# Patient Record
Sex: FEMALE | Race: WHITE | NOT HISPANIC OR LATINO | Employment: OTHER | URBAN - METROPOLITAN AREA
[De-identification: names, ages, dates, MRNs, and addresses within clinical notes are randomized per-mention and may not be internally consistent; named-entity substitution may affect disease eponyms.]

---

## 2017-04-03 ENCOUNTER — TRANSFERRED RECORDS (OUTPATIENT)
Dept: HEALTH INFORMATION MANAGEMENT | Facility: CLINIC | Age: 62
End: 2017-04-03

## 2018-06-05 ENCOUNTER — TRANSFERRED RECORDS (OUTPATIENT)
Dept: HEALTH INFORMATION MANAGEMENT | Facility: CLINIC | Age: 63
End: 2018-06-05

## 2018-12-18 ENCOUNTER — TRANSFERRED RECORDS (OUTPATIENT)
Dept: MULTI SPECIALTY CLINIC | Facility: CLINIC | Age: 63
End: 2018-12-18

## 2019-10-07 ENCOUNTER — ANCILLARY PROCEDURE (OUTPATIENT)
Dept: MAMMOGRAPHY | Facility: CLINIC | Age: 64
End: 2019-10-07
Attending: FAMILY MEDICINE
Payer: COMMERCIAL

## 2019-10-07 DIAGNOSIS — Z12.31 VISIT FOR SCREENING MAMMOGRAM: ICD-10-CM

## 2019-10-07 PROCEDURE — 77067 SCR MAMMO BI INCL CAD: CPT | Mod: TC

## 2019-10-07 PROCEDURE — 77063 BREAST TOMOSYNTHESIS BI: CPT | Mod: TC

## 2019-12-19 ENCOUNTER — TRANSFERRED RECORDS (OUTPATIENT)
Dept: HEALTH INFORMATION MANAGEMENT | Facility: CLINIC | Age: 64
End: 2019-12-19

## 2020-06-17 ENCOUNTER — HOSPITAL ENCOUNTER (OUTPATIENT)
Facility: CLINIC | Age: 65
Setting detail: OBSERVATION
Discharge: HOME | End: 2020-06-20
Attending: EMERGENCY MEDICINE | Admitting: INTERNAL MEDICINE
Payer: COMMERCIAL

## 2020-06-17 DIAGNOSIS — J18.9 PNEUMONIA OF LEFT LOWER LOBE DUE TO INFECTIOUS ORGANISM: ICD-10-CM

## 2020-06-17 DIAGNOSIS — E11.00 TYPE 2 DIABETES MELLITUS WITH HYPEROSMOLARITY WITHOUT COMA, UNSPECIFIED WHETHER LONG TERM INSULIN USE (H): Primary | ICD-10-CM

## 2020-06-17 PROCEDURE — C9803 HOPD COVID-19 SPEC COLLECT: HCPCS

## 2020-06-17 PROCEDURE — 96365 THER/PROPH/DIAG IV INF INIT: CPT | Mod: 59

## 2020-06-17 PROCEDURE — 99285 EMERGENCY DEPT VISIT HI MDM: CPT | Mod: 25

## 2020-06-17 PROCEDURE — 96375 TX/PRO/DX INJ NEW DRUG ADDON: CPT | Mod: 59

## 2020-06-17 PROCEDURE — 96361 HYDRATE IV INFUSION ADD-ON: CPT

## 2020-06-17 PROCEDURE — 93005 ELECTROCARDIOGRAM TRACING: CPT

## 2020-06-17 ASSESSMENT — MIFFLIN-ST. JEOR: SCORE: 1900.79

## 2020-06-18 ENCOUNTER — APPOINTMENT (OUTPATIENT)
Dept: CT IMAGING | Facility: CLINIC | Age: 65
End: 2020-06-18
Attending: EMERGENCY MEDICINE
Payer: COMMERCIAL

## 2020-06-18 ENCOUNTER — APPOINTMENT (OUTPATIENT)
Dept: GENERAL RADIOLOGY | Facility: CLINIC | Age: 65
End: 2020-06-18
Attending: EMERGENCY MEDICINE
Payer: COMMERCIAL

## 2020-06-18 PROBLEM — J18.9 PNEUMONIA: Status: ACTIVE | Noted: 2020-06-18

## 2020-06-18 LAB
ALBUMIN SERPL-MCNC: 2.7 G/DL (ref 3.4–5)
ALBUMIN SERPL-MCNC: 3.3 G/DL (ref 3.4–5)
ALBUMIN UR-MCNC: 10 MG/DL
ALP SERPL-CCNC: 78 U/L (ref 40–150)
ALT SERPL W P-5'-P-CCNC: 31 U/L (ref 0–50)
ANION GAP SERPL CALCULATED.3IONS-SCNC: 4 MMOL/L (ref 3–14)
ANION GAP SERPL CALCULATED.3IONS-SCNC: 6 MMOL/L (ref 3–14)
APPEARANCE UR: CLEAR
AST SERPL W P-5'-P-CCNC: 13 U/L (ref 0–45)
BASOPHILS # BLD AUTO: 0 10E9/L (ref 0–0.2)
BASOPHILS NFR BLD AUTO: 0.2 %
BILIRUB SERPL-MCNC: 0.9 MG/DL (ref 0.2–1.3)
BILIRUB UR QL STRIP: NEGATIVE
BUN SERPL-MCNC: 16 MG/DL (ref 7–30)
BUN SERPL-MCNC: 16 MG/DL (ref 7–30)
CALCIUM SERPL-MCNC: 11.8 MG/DL (ref 8.5–10.1)
CALCIUM SERPL-MCNC: 9.9 MG/DL (ref 8.5–10.1)
CHLORIDE SERPL-SCNC: 101 MMOL/L (ref 94–109)
CHLORIDE SERPL-SCNC: 95 MMOL/L (ref 94–109)
CO2 SERPL-SCNC: 29 MMOL/L (ref 20–32)
CO2 SERPL-SCNC: 29 MMOL/L (ref 20–32)
COLOR UR AUTO: YELLOW
CREAT SERPL-MCNC: 0.8 MG/DL (ref 0.52–1.04)
CREAT SERPL-MCNC: 0.82 MG/DL (ref 0.52–1.04)
D DIMER PPP FEU-MCNC: 0.6 UG/ML FEU (ref 0–0.5)
DIFFERENTIAL METHOD BLD: ABNORMAL
EOSINOPHIL # BLD AUTO: 0.1 10E9/L (ref 0–0.7)
EOSINOPHIL NFR BLD AUTO: 0.7 %
ERYTHROCYTE [DISTWIDTH] IN BLOOD BY AUTOMATED COUNT: 12.9 % (ref 10–15)
GFR SERPL CREATININE-BSD FRML MDRD: 76 ML/MIN/{1.73_M2}
GFR SERPL CREATININE-BSD FRML MDRD: 78 ML/MIN/{1.73_M2}
GLUCOSE BLDC GLUCOMTR-MCNC: 282 MG/DL (ref 70–99)
GLUCOSE BLDC GLUCOMTR-MCNC: 306 MG/DL (ref 70–99)
GLUCOSE BLDC GLUCOMTR-MCNC: 318 MG/DL (ref 70–99)
GLUCOSE BLDC GLUCOMTR-MCNC: 357 MG/DL (ref 70–99)
GLUCOSE BLDC GLUCOMTR-MCNC: 382 MG/DL (ref 70–99)
GLUCOSE SERPL-MCNC: 321 MG/DL (ref 70–99)
GLUCOSE SERPL-MCNC: 420 MG/DL (ref 70–99)
GLUCOSE UR STRIP-MCNC: >1000 MG/DL
HBA1C MFR BLD: 12.4 % (ref 0–5.6)
HCT VFR BLD AUTO: 44.9 % (ref 35–47)
HGB BLD-MCNC: 14.7 G/DL (ref 11.7–15.7)
HGB UR QL STRIP: NEGATIVE
IMM GRANULOCYTES # BLD: 0.1 10E9/L (ref 0–0.4)
IMM GRANULOCYTES NFR BLD: 0.4 %
INTERPRETATION ECG - MUSE: NORMAL
KETONES UR STRIP-MCNC: 10 MG/DL
LACTATE BLD-SCNC: 1.5 MMOL/L (ref 0.7–2)
LEUKOCYTE ESTERASE UR QL STRIP: NEGATIVE
LIPASE SERPL-CCNC: 191 U/L (ref 73–393)
LYMPHOCYTES # BLD AUTO: 1.4 10E9/L (ref 0.8–5.3)
LYMPHOCYTES NFR BLD AUTO: 8.3 %
MAGNESIUM SERPL-MCNC: 1.7 MG/DL (ref 1.6–2.3)
MCH RBC QN AUTO: 31.5 PG (ref 26.5–33)
MCHC RBC AUTO-ENTMCNC: 32.7 G/DL (ref 31.5–36.5)
MCV RBC AUTO: 96 FL (ref 78–100)
MONOCYTES # BLD AUTO: 1.6 10E9/L (ref 0–1.3)
MONOCYTES NFR BLD AUTO: 9.8 %
MUCOUS THREADS #/AREA URNS LPF: PRESENT /LPF
NEUTROPHILS # BLD AUTO: 13.3 10E9/L (ref 1.6–8.3)
NEUTROPHILS NFR BLD AUTO: 80.6 %
NITRATE UR QL: NEGATIVE
NRBC # BLD AUTO: 0 10*3/UL
NRBC BLD AUTO-RTO: 0 /100
OSMOLALITY UR: 814 MMOL/KG (ref 100–1200)
PH UR STRIP: 6 PH (ref 5–7)
PHOSPHATE SERPL-MCNC: 1.9 MG/DL (ref 2.5–4.5)
PLATELET # BLD AUTO: 358 10E9/L (ref 150–450)
POTASSIUM SERPL-SCNC: 3.8 MMOL/L (ref 3.4–5.3)
POTASSIUM SERPL-SCNC: 3.9 MMOL/L (ref 3.4–5.3)
PROT SERPL-MCNC: 9.3 G/DL (ref 6.8–8.8)
RBC # BLD AUTO: 4.67 10E12/L (ref 3.8–5.2)
RBC #/AREA URNS AUTO: <1 /HPF (ref 0–2)
SARS-COV-2 RNA SPEC QL NAA+PROBE: NOT DETECTED
SODIUM SERPL-SCNC: 130 MMOL/L (ref 133–144)
SODIUM SERPL-SCNC: 134 MMOL/L (ref 133–144)
SODIUM UR-SCNC: 36 MMOL/L
SOURCE: ABNORMAL
SP GR UR STRIP: 1.01 (ref 1–1.03)
SPECIMEN SOURCE: NORMAL
SQUAMOUS #/AREA URNS AUTO: 4 /HPF (ref 0–1)
TROPONIN I SERPL-MCNC: <0.015 UG/L (ref 0–0.04)
UROBILINOGEN UR STRIP-MCNC: NORMAL MG/DL (ref 0–2)
WBC # BLD AUTO: 16.5 10E9/L (ref 4–11)
WBC #/AREA URNS AUTO: 2 /HPF (ref 0–5)

## 2020-06-18 PROCEDURE — 85025 COMPLETE CBC W/AUTO DIFF WBC: CPT | Performed by: EMERGENCY MEDICINE

## 2020-06-18 PROCEDURE — 25000125 ZZHC RX 250: Performed by: HOSPITALIST

## 2020-06-18 PROCEDURE — 99220 ZZC INITIAL OBSERVATION CARE,LEVL III: CPT | Performed by: INTERNAL MEDICINE

## 2020-06-18 PROCEDURE — 96365 THER/PROPH/DIAG IV INF INIT: CPT | Mod: 59

## 2020-06-18 PROCEDURE — 84484 ASSAY OF TROPONIN QUANT: CPT | Performed by: EMERGENCY MEDICINE

## 2020-06-18 PROCEDURE — 71045 X-RAY EXAM CHEST 1 VIEW: CPT

## 2020-06-18 PROCEDURE — 25000131 ZZH RX MED GY IP 250 OP 636 PS 637: Performed by: INTERNAL MEDICINE

## 2020-06-18 PROCEDURE — 83036 HEMOGLOBIN GLYCOSYLATED A1C: CPT | Performed by: EMERGENCY MEDICINE

## 2020-06-18 PROCEDURE — U0003 INFECTIOUS AGENT DETECTION BY NUCLEIC ACID (DNA OR RNA); SEVERE ACUTE RESPIRATORY SYNDROME CORONAVIRUS 2 (SARS-COV-2) (CORONAVIRUS DISEASE [COVID-19]), AMPLIFIED PROBE TECHNIQUE, MAKING USE OF HIGH THROUGHPUT TECHNOLOGIES AS DESCRIBED BY CMS-2020-01-R: HCPCS | Performed by: EMERGENCY MEDICINE

## 2020-06-18 PROCEDURE — 83605 ASSAY OF LACTIC ACID: CPT | Performed by: INTERNAL MEDICINE

## 2020-06-18 PROCEDURE — 83735 ASSAY OF MAGNESIUM: CPT | Performed by: INTERNAL MEDICINE

## 2020-06-18 PROCEDURE — 96372 THER/PROPH/DIAG INJ SC/IM: CPT

## 2020-06-18 PROCEDURE — 25000132 ZZH RX MED GY IP 250 OP 250 PS 637: Performed by: EMERGENCY MEDICINE

## 2020-06-18 PROCEDURE — 83690 ASSAY OF LIPASE: CPT | Performed by: EMERGENCY MEDICINE

## 2020-06-18 PROCEDURE — 00000146 ZZHCL STATISTIC GLUCOSE BY METER IP

## 2020-06-18 PROCEDURE — 25000132 ZZH RX MED GY IP 250 OP 250 PS 637: Performed by: INTERNAL MEDICINE

## 2020-06-18 PROCEDURE — 96375 TX/PRO/DX INJ NEW DRUG ADDON: CPT | Mod: 59

## 2020-06-18 PROCEDURE — 36415 COLL VENOUS BLD VENIPUNCTURE: CPT | Performed by: INTERNAL MEDICINE

## 2020-06-18 PROCEDURE — G0378 HOSPITAL OBSERVATION PER HR: HCPCS

## 2020-06-18 PROCEDURE — 80053 COMPREHEN METABOLIC PANEL: CPT | Performed by: EMERGENCY MEDICINE

## 2020-06-18 PROCEDURE — 96376 TX/PRO/DX INJ SAME DRUG ADON: CPT | Mod: 59

## 2020-06-18 PROCEDURE — 87040 BLOOD CULTURE FOR BACTERIA: CPT | Performed by: EMERGENCY MEDICINE

## 2020-06-18 PROCEDURE — 85379 FIBRIN DEGRADATION QUANT: CPT | Performed by: EMERGENCY MEDICINE

## 2020-06-18 PROCEDURE — 71275 CT ANGIOGRAPHY CHEST: CPT

## 2020-06-18 PROCEDURE — 99207 ZZC NON-BILLABLE SERV PER CHARTING: CPT | Performed by: PHYSICIAN ASSISTANT

## 2020-06-18 PROCEDURE — 25800030 ZZH RX IP 258 OP 636: Performed by: HOSPITALIST

## 2020-06-18 PROCEDURE — C9113 INJ PANTOPRAZOLE SODIUM, VIA: HCPCS | Performed by: INTERNAL MEDICINE

## 2020-06-18 PROCEDURE — 25000128 H RX IP 250 OP 636: Performed by: EMERGENCY MEDICINE

## 2020-06-18 PROCEDURE — 25800030 ZZH RX IP 258 OP 636: Performed by: EMERGENCY MEDICINE

## 2020-06-18 PROCEDURE — 81001 URINALYSIS AUTO W/SCOPE: CPT | Performed by: EMERGENCY MEDICINE

## 2020-06-18 PROCEDURE — 99207 ZZC NON-BILLABLE SERV PER CHARTING: CPT | Performed by: INTERNAL MEDICINE

## 2020-06-18 PROCEDURE — 25000128 H RX IP 250 OP 636: Performed by: INTERNAL MEDICINE

## 2020-06-18 PROCEDURE — 25000125 ZZHC RX 250: Performed by: EMERGENCY MEDICINE

## 2020-06-18 PROCEDURE — 83935 ASSAY OF URINE OSMOLALITY: CPT | Performed by: EMERGENCY MEDICINE

## 2020-06-18 PROCEDURE — 96361 HYDRATE IV INFUSION ADD-ON: CPT

## 2020-06-18 PROCEDURE — 84300 ASSAY OF URINE SODIUM: CPT | Performed by: EMERGENCY MEDICINE

## 2020-06-18 PROCEDURE — 80069 RENAL FUNCTION PANEL: CPT | Performed by: INTERNAL MEDICINE

## 2020-06-18 RX ORDER — ONDANSETRON 4 MG/1
4 TABLET, ORALLY DISINTEGRATING ORAL EVERY 6 HOURS PRN
Status: DISCONTINUED | OUTPATIENT
Start: 2020-06-18 | End: 2020-06-20 | Stop reason: HOSPADM

## 2020-06-18 RX ORDER — PROCHLORPERAZINE 25 MG
25 SUPPOSITORY, RECTAL RECTAL EVERY 12 HOURS PRN
Status: DISCONTINUED | OUTPATIENT
Start: 2020-06-18 | End: 2020-06-20 | Stop reason: HOSPADM

## 2020-06-18 RX ORDER — METHOCARBAMOL 500 MG/1
500 TABLET, FILM COATED ORAL ONCE
Status: COMPLETED | OUTPATIENT
Start: 2020-06-18 | End: 2020-06-18

## 2020-06-18 RX ORDER — CEFTRIAXONE 2 G/1
2 INJECTION, POWDER, FOR SOLUTION INTRAMUSCULAR; INTRAVENOUS EVERY 24 HOURS
Status: DISCONTINUED | OUTPATIENT
Start: 2020-06-19 | End: 2020-06-20 | Stop reason: HOSPADM

## 2020-06-18 RX ORDER — CEFTRIAXONE 1 G/1
1 INJECTION, POWDER, FOR SOLUTION INTRAMUSCULAR; INTRAVENOUS ONCE
Status: COMPLETED | OUTPATIENT
Start: 2020-06-18 | End: 2020-06-18

## 2020-06-18 RX ORDER — TRIAMCINOLONE ACETONIDE 1 MG/G
CREAM TOPICAL DAILY PRN
COMMUNITY
End: 2021-10-14

## 2020-06-18 RX ORDER — IBUPROFEN 600 MG/1
600 TABLET, FILM COATED ORAL 3 TIMES DAILY
Status: DISCONTINUED | OUTPATIENT
Start: 2020-06-18 | End: 2020-06-19

## 2020-06-18 RX ORDER — DEXTROSE MONOHYDRATE 25 G/50ML
25-50 INJECTION, SOLUTION INTRAVENOUS
Status: DISCONTINUED | OUTPATIENT
Start: 2020-06-18 | End: 2020-06-20 | Stop reason: HOSPADM

## 2020-06-18 RX ORDER — LEVOTHYROXINE SODIUM 100 UG/1
100 TABLET ORAL DAILY
COMMUNITY
End: 2020-07-20

## 2020-06-18 RX ORDER — NICOTINE POLACRILEX 4 MG
15-30 LOZENGE BUCCAL
Status: DISCONTINUED | OUTPATIENT
Start: 2020-06-18 | End: 2020-06-20 | Stop reason: HOSPADM

## 2020-06-18 RX ORDER — FUROSEMIDE 40 MG
40 TABLET ORAL DAILY PRN
COMMUNITY
End: 2021-08-24

## 2020-06-18 RX ORDER — AZITHROMYCIN 500 MG/1
500 INJECTION, POWDER, LYOPHILIZED, FOR SOLUTION INTRAVENOUS ONCE
Status: COMPLETED | OUTPATIENT
Start: 2020-06-18 | End: 2020-06-18

## 2020-06-18 RX ORDER — HYDROMORPHONE HYDROCHLORIDE 1 MG/ML
0.5 INJECTION, SOLUTION INTRAMUSCULAR; INTRAVENOUS; SUBCUTANEOUS
Status: DISCONTINUED | OUTPATIENT
Start: 2020-06-18 | End: 2020-06-18

## 2020-06-18 RX ORDER — LOSARTAN POTASSIUM 25 MG/1
25 TABLET ORAL DAILY
COMMUNITY
End: 2020-07-20

## 2020-06-18 RX ORDER — TRIAMTERENE AND HYDROCHLOROTHIAZIDE 37.5; 25 MG/1; MG/1
2 CAPSULE ORAL EVERY MORNING
COMMUNITY
End: 2020-10-01

## 2020-06-18 RX ORDER — ACETAMINOPHEN 325 MG/1
650 TABLET ORAL EVERY 4 HOURS PRN
Status: DISCONTINUED | OUTPATIENT
Start: 2020-06-18 | End: 2020-06-20 | Stop reason: HOSPADM

## 2020-06-18 RX ORDER — SODIUM CHLORIDE 9 MG/ML
1000 INJECTION, SOLUTION INTRAVENOUS CONTINUOUS
Status: ACTIVE | OUTPATIENT
Start: 2020-06-18 | End: 2020-06-18

## 2020-06-18 RX ORDER — METFORMIN HCL 500 MG
1000 TABLET, EXTENDED RELEASE 24 HR ORAL
COMMUNITY
End: 2020-09-18

## 2020-06-18 RX ORDER — PROCHLORPERAZINE MALEATE 5 MG
10 TABLET ORAL EVERY 6 HOURS PRN
Status: DISCONTINUED | OUTPATIENT
Start: 2020-06-18 | End: 2020-06-20 | Stop reason: HOSPADM

## 2020-06-18 RX ORDER — ONDANSETRON 2 MG/ML
4 INJECTION INTRAMUSCULAR; INTRAVENOUS EVERY 6 HOURS PRN
Status: DISCONTINUED | OUTPATIENT
Start: 2020-06-18 | End: 2020-06-20 | Stop reason: HOSPADM

## 2020-06-18 RX ORDER — CALCIUM CARBONATE 500 MG/1
TABLET, CHEWABLE ORAL
Status: DISCONTINUED
Start: 2020-06-18 | End: 2020-06-19

## 2020-06-18 RX ORDER — ACETAMINOPHEN 325 MG/1
TABLET ORAL
Status: DISCONTINUED
Start: 2020-06-18 | End: 2020-06-19

## 2020-06-18 RX ORDER — MULTIVITAMIN,THERAPEUTIC
1 TABLET ORAL DAILY
COMMUNITY

## 2020-06-18 RX ORDER — IOPAMIDOL 755 MG/ML
83 INJECTION, SOLUTION INTRAVASCULAR ONCE
Status: COMPLETED | OUTPATIENT
Start: 2020-06-18 | End: 2020-06-18

## 2020-06-18 RX ORDER — PRAVASTATIN SODIUM 20 MG
20 TABLET ORAL DAILY
COMMUNITY
End: 2020-06-25

## 2020-06-18 RX ORDER — ACETAMINOPHEN 650 MG/1
650 SUPPOSITORY RECTAL EVERY 4 HOURS PRN
Status: DISCONTINUED | OUTPATIENT
Start: 2020-06-18 | End: 2020-06-20 | Stop reason: HOSPADM

## 2020-06-18 RX ORDER — HYDROMORPHONE HYDROCHLORIDE 1 MG/ML
.3-.5 INJECTION, SOLUTION INTRAMUSCULAR; INTRAVENOUS; SUBCUTANEOUS
Status: DISCONTINUED | OUTPATIENT
Start: 2020-06-18 | End: 2020-06-20 | Stop reason: HOSPADM

## 2020-06-18 RX ORDER — ALLOPURINOL 300 MG/1
300 TABLET ORAL DAILY
COMMUNITY
End: 2021-01-18

## 2020-06-18 RX ORDER — LIDOCAINE 40 MG/G
CREAM TOPICAL
Status: DISCONTINUED | OUTPATIENT
Start: 2020-06-18 | End: 2020-06-20 | Stop reason: HOSPADM

## 2020-06-18 RX ORDER — CALCIUM CARBONATE 500 MG/1
1000 TABLET, CHEWABLE ORAL EVERY 4 HOURS PRN
Status: DISCONTINUED | OUTPATIENT
Start: 2020-06-18 | End: 2020-06-20 | Stop reason: HOSPADM

## 2020-06-18 RX ORDER — SODIUM CHLORIDE 9 MG/ML
1000 INJECTION, SOLUTION INTRAVENOUS CONTINUOUS
Status: DISCONTINUED | OUTPATIENT
Start: 2020-06-18 | End: 2020-06-18

## 2020-06-18 RX ADMIN — IBUPROFEN 600 MG: 600 TABLET ORAL at 08:51

## 2020-06-18 RX ADMIN — SODIUM CHLORIDE 1000 ML: 9 INJECTION, SOLUTION INTRAVENOUS at 03:56

## 2020-06-18 RX ADMIN — SODIUM CHLORIDE 1000 ML: 9 INJECTION, SOLUTION INTRAVENOUS at 01:36

## 2020-06-18 RX ADMIN — LIDOCAINE HYDROCHLORIDE 30 ML: 20 SOLUTION ORAL; TOPICAL at 00:07

## 2020-06-18 RX ADMIN — IBUPROFEN 600 MG: 600 TABLET ORAL at 13:57

## 2020-06-18 RX ADMIN — PANTOPRAZOLE SODIUM 40 MG: 40 INJECTION, POWDER, FOR SOLUTION INTRAVENOUS at 08:38

## 2020-06-18 RX ADMIN — METHOCARBAMOL 500 MG: 500 TABLET, FILM COATED ORAL at 03:11

## 2020-06-18 RX ADMIN — INSULIN GLARGINE 24 UNITS: 100 INJECTION, SOLUTION SUBCUTANEOUS at 12:11

## 2020-06-18 RX ADMIN — HYDROMORPHONE HYDROCHLORIDE 0.5 MG: 1 INJECTION, SOLUTION INTRAMUSCULAR; INTRAVENOUS; SUBCUTANEOUS at 05:12

## 2020-06-18 RX ADMIN — CEFTRIAXONE SODIUM 1 G: 1 INJECTION, POWDER, FOR SOLUTION INTRAMUSCULAR; INTRAVENOUS at 03:12

## 2020-06-18 RX ADMIN — IBUPROFEN 600 MG: 600 TABLET ORAL at 20:04

## 2020-06-18 RX ADMIN — POTASSIUM PHOSPHATE, MONOBASIC AND POTASSIUM PHOSPHATE, DIBASIC 20 MMOL: 224; 236 INJECTION, SOLUTION INTRAVENOUS at 23:09

## 2020-06-18 RX ADMIN — CEFTRIAXONE SODIUM 1 G: 1 INJECTION, POWDER, FOR SOLUTION INTRAMUSCULAR; INTRAVENOUS at 05:39

## 2020-06-18 RX ADMIN — AZITHROMYCIN MONOHYDRATE 500 MG: 500 INJECTION, POWDER, LYOPHILIZED, FOR SOLUTION INTRAVENOUS at 03:39

## 2020-06-18 RX ADMIN — HYDROMORPHONE HYDROCHLORIDE 0.5 MG: 1 INJECTION, SOLUTION INTRAMUSCULAR; INTRAVENOUS; SUBCUTANEOUS at 10:02

## 2020-06-18 RX ADMIN — ACETAMINOPHEN 650 MG: 325 TABLET, FILM COATED ORAL at 23:48

## 2020-06-18 RX ADMIN — IOPAMIDOL 83 ML: 755 INJECTION, SOLUTION INTRAVENOUS at 01:52

## 2020-06-18 RX ADMIN — SODIUM CHLORIDE 1000 ML: 9 INJECTION, SOLUTION INTRAVENOUS at 10:02

## 2020-06-18 RX ADMIN — HYDROMORPHONE HYDROCHLORIDE 0.5 MG: 1 INJECTION, SOLUTION INTRAMUSCULAR; INTRAVENOUS; SUBCUTANEOUS at 01:52

## 2020-06-18 RX ADMIN — Medication 20 MG: at 00:07

## 2020-06-18 RX ADMIN — CALCIUM CARBONATE (ANTACID) CHEW TAB 500 MG 1000 MG: 500 CHEW TAB at 23:54

## 2020-06-18 RX ADMIN — INSULIN ASPART 2 UNITS: 100 INJECTION, SOLUTION INTRAVENOUS; SUBCUTANEOUS at 21:40

## 2020-06-18 ASSESSMENT — ENCOUNTER SYMPTOMS
ARTHRALGIAS: 1
FREQUENCY: 1
COUGH: 1
FEVER: 1

## 2020-06-18 NOTE — PLAN OF CARE
VSS, A/Ox4. SBA. Has pain in left shoulder and discomfort in chest. SOB upon exertion. Denies tingling or numbness in extremities. Chest x-ray showed possible pneumonia. Pain controlled with Dilaudid and Tylenol. Bg has been running high, last one was 357. Need sputum sample and gram stain sample. Covid results pending.

## 2020-06-18 NOTE — ED NOTES
"[facility]  ED Nurse Handoff Report    ED Chief complaint: Shoulder Pain      ED Diagnosis:   Final diagnoses:   None       Code Status: Full Code    Allergies: Allergies not on file    Patient Story: left chest and shoulder pain  Focused Assessment:  Patient has had left shoulder and chest pain x 2 days. She reports discomfort started 2 days ago after eating mexican food. She denies cough or shortness of breath but is running a low grade fever.     Treatments and/or interventions provided: labs, chest xray, chest CT, urine  Patient's response to treatments and/or interventions: hanyt continues to c/o discomfort     To be done/followed up on inpatient unit:  TBD    Does this patient have any cognitive concerns?: alert and oriented x 4    Activity level - Baseline/Home:  Independent  Activity Level - Current:   Stand with Assist    Patient's Preferred language: English   Needed?: No    Isolation: None and Other: covid rule out  Infection: covid pending  Bariatric?: No    Vital Signs:   Vitals:    06/17/20 2309 06/17/20 2314 06/18/20 0100   BP:  (!) 150/86 (!) 160/92   Pulse:   110   Temp: 99.6  F (37.6  C)     TempSrc: Oral     SpO2:  95% 93%   Weight:  136.1 kg (300 lb)    Height:  1.626 m (5' 4\")        Cardiac Rhythm:     Was the PSS-3 completed:   Yes  What interventions are required if any?               Family Comments: SO updated  OBS brochure/video discussed/provided to patient/family: Yes              Name of person given brochure if not patient: na              Relationship to patient: na    For the majority of the shift this patient's behavior was Green.   Behavioral interventions performed were na.    ED NURSE PHONE NUMBER: *[phone]         "

## 2020-06-18 NOTE — PLAN OF CARE
COVID-19 RESULTS PENDING. A&Ox4. VSS on RA, NICHOLS. IS at bedside, proper technique demonstrated, pt has been doing 10 breaths q hr while awake. Na 130, 1500mL fluid restriction. BGs 306 and 382, see eMAR for insulin schedule. Up ind. Urinary frequency. Chest discomfort/L shoulder pain controlled w/ scheduled ibuprofen and dilaudid x1. Orlin Mod carb diet. Sputum sample needed. Pt ok to transfer if COVID negative (see MD note). Continue to monitor.      Magnesium and renal panel needed, this RN attempted to draw but unsuccessful. Attempted to call lab [id number] but no answer. Alicia REHMAN who is assuming cares for evening is aware and will follow up w/ lab.

## 2020-06-18 NOTE — PHARMACY-ADMISSION MEDICATION HISTORY
Pharmacy Medication History  Admission medication history interview status for the 6/17/2020  admission is complete. See EPIC admission navigator for prior to admission medications     Medication history sources: Patient and Pharmacy ([facility]), care everywhere  Medication history source reliability: Good  Adherence assessment: not assessed    Significant changes made to the medication list:  All meds added to list      Additional medication history information:   Pt thought she was taking Triam/HCTZ 50-25, but pharmacy filling 37.5-25    Medication reconciliation completed by provider prior to medication history? No    Time spent in this activity: 30 minutes      Prior to Admission medications    Medication Sig Last Dose Taking? Auth Provider   allopurinol (ZYLOPRIM) 300 MG tablet Take 300 mg by mouth daily 6/17/2020 at am Yes Unknown, Entered By History   aspirin (ASA) 325 MG EC tablet Take 325 mg by mouth daily 6/17/2020 at am Yes Unknown, Entered By History   furosemide (LASIX) 40 MG tablet Take 40 mg by mouth daily as needed (swelling) prn Yes Unknown, Entered By History   levothyroxine (SYNTHROID/LEVOTHROID) 100 MCG tablet Take 100 mcg by mouth daily 6/17/2020 at am Yes Unknown, Entered By History   losartan (COZAAR) 25 MG tablet Take 25 mg by mouth daily 6/17/2020 at am Yes Unknown, Entered By History   metFORMIN (GLUCOPHAGE-XR) 500 MG 24 hr tablet Take 1,000 mg by mouth daily (with breakfast) 6/17/2020 at am Yes Unknown, Entered By History   multivitamin, therapeutic (THERA-VIT) TABS tablet Take 1 tablet by mouth daily 6/17/2020 at am Yes Unknown, Entered By History   pravastatin (PRAVACHOL) 20 MG tablet Take 20 mg by mouth daily 6/17/2020 at am Yes Unknown, Entered By History   triamcinolone (KENALOG) 0.1 % external cream Apply topically daily as needed for irritation (psoriasis) prn Yes Unknown, Entered By History   triamterene-HCTZ (DYAZIDE) 37.5-25 MG capsule Take 2 capsules by mouth every morning 6/17/2020  at am Yes Unknown, Entered By History

## 2020-06-18 NOTE — PROGRESS NOTES
RECEIVING UNIT ED HANDOFF REVIEW    ED Nurse Handoff Report was reviewed by: Janet Guevara RN on June 18, 2020 at 3:27 AM

## 2020-06-18 NOTE — PROGRESS NOTES
Observation goals      List all goals to be met before discharge home:     - Dyspnea improved and oxygen saturations greater than 88% on room air or prior home oxygen levels: Met   - Tolerates oral antibiotics or has plans for home infusion set up: Not met  - Vital signs normal or at patient baseline: Met   - Infection is improving: Not met   - Return to baseline functional status: Not met   - Safe disposition plan has been identified: Not met     - Nurse to notify provider when observation goals have been met and patient is ready for discharge.

## 2020-06-18 NOTE — ED PROVIDER NOTES
"History     Chief Complaint:    Shoulder Pain       The history is provided by the patient.     Crystal Nicole is a 64 year old female who presents for evaluation of shoulder pain. The patient reports left chest and shoulder pain that becomes worse when she lays down or takes a deep breath. She has tried Tylenol, Ibuprofen, Tums and other antacids but with no relief. Patient states they just make her burp. She also endorses cough, congestion, urinary frequency for a couple weeks and fever. She denies traumas or falls.       Allergies:  No Known Drug Allergies     Medications:   Maxzide   Zyloprim   Cozaar  Metformin   Lipitor   Synthroid   Soma   Lasix   Pravastatin   Dyazide     Medical History:   Hypertension   Edema   Obesity   Gout   Eczema   Hypothyroidism   Generalized anxiety disorder  Medial meniscus tear    Surgical History   Cholecystectomy   Hernia   Right knee arthroscopy with medial debridement  Colonoscopy   Hysteroscopy D & C 2x      Family History:   Family history reviewed. No pertinent family history.     Social History:  Patient was not accompanied to the ED.  Smoking Status: Negative   Smokeless Tobacco: Negative   Alcohol Use: Negative   Drug Use: Negative   Primary Physician: Jia Francois     Review of Systems   Constitutional: Positive for fever.   HENT: Positive for congestion.    Respiratory: Positive for cough.    Cardiovascular: Positive for chest pain.   Genitourinary: Positive for frequency.   Musculoskeletal: Positive for arthralgias (shoulder).   All other systems reviewed and are negative.      Physical Exam     Patient Vitals for the past 24 hrs:   BP Temp Temp src Pulse Heart Rate SpO2 Height Weight   06/18/20 0100 (!) 160/92 -- -- 110 -- 93 % -- --   06/17/20 2314 (!) 150/86 -- -- -- 116 95 % 1.626 m (5' 4\") 136.1 kg (300 lb)   06/17/20 2309 -- 99.6  F (37.6  C) Oral -- -- -- -- --       Physical Exam  General: Appears well-developed and well-nourished.   Head: No signs of " trauma.   Neck: Normal range of motion. No nuchal rigidity. No cervical adenopathy  CV: Normal rate and regular rhythm.    Resp: Effort normal and breath sounds normal. No respiratory distress.   GI: Soft. There is no tenderness.  No rebound or guarding.  Normal bowel sounds.  No CVA tenderness.  MSK: Normal range of motion. no edema. No Calf tenderness.  Neuro: The patient is alert and oriented.  Speech normal.  Skin: Skin is warm and dry. No rash noted.   Psych: normal mood and affect. behavior is normal.       Emergency Department Course     ECG:  Indication: Shoulder Pain  Time: 2344  Vent. Rate 111 bpm. OH interval 156. QRS duration 86. QT/QTc 318/432. P-R-T axis 24 -10 97. Sinus tachycardia with frequent premature ventriclar complexes. ST & T wave abnormality, consider lateral ischemia. Abnormal ECG. Read time: 2347     Imaging:  Radiology findings were communicated with the patient and Admitting MD who voiced understanding of the findings.    XR Chest, Portable, G/E 1 view:   Shallow inspiration. Heart is normal in size. Small amount of left basilar atelectasis or infiltrate. Lung findings accentuated due to level of inspiration. Minimal right lower lung atelectasis. Upper lungs clear. Degenerative change both AC   joints. Limited study. As per radiology.    CT Chest w/ IV contrast - PE protocol:   1.  There is no pulmonary embolus, aortic aneurysm or dissection.  2.  Left basilar pneumonia and small left pleural effusion. As per radiology.     Laboratory:  Laboratory findings were communicated with the patient and Admitting MD who voiced understanding of the findings.    CMP: Glucose 420 (H), Sodium: 130 (L), Calcium: 11.8 (H), Albumin: 3.3 (L), Protein: 9.3 (H)  o/w WNL (Creatinine: 0.82)    CBC: WBC: 16.5 (H), HGB: 14.7, PLT: 358    Lipase: 191    0001 Troponin: <0.015    D dimer quantitative: 0.6 (H)    Hemoglobin A1c: 12.4 (H)    UA with Microscopic: Glucose: >1,000 (A), Ketones: 10 (A), Protein Albumin:  10 (A), Squamous Epithelial: 4 (H), Mucous: Present (A) o/w Negative    COVID-19 Virus (Coronavirus), PCR NP Swab: Pending       Interventions:  0007 GI Cocktail 30 mL PO  0007 Pepcid 20 mg IV  0136 NS 1L IV  0152 Dilaudid 0.5 mg IV  0311 Robaxin 500 mg PO    Emergency Department Course:  Past medical records, nursing notes, and vitals reviewed.    2332 I performed an exam of the patient as documented above.     EKG obtained in the ED, see results above.     IV was inserted and blood was drawn for laboratory testing, results above.    The patient was sent for a Chest X-ray while in the emergency department, results above.     0240 I rechecked the patient and discussed the results of her workup thus far.     0254 I consulted with Dr. Medeiros, Hospitalist, regarding the patient's history and presentation here in the emergency department.    Findings and plan explained to the Patient who consents to admission. Discussed the patient with Dr. Medeiros, who will admit the patient to a Observation bed for further monitoring, evaluation, and treatment.    I personally reviewed the laboratory and imaging results with the Patient and answered all related questions prior to admission.     Impression & Plan     Covid-19  Crystal Nicole was evaluated during a global COVID-19 pandemic, which necessitated consideration that the patient might be at risk for infection with the SARS-CoV-2 virus that causes COVID-19.   Applicable protocols for evaluation were followed during the patient's care.   COVID-19 was considered as part of the patient's evaluation. The plan for testing is:  a test was obtained during this visit.    Medical Decision Making:  Crystal Nicole is a 64-year-old woman presents due to left shoulder and chest pain.  Reports that she is had discomfort the last couple of days and became worse.  States that she been unable to find a comfortable position but ultimately feels best sitting up.  She has had a cough and felt somewhat  congested.  On my evaluation she did not appear in significant distress.  Blood work was obtained that did show an elevated white blood cell count.  Given the pleuritic component to her symptoms I did obtain a d-dimer which came back elevated so CT scan was obtained.  I also obtained a CT scan of the abdomen and pelvis given her symptoms with an area that could be either etiology.  CT scan showed signs of left basilar pneumonia.  Given the patient's continued discomfort despite a few rounds of IV Dilaudid along with her elevated blood sugar levels which seems to be chronic, she will be admitted to the hospital service for IV antibiotics and symptom management.  Pain in the shoulder is likely pleuritic from irritation to the diaphragm.    Diagnosis:    ICD-10-CM    1. Pneumonia of left lower lobe due to infectious organism  J18.9        Disposition:  Admitted to Dr. Medeiros.    Scribe Disclosure:  I, David Clayton, am serving as a scribe at 11:33 PM on 6/17/2020 to document services personally performed by Adan Johnson MD based on my observations and the provider's statements to me.        Adan Johnson MD  06/18/20 6766

## 2020-06-18 NOTE — PROGRESS NOTES
Patient seen and evaluated today history updated.  Examination done.  Agree with IV ceftriaxone and azithromycin for left lower lobe pneumonia/empty can pneumonia  Hyponatremia can be secondary to increased fluid intake and hydrochlorothiazide.  Stop IV fluids in few hours, fluid restriction 1500 mL in 24 hours.  Incentive spirometry and flutter  Once COVID-19 come back negative can be transferred to general medicine floor.  Switch Lantus to 20 units in the morning, start on NovoLog 8 units with the meal  Keep her on sliding scale insulin for correction hypoglycemia protocol

## 2020-06-18 NOTE — H&P
[facility]    History and Physical - Hospitalist Service       Date of Admission:  6/17/2020    Assessment & Plan   Crystal Nicole is a 64 year old female admitted on 6/17/2020. She presents the emergency department with complaints of positional left shoulder pain, worsened with deep inspiration and laying back.  Found on evaluation to have left basilar pneumonia.    Left basilar pneumonia with associated pleuritis: Leukocytosis, dry cough.  Elevated d-dimer with pleuritic chest discomfort, though CT imaging demonstrates no pulmonary emboli, though left lower lobe infiltrate with pleural effusion.  -Ibuprofen 600 mg 3 times daily  -Acetaminophen, oxycodone, low-dose Dilaudid available x3 doses if needed  -Received azithromycin as well as 1 g ceftriaxone in the emergency department.  Continuing azithromycin, additional 1 g ceftriaxone now and 2 g every 24 hours for pneumonia treatment.  -Sputum culture and Gram stain given finding of pleural effusion  -COVID swab pending for rule out.  Low suspicion.    Indigestion: Patient with belching which preceded chest discomfort, though has persisted  -Protonix daily IV given initiation of ibuprofen for pleuritic chest discomfort  -PRN GI cocktail available    Uncontrolled type 2 diabetes: Hemoglobin A1c 12.4 in the emergency department.  Blood glucose in the 400 range.  -Discussed need for insulin initiation with patient on admission  -Initiating Lantus 20 units daily.  Anticipate need for prescription of this at discharge  -Can resume prior to admission metformin when reconciled by pharmacy  -Prandial insulin at 1 unit per 20 g carbohydrate as well as medium dose sliding scale insulin during hospitalization.  -Resume prior to admission statin when reconciled by pharmacy or at discharge given observation admission    Morbid obesity: Increased risk of all cause mortality.  BMI greater than 51.  Comorbidities of diabetes, hypertension, possible hyperlipidemia.  She  reports she does not have sleep apnea.  -Recommend follow-up in bariatric clinic for physiotherapy, dietitian, and surgical follow-up.  This was discussed with patient on admission    Hypertension:  -Can resume prior to admission hydrochlorothiazide when reconciled by pharmacy    Hypothyroidism:  -Resume prior to admission levothyroxine at discharge.    Psoriasis: Rashes on bilateral legs with typical scaling plaque formation.  -Resume prior to admission topicals when reconciled by pharmacy       Diet: Combination Diet 6859-2223 Calories: Moderate Consistent CHO (4-6 CHO units/meal); 2 gm NA Diet    DVT Prophylaxis: Ambulate every shift  Chowdhury Catheter: not present  Code Status: Full Code    Rule Out COVID-19 Handoff:  Crystal is a LOW SUSPICION PUI.  Follow these instructions:    If COVID test positive -> continue isolation precautions    If COVID test negative -> discontinue COVID-specific isolation precautions       Disposition Plan   Expected discharge: Tomorrow, recommended to prior living arrangement once pain better controlled, transitioned to oral antibiotics.  Entered: Teddy Medeiros MD 06/18/2020, 5:03 AM     The patient's care was discussed with the Patient and Dr Conroy in the ER.    Teddy Medeiros MD  [facility]    ______________________________________________________________________    Chief Complaint   Pleuritic chest discomfort, cough    History obtained from patient, chart review, discussion with Dr. Conroy in the emergency department.    History of Present Illness   Crystal Nicole is a 64 year old female who presents the emergency department for complaints of spasm-like back pain and left shoulder pain worsened with deep inspiration and laying flat.  With complaints of pleuritic chest discomfort a d-dimer was obtained and mildly elevated at 0.6.  CT PE study demonstrated no pulmonary embolism, though a left basilar pneumonia with associated pleural effusion.  Given  location adjacent to diaphragm, it appears that patient's shoulder pain and scapular pain is actually referred pain associated with diaphragmatic irritation with left basilar pneumonia.  Patient with a leukocytosis, though no fevers, no chills.  She has had a dry cough without sputum production, though has been attempting not to cough secondary to increased discomfort with coughing.    Patient describes onset of her discomfort following Monday night when she had some Mexican food.  This resulted in some indigestion with belching.  She had no improvement with Pepcid.  The next day she had increased back pain and a dry cough, belching has persisted.  Reports no nausea or vomiting    In the emergency department, patient was initiated on ceftriaxone and azithromycin.  She has not been hypoxic, though has some borderline oxygen saturations in the low 90% range.  She continues to have pain worse with laying flat.    Patient with uncontrolled diabetes.  Her hemoglobin A1c is in the 400 range.  She tells me that she is on metformin, though no other medications for diabetes other than primary prevention medications including statin therapy.  Discussed that she will likely require insulin.  She tells me that her last hemoglobin A1c was also in the 12 range and she was due for follow-up, though no additional medications were initiated through primary clinic at time of last elevated A1c.  Again, discussed insulin teaching and initiation of long-acting insulin regimen prior to follow-up with PCP as I do not anticipate she will have adequate control with oral regimen alone.  Discussed weight loss, bariatric clinic referral with patient.  She is not interested in bariatric clinic referral as she is hesitant to undergo surgery.  Discussed multimodality approach even excluding surgery such as physiotherapy and following with the dietitian, both of which may be beneficial for patient.  Will defer referral to primary care  provider.    Patient lives alone.  Has a family member in [place], son is an emergency department physician in [facility] as well.  Recently at a [employer] gathering this past week, though she tells me that no one appeared sick, and everyone more facemasks.    Review of Systems    The 10 point Review of Systems is negative other than noted in the HPI or here.  No fever  No shaking chills  No abdominal pain    Past Medical History    I have reviewed this patient's medical history and updated it with pertinent information if needed.     Morbid obesity  Type 2 diabetes  Hypothyroidism  Hypertension    Past Surgical History   I have reviewed this patient's surgical history and updated it with pertinent information if needed.  Cholecystectomy    Social History   I have reviewed this patient's social history and updated it with pertinent information if needed.  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            Family History   I have reviewed this patient's family history and updated it with pertinent information if needed.   Brother w/ juvenile diabetes    Prior to Admission Medications   Patient reports metformin, levothyroxine, hydrochlorothiazide, allopurinol, statin therapy     Allergies   Allergies not on file    Physical Exam   Vital Signs: Temp: 97.1  F (36.2  C) Temp src: Oral BP: 137/59 Pulse: 110 Heart Rate: 101 Resp: 20 SpO2: 95 % O2 Device: None (Room air)    Weight: 300 lbs 0 oz    General Appearance: Morbidly obese 64-year-old female who appears her stated age.  Comfortable sitting at edge of bed, though uncomfortable when laying flat for exam  Eyes: No scleral icterus or injection  HEENT: Obese neck, normocephalic  Respiratory: Breath sounds are clear bilaterally to auscultation without wheezes or crackles.  Exam is somewhat limited by habitus.  Some splinting is present with deep inspiration.  Cardiovascular: Regular rate and  rhythm with heart rate currently in the 100 range.  No appreciable murmur.  No rub  GI: Abdomen obese, soft, nontender to palpation.  No palpable mass.  Lymph/Hematologic: Chronic lymphedema bilateral lower extremities  Genitourinary no CVA tenderness to percussion  Skin: Scaling plaque formation along the lateral thighs bilaterally.  Patient reports history of psoriasis.  No significant intertrigo  Musculoskeletal: Shoulder discomfort is not reproducible with palpation  Neurologic: Alert, conversant, appropriate conversation.  Mental status grossly tact.  Psychiatric: Normal affect, very pleasant.  Somewhat pressured speech.      Data   Data reviewed today: I reviewed all medications, new labs and imaging results over the last 24 hours. I personally reviewed the chest CT image(s) showing Left basilar pneumonia with associated effusion.    Recent Labs   Lab 06/18/20  0001   WBC 16.5*   HGB 14.7   MCV 96      *   POTASSIUM 3.9   CHLORIDE 95   CO2 29   BUN 16   CR 0.82   ANIONGAP 6   TRISTIAN 11.8*   *   ALBUMIN 3.3*   PROTTOTAL 9.3*   BILITOTAL 0.9   ALKPHOS 78   ALT 31   AST 13   LIPASE 191   TROPI <0.015

## 2020-06-19 LAB
ALBUMIN SERPL-MCNC: 2.7 G/DL (ref 3.4–5)
ALP SERPL-CCNC: 75 U/L (ref 40–150)
ALT SERPL W P-5'-P-CCNC: 25 U/L (ref 0–50)
ANION GAP SERPL CALCULATED.3IONS-SCNC: 6 MMOL/L (ref 3–14)
AST SERPL W P-5'-P-CCNC: 18 U/L (ref 0–45)
BASOPHILS # BLD AUTO: 0 10E9/L (ref 0–0.2)
BASOPHILS NFR BLD AUTO: 0.3 %
BILIRUB SERPL-MCNC: 0.6 MG/DL (ref 0.2–1.3)
BUN SERPL-MCNC: 16 MG/DL (ref 7–30)
CALCIUM SERPL-MCNC: 9.7 MG/DL (ref 8.5–10.1)
CHLORIDE SERPL-SCNC: 103 MMOL/L (ref 94–109)
CO2 SERPL-SCNC: 26 MMOL/L (ref 20–32)
CREAT SERPL-MCNC: 0.73 MG/DL (ref 0.52–1.04)
DIFFERENTIAL METHOD BLD: ABNORMAL
EOSINOPHIL # BLD AUTO: 0.4 10E9/L (ref 0–0.7)
EOSINOPHIL NFR BLD AUTO: 2.8 %
ERYTHROCYTE [DISTWIDTH] IN BLOOD BY AUTOMATED COUNT: 13 % (ref 10–15)
GFR SERPL CREATININE-BSD FRML MDRD: 87 ML/MIN/{1.73_M2}
GLUCOSE BLDC GLUCOMTR-MCNC: 268 MG/DL (ref 70–99)
GLUCOSE BLDC GLUCOMTR-MCNC: 293 MG/DL (ref 70–99)
GLUCOSE BLDC GLUCOMTR-MCNC: 311 MG/DL (ref 70–99)
GLUCOSE BLDC GLUCOMTR-MCNC: 318 MG/DL (ref 70–99)
GLUCOSE SERPL-MCNC: 354 MG/DL (ref 70–99)
HCT VFR BLD AUTO: 40 % (ref 35–47)
HGB BLD-MCNC: 12.7 G/DL (ref 11.7–15.7)
IMM GRANULOCYTES # BLD: 0 10E9/L (ref 0–0.4)
IMM GRANULOCYTES NFR BLD: 0.3 %
LYMPHOCYTES # BLD AUTO: 1.1 10E9/L (ref 0.8–5.3)
LYMPHOCYTES NFR BLD AUTO: 7.6 %
MCH RBC QN AUTO: 30.6 PG (ref 26.5–33)
MCHC RBC AUTO-ENTMCNC: 31.8 G/DL (ref 31.5–36.5)
MCV RBC AUTO: 96 FL (ref 78–100)
MONOCYTES # BLD AUTO: 1.6 10E9/L (ref 0–1.3)
MONOCYTES NFR BLD AUTO: 11.2 %
NEUTROPHILS # BLD AUTO: 10.8 10E9/L (ref 1.6–8.3)
NEUTROPHILS NFR BLD AUTO: 77.8 %
NRBC # BLD AUTO: 0 10*3/UL
NRBC BLD AUTO-RTO: 0 /100
PHOSPHATE SERPL-MCNC: 2.3 MG/DL (ref 2.5–4.5)
PHOSPHATE SERPL-MCNC: 2.3 MG/DL (ref 2.5–4.5)
PLATELET # BLD AUTO: 299 10E9/L (ref 150–450)
POTASSIUM SERPL-SCNC: 3.9 MMOL/L (ref 3.4–5.3)
PROT SERPL-MCNC: 7.8 G/DL (ref 6.8–8.8)
RBC # BLD AUTO: 4.15 10E12/L (ref 3.8–5.2)
SODIUM SERPL-SCNC: 135 MMOL/L (ref 133–144)
WBC # BLD AUTO: 13.9 10E9/L (ref 4–11)

## 2020-06-19 PROCEDURE — 36415 COLL VENOUS BLD VENIPUNCTURE: CPT | Performed by: INTERNAL MEDICINE

## 2020-06-19 PROCEDURE — G0378 HOSPITAL OBSERVATION PER HR: HCPCS

## 2020-06-19 PROCEDURE — 99207 ZZC CDG-CODE CATEGORY CHANGED: CPT | Performed by: INTERNAL MEDICINE

## 2020-06-19 PROCEDURE — 84100 ASSAY OF PHOSPHORUS: CPT | Performed by: INTERNAL MEDICINE

## 2020-06-19 PROCEDURE — 25000132 ZZH RX MED GY IP 250 OP 250 PS 637: Performed by: HOSPITALIST

## 2020-06-19 PROCEDURE — 80053 COMPREHEN METABOLIC PANEL: CPT | Performed by: INTERNAL MEDICINE

## 2020-06-19 PROCEDURE — 25000131 ZZH RX MED GY IP 250 OP 636 PS 637: Performed by: INTERNAL MEDICINE

## 2020-06-19 PROCEDURE — 25000132 ZZH RX MED GY IP 250 OP 250 PS 637: Performed by: INTERNAL MEDICINE

## 2020-06-19 PROCEDURE — 25000125 ZZHC RX 250: Performed by: HOSPITALIST

## 2020-06-19 PROCEDURE — 99226 ZZC SUBSEQUENT OBSERVATION CARE,LEVEL III: CPT | Performed by: INTERNAL MEDICINE

## 2020-06-19 PROCEDURE — 00000146 ZZHCL STATISTIC GLUCOSE BY METER IP

## 2020-06-19 PROCEDURE — 80069 RENAL FUNCTION PANEL: CPT | Performed by: INTERNAL MEDICINE

## 2020-06-19 PROCEDURE — 96376 TX/PRO/DX INJ SAME DRUG ADON: CPT

## 2020-06-19 PROCEDURE — 85025 COMPLETE CBC W/AUTO DIFF WBC: CPT | Performed by: INTERNAL MEDICINE

## 2020-06-19 PROCEDURE — 25000128 H RX IP 250 OP 636: Performed by: INTERNAL MEDICINE

## 2020-06-19 PROCEDURE — C9113 INJ PANTOPRAZOLE SODIUM, VIA: HCPCS | Performed by: INTERNAL MEDICINE

## 2020-06-19 PROCEDURE — 96372 THER/PROPH/DIAG INJ SC/IM: CPT

## 2020-06-19 PROCEDURE — 25800030 ZZH RX IP 258 OP 636: Performed by: HOSPITALIST

## 2020-06-19 RX ORDER — LOSARTAN POTASSIUM 25 MG/1
25 TABLET ORAL DAILY
Status: DISCONTINUED | OUTPATIENT
Start: 2020-06-19 | End: 2020-06-20 | Stop reason: HOSPADM

## 2020-06-19 RX ORDER — ALLOPURINOL 300 MG/1
300 TABLET ORAL DAILY
Status: DISCONTINUED | OUTPATIENT
Start: 2020-06-19 | End: 2020-06-20 | Stop reason: HOSPADM

## 2020-06-19 RX ORDER — PANTOPRAZOLE SODIUM 40 MG/1
40 TABLET, DELAYED RELEASE ORAL
Status: DISCONTINUED | OUTPATIENT
Start: 2020-06-20 | End: 2020-06-20 | Stop reason: HOSPADM

## 2020-06-19 RX ORDER — PRAVASTATIN SODIUM 20 MG
20 TABLET ORAL DAILY
Status: DISCONTINUED | OUTPATIENT
Start: 2020-06-19 | End: 2020-06-20 | Stop reason: HOSPADM

## 2020-06-19 RX ORDER — OXYCODONE HYDROCHLORIDE 5 MG/1
5-10 TABLET ORAL EVERY 6 HOURS PRN
Status: DISCONTINUED | OUTPATIENT
Start: 2020-06-19 | End: 2020-06-20 | Stop reason: HOSPADM

## 2020-06-19 RX ORDER — NALOXONE HYDROCHLORIDE 0.4 MG/ML
.1-.4 INJECTION, SOLUTION INTRAMUSCULAR; INTRAVENOUS; SUBCUTANEOUS
Status: DISCONTINUED | OUTPATIENT
Start: 2020-06-19 | End: 2020-06-20 | Stop reason: HOSPADM

## 2020-06-19 RX ORDER — CEFTRIAXONE 2 G/1
INJECTION, POWDER, FOR SOLUTION INTRAMUSCULAR; INTRAVENOUS
Status: DISCONTINUED
Start: 2020-06-19 | End: 2020-06-19

## 2020-06-19 RX ORDER — AZITHROMYCIN 250 MG/1
250 TABLET, FILM COATED ORAL DAILY
Status: DISCONTINUED | OUTPATIENT
Start: 2020-06-19 | End: 2020-06-20 | Stop reason: HOSPADM

## 2020-06-19 RX ORDER — IBUPROFEN 600 MG/1
600 TABLET, FILM COATED ORAL EVERY 6 HOURS PRN
Status: DISCONTINUED | OUTPATIENT
Start: 2020-06-19 | End: 2020-06-20 | Stop reason: HOSPADM

## 2020-06-19 RX ORDER — LEVOTHYROXINE SODIUM 100 UG/1
100 TABLET ORAL DAILY
Status: DISCONTINUED | OUTPATIENT
Start: 2020-06-19 | End: 2020-06-20 | Stop reason: HOSPADM

## 2020-06-19 RX ORDER — MULTIVITAMIN,THERAPEUTIC
1 TABLET ORAL DAILY
Status: DISCONTINUED | OUTPATIENT
Start: 2020-06-19 | End: 2020-06-20 | Stop reason: HOSPADM

## 2020-06-19 RX ADMIN — ASPIRIN 325 MG: 325 TABLET, COATED ORAL at 09:57

## 2020-06-19 RX ADMIN — CEFTRIAXONE 2 G: 2 INJECTION, POWDER, FOR SOLUTION INTRAMUSCULAR; INTRAVENOUS at 01:56

## 2020-06-19 RX ADMIN — DIBASIC SODIUM PHOSPHATE, MONOBASIC POTASSIUM PHOSPHATE AND MONOBASIC SODIUM PHOSPHATE 250 MG: 852; 155; 130 TABLET ORAL at 17:55

## 2020-06-19 RX ADMIN — ALLOPURINOL 300 MG: 300 TABLET ORAL at 09:57

## 2020-06-19 RX ADMIN — LOSARTAN POTASSIUM 25 MG: 25 TABLET, FILM COATED ORAL at 09:57

## 2020-06-19 RX ADMIN — CALCIUM CARBONATE (ANTACID) CHEW TAB 500 MG 1000 MG: 500 CHEW TAB at 21:37

## 2020-06-19 RX ADMIN — AZITHROMYCIN MONOHYDRATE 250 MG: 250 TABLET ORAL at 08:10

## 2020-06-19 RX ADMIN — INSULIN ASPART 2 UNITS: 100 INJECTION, SOLUTION INTRAVENOUS; SUBCUTANEOUS at 21:38

## 2020-06-19 RX ADMIN — IBUPROFEN 600 MG: 600 TABLET ORAL at 14:15

## 2020-06-19 RX ADMIN — PANTOPRAZOLE SODIUM 40 MG: 40 INJECTION, POWDER, FOR SOLUTION INTRAVENOUS at 08:10

## 2020-06-19 RX ADMIN — OXYCODONE HYDROCHLORIDE 5 MG: 5 TABLET ORAL at 17:55

## 2020-06-19 RX ADMIN — IBUPROFEN 600 MG: 600 TABLET ORAL at 21:37

## 2020-06-19 RX ADMIN — IBUPROFEN 600 MG: 600 TABLET ORAL at 08:10

## 2020-06-19 RX ADMIN — POTASSIUM PHOSPHATE, MONOBASIC AND POTASSIUM PHOSPHATE, DIBASIC 15 MMOL: 224; 236 INJECTION, SOLUTION INTRAVENOUS at 13:13

## 2020-06-19 RX ADMIN — THERA TABS 1 TABLET: TAB at 09:57

## 2020-06-19 RX ADMIN — CALCIUM CARBONATE (ANTACID) CHEW TAB 500 MG 1000 MG: 500 CHEW TAB at 16:49

## 2020-06-19 RX ADMIN — INSULIN GLARGINE 30 UNITS: 100 INJECTION, SOLUTION SUBCUTANEOUS at 09:57

## 2020-06-19 RX ADMIN — LEVOTHYROXINE SODIUM 100 MCG: 100 TABLET ORAL at 09:57

## 2020-06-19 RX ADMIN — ACETAMINOPHEN 650 MG: 325 TABLET, FILM COATED ORAL at 16:49

## 2020-06-19 RX ADMIN — PRAVASTATIN SODIUM 20 MG: 20 TABLET ORAL at 09:57

## 2020-06-19 NOTE — PROGRESS NOTES
Paged regarding negative Covid PCR results. Per Dr. Curtis's note from earlier today, OK to transfer to general medicine floor once results back. Discontinue Covid precautions.

## 2020-06-19 NOTE — PLAN OF CARE
- Dyspnea improved and oxygen saturations greater than 88% on room air or prior home oxygen levels: MET  - Tolerates oral antibiotics or has plans for home infusion set up: NOT MET  - Vital signs normal or at patient baseline: MET  - Infection is improving: NOT MET      DATE & TIME: 6/18/2020 6462-3204   Cognitive Concerns/ Orientation : A&Ox4    BEHAVIOR & AGGRESSION TOOL COLOR: Green  CIWA SCORE: NA   ABNL VS/O2: VSS on RA.   MOBILITY: Pt can be Ind in room, Calls appropriate when hooked to IV .   PAIN MANAGMENT: L Shoulder pain, gave PRN tylenol  DIET: Mod CHO/ 1500 ml fluid restriction.   BOWEL/BLADDER: Continent B/B  ABNL LAB/BG: Phos 1.9 Replaced  DRAIN/DEVICES: Double lumen PIV RLE Saline locked. PIV RUE SL  TELEMETRY RHYTHM: N/A  SKIN: Bruises, redness to bilateral shins (per pt, normal for her).   TESTS/PROCEDURES: NA  D/C DAY/GOALS/PLACE: Pending. Pt observation status.   OTHER IMPORTANT INFO: Sputum culture still needed Pt does not have productive cough. Phos replaced, new IV placed for IV antibiotics. NICHOLS. Nursing will continue to monitor.

## 2020-06-19 NOTE — PLAN OF CARE
COVID NEGATIVE. Precautions discontinued. Plan to transfer to station 66 and handoff report given. A&Ox4 with VSS on RA. Up independently and tolerating mod carb diet.  and 282. Corrections given. Pt on 1500mL fluid restrictions. IV SL and IS at bedside. Reports shoulder pain at 4/10. Declines intervention. NICHOLS. Denies numbness, tingling, nausea, and dizziness. Continue to monitor.

## 2020-06-19 NOTE — PROGRESS NOTES
Transfer    S- Transfer to 605-2 from Obs6.    B- Pt seen for increasing SOB and NICHOLS over multiple days. Pt in the observation unit, swabbed for COVID which have come back negative.     A- Brief systems assessment: Pt is alert and orientedx4, is spontaouesnly speaking, clearly and logically. Pt's respirations are equal and nonlabored at rest. Pt is NICHOLS. Pt is satting mid 90s on RA. pts skin is warm to the touch. Nursing will continue to monitor.     R- Transfer to non-covid unit per physician orders. Continue to monitor pt and update physician as needed.      Code status: Full Code  Skin: Terry BLE, scattered bruising.   Fall Risk: Yes- Department fall risk interventions implemented.  Isolation: None  Patient belongings: Accompanied pt and are placed in her window cill. Pt denies any valuables she needs locked up and denies bringing any medications of her own with.   Medication drips upon transfer: BISHNU  Bedside Report Letter Given and explained to pt: BISHNU

## 2020-06-19 NOTE — PLAN OF CARE
DATE & TIME: 6/19/20 (6402-2938)   Cognitive Concerns/ Orientation : A&Ox4    BEHAVIOR & AGGRESSION TOOL COLOR: Green  CIWA SCORE: NA   ABNL VS/O2: VSS on RA.   MOBILITY: Pt can be Ind in room, Calls appropriate when hooked to IV .   PAIN MANAGMENT: L lower back pain, ibuprofen given this am and ice packs applied.  DIET: Mod CHO/ 1500 ml fluid restriction.   BOWEL/BLADDER: Continent B/B  ABNL LAB/BG: Phos 2.3 Replacing per IV currently, started on oral Phosphorus BID,  & 311  DRAIN/DEVICES: PIV  TELEMETRY RHYTHM: N/A  SKIN: Bruises, redness to bilateral shins (per pt, normal for her).   TESTS/PROCEDURES: NA  D/C DAY/GOALS/PLACE: Pending Tomorrow. Pt observation status.   OTHER IMPORTANT INFO: Sputum culture still needed Pt does not have productive cough. NICHOLS. Lungs diminished left base. Nursing will continue to monitor.     MD/RN ROUNDING SIGNED OFF D/E SHIFT: Yes  COMMIT TO SIT DONE AND SIGNED OFF Yes

## 2020-06-19 NOTE — PROVIDER NOTIFICATION
MD Notification    Notified Person: MD    Notified Person Name: Lee    Notification Date/Time: 06/19/20 12:44 AM    Notification Interaction: Text Page    Purpose of Notification: Can we get oral azithromycin instead of IV    Orders Received: Waiting for response    Comments:

## 2020-06-19 NOTE — UTILIZATION REVIEW
"  Concurrent stay review; Secondary Review Determination     [facility]          Under the authority of the Utilization Management Committee, the utilization review process indicated a secondary review on the above patient.  The review outcome is based on review of the medical records, discussions with staff, and applying clinical experience noted on the date of the review.          (x) Observation Status Appropriate - Concurrent stay review    RATIONALE FOR DETERMINATION   Patient in observation for lower lobe community-acquired pneumonia, has diabetes mellitus hypertension.  No evidence of sepsis, afebrile, no hypoxia, blood sugar poorly controlled and has leukocytosis.  He reports today \"Has some pleuritic chest pain, some cough this morning.  No fever chills nausea vomiting at this time.  No shortness of breath.\"  No clear indication to change patient's status to inpatient. The severity of illness, intensity of service provided, expected LOS and risk for adverse outcome make the care appropriate for observation.      This document was produced using voice recognition software       The information on this document is developed by the utilization review team in order for the business office to ensure compliance.  This only denotes the appropriateness of proper admission status and does not reflect the quality of care rendered.         The definitions of Inpatient Status and Observation Status used in making the determination above are those provided in the CMS Coverage Manual, Chapter 1 and Chapter 6, section 70.4.      Sincerely,     DELANEY DE JESUS MD    System Medical Director  Utilization Management  [facility].          "

## 2020-06-19 NOTE — PLAN OF CARE
DATE & TIME: 6/18/2020 2200-2330    Cognitive Concerns/ Orientation : A&Ox4    BEHAVIOR & AGGRESSION TOOL COLOR: Green  CIWA SCORE: NA   ABNL VS/O2: Slightly elevated BP; otherwise VSS on RA.   MOBILITY: SBA (pt not seen ambulating yet, was independent in observation); nursing to monitor and readdress as needed.   PAIN MANAGMENT: Reports L shoulder discomfort; declines want for any intervention.   DIET: Mod CHO/ 1500 ml fluid restriction.   BOWEL/BLADDER: Continent; was ambulating to BR in obs unit.   ABNL LAB/BG: ;coverage done in obs unit. Phos 1.9; no protocol noted. On call MD paged for replacement- new orders placed. D-dimer 0.6 upon admission.   DRAIN/DEVICES: PIV SL.   TELEMETRY RHYTHM: NA, radial pulse regular.   SKIN: Bruises, redness to bilateral shins (per pt, normal for her).   TESTS/PROCEDURES: CT PE negative for PE.   D/C DAY/GOALS/PLACE: Pending. Pt observation status.   OTHER IMPORTANT INFO: Sputum culture still needed. Transferred from observation unit at 2200. NICHOLS. Nursing will continue to monitor.     MD/RN ROUNDING SIGNED OFF D/E SHIFT: NA, transferred after 2200; MD did not round at this time.   COMMIT TO SIT DONE AND SIGNED OFF Yes

## 2020-06-19 NOTE — PROVIDER NOTIFICATION
MD Notification    Notified Person: PA    Notified Person Name: Dennise Leigh PA    Notification Date/Time: 06/18/2020 at 2048    Notification Interaction: Web page    Purpose of Notification: Informed about negative covid results and asked about discontinuing isolation.    Orders Received:    Comments:

## 2020-06-19 NOTE — PROGRESS NOTES
[facility]    Hospitalist Progress Note    Brief Summary:   This is a 64-year-old female with history of obesity, diabetes mellitus type 2, hypertension hypothyroidism psoriasis comes to the ER with complaint of pleuritic chest pain found to have left lower lobe pneumonia and subsequently get admitted.    Assessment & Plan        Left lower lobe community-acquired pneumonia:  Small left pleural effusion  Patient presented with pleuritic chest pain in the left side, cough, CT scan chest PE protocol done shows no pulmonary embolism, but mild left-sided pleural effusion with left basilar infiltrate consistent with left lower lobe pneumonia and have leukocytosis.  She was started on IV ceftriaxone and azithromycin agree with that.  She was also started on scheduled ibuprofen 3 times a day for pleuritic chest pain, sputum culture cultures were ordered but sample still have to be obtained.  Blood cultures remain negative so far.  COVID-19 come back negative.  At this time I will start her on incentive spirometry, will discontinue scheduled ibuprofen now.  And continue the ceftriaxone 2 g IV and oral azithromycin.  If she remains stable she can switch her IV antibiotic to oral and discharge home.         Dyspepsia:   Proved at this time, she is on Protonix will continue that.     Uncontrolled type 2 diabetes:   Patient diabetes is uncontrolled, with A1c of 12.4.\  She was only on metformin XR 1000 mg once a day.  Started on Lantus increase the dose to 24 yesterday, blood sugar remained on the higher side increase the dose to 30 units in the morning.  Started on NovoLog increase the dose to 10 units with the meal today.  Can restart metformin on discharge.  Keep on sliding scale insulin for correction hypoglycemia protocol.     Morbid obesity: Increased risk of all cause mortality.  BMI greater than 51.  Comorbidities of diabetes, hypertension, possible hyperlipidemia.  She reports she does not have sleep  apnea.  Needs dieting and exercising and lose some weight.  We will go that he was started on Victoza as an outpatient basis to both control diabetes as well as her weight.     Hypertension:  Patient is on losartan and Maxide at home.  I will restart the losartan today and continue hold Maxide because of her hyponatremia.     Hypothyroidism:  -Resume levothyroxine at this time.     Psoriasis: Rashes on bilateral legs with typical scaling plaque formation.    Increase Lantus to 30 units and NovoLog to 10 units with meals  Resume metformin on discharge  Okay to switch azithromycin and Protonix to oral  Possible discharge tomorrow  Updated her son  on 6/18/2020      DVT Prophylaxis: Pneumatic Compression Devices  Code Status: Full Code    Disposition: Expected discharge tomorrow if remains stable.    Vidal Curtis MD  Text Page  (7am - 6pm)    Interval History   Has some pleuritic chest pain, some cough this morning.  No fever chills nausea vomiting at this time.  No shortness of breath    No other significant event overnight    -Data reviewed today: I reviewed all new labs and imaging results over the last 24 hours. I personally reviewed no images or EKG's today.    Physical Exam   Temp: 98.6  F (37  C) Temp src: Oral BP: 125/66 Pulse: 83 Heart Rate: 77 Resp: 18 SpO2: 95 % O2 Device: None (Room air)    Vitals:    06/17/20 2314   Weight: 136.1 kg (300 lb)     Vital Signs with Ranges  Temp:  [95.9  F (35.5  C)-99.1  F (37.3  C)] 98.6  F (37  C)  Pulse:  [83] 83  Heart Rate:  [77-90] 77  Resp:  [18-20] 18  BP: (121-154)/(49-79) 125/66  SpO2:  [92 %-97 %] 95 %  I/O last 3 completed shifts:  In: 1240 [P.O.:1240]  Out: -     Constitutional: awake, alert, cooperative, no apparent distress, and appears stated age  Eyes: Lids and lashes normal, pupils equal, round and reactive to light, extra ocular muscles intact, sclera clear, conjunctiva normal  Respiratory: No increased work of breathing, decreased air entry in the left  base, occasional crackles, no wheezing  Cardiovascular: normal apical pulses   GI: No scars, normal bowel sounds, soft, non-distended, non-tender, no masses palpated, no hepatosplenomegally  Skin: no bruising or bleeding  Musculoskeletal: no lower extremity pitting edema present  Neurologic: No focal deficit    Medications       allopurinol  300 mg Oral Daily     aspirin  325 mg Oral Daily     azithromycin  250 mg Oral Daily     cefTRIAXone  2 g Intravenous Q24H     insulin aspart  10 Units Subcutaneous TID w/meals     insulin aspart  1-7 Units Subcutaneous TID AC     insulin aspart  1-5 Units Subcutaneous At Bedtime     insulin aspart   Subcutaneous QAM AC     insulin glargine  30 Units Subcutaneous QAM AC     levothyroxine  100 mcg Oral Daily     losartan  25 mg Oral Daily     multivitamin, therapeutic  1 tablet Oral Daily     [START ON 6/20/2020] pantoprazole  40 mg Oral QAM AC     pravastatin  20 mg Oral Daily     sodium chloride 0.9 %  100 mL Intravenous Once     sodium chloride (PF)  3 mL Intracatheter Q8H       Data   Recent Labs   Lab 06/19/20  0948 06/18/20  2042 06/18/20  0001   WBC 13.9*  --  16.5*   HGB 12.7  --  14.7   MCV 96  --  96     --  358    134 130*   POTASSIUM 3.9 3.8 3.9   CHLORIDE 103 101 95   CO2 26 29 29   BUN 16 16 16   CR 0.73 0.80 0.82   ANIONGAP 6 4 6   TRISTIAN 9.7 9.9 11.8*   * 321* 420*   ALBUMIN 2.7* 2.7* 3.3*   PROTTOTAL 7.8  --  9.3*   BILITOTAL 0.6  --  0.9   ALKPHOS 75  --  78   ALT 25  --  31   AST 18  --  13   LIPASE  --   --  191   TROPI  --   --  <0.015       No results found for this or any previous visit (from the past 24 hour(s)).

## 2020-06-20 VITALS
TEMPERATURE: 98.7 F | SYSTOLIC BLOOD PRESSURE: 129 MMHG | WEIGHT: 293 LBS | HEIGHT: 64 IN | DIASTOLIC BLOOD PRESSURE: 57 MMHG | HEART RATE: 83 BPM | BODY MASS INDEX: 50.02 KG/M2 | RESPIRATION RATE: 16 BRPM | OXYGEN SATURATION: 93 %

## 2020-06-20 LAB
GLUCOSE BLDC GLUCOMTR-MCNC: 258 MG/DL (ref 70–99)
GLUCOSE BLDC GLUCOMTR-MCNC: 259 MG/DL (ref 70–99)
GLUCOSE BLDC GLUCOMTR-MCNC: 287 MG/DL (ref 70–99)
PHOSPHATE SERPL-MCNC: 3.2 MG/DL (ref 2.5–4.5)

## 2020-06-20 PROCEDURE — 36415 COLL VENOUS BLD VENIPUNCTURE: CPT | Performed by: STUDENT IN AN ORGANIZED HEALTH CARE EDUCATION/TRAINING PROGRAM

## 2020-06-20 PROCEDURE — 25000131 ZZH RX MED GY IP 250 OP 636 PS 637: Performed by: INTERNAL MEDICINE

## 2020-06-20 PROCEDURE — 25000128 H RX IP 250 OP 636: Performed by: INTERNAL MEDICINE

## 2020-06-20 PROCEDURE — 84100 ASSAY OF PHOSPHORUS: CPT | Performed by: STUDENT IN AN ORGANIZED HEALTH CARE EDUCATION/TRAINING PROGRAM

## 2020-06-20 PROCEDURE — 96376 TX/PRO/DX INJ SAME DRUG ADON: CPT

## 2020-06-20 PROCEDURE — 99207 ZZC CDG-CODE CATEGORY CHANGED: CPT | Performed by: STUDENT IN AN ORGANIZED HEALTH CARE EDUCATION/TRAINING PROGRAM

## 2020-06-20 PROCEDURE — 00000146 ZZHCL STATISTIC GLUCOSE BY METER IP

## 2020-06-20 PROCEDURE — 25000132 ZZH RX MED GY IP 250 OP 250 PS 637: Performed by: INTERNAL MEDICINE

## 2020-06-20 PROCEDURE — 25000132 ZZH RX MED GY IP 250 OP 250 PS 637: Performed by: HOSPITALIST

## 2020-06-20 PROCEDURE — 96372 THER/PROPH/DIAG INJ SC/IM: CPT

## 2020-06-20 PROCEDURE — G0378 HOSPITAL OBSERVATION PER HR: HCPCS

## 2020-06-20 PROCEDURE — 99217 ZZC OBSERVATION CARE DISCHARGE: CPT | Performed by: STUDENT IN AN ORGANIZED HEALTH CARE EDUCATION/TRAINING PROGRAM

## 2020-06-20 RX ORDER — GLUCOSAMINE HCL/CHONDROITIN SU 500-400 MG
CAPSULE ORAL
Qty: 100 EACH | Refills: 3 | Status: SHIPPED | OUTPATIENT
Start: 2020-06-20 | End: 2020-07-09

## 2020-06-20 RX ORDER — LANCETS
EACH MISCELLANEOUS
Qty: 200 EACH | Refills: 1 | Status: SHIPPED | OUTPATIENT
Start: 2020-06-20 | End: 2024-03-21

## 2020-06-20 RX ORDER — AZITHROMYCIN 250 MG/1
250 TABLET, FILM COATED ORAL DAILY
Qty: 4 TABLET | Refills: 0 | Status: SHIPPED | OUTPATIENT
Start: 2020-06-20 | End: 2020-06-24

## 2020-06-20 RX ORDER — OXYCODONE HYDROCHLORIDE 5 MG/1
5 TABLET ORAL EVERY 6 HOURS PRN
Qty: 15 TABLET | Refills: 0 | Status: SHIPPED | OUTPATIENT
Start: 2020-06-20 | End: 2020-09-23

## 2020-06-20 RX ORDER — CEFUROXIME AXETIL 500 MG/1
500 TABLET ORAL 2 TIMES DAILY
Qty: 10 TABLET | Refills: 0 | Status: SHIPPED | OUTPATIENT
Start: 2020-06-20 | End: 2020-06-25

## 2020-06-20 RX ORDER — INSULIN ASPART 100 [IU]/ML
INJECTION, SOLUTION INTRAVENOUS; SUBCUTANEOUS
Qty: 15 ML | Refills: 0 | Status: SHIPPED | OUTPATIENT
Start: 2020-06-20 | End: 2020-06-30

## 2020-06-20 RX ORDER — PANTOPRAZOLE SODIUM 40 MG/1
40 TABLET, DELAYED RELEASE ORAL
Qty: 30 TABLET | Refills: 0 | Status: SHIPPED | OUTPATIENT
Start: 2020-06-21 | End: 2020-06-25

## 2020-06-20 RX ADMIN — OXYCODONE HYDROCHLORIDE 10 MG: 5 TABLET ORAL at 13:28

## 2020-06-20 RX ADMIN — LEVOTHYROXINE SODIUM 100 MCG: 100 TABLET ORAL at 06:46

## 2020-06-20 RX ADMIN — AZITHROMYCIN MONOHYDRATE 250 MG: 250 TABLET ORAL at 08:09

## 2020-06-20 RX ADMIN — LOSARTAN POTASSIUM 25 MG: 25 TABLET, FILM COATED ORAL at 08:08

## 2020-06-20 RX ADMIN — THERA TABS 1 TABLET: TAB at 08:09

## 2020-06-20 RX ADMIN — OXYCODONE HYDROCHLORIDE 10 MG: 5 TABLET ORAL at 06:49

## 2020-06-20 RX ADMIN — INSULIN GLARGINE 30 UNITS: 100 INJECTION, SOLUTION SUBCUTANEOUS at 08:09

## 2020-06-20 RX ADMIN — ALLOPURINOL 300 MG: 300 TABLET ORAL at 08:08

## 2020-06-20 RX ADMIN — OXYCODONE HYDROCHLORIDE 5 MG: 5 TABLET ORAL at 00:44

## 2020-06-20 RX ADMIN — CEFTRIAXONE 2 G: 2 INJECTION, POWDER, FOR SOLUTION INTRAMUSCULAR; INTRAVENOUS at 00:01

## 2020-06-20 RX ADMIN — DIBASIC SODIUM PHOSPHATE, MONOBASIC POTASSIUM PHOSPHATE AND MONOBASIC SODIUM PHOSPHATE 250 MG: 852; 155; 130 TABLET ORAL at 08:08

## 2020-06-20 RX ADMIN — OXYCODONE HYDROCHLORIDE 5 MG: 5 TABLET ORAL at 00:00

## 2020-06-20 RX ADMIN — PRAVASTATIN SODIUM 20 MG: 20 TABLET ORAL at 08:08

## 2020-06-20 RX ADMIN — ASPIRIN 325 MG: 325 TABLET, COATED ORAL at 08:08

## 2020-06-20 RX ADMIN — PANTOPRAZOLE SODIUM 40 MG: 40 TABLET, DELAYED RELEASE ORAL at 06:46

## 2020-06-20 NOTE — PLAN OF CARE
DATE & TIME: 6/19 5427-2980  Cognitive Concerns/ Orientation : A&Ox4    BEHAVIOR & AGGRESSION TOOL COLOR: Green  CIWA SCORE: NA   ABNL VS/O2: VSS on RA, HTN at times.   MOBILITY: Pt can be Ind in room, Calls appropriate when hooked to IV.   PAIN MANAGMENT: L lower back pain/ L shoulder. Tylenol and ibuprofen utilized w/o sufficient pain relief, MD notified. Oxycodone added and relieved pain. ice packs applied.  DIET: Mod CHO/ 1500 ml fluid restriction.   BOWEL/BLADDER: Continent B/B. C/o diarrhea, will continue to monitor.     ABNL LAB/BG: Phos 2.3 Replaced through IV this evening & started on oral Phosphorus BID,  & 293.  DRAIN/DEVICES: PIV  TELEMETRY RHYTHM: N/A  SKIN: Bruises, redness to bilateral shins (per pt, normal for her).   TESTS/PROCEDURES: NA  D/C DAY/GOALS/PLACE: Possibly tomorrow. Pt observation status.   OTHER IMPORTANT INFO: Sputum culture still needed Pt does not have productive cough. NICHOLS. Lungs diminished left base. Pt concerned about elevated BG levels and going home tomorrow. Pt will need diabetes teaching before she can go home as she has never given herself insulin before.

## 2020-06-20 NOTE — DISCHARGE SUMMARY
[facility]  Hospitalist Discharge Summary      Date of Admission:  6/17/2020  Date of Discharge:  6/20/2020  Discharging Provider: Osman Zelaya MD      Discharge Diagnoses     Left lower lobe community-acquired pneumonia    Follow-ups Needed After Discharge   Follow-up Appointments     Follow-up and recommended labs and tests       Follow up with primary care provider, Jia Francois, within 7 days   for hospital follow- up.  The following labs/tests are recommended: None.   Needs tighter BG control, likely should be on insulin.           Unresulted Labs Ordered in the Past 30 Days of this Admission     Date and Time Order Name Status Description    6/18/2020 0244 Blood culture Preliminary     6/18/2020 0244 Blood culture Preliminary         Discharge Disposition   Discharged to home  Condition at discharge: Stable    Hospital Course      Left lower lobe community-acquired pneumonia:  Small left pleural effusion  Patient presented with pleuritic chest pain in the left side, cough, CT scan chest PE protocol done shows no pulmonary embolism, but mild left-sided pleural effusion with left basilar infiltrate consistent with left lower lobe pneumonia and have leukocytosis.  She was started on IV ceftriaxone and azithromycin agree with that.  She was also started on scheduled ibuprofen 3 times a day for pleuritic chest pain, sputum culture cultures were ordered but sample still have to be obtained.  Blood cultures remain negative so far.  COVID-19 come back negative.  Plan:  - Ceftin/Azithromycin at discharge      Dyspepsia:   She is on Protonix will continue that.     Uncontrolled type 2 diabetes:   Patient diabetes is uncontrolled, with A1c of 12.4. Was only on metformin XR 1000 mg once a day. Metformin resumed at discharge with plans to follow up with PCP for starting insulin.   Plan:  - Started on Lantus 30 units in the morning.  - Started on NovoLog sliding scale    Morbid obesity: Increased risk  of all cause mortality.  BMI greater than 51.  Comorbidities of diabetes, hypertension, possible hyperlipidemia.  She reports she does not have sleep apnea.  Needs dieting and exercising and lose some weight.  We will go that he was started on Victoza as an outpatient basis to both control diabetes as well as her weight.     Hypertension:  Patient is on losartan and Maxide at home, resumed at discharge.      Hypothyroidism:  -Resume levothyroxine      Psoriasis: Rashes on bilateral legs with typical scaling plaque formation.    Consultations This Hospital Stay   None    Code Status   Full Code    Time Spent on this Encounter   I, Osman Zelaya MD, personally saw the patient today and spent greater than 30 minutes discharging this patient.       Osman Zelaya MD  [facility]  ______________________________________________________________________    Physical Exam   Vital Signs: Temp: 98.7  F (37.1  C) Temp src: Oral BP: 129/57   Heart Rate: 78 Resp: 16 SpO2: 93 % O2 Device: None (Room air)    Weight: 300 lbs 0 oz    Primary Care Physician   Jia Francois    Discharge Orders      Reason for your hospital stay    You had pneumonia and needed IV antibiotics. Also needed insulin for BG control.     Follow-up and recommended labs and tests     Follow up with primary care provider, Jia Francois, within 7 days for hospital follow- up.  The following labs/tests are recommended: None. Needs tighter BG control, likely should be on insulin.     Activity    Your activity upon discharge: activity as tolerated     Diet    Follow this diet upon discharge: Orders Placed This Encounter      Fluid restriction 1500 ML FLUID      Combination Diet 1559-1574 Calories: Moderate Consistent CHO (4-6 CHO units/meal); 2 gm NA Diet       Significant Results and Procedures   Most Recent 3 CBC's:  Recent Labs   Lab Test 06/19/20  0948 06/18/20  0001   WBC 13.9* 16.5*   HGB 12.7 14.7   MCV 96 96    358     Most Recent 3  BMP's:  Recent Labs   Lab Test 06/19/20  0948 06/18/20 2042 06/18/20  0001    134 130*   POTASSIUM 3.9 3.8 3.9   CHLORIDE 103 101 95   CO2 26 29 29   BUN 16 16 16   CR 0.73 0.80 0.82   ANIONGAP 6 4 6   TRISTIAN 9.7 9.9 11.8*   * 321* 420*     Most Recent 2 LFT's:  Recent Labs   Lab Test 06/19/20  0948 06/18/20  0001   AST 18 13   ALT 25 31   ALKPHOS 75 78   BILITOTAL 0.6 0.9     Most Recent 3 INR's:No lab results found.,   Results for orders placed or performed during the hospital encounter of 06/17/20   XR Chest Port 1 View    Narrative    EXAM: XR CHEST PORT 1 VW  LOCATION: [facility]  DATE/TIME: 6/18/2020 12:05 AM    INDICATION: Cough.  COMPARISON: None.      Impression    IMPRESSION: Shallow inspiration. Heart is normal in size. Small amount of left basilar atelectasis or infiltrate. Lung findings accentuated due to level of inspiration. Minimal right lower lung atelectasis. Upper lungs clear. Degenerative change both AC   joints. Limited study.   CT Chest Pulmonary Embolism w Contrast    Narrative    EXAM: CT CHEST PULMONARY EMBOLISM W CONTRAST  LOCATION: [facility]  DATE/TIME: 6/18/2020 2:12 AM    INDICATION: Left-sided chest pain. Upper abdominal pain. PUI for COVID-19.  COMPARISON: None.  TECHNIQUE: CT chest pulmonary angiogram during arterial phase injection of IV contrast. Multiplanar reformats and MIP reconstructions were performed. Dose reduction techniques were used.   CONTRAST: 83 mL Isovue-370.    FINDINGS:  ANGIOGRAM CHEST: Pulmonary arteries are normal caliber and negative for pulmonary emboli. Thoracic aorta is negative for dissection. The heart size is normal.    LUNGS AND PLEURA: There is a left basilar consolidation consistent with pneumonia. Small left pleural effusion. Few bands of scar or atelectasis in the lungs bilaterally.    MEDIASTINUM/AXILLAE: No lymph node enlargement. The central airways are unremarkable.    UPPER ABDOMEN: No acute upper abdominal  abnormality.    MUSCULOSKELETAL: Degenerative disease in the spine.      Impression    IMPRESSION:  1.  There is no pulmonary embolus, aortic aneurysm or dissection.  2.  Left basilar pneumonia and small left pleural effusion.       Discharge Medications   Current Discharge Medication List      START taking these medications    Details   alcohol swab prep pads Use to swab area of injection/shubham as directed.  Qty: 100 each, Refills: 3    Associated Diagnoses: Type 2 diabetes mellitus with hyperosmolarity without coma, unspecified whether long term insulin use (H)      azithromycin (ZITHROMAX) 250 MG tablet Take 1 tablet (250 mg) by mouth daily for 4 days  Qty: 4 tablet, Refills: 0    Associated Diagnoses: Pneumonia of left lower lobe due to infectious organism      blood glucose (NO BRAND SPECIFIED) test strip Use to test blood sugar 4 times daily or as directed.  Qty: 100 strip, Refills: 6    Comments: To accompany: glucometer per insurance.  Associated Diagnoses: Type 2 diabetes mellitus with hyperosmolarity without coma, unspecified whether long term insulin use (H)      blood glucose calibration (NO BRAND SPECIFIED) solution Use to calibrate blood glucose monitor as needed as directed.  Qty: 1 Bottle, Refills: 3    Comments: To accompany: Glucometer per insurance.  Associated Diagnoses: Type 2 diabetes mellitus with hyperosmolarity without coma, unspecified whether long term insulin use (H)      blood glucose monitoring (NO BRAND SPECIFIED) meter device kit Use to test blood sugar 4 times daily or as directed.  Qty: 1 kit, Refills: 0    Comments: Preferred blood glucose meter OR supplies to accompany: glucometer per insurance  Associated Diagnoses: Type 2 diabetes mellitus with hyperosmolarity without coma, unspecified whether long term insulin use (H)      cefuroxime (CEFTIN) 500 MG tablet Take 1 tablet (500 mg) by mouth 2 times daily for 5 days  Qty: 10 tablet, Refills: 0    Associated Diagnoses: Pneumonia of  left lower lobe due to infectious organism      insulin aspart (NOVOLOG FLEXPEN) 100 UNIT/ML pen Novolog Flexpen  Give before meals and before bed:  For Pre-meal glucose:  140 - 239 give 1 unit   240 - 339 give 2 units   > 340 give 3 units    For Bedtime Glucose:  200 - 239 give 0.5 unit   240 - 339 give 1 unit  > 340 give 1.5 units  Qty: 15 mL, Refills: 0    Associated Diagnoses: Type 2 diabetes mellitus with hyperosmolarity without coma, unspecified whether long term insulin use (H)      insulin glargine (LANTUS PEN) 100 UNIT/ML pen Inject 30 Units Subcutaneous every morning (before breakfast)  Qty: 3 mL, Refills: 1    Comments: If Lantus is not covered by insurance, may substitute Basaglar at same dose and frequency.    Associated Diagnoses: Type 2 diabetes mellitus with hyperosmolarity without coma, unspecified whether long term insulin use (H)      oxyCODONE (ROXICODONE) 5 MG tablet Take 1 tablet (5 mg) by mouth every 6 hours as needed for moderate to severe pain  Qty: 15 tablet, Refills: 0    Associated Diagnoses: Pneumonia of left lower lobe due to infectious organism      pantoprazole (PROTONIX) 40 MG EC tablet Take 1 tablet (40 mg) by mouth every morning (before breakfast)  Qty: 30 tablet, Refills: 0    Associated Diagnoses: Pneumonia of left lower lobe due to infectious organism      thin (NO BRAND SPECIFIED) lancets Use with lanceting device.  Qty: 200 each, Refills: 1    Comments: To accompany: per insurance.  Associated Diagnoses: Type 2 diabetes mellitus with hyperosmolarity without coma, unspecified whether long term insulin use (H)         CONTINUE these medications which have NOT CHANGED    Details   allopurinol (ZYLOPRIM) 300 MG tablet Take 300 mg by mouth daily      aspirin (ASA) 325 MG EC tablet Take 325 mg by mouth daily      furosemide (LASIX) 40 MG tablet Take 40 mg by mouth daily as needed (swelling)      levothyroxine (SYNTHROID/LEVOTHROID) 100 MCG tablet Take 100 mcg by mouth daily       losartan (COZAAR) 25 MG tablet Take 25 mg by mouth daily      metFORMIN (GLUCOPHAGE-XR) 500 MG 24 hr tablet Take 1,000 mg by mouth daily (with breakfast)      multivitamin, therapeutic (THERA-VIT) TABS tablet Take 1 tablet by mouth daily      pravastatin (PRAVACHOL) 20 MG tablet Take 20 mg by mouth daily      triamcinolone (KENALOG) 0.1 % external cream Apply topically daily as needed for irritation (psoriasis)      triamterene-HCTZ (DYAZIDE) 37.5-25 MG capsule Take 2 capsules by mouth every morning           Allergies   No Known Allergies

## 2020-06-20 NOTE — PROGRESS NOTES
MD Notification    Notified Person: MD    Notified Person Name: Nathalie    Notification Date/Time: 6/20/20 1010    Notification Interaction: Paged    Purpose of Notification: Pt was stable overnight and is wondering if she is able to discharge today. Pt also asking what type of BG control will be ordered at discharge.     Thanks     Orders Received: Okay to discharge. Will go home with PTA Metformin and should follow-up with primary outpatient to start insulin.     Comments:

## 2020-06-20 NOTE — PROGRESS NOTES
Met with patient, Primary care MD correct on face sheet and patient will call to schedule her own appointment.

## 2020-06-20 NOTE — PROGRESS NOTES
DATE & TIME:  night shift     Cognitive Concerns/ Orientation : A&Ox4   BEHAVIOR & AGGRESSION TOOL COLOR: green   ABNL VS/O2: VSS on RA, dyspnea with exertion   MOBILITY: independent in room, calling appropriately   PAIN MANAGMENT: 10mg PRN oxycodone given for 8/10 L rib cage pain, effective   DIET: Mod CHO w/ 1500 ml fluid restriction   BOWEL/BLADDER: continent of B/B. 1 small bout of diarrhea on shift per pt, continue to monitor  ABNL LAB/BG: phos 2.3- replaced, recheck in AM. Now on phosphorus BID. B  DRAIN/DEVICES: PIV SL- intermittent rocephin   TELEMETRY RHYTHM: na  SKIN: Redness to bilat shins, baseline per pt. Scattered bruising, otherwise intact   TESTS/PROCEDURES: na  D/C DAY/GOALS/PLACE: possible today , obs status   OTHER IMPORTANT INFO: sputum culture needed, no productive cough at this time. Pt needs diabetic teaching prior to discharge.   MD/RN ROUNDING SIGNED OFF D/E SHIFT: na  COMMIT TO SIT DONE AND SIGNED OFF yes

## 2020-06-20 NOTE — PLAN OF CARE
Discharge instructions reviewed with patient. Education performed regarding insulin administration and dosing. proper technique demonstrated by patient. Counseling provided regarding antibiotic and narcotic rxs. Pt verbalized understanding of all instructions. Discharged home with  transporting.

## 2020-06-24 LAB
BACTERIA SPEC CULT: NO GROWTH
BACTERIA SPEC CULT: NO GROWTH
SPECIMEN SOURCE: NORMAL
SPECIMEN SOURCE: NORMAL

## 2020-06-24 NOTE — PROGRESS NOTES
Subjective     Crystal Nicole is a 64 year old female who presents to clinic today for the following health issues:    HPI     Hospital Follow-up Visit:    Hospital/Nursing Home/IP Rehab Facility: [facility]  Date of Admission: 6/17/20  Date of Discharge: 6/20/20  Reason(s) for Admission: pneumonia      Was your hospitalization related to COVID-19? No   Problems taking medications regularly:  None  Medication changes since discharge: None  Problems adhering to non-medication therapy:  None    Summary of hospitalization:  [facility] discharge summary reviewed  Diagnostic Tests/Treatments reviewed.  Follow up needed: diabetic ed  Other Healthcare Providers Involved in Patient s Care:         None  Update since discharge: improved. Post Discharge Medication Reconciliation: discharge medications reconciled and changed, per note/orders (see AVS).  Plan of care communicated with patient          Diabetes Follow-up    How often are you checking your blood sugar? Three times daily  Blood sugar testing frequency justification:  Uncontrolled diabetes    accuchecks still over 200    What symptoms do you notice when your blood sugar is low?  Has not had any symptoms    What concerns do you have today about your diabetes?  Blood sugar is often over 200     Do you have any of these symptoms? (Select all that apply)  No numbness or tingling in feet.  No redness, sores or blisters on feet.  No complaints of excessive thirst.  No reports of blurry vision.  No significant changes to weight.    Have you had a diabetic eye exam in the last 12 months? No        BP Readings from Last 2 Encounters:   06/25/20 124/70   06/20/20 129/57     Hemoglobin A1C (%)   Date Value   06/18/2020 12.4 (H)           Patient Active Problem List   Diagnosis     Pneumonia     Diabetes mellitus, type 2 (H)     Morbid obesity (H)     Essential hypertension     Hypothyroidism     Past Surgical History:   Procedure Laterality Date      CHOLECYSTECTOMY         Social History     Tobacco Use     Smoking status: Never Smoker     Smokeless tobacco: Never Used   Substance Use Topics     Alcohol use: Yes     Frequency: Never     Comment: Occ     Family History   Problem Relation Age of Onset     Diabetes Mother         d age 80     Hypertension Mother      Coronary Artery Disease Father         d age 85     Brain Tumor Father      Diabetes Brother          Current Outpatient Medications   Medication Sig Dispense Refill     alcohol swab prep pads Use to swab area of injection/shubham as directed. 100 each 3     allopurinol (ZYLOPRIM) 300 MG tablet Take 300 mg by mouth daily       aspirin (ASA) 325 MG EC tablet Take 325 mg by mouth daily       blood glucose (NO BRAND SPECIFIED) test strip Use to test blood sugar 4 times daily or as directed. 100 strip 6     blood glucose calibration (NO BRAND SPECIFIED) solution Use to calibrate blood glucose monitor as needed as directed. 1 Bottle 3     blood glucose monitoring (NO BRAND SPECIFIED) meter device kit Use to test blood sugar 4 times daily or as directed. 1 kit 0     cefdinir (OMNICEF) 300 MG capsule Take 1 capsule (300 mg) by mouth 2 times daily for 10 days 20 capsule 0     furosemide (LASIX) 40 MG tablet Take 40 mg by mouth daily as needed (swelling)       insulin aspart (NOVOLOG FLEXPEN) 100 UNIT/ML pen Novolog Flexpen  Give before meals and before bed:  For Pre-meal glucose:  140 - 239 give 1 unit   240 - 339 give 2 units   > 340 give 3 units    For Bedtime Glucose:  200 - 239 give 0.5 unit   240 - 339 give 1 unit  > 340 give 1.5 units 15 mL 0     insulin glargine (LANTUS PEN) 100 UNIT/ML pen Inject 30 Units Subcutaneous every morning (before breakfast) 3 mL 1     levothyroxine (SYNTHROID/LEVOTHROID) 100 MCG tablet Take 100 mcg by mouth daily       losartan (COZAAR) 25 MG tablet Take 25 mg by mouth daily       metFORMIN (GLUCOPHAGE-XR) 500 MG 24 hr tablet Take 1,000 mg by mouth daily (with breakfast)        "multivitamin, therapeutic (THERA-VIT) TABS tablet Take 1 tablet by mouth daily       oxyCODONE (ROXICODONE) 5 MG tablet Take 1 tablet (5 mg) by mouth every 6 hours as needed for moderate to severe pain 15 tablet 0     pantoprazole (PROTONIX) 40 MG EC tablet Take 1 tablet (40 mg) by mouth every morning (before breakfast) 30 tablet 0     pravastatin (PRAVACHOL) 20 MG tablet Take 20 mg by mouth daily       thin (NO BRAND SPECIFIED) lancets Use with lanceting device. 200 each 1     triamcinolone (KENALOG) 0.1 % external cream Apply topically daily as needed for irritation (psoriasis)       triamterene-HCTZ (DYAZIDE) 37.5-25 MG capsule Take 2 capsules by mouth every morning       cefuroxime (CEFTIN) 500 MG tablet Take 1 tablet (500 mg) by mouth 2 times daily for 5 days (Patient not taking: Reported on 6/25/2020) 10 tablet 0     No Known Allergies  Recent Labs   Lab Test 06/19/20  0948 06/18/20 2042 06/18/20  0001   A1C  --   --  12.4*   ALT 25  --  31   CR 0.73 0.80 0.82   GFRESTIMATED 87 78 76   GFRESTBLACK >90 >90 88   POTASSIUM 3.9 3.8 3.9      BP Readings from Last 3 Encounters:   06/25/20 124/70   06/20/20 129/57    Wt Readings from Last 3 Encounters:   06/25/20 146.9 kg (323 lb 12.8 oz)   06/17/20 136.1 kg (300 lb)                    Reviewed and updated as needed this visit by Provider         Review of Systems   CONSTITUTIONAL: NEGATIVE for fever, chills, change in weight  INTEGUMENTARY/SKIN: NEGATIVE for worrisome rashes, moles or lesions  ENT/MOUTH: NEGATIVE for ear, mouth and throat problems  RESP:still has cough  Though symptosmare better  CV: NEGATIVE for chest pain, palpitations or peripheral edema  GI: NEGATIVE for nausea, abdominal pain, heartburn, or change in bowel habits  PSYCHIATRIC: NEGATIVE for changes in mood or affect  ROS otherwise negative      Objective    /70   Pulse 107   Temp 97.3  F (36.3  C) (Oral)   Resp 22   Ht 1.626 m (5' 4\")   Wt 146.9 kg (323 lb 12.8 oz)   SpO2 91%   BMI " "55.58 kg/m    Body mass index is 55.58 kg/m .  Physical Exam   GENERAL: healthy, alert and no distress  GENERAL: healthy, alert, no distress and obese  NECK: no adenopathy, no asymmetry, masses, or scars and thyroid normal to palpation  RESP: lungs clear to auscultation - no rales, rhonchi or wheezes  CV: regular rate and rhythm, normal S1 S2, no S3 or S4, no murmur, click or rub, no peripheral edema and peripheral pulses strong  ABDOMEN: soft, nontender, no hepatosplenomegaly, no masses and bowel sounds normal  MS: no gross musculoskeletal defects noted, no edema  SKIN: no suspicious lesions or rashes  PSYCH: mentation appears normal    Diagnostic Test Results:  Labs reviewed in Epic  Pending         Assessment & Plan     1. Pneumonia of left lower lobe due to infectious organism  Will continue antibiotics  If any worsening go to ER  - cefdinir (OMNICEF) 300 MG capsule; Take 1 capsule (300 mg) by mouth 2 times daily for 10 days  Dispense: 20 capsule; Refill: 0  -   Follow up CXR in 6 weeks  2. Type 2 diabetes mellitus without complication, with long-term current use of insulin (H)  Refer Diabetic e  ? Diabetes Type 1 -will need to get her Old Records  - Lipid panel reflex to direct LDL Fasting  - Albumin Random Urine Quantitative with Creat Ratio  - ENDOCRINOLOGY ADULT REFERRAL  - AMBULATORY ADULT DIABETES EDUCATOR REFERRAL; Future  - Basic metabolic panel    3. Morbid obesity (H)  Low brianne diet    4. Acquired hypothyroidism  Pending  lab  5 GERD-stable   Refilled PPI  Follow up 6 weeks repeat Labs and CXR  Make appointment with endocrine       BMI:   Estimated body mass index is 55.58 kg/m  as calculated from the following:    Height as of this encounter: 1.626 m (5' 4\").    Weight as of this encounter: 146.9 kg (323 lb 12.8 oz).   Weight management plan: Discussed healthy diet and exercise guidelines        Work on weight loss  Regular exercise    Return in about 6 weeks (around 8/6/2020) for recheck.    Juani" MD Tu  [facility] [place]

## 2020-06-25 ENCOUNTER — OFFICE VISIT (OUTPATIENT)
Dept: FAMILY MEDICINE | Facility: CLINIC | Age: 65
End: 2020-06-25
Payer: COMMERCIAL

## 2020-06-25 VITALS
OXYGEN SATURATION: 91 % | DIASTOLIC BLOOD PRESSURE: 70 MMHG | SYSTOLIC BLOOD PRESSURE: 124 MMHG | WEIGHT: 293 LBS | BODY MASS INDEX: 50.02 KG/M2 | TEMPERATURE: 97.3 F | HEART RATE: 107 BPM | HEIGHT: 64 IN | RESPIRATION RATE: 22 BRPM

## 2020-06-25 DIAGNOSIS — K21.9 GASTROESOPHAGEAL REFLUX DISEASE WITHOUT ESOPHAGITIS: ICD-10-CM

## 2020-06-25 DIAGNOSIS — E66.01 MORBID OBESITY (H): ICD-10-CM

## 2020-06-25 DIAGNOSIS — J18.9 PNEUMONIA OF LEFT LOWER LOBE DUE TO INFECTIOUS ORGANISM: Primary | ICD-10-CM

## 2020-06-25 DIAGNOSIS — E78.5 HYPERLIPIDEMIA LDL GOAL <70: ICD-10-CM

## 2020-06-25 DIAGNOSIS — I10 HYPERTENSION GOAL BP (BLOOD PRESSURE) < 140/90: ICD-10-CM

## 2020-06-25 DIAGNOSIS — E03.9 ACQUIRED HYPOTHYROIDISM: ICD-10-CM

## 2020-06-25 DIAGNOSIS — E11.9 TYPE 2 DIABETES MELLITUS WITHOUT COMPLICATION, WITH LONG-TERM CURRENT USE OF INSULIN (H): ICD-10-CM

## 2020-06-25 DIAGNOSIS — Z79.4 TYPE 2 DIABETES MELLITUS WITHOUT COMPLICATION, WITH LONG-TERM CURRENT USE OF INSULIN (H): ICD-10-CM

## 2020-06-25 LAB
ANION GAP SERPL CALCULATED.3IONS-SCNC: 9 MMOL/L (ref 3–14)
BUN SERPL-MCNC: 30 MG/DL (ref 7–30)
CALCIUM SERPL-MCNC: 10.1 MG/DL (ref 8.5–10.1)
CHLORIDE SERPL-SCNC: 100 MMOL/L (ref 94–109)
CHOLEST SERPL-MCNC: 129 MG/DL
CO2 SERPL-SCNC: 25 MMOL/L (ref 20–32)
CREAT SERPL-MCNC: 1.2 MG/DL (ref 0.52–1.04)
GFR SERPL CREATININE-BSD FRML MDRD: 48 ML/MIN/{1.73_M2}
GLUCOSE SERPL-MCNC: 290 MG/DL (ref 70–99)
HDLC SERPL-MCNC: 29 MG/DL
LDLC SERPL CALC-MCNC: 72 MG/DL
NONHDLC SERPL-MCNC: 100 MG/DL
POTASSIUM SERPL-SCNC: 3.8 MMOL/L (ref 3.4–5.3)
SODIUM SERPL-SCNC: 134 MMOL/L (ref 133–144)
TRIGL SERPL-MCNC: 141 MG/DL
TSH SERPL DL<=0.005 MIU/L-ACNC: 2.44 MU/L (ref 0.4–4)

## 2020-06-25 PROCEDURE — 36415 COLL VENOUS BLD VENIPUNCTURE: CPT | Performed by: FAMILY MEDICINE

## 2020-06-25 PROCEDURE — 99204 OFFICE O/P NEW MOD 45 MIN: CPT | Performed by: FAMILY MEDICINE

## 2020-06-25 PROCEDURE — 82043 UR ALBUMIN QUANTITATIVE: CPT | Performed by: FAMILY MEDICINE

## 2020-06-25 PROCEDURE — 80048 BASIC METABOLIC PNL TOTAL CA: CPT | Performed by: FAMILY MEDICINE

## 2020-06-25 PROCEDURE — 84443 ASSAY THYROID STIM HORMONE: CPT | Performed by: FAMILY MEDICINE

## 2020-06-25 PROCEDURE — 80061 LIPID PANEL: CPT | Performed by: FAMILY MEDICINE

## 2020-06-25 RX ORDER — CEFDINIR 300 MG/1
300 CAPSULE ORAL 2 TIMES DAILY
Qty: 20 CAPSULE | Refills: 0 | Status: SHIPPED | OUTPATIENT
Start: 2020-06-25 | End: 2020-07-05

## 2020-06-25 RX ORDER — PANTOPRAZOLE SODIUM 40 MG/1
40 TABLET, DELAYED RELEASE ORAL
Qty: 30 TABLET | Refills: 0 | Status: SHIPPED | OUTPATIENT
Start: 2020-06-25 | End: 2020-08-05

## 2020-06-25 RX ORDER — ATORVASTATIN CALCIUM 10 MG/1
10 TABLET, FILM COATED ORAL DAILY
Qty: 90 TABLET | Refills: 3 | Status: SHIPPED | OUTPATIENT
Start: 2020-06-25 | End: 2021-05-31

## 2020-06-25 SDOH — HEALTH STABILITY: MENTAL HEALTH: HOW OFTEN DO YOU HAVE A DRINK CONTAINING ALCOHOL?: NEVER

## 2020-06-25 ASSESSMENT — MIFFLIN-ST. JEOR: SCORE: 2008.75

## 2020-06-25 NOTE — PATIENT INSTRUCTIONS
Please make appointment with Diabetic educator  Increase Lantus to 32 units and then 34 units if Fasting AM blood sugar is more than 140  Follow up 6 weeks forRepeat Chest Xray  If any worsening go to ER  Please make appointment with endocrinologist  Juani Mae MD

## 2020-06-26 ENCOUNTER — TELEPHONE (OUTPATIENT)
Dept: FAMILY MEDICINE | Facility: CLINIC | Age: 65
End: 2020-06-26

## 2020-06-26 DIAGNOSIS — E11.00 TYPE 2 DIABETES MELLITUS WITH HYPEROSMOLARITY WITHOUT COMA, UNSPECIFIED WHETHER LONG TERM INSULIN USE (H): ICD-10-CM

## 2020-06-26 LAB
CREAT UR-MCNC: 96 MG/DL
MICROALBUMIN UR-MCNC: 26 MG/L
MICROALBUMIN/CREAT UR: 27.44 MG/G CR (ref 0–25)

## 2020-06-26 NOTE — TELEPHONE ENCOUNTER
Left message for patient to call RN hotline [phone].     Katharine Zimmerman RN    ----- Message from Juani Mae MD sent at 6/26/2020  9:52 AM CDT -----  Kidney test are borderline High  Cholesterol is Good  Please tell pt to change to Lipitor as this is a better medicine  Blood sugar is High  Follow up 3 months  Juani Zimmerman RN

## 2020-06-26 NOTE — TELEPHONE ENCOUNTER
Patient notified of providers message as written.  Patient is scheduled to see endocrinology on 7/15/20- she thought provider wanted her seen sooner due to her elevated blood sugars.  Is it okay for her to wait until this appointment?  Tiffanie Singh RN

## 2020-06-29 ENCOUNTER — ALLIED HEALTH/NURSE VISIT (OUTPATIENT)
Dept: EDUCATION SERVICES | Facility: CLINIC | Age: 65
End: 2020-06-29
Payer: COMMERCIAL

## 2020-06-29 ENCOUNTER — TELEPHONE (OUTPATIENT)
Dept: EDUCATION SERVICES | Facility: CLINIC | Age: 65
End: 2020-06-29

## 2020-06-29 DIAGNOSIS — E11.9 DIABETES MELLITUS (H): ICD-10-CM

## 2020-06-29 DIAGNOSIS — E11.9 TYPE 2 DIABETES MELLITUS WITHOUT COMPLICATION, WITH LONG-TERM CURRENT USE OF INSULIN (H): Primary | ICD-10-CM

## 2020-06-29 DIAGNOSIS — Z79.4 TYPE 2 DIABETES MELLITUS WITHOUT COMPLICATION, WITH LONG-TERM CURRENT USE OF INSULIN (H): Primary | ICD-10-CM

## 2020-06-29 PROCEDURE — G0108 DIAB MANAGE TRN  PER INDIV: HCPCS | Mod: 95

## 2020-06-29 NOTE — TELEPHONE ENCOUNTER
Hi Dr. Mae,     May I put an prescription in for a FreeStyle Hi continuous glucose monitor?       Thanks!  Emili Hernandez RD, LD, CDE  Diabetes Education

## 2020-06-29 NOTE — TELEPHONE ENCOUNTER
Pt is requesting a call back, please clarify if okay for pt to wait till 07/15 for appt with endo or if she needs to be seen sooner.    Per SHERIE Moreno 06/26:  Patient is scheduled to see endocrinology on 7/15/20- she thought provider wanted her seen sooner due to her elevated blood sugars.  Is it okay for her to wait until this appointment?  Tiffanie Singh RN

## 2020-06-29 NOTE — PROGRESS NOTES
"Diabetes Self-Management Education & Support  Presents for: Individual review.  Patient verbally consented to the telephone visit service today: yes    SUBJECTIVE/OBJECTIVE:  Presents for: Individual review  Accompanied by: Self  Diabetes education in the past 24mo: No  Focus of Visit: Patient Unsure  Diabetes type: Type 2  Date of diagnosis: diagnosed 5 years ago  Disease course: Worsening  Other concerns:: None  Cultural Influences/Ethnic Background:  American    Diabetes Symptoms & Complications:    Patient Problem List and Family Medical History reviewed for relevant medical history, current medical status, and diabetes risk factors.    Vitals:  There were no vitals taken for this visit.  Estimated body mass index is 55.58 kg/m  as calculated from the following:    Height as of 6/25/20: 1.626 m (5' 4\").    Weight as of 6/25/20: 146.9 kg (323 lb 12.8 oz).   Last 3 BP:   BP Readings from Last 3 Encounters:   06/25/20 124/70   06/20/20 129/57       History   Smoking Status     Never Smoker   Smokeless Tobacco     Never Used       Labs:  Lab Results   Component Value Date    A1C 12.4 06/18/2020     Lab Results   Component Value Date     06/25/2020     Lab Results   Component Value Date    LDL 72 06/25/2020     HDL Cholesterol   Date Value Ref Range Status   06/25/2020 29 (L) >49 mg/dL Final   ]  GFR Estimate   Date Value Ref Range Status   06/25/2020 48 (L) >60 mL/min/[1.73_m2] Final     Comment:     Non  GFR Calc  Starting 12/18/2018, serum creatinine based estimated GFR (eGFR) will be   calculated using the Chronic Kidney Disease Epidemiology Collaboration   (CKD-EPI) equation.       GFR Estimate If Black   Date Value Ref Range Status   06/25/2020 55 (L) >60 mL/min/[1.73_m2] Final     Comment:      GFR Calc  Starting 12/18/2018, serum creatinine based estimated GFR (eGFR) will be   calculated using the Chronic Kidney Disease Epidemiology Collaboration   (CKD-EPI) equation.   "     Lab Results   Component Value Date    CR 1.20 06/25/2020     No results found for: MICROALBUMIN    Healthy Eating:  Healthy Eating Assessed Today: Yes  Meal planning/habits: None  Meals include: Breakfast, Lunch, Dinner    Being Active:  Being Active Assessed Today: No    Monitoring:  Monitoring Assessed Today: Yes  Times checking blood sugar at home (number): 4  Times checking blood sugar at home (per): Day  Blood glucose trend: Decreasing    Before meals and bedtime  25th: 289, 266, 288, 251  26th: 289, 298, 241, 348  32 u  27th: 255, 261, 284, 328  34 u  28th: 242, 228, 226, 256  34 u  29th: 270, 196                  34 u    Taking Medications:  Diabetes Medication(s)     Biguanides       metFORMIN (GLUCOPHAGE-XR) 500 MG 24 hr tablet    Take 1,000 mg by mouth daily (with breakfast)    Insulin       insulin aspart (NOVOLOG FLEXPEN) 100 UNIT/ML pen    Novolog Flexpen  Give before meals and before bed:  For Pre-meal glucose:  140 - 239 give 1 unit   240 - 339 give 2 units   > 340 give 3 units    For Bedtime Glucose:  200 - 239 give 0.5 unit   240 - 339 give 1 unit  > 340 give 1.5 units     insulin glargine (LANTUS PEN) 100 UNIT/ML pen    Inject 30 Units Subcutaneous every morning (before breakfast)        Lantus   Novolog QID with sliding scale, bedtime   Novolog Flexpen Give before meals and before bed: For Pre-meal glucose: 140 - 239 give 1 unit 240 - 339 give 2 units > 340 give 3 units For Bedtime Glucose: 200 - 239 give 0.5 unit 240 - 339 give 1 unit> 340 give 1.5 units    Taking Medication Assessed Today: Yes  Current Treatments: Insulin Injections  Dose schedule: Pre-breakfast, Pre-lunch, Pre-dinner  Given by: Patient    Problem Solving:  Not assessed at this visit     Reducing Risks:  Reducing Risks Assessed Today: No  Feet checked by healthcare provider in the last year?: Yes    Healthy Coping:  Healthy Coping Assessed Today: Yes  Emotional response to diabetes: Ready to learn  Stage of change: ACTION  (Actively working towards change)  Support resources: Websites  Patient Activation Measure Survey Score:  No flowsheet data found.    Diabetes knowledge and skills assessment:   Patient is knowledgeable in diabetes management concepts related to: Monitoring, Taking Medication and Problem Solving  Patient needs further education on the following diabetes management concepts: Healthy Eating, Being Active, Monitoring, Taking Medication, Problem Solving, Reducing Risks and Healthy Coping  Based on learning assessment above, most appropriate setting for further diabetes education would be: Individual setting.      INTERVENTIONS:  Education provided today on:  AADE Self-Care Behaviors:  Diabetes Pathophysiology  Healthy Eating: carbohydrate counting and consistency in amount, composition, and timing of food intake  Monitoring: log and interpret results, individual blood glucose targets, frequency of monitoring and continuous glucose monitoring   Taking Medication: action of prescribed medication    Opportunities for ongoing education and support in diabetes-self management were discussed.  Pt verbalized understanding of concepts discussed and recommendations provided today.       Education Materials Provided:  Living Healthy with Diabetes and Carbohydrate Counting      ASSESSMENT:  Patient was diagnosed 5 years ago, but is taking more action now.  Had some education in the hospital when the insulin was started.  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  Reviewed insulin injection, titration, action, hypoglycemia etc.      Is trying to keep carbohydrates at 45-60/meal     Reviewed self titration of insulin, 2 units every 3 days if > 50% of fasting blood sugars are > 150.      Plan for follow up in 8 days to continue education and insulin adjustments.     Has antibody testing ordered for MATTEO and reviewed what these labs are for, Diabetes Pathophysiology.     Will see if a FreeStyle Hi continuous  glucose monitor is on formulary, telephone encounter sent to endocrinology.     Patient's most recent   Lab Results   Component Value Date    A1C 12.4 06/18/2020    is not meeting goal of <7.0    PLAN  See Patient Instructions for co-developed, patient-stated behavior change goals.  AVS printed and provided to patient today. See Follow-Up section for recommended follow-up.    Karen Hernandez RD, LD, CDE  Diabetes Education    Time Spent: 46 minutes  Encounter Type: Individual

## 2020-06-30 RX ORDER — FLASH GLUCOSE SCANNING READER
EACH MISCELLANEOUS
Qty: 1 EACH | Refills: 0 | Status: SHIPPED | OUTPATIENT
Start: 2020-06-30 | End: 2020-06-30

## 2020-06-30 RX ORDER — FLASH GLUCOSE SENSOR
KIT MISCELLANEOUS
Qty: 2 EACH | Refills: 11 | Status: SHIPPED | OUTPATIENT
Start: 2020-06-30 | End: 2021-10-14

## 2020-06-30 RX ORDER — FLASH GLUCOSE SCANNING READER
EACH MISCELLANEOUS
Qty: 1 EACH | Refills: 0 | Status: SHIPPED | OUTPATIENT
Start: 2020-06-30 | End: 2024-03-19

## 2020-06-30 RX ORDER — INSULIN ASPART 100 [IU]/ML
INJECTION, SOLUTION INTRAVENOUS; SUBCUTANEOUS
Qty: 15 ML | Refills: 0 | Status: SHIPPED | OUTPATIENT
Start: 2020-06-30 | End: 2020-07-26

## 2020-06-30 RX ORDER — FLASH GLUCOSE SENSOR
KIT MISCELLANEOUS
Qty: 2 EACH | Refills: 11 | Status: SHIPPED | OUTPATIENT
Start: 2020-06-30 | End: 2020-06-30

## 2020-06-30 NOTE — TELEPHONE ENCOUNTER
Pt is requesting a call back, please clarify if okay for pt to wait till 07/15 for appt with endo or if she needs to be seen sooner.     Per SHERIE Moreno 06/26:  Patient is scheduled to see endocrinology on 7/15/20- she thought provider wanted her seen sooner due to her elevated blood sugars.  Is it okay for her to wait until this appointment?  Tiffanie Zimmerman RN

## 2020-06-30 NOTE — TELEPHONE ENCOUNTER
Left detailed message for patient with information.  Patient has an appointment with diabetic ed on 7/7/2020 and 7/15/2020 with endocrinology. OK to keep as scheduled.   Advised to call RN Hotline if questions.     Katharine Zimmerman RN

## 2020-06-30 NOTE — TELEPHONE ENCOUNTER
Called and spoke with patient & notified of provider's notes as written.   Verbalizes understanding & no further questions.     TC - please make sure Dr. Mae signed insulin Rx and fax to [facility]       Katharine Zimmerman RN

## 2020-06-30 NOTE — TELEPHONE ENCOUNTER
Spoke with pt. Gave her message below.    [facility] diabetic ed informed her of lab orders in her chart. States she was seen on 6/25 and orders were placed for labs on 6/26 for islet cell antibody, insulin antibody, glutamic acid  Decarboxylase antibody, and c peptide.  Should she do these labs?    Is down to 30 units of Novolog. Pt is requesting a refill. Medication has not been prescribed by provider. Please advise.    Ayde Wong RN  [facility]

## 2020-07-01 ENCOUNTER — TELEPHONE (OUTPATIENT)
Dept: FAMILY MEDICINE | Facility: CLINIC | Age: 65
End: 2020-07-01

## 2020-07-01 NOTE — TELEPHONE ENCOUNTER
Reason for Call:  Form, our goal is to have forms completed with 72 hours, however, some forms may require a visit or additional information.    Type of letter, form or note:  FMLA    Who is the form from?: Patient    Where did the form come from: Patient or family brought in       What clinic location was the form placed at?: [facility]    Where the form was placed: [facility] Box/Folder    What number is listed as a contact on the form?: [phone]       Additional comments: PT says all information and instructions for Dr. Mae are in the sealed envelope. PT is unsure of dates of leave for her FMLA.    Call taken on 7/1/2020 at 1:03 PM by Cliff Hameed

## 2020-07-07 ENCOUNTER — ALLIED HEALTH/NURSE VISIT (OUTPATIENT)
Dept: EDUCATION SERVICES | Facility: CLINIC | Age: 65
End: 2020-07-07
Payer: COMMERCIAL

## 2020-07-07 DIAGNOSIS — E11.00 TYPE 2 DIABETES MELLITUS WITH HYPEROSMOLARITY WITHOUT COMA, UNSPECIFIED WHETHER LONG TERM INSULIN USE (H): ICD-10-CM

## 2020-07-07 DIAGNOSIS — E11.9 TYPE 2 DIABETES MELLITUS WITHOUT COMPLICATION, WITH LONG-TERM CURRENT USE OF INSULIN (H): Primary | ICD-10-CM

## 2020-07-07 DIAGNOSIS — Z79.4 TYPE 2 DIABETES MELLITUS WITHOUT COMPLICATION, WITH LONG-TERM CURRENT USE OF INSULIN (H): Primary | ICD-10-CM

## 2020-07-07 PROCEDURE — 98968 PH1 ASSMT&MGMT NQHP 21-30: CPT

## 2020-07-07 NOTE — PROGRESS NOTES
"Diabetes Self-Management Education & Support    Presents for: Individual review.  Patient verbally consented to the telephone visit service today: yes    SUBJECTIVE/OBJECTIVE:  Presents for: Individual review  Accompanied by: Self  Diabetes education in the past 24mo: No  Focus of Visit: Patient Unsure  Diabetes type: Type 2  Date of diagnosis: diagnosed 5 years ago  Disease course: Improving  Other concerns:: None  Cultural Influences/Ethnic Background:  American      Diabetes Symptoms & Complications:  Not assessed at this visit      Patient Problem List and Family Medical History reviewed for relevant medical history, current medical status, and diabetes risk factors.    Vitals:  There were no vitals taken for this visit.  Estimated body mass index is 55.58 kg/m  as calculated from the following:    Height as of 6/25/20: 1.626 m (5' 4\").    Weight as of 6/25/20: 146.9 kg (323 lb 12.8 oz).   Last 3 BP:   BP Readings from Last 3 Encounters:   06/25/20 124/70   06/20/20 129/57       History   Smoking Status     Never Smoker   Smokeless Tobacco     Never Used       Labs:  Lab Results   Component Value Date    A1C 12.4 06/18/2020     Lab Results   Component Value Date     06/25/2020     Lab Results   Component Value Date    LDL 72 06/25/2020     HDL Cholesterol   Date Value Ref Range Status   06/25/2020 29 (L) >49 mg/dL Final   ]  GFR Estimate   Date Value Ref Range Status   06/25/2020 48 (L) >60 mL/min/[1.73_m2] Final     Comment:     Non  GFR Calc  Starting 12/18/2018, serum creatinine based estimated GFR (eGFR) will be   calculated using the Chronic Kidney Disease Epidemiology Collaboration   (CKD-EPI) equation.       GFR Estimate If Black   Date Value Ref Range Status   06/25/2020 55 (L) >60 mL/min/[1.73_m2] Final     Comment:      GFR Calc  Starting 12/18/2018, serum creatinine based estimated GFR (eGFR) will be   calculated using the Chronic Kidney Disease Epidemiology " Collaboration   (CKD-EPI) equation.       Lab Results   Component Value Date    CR 1.20 06/25/2020     No results found for: MICROALBUMIN    Healthy Eating:  Healthy Eating Assessed Today: Yes  Meal planning/habits: None  Meals include: Breakfast, Lunch, Dinner    Being Active:  Being Active Assessed Today: No    Monitoring:  Monitoring Assessed Today: Yes  Times checking blood sugar at home (number): 4  Times checking blood sugar at home (per): Day  Blood glucose trend: Decreasing    29th: 270, 196,  266, 318  30th: 241, 231, 230, 255  1st: 259, 204, 231, 258  2nd: 240, 237, 234, 284  3rd: 236, 195, 196, 307 (38 units)   4th, 214, 200, 210, 278  5th: 219, 176, 242, 246  6th 256, 198, 235, 193  7th: 229       Average decreasing, 232mg/dL   (260mg/dL average at last assessment)     Taking Medications:  Diabetes Medication(s)     Biguanides       metFORMIN (GLUCOPHAGE-XR) 500 MG 24 hr tablet    Take 1,000 mg by mouth daily (with breakfast)    Insulin       insulin aspart (NOVOLOG FLEXPEN) 100 UNIT/ML pen    Novolog Flexpen  Give before meals and before bed:  For Pre-meal glucose:  140 - 239 give 1 unit   240 - 339 give 2 units   > 340 give 3 units    For Bedtime Glucose:  200 - 239 give 0.5 unit   240 - 339 give 1 unit  > 340 give 1.5 units     insulin glargine (LANTUS PEN) 100 UNIT/ML pen    Inject 30 Units Subcutaneous every morning (before breakfast)          Taking Medication Assessed Today: Yes  Current Treatments: Insulin Injections  Dose schedule: Pre-breakfast, Pre-lunch, Pre-dinner  Given by: Patient    Problem Solving:   Not assessed at this visit     Reducing Risks:  Reducing Risks Assessed Today: No  Feet checked by healthcare provider in the last year?: Yes    Healthy Coping:  Healthy Coping Assessed Today: Yes  Emotional response to diabetes: Ready to learn  Stage of change: ACTION (Actively working towards change)  Support resources: Websites  Patient Activation Measure Survey Score:  No flowsheet data  found.    Diabetes knowledge and skills assessment:   Patient is knowledgeable in diabetes management concepts related to: Healthy Eating, Being Active, Monitoring, Taking Medication, Problem Solving, Reducing Risks and Healthy Coping  Patient needs further education on the following diabetes management concepts: Taking Medication and Reducing Risks  Based on learning assessment above, most appropriate setting for further diabetes education would be: Individual setting.      INTERVENTIONS:  Education provided today on:  AADE Self-Care Behaviors:  Diabetes Pathophysiology  Healthy Eating: carbohydrate counting, consistency in amount, composition, and timing of food intake and portion control  Monitoring: individual blood glucose targets  Taking Medication: action of prescribed medication    Opportunities for ongoing education and support in diabetes-self management were discussed.  Pt verbalized understanding of concepts discussed and recommendations provided today.         ASSESSMENT:  Increase Lantus by 6 units today (16% increase, 0.05units/kg) to expedite titration from 38 units to 44 units.  Set short phone follow ups for titration Friday and Monday before endocrinology next Wednesday.      Titrating up long acting insulin as it is only at 0.3units/kg and will see if basal insulin is enough to control blood sugars to avoid MDI.  Also could consider GLP1s, Patient denies any of the contraindications to a GLP1 medication including a history of Pancreatitis, history of Medullary Thyroid Cancer, or Multiple Endocrine Neoplasia Syndrome Type 2.    Labs to check for MATTEO ordered for the future.     FreeStyle Hi continuous glucose monitor was too expensive, continue finger sticks.       Patient's most recent   Lab Results   Component Value Date    A1C 12.4 06/18/2020    is not meeting goal of <7.0    PLAN  See Patient Instructions for co-developed, patient-stated behavior change goals.  AVS printed and provided to  patient today. See Follow-Up section for recommended follow-up.    Karen Hernandez RD, JOSÉ, CDE  Diabetes Education    Time Spent: 29 minutes  Encounter Type: Individual    Any diabetes medication dose changes were made via the CDE Protocol and Collaborative Practice Agreement with the patient's referring provider. A copy of this encounter was shared with the provider.

## 2020-07-07 NOTE — Clinical Note
Hi Dr. Armijo,   This was a lady who was diagnosed before [place], so don't have those records.  She is seeing you next week.  MATTEO labs are ordered  by her PCP and she was wondering if she should get those before she sees you.  Higher BMI, but it sounds like some type 1 in her family and she is only 5 years diagnosed with very elevated blood sugars.   I told her it wouldn't hurt to get the labs done and rule it out, but she wanted me to run it by you.  We discussed GLP1s as a possibility depending on lab work.  Have just been working up basal, hoping Novolog is temporary.  Thanks! Emili

## 2020-07-09 ENCOUNTER — TELEPHONE (OUTPATIENT)
Dept: FAMILY MEDICINE | Facility: CLINIC | Age: 65
End: 2020-07-09

## 2020-07-09 DIAGNOSIS — E11.00 TYPE 2 DIABETES MELLITUS WITH HYPEROSMOLARITY WITHOUT COMA, UNSPECIFIED WHETHER LONG TERM INSULIN USE (H): ICD-10-CM

## 2020-07-09 RX ORDER — GLUCOSAMINE HCL/CHONDROITIN SU 500-400 MG
CAPSULE ORAL
Qty: 300 EACH | Refills: 0 | Status: SHIPPED | OUTPATIENT
Start: 2020-07-09 | End: 2020-10-26

## 2020-07-09 NOTE — TELEPHONE ENCOUNTER
Patient had hospital f/u with Dr. Mae on 6/25/2020    Is out of diabetic testing supplies which was prescribed in the hospital  She is requesting pen needles, lancets, test strips, and alcohol wipes    Sent    Marino Bansal RN

## 2020-07-10 ENCOUNTER — PATIENT OUTREACH (OUTPATIENT)
Dept: EDUCATION SERVICES | Facility: CLINIC | Age: 65
End: 2020-07-10
Payer: COMMERCIAL

## 2020-07-10 DIAGNOSIS — E11.00 TYPE 2 DIABETES MELLITUS WITH HYPEROSMOLARITY WITHOUT COMA, UNSPECIFIED WHETHER LONG TERM INSULIN USE (H): ICD-10-CM

## 2020-07-10 DIAGNOSIS — E11.9 TYPE 2 DIABETES MELLITUS WITHOUT COMPLICATION, WITH LONG-TERM CURRENT USE OF INSULIN (H): ICD-10-CM

## 2020-07-10 DIAGNOSIS — Z79.4 TYPE 2 DIABETES MELLITUS WITHOUT COMPLICATION, WITH LONG-TERM CURRENT USE OF INSULIN (H): ICD-10-CM

## 2020-07-10 NOTE — PROGRESS NOTES
Diabetes Follow-up    Subjective/Objective:    Crystal Nicole sent in blood glucose log for review. Last date of communication was: 2020.    Diabetes is being managed with   Lifestyle (diet/activity), Diabetes Medications   Diabetes Medication(s)     Biguanides       metFORMIN (GLUCOPHAGE-XR) 500 MG 24 hr tablet    Take 1,000 mg by mouth daily (with breakfast)    Insulin       insulin aspart (NOVOLOG FLEXPEN) 100 UNIT/ML pen    Novolog Flexpen  Give before meals and before bed:  For Pre-meal glucose:  140 - 239 give 1 unit   240 - 339 give 2 units   > 340 give 3 units    For Bedtime Glucose:  200 - 239 give 0.5 unit   240 - 339 give 1 unit  > 340 give 1.5 units     insulin glargine (LANTUS PEN) 100 UNIT/ML pen    Inject 44 Units Subcutaneous every morning (before breakfast) (allow max dose 60 units/day for ongoing titration & needle prime)          BG/Food Lo/7: 229, 250, 197, 269       (began 44 units )   :  207, 208, 206, 225      (44 units)   :  232, 234, 208, 202       (44 units)   7/10: 244     44 units (0.3units/kg basal)       Assessment/Plan/Response:  Increase from Lantus from 44 to 50 units (6 unit increase today, 0.05units/kg)      Working up basal insulin (versus Novolog) in consideration of possibly  adding a GLP and stopping the Novolog to keep injections at a minimum.     Insulin titration  and endocrinology Wed 7/15.       Karen Hernandez RD, LD, CDE  Diabetes Education  < 4 minutes       Any diabetes medication dose changes were made via the CDE Protocol and Collaborative Practice Agreement with the patient's referring provider. A copy of this encounter was shared with the provider.

## 2020-07-10 NOTE — TELEPHONE ENCOUNTER
The Form has been completed by the provider, confirmed faxed to the fax number on the form and listed below. A copy has been sent to be added to the [facility] file for care givers. Laya Eaton,     The original form was mailed to: [address] [place] [id number]    Per request in envelope

## 2020-07-13 ENCOUNTER — PATIENT OUTREACH (OUTPATIENT)
Dept: EDUCATION SERVICES | Facility: CLINIC | Age: 65
End: 2020-07-13
Payer: COMMERCIAL

## 2020-07-13 DIAGNOSIS — E11.9 TYPE 2 DIABETES MELLITUS WITHOUT COMPLICATION, WITH LONG-TERM CURRENT USE OF INSULIN (H): ICD-10-CM

## 2020-07-13 DIAGNOSIS — Z79.4 TYPE 2 DIABETES MELLITUS WITHOUT COMPLICATION, WITH LONG-TERM CURRENT USE OF INSULIN (H): ICD-10-CM

## 2020-07-13 DIAGNOSIS — E11.00 TYPE 2 DIABETES MELLITUS WITH HYPEROSMOLARITY WITHOUT COMA, UNSPECIFIED WHETHER LONG TERM INSULIN USE (H): ICD-10-CM

## 2020-07-13 NOTE — PROGRESS NOTES
Diabetes Follow-up    Subjective/Objective:    Crystal Nicole sent in blood glucose log for review. Last date of communication was: 07/10/2020.    Diabetes is being managed with   Lifestyle (diet/activity), Diabetes Medications   Diabetes Medication(s)     Biguanides       metFORMIN (GLUCOPHAGE-XR) 500 MG 24 hr tablet    Take 1,000 mg by mouth daily (with breakfast)    Insulin       insulin aspart (NOVOLOG FLEXPEN) 100 UNIT/ML pen    Novolog Flexpen  Give before meals and before bed:  For Pre-meal glucose:  140 - 239 give 1 unit   240 - 339 give 2 units   > 340 give 3 units    For Bedtime Glucose:  200 - 239 give 0.5 unit   240 - 339 give 1 unit  > 340 give 1.5 units     insulin glargine (LANTUS PEN) 100 UNIT/ML pen    Inject 50 Units Subcutaneous every morning (before breakfast) (allow max dose 75 units/day for ongoing titration & needle prime)          BG/Food Lo/10: 244, 228, 222,  Missed bedtime         (50 units from the morning of 7/10 on )   : 279, 214, 211, 232   : 282, 210, 229, 265   : 165    Assessment/Plan/Response:  Increase Lantus from 50 to 55 units (10% increase).  Working up the basal insulin (versus Novolog) in consideration of possibly  adding a GLP and stopping the Novolog to keep injections at a minimum.     55 units of Lantus is 0.37units/kg basal insulin.  Has future labs ordered for ruling out MATTEO as well (patient will discuss with endocrinology on Wednesday.    Has a high deductible healthcare plan, but if trying a GLP1 reviewed using the [facility] as they have a special copay card to bring Trulicity to 35$/month regardless of deductible.       Karen Hernandez RD, LD, CDE  Diabetes Education  Time spent: < 5 minutes    Any diabetes medication dose changes were made via the CDE Protocol and Collaborative Practice Agreement with the patient's referring provider.

## 2020-07-15 ENCOUNTER — VIRTUAL VISIT (OUTPATIENT)
Dept: ENDOCRINOLOGY | Facility: CLINIC | Age: 65
End: 2020-07-15
Payer: COMMERCIAL

## 2020-07-15 DIAGNOSIS — E11.9 TYPE 2 DIABETES MELLITUS WITHOUT COMPLICATION, WITH LONG-TERM CURRENT USE OF INSULIN (H): Primary | ICD-10-CM

## 2020-07-15 DIAGNOSIS — Z79.4 TYPE 2 DIABETES MELLITUS WITHOUT COMPLICATION, WITH LONG-TERM CURRENT USE OF INSULIN (H): Primary | ICD-10-CM

## 2020-07-15 DIAGNOSIS — E78.5 DYSLIPIDEMIA: ICD-10-CM

## 2020-07-15 DIAGNOSIS — I10 ESSENTIAL HYPERTENSION: ICD-10-CM

## 2020-07-15 PROCEDURE — 99203 OFFICE O/P NEW LOW 30 MIN: CPT | Mod: 95 | Performed by: INTERNAL MEDICINE

## 2020-07-15 RX ORDER — DULAGLUTIDE 0.75 MG/.5ML
0.75 INJECTION, SOLUTION SUBCUTANEOUS
Qty: 2 ML | Refills: 11 | Status: SHIPPED | OUTPATIENT
Start: 2020-07-15 | End: 2020-09-18

## 2020-07-15 NOTE — PROGRESS NOTES
CC: DM.     HPI: Patient presents for management of DM.   Estimates she was diagnosed about 5 years ago.   She was just on metformin.   Then admitted last month for PNA and HbA1C was 12.4%.   Discharged on insulin.     Lantus 55 U every day   Novolog:   For Pre-meal glucose:  140 - 239 give 1 unit   240 - 339 give 2 units   > 340 give 3 units     For Bedtime Glucose:  200 - 239 give 0.5 unit   240 - 339 give 1 unit  > 340 give 1.5 units    Since discharged, trying to stick to 45-60 grams of carbs per meal.   She does an exercise video once a day for 30 minutes.     BG Log     7/15/2020  207 8:21am     7/14/20  191 7:59pm  206 2:24pm  203 10:am     7/13/20  183 8:00pm  171 3:02pm  165 11:01am     7/12/20  265 9:59pm  229 6:03pm  210 12:43pm  282 8:11am     7/11/20  232 9:19pm  211 4:53pm  214 10:10am  279 2:46am     7/10/20  222 8:39pm  228 3:14pm  244 9:21am     7/9/20  208 6:46pm  234 1:51pm  232 10:10am  225 12:07am     7/8/20  206 6:18pm  208 1:11pm  207 10:00am     7/7/20  269 10:21pm  197 7:34pm  250 12:37pm  229 9:38am     7/6/20  193 10:21pm  235 7:30pm  198 1:15pm  256 8:06am     7/5/20  246 10:36pm  192 6:07pm  176 1:00pm  219 8:23am        ROS: 10 point ROS neg other than the symptoms noted above in the HPI.    PMH:   Patient Active Problem List   Diagnosis     Pneumonia     Diabetes mellitus, type 2 (H)     Morbid obesity (H)     Essential hypertension     Hypothyroidism     Meds:  Current Outpatient Medications   Medication     alcohol swab prep pads     allopurinol (ZYLOPRIM) 300 MG tablet     aspirin (ASA) 325 MG EC tablet     atorvastatin (LIPITOR) 10 MG tablet     blood glucose (NO BRAND SPECIFIED) lancets standard     blood glucose (NO BRAND SPECIFIED) test strip     blood glucose calibration (NO BRAND SPECIFIED) solution     blood glucose monitoring (NO BRAND SPECIFIED) meter device kit     furosemide (LASIX) 40 MG tablet     insulin aspart (NOVOLOG FLEXPEN) 100 UNIT/ML pen     insulin glargine  (LANTUS PEN) 100 UNIT/ML pen     insulin pen needle (32G X 4 MM) 32G X 4 MM miscellaneous     levothyroxine (SYNTHROID/LEVOTHROID) 100 MCG tablet     losartan (COZAAR) 25 MG tablet     metFORMIN (GLUCOPHAGE-XR) 500 MG 24 hr tablet     multivitamin, therapeutic (THERA-VIT) TABS tablet     oxyCODONE (ROXICODONE) 5 MG tablet     pantoprazole (PROTONIX) 40 MG EC tablet     thin (NO BRAND SPECIFIED) lancets     triamcinolone (KENALOG) 0.1 % external cream     triamterene-HCTZ (DYAZIDE) 37.5-25 MG capsule     Continuous Blood Gluc  (FREESTYLE SJ 14 DAY READER) [facility]     Continuous Blood Gluc Sensor (FREESTYLE SJ 14 DAY SENSOR) [facility]     No current facility-administered medications for this visit.      FHX:   Brother and several cousins with DM.   Brother had type 1 DM.     SHX:  Retired.   Non-smoker.     Exam:   GENERAL: Healthy, alert and no distress  EYES: Eyes grossly normal to inspection.  No discharge or erythema, or obvious scleral/conjunctival abnormalities.  HENT: Normal cephalic/atraumatic.  External ears, nose and mouth without ulcers or lesions.  No nasal drainage visible.  RESP: No audible wheeze, cough, or visible cyanosis.  No visible retractions or increased work of breathing.    MS: No gross musculoskeletal defects noted.  Normal range of motion.  No visible edema.  SKIN: Visible skin clear. No significant rash, abnormal pigmentation or lesions.  NEURO: Cranial nerves grossly intact.  Mentation and speech appropriate for age.  PSYCH: Mentation appears normal, affect normal/bright, judgement and insight intact, normal speech and appearance well-groomed.      A/P:   Type 2 DM - Uncontrolled. Questionable MATTEO given brother's history of type 1 DM. Regardless, a GLP-1 agonist would be helpful given her weight. Risks/benefits reviewed.   -Schedule labs as ordered by Dr Mae.   -Start trulicity 0.75 mg once a week.   -Increase lantus to 60 units daily.     -New sliding scale:  For Pre-meal  glucose:  140 - 179 2 units  180-219 4 units  220-259 6 units  260-299 8 units  300 and higher, 10 units.      For Bedtime Glucose:  200 - 239 2 units  240-279 4 units  280 and higher 6 units.     -ASA taking.  -BP: controlled on last check.   -NAFL/WALLER: normal ALT and AST in 6/2020.   -Lipids: HDL 29, LDL 72, Trg 141 in 6/2020.    On atorvastatin.   -Microalbumin elevated in 6/2020. She is on losartan.   -Eyes: due for exam.   -Smoking: none.     Due to the COVID 19 pandemic this visit was a telephone/video visit in order to help prevent spread of infection in this high risk patient and the general population. The patient gave verbal consent for the visit today.    Start time 1330  Stop time 1355  Total time 25  This visit would have been billed as 66945 as an E & M code    Renato Armijo MD on 7/15/2020 at 1:55 PM

## 2020-07-15 NOTE — NURSING NOTE
BG Log    7/15/2020  207 8:21am    7/14/20  191 7:59pm  206 2:24pm  203 10:am    7/13/20  183 8:00pm  171 3:02pm  165 11:01am    7/12/20  265 9:59pm  229 6:03pm  210 12:43pm  282 8:11am    7/11/20  232 9:19pm  211 4:53pm  214 10:10am  279 2:46am    7/10/20  222 8:39pm  228 3:14pm  244 9:21am    7/9/20  208 6:46pm  234 1:51pm  232 10:10am  225 12:07am    7/8/20  206 6:18pm  208 1:11pm  207 10:00am    7/7/20  269 10:21pm  197 7:34pm  250 12:37pm  229 9:38am    7/6/20  193 10:21pm  235 7:30pm  198 1:15pm  256 8:06am    7/5/20  246 10:36pm  192 6:07pm  176 1:00pm  219 8:23am

## 2020-07-15 NOTE — LETTER
7/15/2020         RE: Crystal Nicole  [address]  [facility] [address]        Dear Colleague,    Thank you for referring your patient, Crystal Nicole, to the [facility]. Please see a copy of my visit note below.    CC: DM.     HPI: Patient presents for management of DM.   Estimates she was diagnosed about 5 years ago.   She was just on metformin.   Then admitted last month for PNA and HbA1C was 12.4%.   Discharged on insulin.     Lantus 55 U every day   Novolog:   For Pre-meal glucose:  140 - 239 give 1 unit   240 - 339 give 2 units   > 340 give 3 units     For Bedtime Glucose:  200 - 239 give 0.5 unit   240 - 339 give 1 unit  > 340 give 1.5 units    Since discharged, trying to stick to 45-60 grams of carbs per meal.   She does an exercise video once a day for 30 minutes.     BG Log     7/15/2020  207 8:21am     7/14/20  191 7:59pm  206 2:24pm  203 10:am     7/13/20  183 8:00pm  171 3:02pm  165 11:01am     7/12/20  265 9:59pm  229 6:03pm  210 12:43pm  282 8:11am     7/11/20  232 9:19pm  211 4:53pm  214 10:10am  279 2:46am     7/10/20  222 8:39pm  228 3:14pm  244 9:21am     7/9/20  208 6:46pm  234 1:51pm  232 10:10am  225 12:07am     7/8/20  206 6:18pm  208 1:11pm  207 10:00am     7/7/20  269 10:21pm  197 7:34pm  250 12:37pm  229 9:38am     7/6/20  193 10:21pm  235 7:30pm  198 1:15pm  256 8:06am     7/5/20  246 10:36pm  192 6:07pm  176 1:00pm  219 8:23am        ROS: 10 point ROS neg other than the symptoms noted above in the HPI.    PMH:   Patient Active Problem List   Diagnosis     Pneumonia     Diabetes mellitus, type 2 (H)     Morbid obesity (H)     Essential hypertension     Hypothyroidism     Meds:  Current Outpatient Medications   Medication     alcohol swab prep pads     allopurinol (ZYLOPRIM) 300 MG tablet     aspirin (ASA) 325 MG EC tablet     atorvastatin (LIPITOR) 10 MG tablet     blood glucose (NO BRAND SPECIFIED) lancets standard     blood glucose (NO BRAND SPECIFIED) test  strip     blood glucose calibration (NO BRAND SPECIFIED) solution     blood glucose monitoring (NO BRAND SPECIFIED) meter device kit     furosemide (LASIX) 40 MG tablet     insulin aspart (NOVOLOG FLEXPEN) 100 UNIT/ML pen     insulin glargine (LANTUS PEN) 100 UNIT/ML pen     insulin pen needle (32G X 4 MM) 32G X 4 MM miscellaneous     levothyroxine (SYNTHROID/LEVOTHROID) 100 MCG tablet     losartan (COZAAR) 25 MG tablet     metFORMIN (GLUCOPHAGE-XR) 500 MG 24 hr tablet     multivitamin, therapeutic (THERA-VIT) TABS tablet     oxyCODONE (ROXICODONE) 5 MG tablet     pantoprazole (PROTONIX) 40 MG EC tablet     thin (NO BRAND SPECIFIED) lancets     triamcinolone (KENALOG) 0.1 % external cream     triamterene-HCTZ (DYAZIDE) 37.5-25 MG capsule     Continuous Blood Gluc  (FREESTYLE SJ 14 DAY READER) [facility]     Continuous Blood Gluc Sensor (FREESTYLE SJ 14 DAY SENSOR) [facility]     No current facility-administered medications for this visit.      FHX:   Brother and several cousins with DM.   Brother had type 1 DM.     SHX:  Retired.   Non-smoker.     Exam:   GENERAL: Healthy, alert and no distress  EYES: Eyes grossly normal to inspection.  No discharge or erythema, or obvious scleral/conjunctival abnormalities.  HENT: Normal cephalic/atraumatic.  External ears, nose and mouth without ulcers or lesions.  No nasal drainage visible.  RESP: No audible wheeze, cough, or visible cyanosis.  No visible retractions or increased work of breathing.    MS: No gross musculoskeletal defects noted.  Normal range of motion.  No visible edema.  SKIN: Visible skin clear. No significant rash, abnormal pigmentation or lesions.  NEURO: Cranial nerves grossly intact.  Mentation and speech appropriate for age.  PSYCH: Mentation appears normal, affect normal/bright, judgement and insight intact, normal speech and appearance well-groomed.      A/P:   Type 2 DM - Uncontrolled. Questionable MATTEO given brother's history of type 1 DM.  Regardless, a GLP-1 agonist would be helpful given her weight. Risks/benefits reviewed.   -Schedule labs as ordered by Dr Mae.   -Start trulicity 0.75 mg once a week.   -Increase lantus to 60 units daily.     -New sliding scale:  For Pre-meal glucose:  140 - 179 2 units  180-219 4 units  220-259 6 units  260-299 8 units  300 and higher, 10 units.      For Bedtime Glucose:  200 - 239 2 units  240-279 4 units  280 and higher 6 units.     -ASA taking.  -BP: controlled on last check.   -NAFL/WALLER: normal ALT and AST in 6/2020.   -Lipids: HDL 29, LDL 72, Trg 141 in 6/2020.    On atorvastatin.   -Microalbumin elevated in 6/2020. She is on losartan.   -Eyes: due for exam.   -Smoking: none.     Due to the COVID 19 pandemic this visit was a telephone/video visit in order to help prevent spread of infection in this high risk patient and the general population. The patient gave verbal consent for the visit today.    Start time 1330  Stop time 1355  Total time 25  This visit would have been billed as 92905 as an E & M code    Renato Armijo MD on 7/15/2020 at 1:55 PM          Again, thank you for allowing me to participate in the care of your patient.        Sincerely,        Renato Armijo MD

## 2020-07-16 DIAGNOSIS — I10 ESSENTIAL HYPERTENSION: ICD-10-CM

## 2020-07-16 DIAGNOSIS — E03.9 HYPOTHYROIDISM, UNSPECIFIED TYPE: ICD-10-CM

## 2020-07-16 DIAGNOSIS — J18.9 PNEUMONIA DUE TO INFECTIOUS ORGANISM, UNSPECIFIED LATERALITY, UNSPECIFIED PART OF LUNG: Primary | ICD-10-CM

## 2020-07-16 NOTE — TELEPHONE ENCOUNTER
Patient was seen on 6/25/20 and was told to follow up in 6 weeks to repeat labs and CXR.  Labs orders are placed and patient is scheduled on 7/31/20, she would like to do chest xray that day while she is here but there is no order.   Order pending.   Tiffanie Singh RN

## 2020-07-20 RX ORDER — LEVOTHYROXINE SODIUM 100 UG/1
100 TABLET ORAL DAILY
Qty: 90 TABLET | Refills: 0 | Status: SHIPPED | OUTPATIENT
Start: 2020-07-20 | End: 2020-11-03

## 2020-07-20 RX ORDER — LOSARTAN POTASSIUM 25 MG/1
25 TABLET ORAL DAILY
Qty: 30 TABLET | Refills: 0 | Status: SHIPPED | OUTPATIENT
Start: 2020-07-20 | End: 2020-08-26

## 2020-07-20 NOTE — TELEPHONE ENCOUNTER
Patient called to check on status of Xray order.  Advised that order was placed, no appointment necessary, can walk in to have done while in clinic for labs.   Patient would also like refill for Levothyroxine and Losartan, both historical.  Patient is changing providers and would like Dr. Mae to take over prescription.   Orders pending.   Tiffanie Singh RN

## 2020-07-23 DIAGNOSIS — E11.00 TYPE 2 DIABETES MELLITUS WITH HYPEROSMOLARITY WITHOUT COMA, UNSPECIFIED WHETHER LONG TERM INSULIN USE (H): ICD-10-CM

## 2020-07-26 RX ORDER — INSULIN ASPART 100 [IU]/ML
INJECTION, SOLUTION INTRAVENOUS; SUBCUTANEOUS
Qty: 15 ML | Refills: 0 | Status: SHIPPED | OUTPATIENT
Start: 2020-07-26 | End: 2020-07-28

## 2020-07-26 NOTE — TELEPHONE ENCOUNTER
Prescription approved per [facility] Refill Protocol.  Ashley Houston RN

## 2020-07-28 NOTE — TELEPHONE ENCOUNTER
Order jennifer'd up with previous sliding scale but pharmacy also requesting max units per day. No previous information on this.    Katharine Zimmerman RN

## 2020-07-28 NOTE — TELEPHONE ENCOUNTER
Per fax: Missing/Illegibe information on the Rx: Please Re-send with specific instructions. Need max amount of units per day.    [facility] #[id number] - [place], [place] - [address]    Outpatient Medication Detail      Disp  Refills  Start  End  ERIC    insulin aspart (NOVOLOG FLEXPEN) 100 UNIT/ML pen  15 mL  0  7/26/2020   No    Sig: GIVE BEFORE MEALS AND BEFORE BED PER SLIDING SCALE    Sent to pharmacy as: NovoLOG FlexPen 100 UNIT/ML Subcutaneous Solution Pen-injector (insulin aspart)    Class: E-Prescribe    Order: [id number]    E-Prescribing Status: Receipt confirmed by pharmacy (7/26/2020 11:50 AM CDT)

## 2020-07-29 RX ORDER — INSULIN ASPART 100 [IU]/ML
INJECTION, SOLUTION INTRAVENOUS; SUBCUTANEOUS
Qty: 15 ML | Refills: 0 | Status: SHIPPED | OUTPATIENT
Start: 2020-07-29 | End: 2021-06-08

## 2020-07-29 NOTE — TELEPHONE ENCOUNTER
The prescription has been confirmed faxed to the Pharmacy. Laya Eaton,     NOVOLOG FLEXPEN 100 UNIT/ML soln

## 2020-07-31 ENCOUNTER — TRANSFERRED RECORDS (OUTPATIENT)
Dept: MULTI SPECIALTY CLINIC | Facility: CLINIC | Age: 65
End: 2020-07-31

## 2020-07-31 ENCOUNTER — TRANSFERRED RECORDS (OUTPATIENT)
Dept: HEALTH INFORMATION MANAGEMENT | Facility: CLINIC | Age: 65
End: 2020-07-31

## 2020-07-31 ENCOUNTER — ANCILLARY PROCEDURE (OUTPATIENT)
Dept: GENERAL RADIOLOGY | Facility: CLINIC | Age: 65
End: 2020-07-31
Attending: FAMILY MEDICINE
Payer: COMMERCIAL

## 2020-07-31 DIAGNOSIS — J18.9 PNEUMONIA DUE TO INFECTIOUS ORGANISM, UNSPECIFIED LATERALITY, UNSPECIFIED PART OF LUNG: ICD-10-CM

## 2020-07-31 DIAGNOSIS — Z79.4 TYPE 2 DIABETES MELLITUS WITHOUT COMPLICATION, WITH LONG-TERM CURRENT USE OF INSULIN (H): ICD-10-CM

## 2020-07-31 DIAGNOSIS — E11.9 TYPE 2 DIABETES MELLITUS WITHOUT COMPLICATION, WITH LONG-TERM CURRENT USE OF INSULIN (H): ICD-10-CM

## 2020-07-31 LAB
RETINOPATHY: NORMAL
RETINOPATHY: NORMAL

## 2020-07-31 PROCEDURE — 86341 ISLET CELL ANTIBODY: CPT | Mod: 59 | Performed by: FAMILY MEDICINE

## 2020-07-31 PROCEDURE — 86341 ISLET CELL ANTIBODY: CPT | Mod: 90 | Performed by: FAMILY MEDICINE

## 2020-07-31 PROCEDURE — 71046 X-RAY EXAM CHEST 2 VIEWS: CPT

## 2020-07-31 PROCEDURE — 86337 INSULIN ANTIBODIES: CPT | Mod: 90 | Performed by: FAMILY MEDICINE

## 2020-07-31 PROCEDURE — 36415 COLL VENOUS BLD VENIPUNCTURE: CPT | Performed by: FAMILY MEDICINE

## 2020-07-31 PROCEDURE — 99000 SPECIMEN HANDLING OFFICE-LAB: CPT | Performed by: FAMILY MEDICINE

## 2020-07-31 PROCEDURE — 84681 ASSAY OF C-PEPTIDE: CPT | Performed by: FAMILY MEDICINE

## 2020-08-01 LAB — PANC ISLET CELL AB TITR SER: NORMAL {TITER}

## 2020-08-03 LAB — GAD65 AB SER IA-ACNC: <5 IU/ML (ref 0–5)

## 2020-08-03 NOTE — RESULT ENCOUNTER NOTE
Dear Crystal,    Your recent test results are attached.      Normal antibody test.    If you have any questions please feel free to contact [phone] or myself via [employer].    Sincerely,  Sandra Matt, CNP

## 2020-08-04 LAB — C PEPTIDE SERPL-MCNC: 9.8 NG/ML (ref 0.9–6.9)

## 2020-08-05 DIAGNOSIS — J18.9 PNEUMONIA OF LEFT LOWER LOBE DUE TO INFECTIOUS ORGANISM: ICD-10-CM

## 2020-08-05 RX ORDER — PANTOPRAZOLE SODIUM 40 MG/1
TABLET, DELAYED RELEASE ORAL
Qty: 30 TABLET | Refills: 0 | Status: SHIPPED | OUTPATIENT
Start: 2020-08-05 | End: 2020-08-31

## 2020-08-05 NOTE — TELEPHONE ENCOUNTER
Routing refill request to provider for review/approval because:  Review diagnosis since not prescribed as PPI. Prescribed 06/25/2020 for pneumonia.

## 2020-08-07 ENCOUNTER — TELEPHONE (OUTPATIENT)
Dept: FAMILY MEDICINE | Facility: CLINIC | Age: 65
End: 2020-08-07

## 2020-08-07 NOTE — TELEPHONE ENCOUNTER
"Patient had hospital f/u with Dr. Mae on 6/25/2020 for pneumonia and was to f/u in 6 weeks  Had a f/u scheduled for 8/17/2020, however, this had been cancelled \"via [employer]\"  Updated patient that this appointment was cancelled  Patient had a f/u chest XR on 7/31/2020 which was reviewed by Delia Dailey CNP (covering provider)  Chest xray shows improvement  Patient's symptoms are much better but not completely gone yet    Patient would like Dr. Mae to review the chest XR when she is back in the office next week   Please also advise if you would still want her to follow-up in clinic again    Marino Hyman RN        "

## 2020-08-10 NOTE — TELEPHONE ENCOUNTER
Called and spoke with patient & notified of provider's results as written. Verbalizes understanding & no further questions.     Katharine Zimmerman RN

## 2020-08-14 LAB — INSULIN HUMAN AB SER-ACNC: <0.4 U/ML (ref 0–0.4)

## 2020-08-17 NOTE — NURSING NOTE
"Chief Complaint   Patient presents with     RECHECK     DM       Initial There were no vitals taken for this visit. Estimated body mass index is 55.58 kg/m  as calculated from the following:    Height as of 6/25/20: 1.626 m (5' 4\").    Weight as of 6/25/20: 146.9 kg (323 lb 12.8 oz).  BP completed using cuff size: NA (Not Taken)  Medications and allergies reviewed.      Maricarmen PIZARRO MA    "

## 2020-08-17 NOTE — PROGRESS NOTES
"Crystal Nicole is a 65 year old female who is being evaluated via a billable video visit.      The patient has been notified of following:     \"This video visit will be conducted via a call between you and your physician/provider. We have found that certain health care needs can be provided without the need for an in-person physical exam.  This service lets us provide the care you need with a video conversation.  If a prescription is necessary we can send it directly to your pharmacy.  If lab work is needed we can place an order for that and you can then stop by our lab to have the test done at a later time.    Video visits are billed at different rates depending on your insurance coverage.  Please reach out to your insurance provider with any questions.    If during the course of the call the physician/provider feels a video visit is not appropriate, you will not be charged for this service.\"    Patient has given verbal consent for Video visit? Yes  How would you like to obtain your AVS? [employer]  If you are dropped from the video visit, the video invite should be resent to: Other e-mail: [facility]  Will anyone else be joining your video visit? No        Video-Visit Details    Type of service:  Video Visit    Video Start Time: 1130  Video End Time: 11:56 AM    Originating Location (pt. Location): Home    Distant Location (provider location):  [facility]     Platform used for Video Visit: Javier    S:   Pt being seen in f/u for DM.  Estimates she was diagnosed about 5 years ago.   She was just on metformin.   Then admitted last month for PNA and HbA1C was 12.4%.   Discharged on insulin.   Since discharged, trying to stick to 45-60 grams of carbs per meal.   She does an exercise video once a day for 30 minutes.     lantus 60 U every day   Novolog   For Pre-meal glucose:  140 - 179 2 units  180-219 4 units  220-259 6 units  260-299 8 units  300 and higher, 10 units.      For Bedtime Glucose:  160-179 1 " units  180 - 199 2 units  200 - 239 3 units  240-279 5 units  280 and higher 7 units.     Trulicity 0.75 mg a week   Metformin 1000 mg every day     BG Log      8/3/2020  132 9:52am  132 1:34pm  124 8:06pm     8/4/20  143 12:12am  136 9:51am  143 12:12pm  130 12:59pm  143 8:05pm     8/5/20  193 12:06am  143 9:06am  115 2:41pm  158 7:36pm     8/6/20  135 12:24am  143 9:28am  125 1:31pm  148 6:44pm  78 10:39pm     8/7/20  173 12:20am  148 10:00am  150 1:35pm  151 9:00pm     8/8/20  112 7:49am  189 11:48pm     8/9/20  130 9:21am  103 2:23pm     8/10/20  142 1:22pm  163 11:28pm     8/11/20  105 9:28am  127 12:15pm  193 7:00pm     8/12/20  127 12:15am  107 9:55am  128 1:15pm  162 8:10pm     8/13/20  148 8:10am  115 11:25am  148 12:30pm  122 8:00pm     8/14/20  139 1:13am  126 9:12am  173 1:30pm  131 7:36pm  124 10:48pm     8/15/20  140 12:30am  143 8:12am  135 12:55pm  118 7:38pm     8/16/20  140 9:00am  122 12:00pm     8/17/20  114 1:09am  129 9:00am  119 12:00pm     Frustrated she gained weight since our last visit.     ROS: 10 point ROS neg other than the symptoms noted above in the HPI.    O:  GENERAL: Healthy, alert and no distress  EYES: Eyes grossly normal to inspection.  No discharge or erythema, or obvious scleral/conjunctival abnormalities.  RESP: No audible wheeze, cough, or visible cyanosis.  No visible retractions or increased work of breathing.    SKIN: Visible skin clear. No significant rash, abnormal pigmentation or lesions.  NEURO: Cranial nerves grossly intact.  Mentation and speech appropriate for age.  PSYCH: Mentation appears normal, affect normal/bright, judgement and insight intact, normal speech and appearance well-groomed.      A/P:   Type 2 DM - Uncontrolled. Questionable MATTEO given brother's history of type 1 DM. Regardless, a GLP-1 agonist would be helpful given her weight. Risks/benefits reviewed.   Labs from 7/31/2020 consistent with type 2 DM.   In 8/2020, much improved control. Concerned  about weight gain and would like to try to come off insulin.   Discussed increasing trulicity +/- SGLT-2 inhibitor.   -Increase trulcity to 1.5 mg once a week.   -No change to metformin or insulin doses.   -Start farxiga 5 mg daily.   -Increase activity as tolerated.   -Call me if you begin to have glucoses below 80. We will lower insulin at that time.   -Labs in 1 month. See me shortly after.   -ASA taking.  -BP: controlled on last check.   -NAFL/WALLER: normal ALT and AST in 6/2020.   -Lipids: HDL 29, LDL 72, Trg 141 in 6/2020.               On atorvastatin.   -Microalbumin elevated in 6/2020. She is on losartan.   -Eyes: reports normal exam in 8/2020.   -Smoking: none.       Renato Armijo MD on 8/18/2020 at 11:56 AM

## 2020-08-17 NOTE — NURSING NOTE
BG Log     8/3/2020  132 9:52am  132 1:34pm  124 8:06pm    8/4/20  143 12:12am  136 9:51am  143 12:12pm  130 12:59pm  143 8:05pm    8/5/20  193 12:06am  143 9:06am  115 2:41pm  158 7:36pm    8/6/20  135 12:24am  143 9:28am  125 1:31pm  148 6:44pm  78 10:39pm    8/7/20  173 12:20am  148 10:00am  150 1:35pm  151 9:00pm    8/8/20  112 7:49am  189 11:48pm    8/9/20  130 9:21am  103 2:23pm    8/10/20  142 1:22pm  163 11:28pm    8/11/20  105 9:28am  127 12:15pm  193 7:00pm    8/12/20  127 12:15am  107 9:55am  128 1:15pm  162 8:10pm    8/13/20  148 8:10am  115 11:25am  148 12:30pm  122 8:00pm    8/14/20  139 1:13am  126 9:12am  173 1:30pm  131 7:36pm  124 10:48pm    8/15/20  140 12:30am  143 8:12am  135 12:55pm  118 7:38pm    8/16/20  140 9:00am  122 12:00pm    8/17/20  114 1:09am  129 9:00am  119 12:00pm

## 2020-08-18 ENCOUNTER — VIRTUAL VISIT (OUTPATIENT)
Dept: ENDOCRINOLOGY | Facility: CLINIC | Age: 65
End: 2020-08-18
Payer: COMMERCIAL

## 2020-08-18 DIAGNOSIS — Z79.4 TYPE 2 DIABETES MELLITUS WITHOUT COMPLICATION, WITH LONG-TERM CURRENT USE OF INSULIN (H): Primary | ICD-10-CM

## 2020-08-18 DIAGNOSIS — I10 ESSENTIAL HYPERTENSION: ICD-10-CM

## 2020-08-18 DIAGNOSIS — E11.9 TYPE 2 DIABETES MELLITUS WITHOUT COMPLICATION, WITH LONG-TERM CURRENT USE OF INSULIN (H): Primary | ICD-10-CM

## 2020-08-18 DIAGNOSIS — E78.5 DYSLIPIDEMIA: ICD-10-CM

## 2020-08-18 PROCEDURE — 99214 OFFICE O/P EST MOD 30 MIN: CPT | Mod: 95 | Performed by: INTERNAL MEDICINE

## 2020-08-18 RX ORDER — DAPAGLIFLOZIN 5 MG/1
5 TABLET, FILM COATED ORAL DAILY
Qty: 30 TABLET | Refills: 11 | Status: SHIPPED | OUTPATIENT
Start: 2020-08-18 | End: 2021-10-14

## 2020-08-18 RX ORDER — DULAGLUTIDE 1.5 MG/.5ML
1.5 INJECTION, SOLUTION SUBCUTANEOUS
Qty: 6 ML | Refills: 11 | Status: SHIPPED | OUTPATIENT
Start: 2020-08-18 | End: 2020-09-23

## 2020-08-18 NOTE — LETTER
"    8/18/2020         RE: Crystal Nicole  [address]  [place] [address]        Dear Colleague,    Thank you for referring your patient, Crystal Nicole, to the [facility]. Please see a copy of my visit note below.    Crystal Nicole is a 65 year old female who is being evaluated via a billable video visit.      The patient has been notified of following:     \"This video visit will be conducted via a call between you and your physician/provider. We have found that certain health care needs can be provided without the need for an in-person physical exam.  This service lets us provide the care you need with a video conversation.  If a prescription is necessary we can send it directly to your pharmacy.  If lab work is needed we can place an order for that and you can then stop by our lab to have the test done at a later time.    Video visits are billed at different rates depending on your insurance coverage.  Please reach out to your insurance provider with any questions.    If during the course of the call the physician/provider feels a video visit is not appropriate, you will not be charged for this service.\"    Patient has given verbal consent for Video visit? Yes  How would you like to obtain your AVS? [employer]  If you are dropped from the video visit, the video invite should be resent to: Other e-mail: [email or web address]  Will anyone else be joining your video visit? No        Video-Visit Details    Type of service:  Video Visit    Video Start Time: 1130  Video End Time: 11:56 AM    Originating Location (pt. Location): Home    Distant Location (provider location):  [facility]     Platform used for Video Visit: Javier    S:   Pt being seen in f/u for DM.  Estimates she was diagnosed about 5 years ago.   She was just on metformin.   Then admitted last month for PNA and HbA1C was 12.4%.   Discharged on insulin.   Since discharged, trying to stick to 45-60 grams of carbs per meal.   She does " an exercise video once a day for 30 minutes.     lantus 60 U every day   Novolog   For Pre-meal glucose:  140 - 179 2 units  180-219 4 units  220-259 6 units  260-299 8 units  300 and higher, 10 units.      For Bedtime Glucose:  160-179 1 units  180 - 199 2 units  200 - 239 3 units  240-279 5 units  280 and higher 7 units.     Trulicity 0.75 mg a week   Metformin 1000 mg every day     BG Log      8/3/2020  132 9:52am  132 1:34pm  124 8:06pm     8/4/20  143 12:12am  136 9:51am  143 12:12pm  130 12:59pm  143 8:05pm     8/5/20  193 12:06am  143 9:06am  115 2:41pm  158 7:36pm     8/6/20  135 12:24am  143 9:28am  125 1:31pm  148 6:44pm  78 10:39pm     8/7/20  173 12:20am  148 10:00am  150 1:35pm  151 9:00pm     8/8/20  112 7:49am  189 11:48pm     8/9/20  130 9:21am  103 2:23pm     8/10/20  142 1:22pm  163 11:28pm     8/11/20  105 9:28am  127 12:15pm  193 7:00pm     8/12/20  127 12:15am  107 9:55am  128 1:15pm  162 8:10pm     8/13/20  148 8:10am  115 11:25am  148 12:30pm  122 8:00pm     8/14/20  139 1:13am  126 9:12am  173 1:30pm  131 7:36pm  124 10:48pm     8/15/20  140 12:30am  143 8:12am  135 12:55pm  118 7:38pm     8/16/20  140 9:00am  122 12:00pm     8/17/20  114 1:09am  129 9:00am  119 12:00pm     Frustrated she gained weight since our last visit.     ROS: 10 point ROS neg other than the symptoms noted above in the HPI.    O:  GENERAL: Healthy, alert and no distress  EYES: Eyes grossly normal to inspection.  No discharge or erythema, or obvious scleral/conjunctival abnormalities.  RESP: No audible wheeze, cough, or visible cyanosis.  No visible retractions or increased work of breathing.    SKIN: Visible skin clear. No significant rash, abnormal pigmentation or lesions.  NEURO: Cranial nerves grossly intact.  Mentation and speech appropriate for age.  PSYCH: Mentation appears normal, affect normal/bright, judgement and insight intact, normal speech and appearance well-groomed.      A/P:   Type 2 DM - Uncontrolled.  Questionable MATTEO given brother's history of type 1 DM. Regardless, a GLP-1 agonist would be helpful given her weight. Risks/benefits reviewed.   Labs from 7/31/2020 consistent with type 2 DM.   In 8/2020, much improved control. Concerned about weight gain and would like to try to come off insulin.   Discussed increasing trulicity +/- SGLT-2 inhibitor.   -Increase trulcity to 1.5 mg once a week.   -No change to metformin or insulin doses.   -Start farxiga 5 mg daily.   -Increase activity as tolerated.   -Call me if you begin to have glucoses below 80. We will lower insulin at that time.   -Labs in 1 month. See me shortly after.   -ASA taking.  -BP: controlled on last check.   -NAFL/WALLER: normal ALT and AST in 6/2020.   -Lipids: HDL 29, LDL 72, Trg 141 in 6/2020.               On atorvastatin.   -Microalbumin elevated in 6/2020. She is on losartan.   -Eyes: reports normal exam in 8/2020.   -Smoking: none.       Renato Armijo MD on 8/18/2020 at 11:56 AM                Again, thank you for allowing me to participate in the care of your patient.        Sincerely,        Renato Armijo MD

## 2020-08-25 DIAGNOSIS — I10 ESSENTIAL HYPERTENSION: ICD-10-CM

## 2020-08-26 RX ORDER — LOSARTAN POTASSIUM 25 MG/1
TABLET ORAL
Qty: 30 TABLET | Refills: 0 | Status: SHIPPED | OUTPATIENT
Start: 2020-08-26 | End: 2020-08-31

## 2020-08-26 NOTE — TELEPHONE ENCOUNTER
"Routing refill request to provider for review/approval because:  Labs out of range:  Cr    Requested Prescriptions   Pending Prescriptions Disp Refills    losartan (COZAAR) 25 MG tablet [Pharmacy Med Name: LOSARTAN POTASSIUM 25 MG TAB] 30 tablet 0     Sig: TAKE 1 TABLET BY MOUTH EVERY DAY       Angiotensin-II Receptors Failed - 8/26/2020  8:06 AM        Failed - Normal serum creatinine on file in past 12 months     Recent Labs   Lab Test 06/25/20  1214   CR 1.20*       Ok to refill medication if creatinine is low          Passed - Last blood pressure under 140/90 in past 12 months     BP Readings from Last 3 Encounters:   06/25/20 124/70   06/20/20 129/57                 Passed - Recent (12 mo) or future (30 days) visit within the authorizing provider's specialty     Patient has had an office visit with the authorizing provider or a provider within the authorizing providers department within the previous 12 mos or has a future within next 30 days. See \"Patient Info\" tab in inbasket, or \"Choose Columns\" in Meds & Orders section of the refill encounter.              Passed - Medication is active on med list        Passed - Patient is age 18 or older        Passed - No active pregnancy on record        Passed - Normal serum potassium on file in past 12 months     Recent Labs   Lab Test 06/25/20  1214   POTASSIUM 3.8                    Passed - No positive pregnancy test in past 12 months           Katharine Zimmerman RN  "

## 2020-08-28 ENCOUNTER — MYC MEDICAL ADVICE (OUTPATIENT)
Dept: ENDOCRINOLOGY | Facility: CLINIC | Age: 65
End: 2020-08-28

## 2020-08-28 ENCOUNTER — MYC MEDICAL ADVICE (OUTPATIENT)
Dept: FAMILY MEDICINE | Facility: CLINIC | Age: 65
End: 2020-08-28

## 2020-08-28 DIAGNOSIS — I10 ESSENTIAL HYPERTENSION: ICD-10-CM

## 2020-08-28 DIAGNOSIS — Z79.4 TYPE 2 DIABETES MELLITUS WITHOUT COMPLICATION, WITH LONG-TERM CURRENT USE OF INSULIN (H): ICD-10-CM

## 2020-08-28 DIAGNOSIS — E11.9 TYPE 2 DIABETES MELLITUS WITHOUT COMPLICATION, WITH LONG-TERM CURRENT USE OF INSULIN (H): ICD-10-CM

## 2020-08-28 DIAGNOSIS — E11.00 TYPE 2 DIABETES MELLITUS WITH HYPEROSMOLARITY WITHOUT COMA, UNSPECIFIED WHETHER LONG TERM INSULIN USE (H): ICD-10-CM

## 2020-08-30 DIAGNOSIS — J18.9 PNEUMONIA OF LEFT LOWER LOBE DUE TO INFECTIOUS ORGANISM: ICD-10-CM

## 2020-08-31 ENCOUNTER — TELEPHONE (OUTPATIENT)
Dept: ENDOCRINOLOGY | Facility: CLINIC | Age: 65
End: 2020-08-31

## 2020-08-31 DIAGNOSIS — E11.00 TYPE 2 DIABETES MELLITUS WITH HYPEROSMOLARITY WITHOUT COMA, UNSPECIFIED WHETHER LONG TERM INSULIN USE (H): ICD-10-CM

## 2020-08-31 DIAGNOSIS — Z79.4 TYPE 2 DIABETES MELLITUS WITHOUT COMPLICATION, WITH LONG-TERM CURRENT USE OF INSULIN (H): ICD-10-CM

## 2020-08-31 DIAGNOSIS — E11.9 TYPE 2 DIABETES MELLITUS WITHOUT COMPLICATION, WITH LONG-TERM CURRENT USE OF INSULIN (H): ICD-10-CM

## 2020-08-31 RX ORDER — PANTOPRAZOLE SODIUM 40 MG/1
TABLET, DELAYED RELEASE ORAL
Qty: 30 TABLET | Refills: 0 | Status: SHIPPED | OUTPATIENT
Start: 2020-08-31 | End: 2020-09-23

## 2020-08-31 RX ORDER — LOSARTAN POTASSIUM 25 MG/1
25 TABLET ORAL DAILY
Qty: 90 TABLET | Refills: 0 | Status: SHIPPED | OUTPATIENT
Start: 2020-08-31 | End: 2020-09-23

## 2020-08-31 RX ORDER — LOSARTAN POTASSIUM 25 MG/1
25 TABLET ORAL DAILY
Qty: 30 TABLET | Refills: 0 | Status: SHIPPED | OUTPATIENT
Start: 2020-08-31 | End: 2020-08-31

## 2020-08-31 NOTE — TELEPHONE ENCOUNTER
insulin glargine (LANTUS PEN) 100 UNIT/ML pen  24 mL  1  7/13/2020   No    Sig - Route: Inject 55 Units Subcutaneous every morning (before breakfast) (allow max dose 75 units/day for ongoing titration & needle prime) - Subcutaneous    Sent to pharmacy as: Insulin Glargine 100 UNIT/ML Subcutaneous Solution Pen-injector (LANTUS PEN)    Class: E-Prescribe    Notes to Pharmacy: If Lantus is not covered by insurance, may substitute Basaglar at same dose and frequency.  DOSE UPDATE, DO NOT FILL TODAY    Order: [id number]    E-Prescribing Status: Receipt confirmed by pharmacy (7/13/2020  9:35 AM CDT)      Duplicate request.   Closing encounter.     Katharine Zimmerman RN

## 2020-08-31 NOTE — TELEPHONE ENCOUNTER
Called patient who said she needs a new script for her lantus not lancets as noted below. Each insulin box is dispensed as 15 ml and patient is requesting a 90 day supply. RN sent in refills per [facility] protocol.    Wesly Currie RN....8/31/2020 12:19 PM

## 2020-08-31 NOTE — TELEPHONE ENCOUNTER
"Routing refill request to provider for review/approval because:  Labs out of range:  Cr    Requested Prescriptions   Pending Prescriptions Disp Refills    losartan (COZAAR) 25 MG tablet 30 tablet 0     Sig: Take 1 tablet (25 mg) by mouth daily       Angiotensin-II Receptors Failed - 8/31/2020  7:12 AM        Failed - Normal serum creatinine on file in past 12 months     Recent Labs   Lab Test 06/25/20  1214   CR 1.20*       Ok to refill medication if creatinine is low          Passed - Last blood pressure under 140/90 in past 12 months     BP Readings from Last 3 Encounters:   06/25/20 124/70   06/20/20 129/57                 Passed - Recent (12 mo) or future (30 days) visit within the authorizing provider's specialty     Patient has had an office visit with the authorizing provider or a provider within the authorizing providers department within the previous 12 mos or has a future within next 30 days. See \"Patient Info\" tab in inbasket, or \"Choose Columns\" in Meds & Orders section of the refill encounter.              Passed - Medication is active on med list        Passed - Patient is age 18 or older        Passed - No active pregnancy on record        Passed - Normal serum potassium on file in past 12 months     Recent Labs   Lab Test 06/25/20  1214   POTASSIUM 3.8                    Passed - No positive pregnancy test in past 12 months           Katharine Zimmerman RN  "

## 2020-08-31 NOTE — TELEPHONE ENCOUNTER
Reason for Call:  Medication or medication refill:    Do you use a [facility]?  Name of the pharmacy and phone number for the current request:  [facility]    Name of the medication requested: lancets     Other request: patient is calling to ask pcp to send in the script for lancets per box instead of Mg stated the pharmacy can not break open the boxes anymore need to refill by box. Patient is requesting 2 boxes  Thank you     Can we leave a detailed message on this number? YES    Phone number patient can be reached at: Home number on file [phone] (home)    Best Time: any    Call taken on 8/31/2020 at 10:27 AM by Deedee Stoner

## 2020-09-14 DIAGNOSIS — E11.9 TYPE 2 DIABETES MELLITUS WITHOUT COMPLICATION, WITH LONG-TERM CURRENT USE OF INSULIN (H): ICD-10-CM

## 2020-09-14 DIAGNOSIS — Z79.4 TYPE 2 DIABETES MELLITUS WITHOUT COMPLICATION, WITH LONG-TERM CURRENT USE OF INSULIN (H): ICD-10-CM

## 2020-09-14 LAB
ANION GAP SERPL CALCULATED.3IONS-SCNC: 8 MMOL/L (ref 3–14)
BUN SERPL-MCNC: 23 MG/DL (ref 7–30)
CALCIUM SERPL-MCNC: 9.5 MG/DL (ref 8.5–10.1)
CHLORIDE SERPL-SCNC: 104 MMOL/L (ref 94–109)
CO2 SERPL-SCNC: 28 MMOL/L (ref 20–32)
CREAT SERPL-MCNC: 1.03 MG/DL (ref 0.52–1.04)
GFR SERPL CREATININE-BSD FRML MDRD: 57 ML/MIN/{1.73_M2}
GLUCOSE SERPL-MCNC: 133 MG/DL (ref 70–99)
HBA1C MFR BLD: 7 % (ref 0–5.6)
POTASSIUM SERPL-SCNC: 3.6 MMOL/L (ref 3.4–5.3)
SODIUM SERPL-SCNC: 140 MMOL/L (ref 133–144)

## 2020-09-14 PROCEDURE — 83036 HEMOGLOBIN GLYCOSYLATED A1C: CPT | Performed by: INTERNAL MEDICINE

## 2020-09-14 PROCEDURE — 36415 COLL VENOUS BLD VENIPUNCTURE: CPT | Performed by: INTERNAL MEDICINE

## 2020-09-14 PROCEDURE — 80048 BASIC METABOLIC PNL TOTAL CA: CPT | Performed by: INTERNAL MEDICINE

## 2020-09-17 NOTE — PROGRESS NOTES
"Crystal Nicole is a 65 year old female who is being evaluated via a billable video visit.      The patient has been notified of following:     \"This video visit will be conducted via a call between you and your physician/provider. We have found that certain health care needs can be provided without the need for an in-person physical exam.  This service lets us provide the care you need with a video conversation.  If a prescription is necessary we can send it directly to your pharmacy.  If lab work is needed we can place an order for that and you can then stop by our lab to have the test done at a later time.    Video visits are billed at different rates depending on your insurance coverage.  Please reach out to your insurance provider with any questions.    If during the course of the call the physician/provider feels a video visit is not appropriate, you will not be charged for this service.\"    Patient has given verbal consent for Video visit? Yes  How would you like to obtain your AVS? [employer]  If you are dropped from the video visit, the video invite should be resent to: Other e-mail: [employer]  Will anyone else be joining your video visit? No        Video-Visit Details    Type of service:  Video Visit    Video Start Time: 10:59 AM  Video End Time: 11:25 AM    Originating Location (pt. Location): Home    Distant Location (provider location):  [facility]     Platform used for Video Visit: Javier    S:   Pt being seen in f/u for DM.  Estimates she was diagnosed about 5 years ago.   She was just on metformin.   Then admitted last month (6/2020) for PNA and HbA1C was 12.4%.   Discharged on insulin.     Since being discharged, trying to stick to 45-60 grams of carbs per meal.   She does an exercise video once a day for 30 minutes.   However, she notes this is not very intense exercise.      lantus 60 U every day   Novolog   For Pre-meal glucose:  140 - 179 2 units  180-219 4 units  220-259 6 units  260-299 8 " units  300 and higher, 10 units.      For Bedtime Glucose:  160-179 1 units  180 - 199 2 units  200 - 239 3 units  240-279 5 units  280 and higher 7 units.     Rarely using her novolog as her readings have been well controlled.      Trulicity 1.5 mg a week.   Metformin 1000 mg every day   Farxiga 5 mg every day      BG Log     9/4/2020  133 9:47am  116 3:44pm  146 7:49pm     9/5/20  137 10:52am  119 2:52pm  138 7:13pm     9/6/20  131 10:56am  127 2:26pm  123 8:32pm     9/7/20  120 9:45am  130 3:05pm  109 6:57pm  124 11:12pm     9/8/20  118 8:07am  103 12:06pm  118 7:07pm  122 12:51am     9/9/20  126 10:00am  116 4:26pm     9/10/20  133 7:28am  109 5:00pm  111 9:40pm     9/11/20  116 11:19am  127 12:08pm  121 3:15pm  132 9:32pm     9/12/20  117 9:33am  124 3:33pm  126 7:35pm     9/13/20  123 11:49am  108 5:37pm     9/14/20  136 9:33am  104 2:39pm  118 7:39pm     9/15/20  125 8:33am  117 12:15pm  134 9:45pm  147 12:52am     9/16/20  139 10:55am  136 3:30pm  111 6:30pm  130 11:30pm     9/17/20  133 11:33am  106 4:33pm     ROS: 10 point ROS neg other than the symptoms noted above in the HPI.    Exam:  GENERAL: Healthy, alert and no distress  EYES: Eyes grossly normal to inspection.  No discharge or erythema, or obvious scleral/conjunctival abnormalities.  RESP: No audible wheeze, cough, or visible cyanosis.  No visible retractions or increased work of breathing.    SKIN: Visible skin clear. No significant rash, abnormal pigmentation or lesions.  NEURO: Cranial nerves grossly intact.  Mentation and speech appropriate for age.  PSYCH: Mentation appears normal, affect normal/bright, judgement and insight intact, normal speech and appearance well-groomed.    A/P:   Type 2 DM - Uncontrolled. Questionable MATTEO given brother's history of type 1 DM. Regardless, a GLP-1 agonist would be helpful given her weight. Risks/benefits reviewed.   Labs from 7/31/2020 consistent with type 2 DM.   In 8/2020, much improved control.  Concerned about weight gain and would like to try to come off insulin.   Discussed increasing trulicity +/- SGLT-2 inhibitor.   We increased trulicity to 1.5 mg and started farxiga.   In 9/2020, 7.0%. Barely needing to use her novolog. Discussed how trulicity now has 3.0 and 4.5 mg dosing available. However, I could not find these yet in the MAR.   -Continue metformin.   -Lantus reduce to 55 units a day.   -Farxiga increase to 10 mg every day.   -Trulicity keep at 1.5 mg a week for now. I will find out when the 3.0 mg dose will be in pharmacies.   -Cut out bread, noodles, and salt intake as we discussed.   -Increase activity as tolerated.   -Call me if you begin to have glucoses below 80. We will lower insulin at that time.   -Labs in 3 months.   -ASA taking.  -BP: controlled on last check.   -NAFL/WALLER: normal ALT and AST in 6/2020.   -Lipids: HDL 29, LDL 72, Trg 141 in 6/2020.               On atorvastatin.   -Microalbumin elevated in 6/2020. She is on losartan.   -Eyes: reports normal exam in 8/2020.   -Smoking: none.     Renato Armijo MD on 9/18/2020 at 11:25 AM

## 2020-09-17 NOTE — NURSING NOTE
BG Log    9/4/2020  133 9:47am  116 3:44pm  146 7:49pm    9/5/20  137 10:52am  119 2:52pm  138 7:13pm    9/6/20  131 10:56am  127 2:26pm  123 8:32pm    9/7/20  120 9:45am  130 3:05pm  109 6:57pm  124 11:12pm    9/8/20  118 8:07am  103 12:06pm  118 7:07pm  122 12:51am    9/9/20  126 10:00am  116 4:26pm    9/10/20  133 7:28am  109 5:00pm  111 9:40pm    9/11/20  116 11:19am  127 12:08pm  121 3:15pm  132 9:32pm    9/12/20  117 9:33am  124 3:33pm  126 7:35pm    9/13/20  123 11:49am  108 5:37pm    9/14/20  136 9:33am  104 2:39pm  118 7:39pm    9/15/20  125 8:33am  117 12:15pm  134 9:45pm  147 12:52am    9/16/20  139 10:55am  136 3:30pm  111 6:30pm  130 11:30pm    9/17/20  133 11:33am  106 4:33pm

## 2020-09-18 ENCOUNTER — VIRTUAL VISIT (OUTPATIENT)
Dept: ENDOCRINOLOGY | Facility: CLINIC | Age: 65
End: 2020-09-18
Payer: COMMERCIAL

## 2020-09-18 ENCOUNTER — MYC MEDICAL ADVICE (OUTPATIENT)
Dept: ENDOCRINOLOGY | Facility: CLINIC | Age: 65
End: 2020-09-18

## 2020-09-18 DIAGNOSIS — E11.00 TYPE 2 DIABETES MELLITUS WITH HYPEROSMOLARITY WITHOUT COMA, UNSPECIFIED WHETHER LONG TERM INSULIN USE (H): Primary | ICD-10-CM

## 2020-09-18 DIAGNOSIS — I10 ESSENTIAL HYPERTENSION: ICD-10-CM

## 2020-09-18 DIAGNOSIS — Z79.4 LONG TERM (CURRENT) USE OF INSULIN (H): ICD-10-CM

## 2020-09-18 DIAGNOSIS — E78.5 DYSLIPIDEMIA: ICD-10-CM

## 2020-09-18 DIAGNOSIS — E11.9 TYPE 2 DIABETES MELLITUS WITHOUT COMPLICATION, WITH LONG-TERM CURRENT USE OF INSULIN (H): ICD-10-CM

## 2020-09-18 DIAGNOSIS — Z79.4 TYPE 2 DIABETES MELLITUS WITHOUT COMPLICATION, WITH LONG-TERM CURRENT USE OF INSULIN (H): ICD-10-CM

## 2020-09-18 PROCEDURE — 99214 OFFICE O/P EST MOD 30 MIN: CPT | Mod: 95 | Performed by: INTERNAL MEDICINE

## 2020-09-18 RX ORDER — METFORMIN HCL 500 MG
1000 TABLET, EXTENDED RELEASE 24 HR ORAL
Qty: 180 TABLET | Refills: 3 | Status: SHIPPED | OUTPATIENT
Start: 2020-09-18 | End: 2021-08-26

## 2020-09-18 RX ORDER — DAPAGLIFLOZIN 10 MG/1
10 TABLET, FILM COATED ORAL DAILY
Qty: 90 TABLET | Refills: 3 | Status: SHIPPED | OUTPATIENT
Start: 2020-09-18 | End: 2021-10-14

## 2020-09-18 NOTE — LETTER
"    9/18/2020         RE: Crystal Nicole  [address]  [facility] [address]        Dear Colleague,    Thank you for referring your patient, Crystal Nicole, to the [facility]. Please see a copy of my visit note below.    Crystal Nicole is a 65 year old female who is being evaluated via a billable video visit.      The patient has been notified of following:     \"This video visit will be conducted via a call between you and your physician/provider. We have found that certain health care needs can be provided without the need for an in-person physical exam.  This service lets us provide the care you need with a video conversation.  If a prescription is necessary we can send it directly to your pharmacy.  If lab work is needed we can place an order for that and you can then stop by our lab to have the test done at a later time.    Video visits are billed at different rates depending on your insurance coverage.  Please reach out to your insurance provider with any questions.    If during the course of the call the physician/provider feels a video visit is not appropriate, you will not be charged for this service.\"    Patient has given verbal consent for Video visit? Yes  How would you like to obtain your AVS? [employer]  If you are dropped from the video visit, the video invite should be resent to: Other e-mail: [email or web address]  Will anyone else be joining your video visit? No        Video-Visit Details    Type of service:  Video Visit    Video Start Time: 10:59 AM  Video End Time: 11:25 AM    Originating Location (pt. Location): Home    Distant Location (provider location):  [facility]     Platform used for Video Visit: Javier    S:   Pt being seen in f/u for DM.  Estimates she was diagnosed about 5 years ago.   She was just on metformin.   Then admitted last month (6/2020) for PNA and HbA1C was 12.4%.   Discharged on insulin.     Since being discharged, trying to stick to 45-60 grams of carbs " per meal.   She does an exercise video once a day for 30 minutes.   However, she notes this is not very intense exercise.      lantus 60 U every day   Novolog   For Pre-meal glucose:  140 - 179 2 units  180-219 4 units  220-259 6 units  260-299 8 units  300 and higher, 10 units.      For Bedtime Glucose:  160-179 1 units  180 - 199 2 units  200 - 239 3 units  240-279 5 units  280 and higher 7 units.     Rarely using her novolog as her readings have been well controlled.      Trulicity 1.5 mg a week.   Metformin 1000 mg every day   Farxiga 5 mg every day      BG Log     9/4/2020  133 9:47am  116 3:44pm  146 7:49pm     9/5/20  137 10:52am  119 2:52pm  138 7:13pm     9/6/20  131 10:56am  127 2:26pm  123 8:32pm     9/7/20  120 9:45am  130 3:05pm  109 6:57pm  124 11:12pm     9/8/20  118 8:07am  103 12:06pm  118 7:07pm  122 12:51am     9/9/20  126 10:00am  116 4:26pm     9/10/20  133 7:28am  109 5:00pm  111 9:40pm     9/11/20  116 11:19am  127 12:08pm  121 3:15pm  132 9:32pm     9/12/20  117 9:33am  124 3:33pm  126 7:35pm     9/13/20  123 11:49am  108 5:37pm     9/14/20  136 9:33am  104 2:39pm  118 7:39pm     9/15/20  125 8:33am  117 12:15pm  134 9:45pm  147 12:52am     9/16/20  139 10:55am  136 3:30pm  111 6:30pm  130 11:30pm     9/17/20  133 11:33am  106 4:33pm     ROS: 10 point ROS neg other than the symptoms noted above in the HPI.    Exam:  GENERAL: Healthy, alert and no distress  EYES: Eyes grossly normal to inspection.  No discharge or erythema, or obvious scleral/conjunctival abnormalities.  RESP: No audible wheeze, cough, or visible cyanosis.  No visible retractions or increased work of breathing.    SKIN: Visible skin clear. No significant rash, abnormal pigmentation or lesions.  NEURO: Cranial nerves grossly intact.  Mentation and speech appropriate for age.  PSYCH: Mentation appears normal, affect normal/bright, judgement and insight intact, normal speech and appearance well-groomed.    A/P:   Type 2 DM -  Uncontrolled. Questionable MATTEO given brother's history of type 1 DM. Regardless, a GLP-1 agonist would be helpful given her weight. Risks/benefits reviewed.   Labs from 7/31/2020 consistent with type 2 DM.   In 8/2020, much improved control. Concerned about weight gain and would like to try to come off insulin.   Discussed increasing trulicity +/- SGLT-2 inhibitor.   We increased trulicity to 1.5 mg and started farxiga.   In 9/2020, 7.0%. Barely needing to use her novolog. Discussed how trulicity now has 3.0 and 4.5 mg dosing available. However, I could not find these yet in the MAR.   -Continue metformin.   -Lantus reduce to 55 units a day.   -Farxiga increase to 10 mg every day.   -Trulicity keep at 1.5 mg a week for now. I will find out when the 3.0 mg dose will be in pharmacies.   -Cut out bread, noodles, and salt intake as we discussed.   -Increase activity as tolerated.   -Call me if you begin to have glucoses below 80. We will lower insulin at that time.   -Labs in 3 months.   -ASA taking.  -BP: controlled on last check.   -NAFL/WALLER: normal ALT and AST in 6/2020.   -Lipids: HDL 29, LDL 72, Trg 141 in 6/2020.               On atorvastatin.   -Microalbumin elevated in 6/2020. She is on losartan.   -Eyes: reports normal exam in 8/2020.   -Smoking: none.     Renato Armijo MD on 9/18/2020 at 11:25 AM      Again, thank you for allowing me to participate in the care of your patient.        Sincerely,        Renato Armijo MD

## 2020-09-18 NOTE — TELEPHONE ENCOUNTER
Reason for call:  Other   Patient called regarding (reason for call): prescription  Additional comments: Patient calling wondering if there is a off brand of Farxiga due to 90 day supply with 10 mg is $500, please call to advise.     Phone number to reach patient:  Home number on file [phone] (home)    Best Time:  Any     Can we leave a detailed message on this number?  YES    Travel screening: Not Applicable

## 2020-09-20 DIAGNOSIS — E11.00 TYPE 2 DIABETES MELLITUS WITH HYPEROSMOLARITY WITHOUT COMA, UNSPECIFIED WHETHER LONG TERM INSULIN USE (H): ICD-10-CM

## 2020-09-21 ENCOUNTER — TELEPHONE (OUTPATIENT)
Dept: ENDOCRINOLOGY | Facility: CLINIC | Age: 65
End: 2020-09-21

## 2020-09-21 NOTE — TELEPHONE ENCOUNTER
[place] tier exception be done for   dapagliflozin (FARXIGA) 10 MG TABS tablet  90 tablet  3  9/18/2020   --    Sig - Route: Take 1 tablet (10 mg) by mouth daily - Oral    Sent to pharmacy as: Dapagliflozin Propanediol 10 MG Oral Tablet (Farxiga)      ?    Amanda Bojorquez RN Specialty Triage 9/21/2020 4:18 PM

## 2020-09-21 NOTE — TELEPHONE ENCOUNTER
Pt returned call and I let her know Md agrees with plan to call  for help on payment. Pt states she is also waiting to hear back on trulicity 3mg dosing. I let her know I would look into this for her as well.    Amanda Bojorquez RN Specialty Triage 9/21/2020 4:14 PM

## 2020-09-21 NOTE — TELEPHONE ENCOUNTER
called pt and let her know that her medication was sent in for the generic. I let her know her options are to check other pharmacies or go to the [employer] site to see if assistance is available. Left call back number if these fail.    Amanda Bojorquez RN Specialty Triage 9/21/2020 11:29 AM

## 2020-09-22 DIAGNOSIS — J18.9 PNEUMONIA OF LEFT LOWER LOBE DUE TO INFECTIOUS ORGANISM: ICD-10-CM

## 2020-09-22 RX ORDER — PEN NEEDLE, DIABETIC 32GX 5/32"
NEEDLE, DISPOSABLE MISCELLANEOUS
Qty: 100 EACH | Refills: 2 | Status: SHIPPED | OUTPATIENT
Start: 2020-09-22 | End: 2021-05-28

## 2020-09-22 NOTE — TELEPHONE ENCOUNTER
PRIOR AUTHORIZATION DENIED    Medication: dapagliflozin (FARXIGA) 10 MG TABS tablet--TIER EXCEPTION DENIED    Denial Date: 9/22/2020    Denial Rational: Per insurance rep patient's plan doesn't offer tier exceptions.  Medication is covered by plan, unable to get cost lowered.

## 2020-09-22 NOTE — TELEPHONE ENCOUNTER
Prescription approved per [facility] Refill Protocol.  Arelis Dasilva RN

## 2020-09-23 ENCOUNTER — OFFICE VISIT (OUTPATIENT)
Dept: FAMILY MEDICINE | Facility: CLINIC | Age: 65
End: 2020-09-23
Payer: COMMERCIAL

## 2020-09-23 VITALS
HEIGHT: 64 IN | RESPIRATION RATE: 18 BRPM | DIASTOLIC BLOOD PRESSURE: 60 MMHG | TEMPERATURE: 99 F | WEIGHT: 293 LBS | OXYGEN SATURATION: 97 % | HEART RATE: 87 BPM | BODY MASS INDEX: 50.02 KG/M2 | SYSTOLIC BLOOD PRESSURE: 110 MMHG

## 2020-09-23 DIAGNOSIS — Z00.01 ENCOUNTER FOR ROUTINE ADULT PHYSICAL EXAM WITH ABNORMAL FINDINGS: Primary | ICD-10-CM

## 2020-09-23 DIAGNOSIS — K21.9 GASTROESOPHAGEAL REFLUX DISEASE, ESOPHAGITIS PRESENCE NOT SPECIFIED: ICD-10-CM

## 2020-09-23 DIAGNOSIS — Z12.4 SCREENING FOR MALIGNANT NEOPLASM OF CERVIX: ICD-10-CM

## 2020-09-23 DIAGNOSIS — E66.01 MORBID OBESITY (H): ICD-10-CM

## 2020-09-23 DIAGNOSIS — Z11.59 NEED FOR HEPATITIS C SCREENING TEST: ICD-10-CM

## 2020-09-23 DIAGNOSIS — I10 ESSENTIAL HYPERTENSION: ICD-10-CM

## 2020-09-23 DIAGNOSIS — L40.9 PSORIASIS: ICD-10-CM

## 2020-09-23 PROBLEM — J18.9 PNEUMONIA: Status: RESOLVED | Noted: 2020-06-18 | Resolved: 2020-09-23

## 2020-09-23 PROCEDURE — G0145 SCR C/V CYTO,THINLAYER,RESCR: HCPCS | Performed by: FAMILY MEDICINE

## 2020-09-23 PROCEDURE — 99213 OFFICE O/P EST LOW 20 MIN: CPT | Mod: 25 | Performed by: FAMILY MEDICINE

## 2020-09-23 PROCEDURE — 87624 HPV HI-RISK TYP POOLED RSLT: CPT | Performed by: FAMILY MEDICINE

## 2020-09-23 PROCEDURE — 99396 PREV VISIT EST AGE 40-64: CPT | Performed by: FAMILY MEDICINE

## 2020-09-23 RX ORDER — PANTOPRAZOLE SODIUM 40 MG/1
TABLET, DELAYED RELEASE ORAL
Qty: 30 TABLET | Refills: 0 | Status: SHIPPED | OUTPATIENT
Start: 2020-09-23 | End: 2020-09-23

## 2020-09-23 RX ORDER — PANTOPRAZOLE SODIUM 40 MG/1
TABLET, DELAYED RELEASE ORAL
Qty: 90 TABLET | Refills: 1 | Status: SHIPPED | OUTPATIENT
Start: 2020-09-23 | End: 2021-03-04

## 2020-09-23 RX ORDER — CLOBETASOL PROPIONATE 0.5 MG/ML
SOLUTION TOPICAL PRN
COMMUNITY
End: 2023-05-04

## 2020-09-23 RX ORDER — DULAGLUTIDE 1.5 MG/.5ML
3 INJECTION, SOLUTION SUBCUTANEOUS
Qty: 12 ML | Refills: 11 | Status: SHIPPED | OUTPATIENT
Start: 2020-09-23 | End: 2021-06-08

## 2020-09-23 RX ORDER — CLOBETASOL PROPIONATE 0.5 MG/G
OINTMENT TOPICAL 2 TIMES DAILY
Qty: 45 G | Refills: 0 | Status: SHIPPED | OUTPATIENT
Start: 2020-09-23 | End: 2020-11-17

## 2020-09-23 RX ORDER — TRIAMCINOLONE ACETONIDE 1 MG/G
CREAM TOPICAL 2 TIMES DAILY
Qty: 453 G | Refills: 1 | Status: SHIPPED | OUTPATIENT
Start: 2020-09-23 | End: 2021-08-27

## 2020-09-23 RX ORDER — LOSARTAN POTASSIUM 25 MG/1
25 TABLET ORAL DAILY
Qty: 90 TABLET | Refills: 3 | Status: SHIPPED | OUTPATIENT
Start: 2020-09-23 | End: 2021-10-14

## 2020-09-23 ASSESSMENT — MIFFLIN-ST. JEOR: SCORE: 1963.83

## 2020-09-23 ASSESSMENT — PAIN SCALES - GENERAL: PAINLEVEL: NO PAIN (0)

## 2020-09-23 NOTE — TELEPHONE ENCOUNTER
Routing refill request to provider for review/approval because:  Patient needs to be seen because:  Need annual exam

## 2020-09-23 NOTE — PROGRESS NOTES
SUBJECTIVE:   CC: Crystal Nicole is an 65 year old woman who presents for preventive health visit.       Patient has been advised of split billing requirements and indicates understanding: Yes  Healthy Habits:    Getting at least 3 servings of Calcium per day:  Yes    Bi-annual eye exam:  Yes    Dental care twice a year:  Yes    Sleep apnea or symptoms of sleep apnea:  None    Diet:  Regular (no restrictions)    Frequency of exercise:  6-7 days/week    Duration of exercise:  15-30 minutes    Taking medications regularly:  0    Barriers to taking medications:  None    Medication side effects:  None    PHQ-2 Total Score:    Additional concerns today:  No  History of Present Illness        Diabetes:   She presents for follow up of diabetes.  She is checking home blood glucose four or more times daily. She checks blood glucose before and after meals.  Blood glucose is never over 200 and never under 70. When her blood glucose is low, the patient is asymptomatic for confusion, blurred vision, lethargy and reports not feeling dizzy, shaky, or weak.  She has no concerns regarding her diabetes at this time.  She is not experiencing numbness or burning in feet, excessive thirst, blurry vision, weight changes or redness, sores or blisters on feet. The patient has had a diabetic eye exam in the last 12 months. Eye exam performed on 7/31/20. Location of last eye exam [facility].        Hypertension: She presents for follow up of hypertension.  She does not check blood pressure  regularly outside of the clinic.      She eats 4 or more servings of fruits and vegetables daily.She consumes 0 sweetened beverage(s) daily.She exercises with enough effort to increase her heart rate 20 to 29 minutes per day.  She exercises with enough effort to increase her heart rate 7 days per week.   She is taking medications regularly.  She is not taking prescribed medications regularly due to None.    pts Diabetes is doing better  She sees  Endocrine  Lipids are stable       Pt wants Clobetasol oint ment and Triamcinolone cr for her Psoriasis  She is doing well on these    Today's PHQ-2 Score:   PHQ-2 ( 1999 Pfizer) 6/24/2020   Q1: Little interest or pleasure in doing things 0   Q2: Feeling down, depressed or hopeless 0   PHQ-2 Score 0       Abuse: Current or Past (Physical, Sexual or Emotional) - No  Do you feel safe in your environment? Yes    Have you ever done Advance Care Planning? (For example, a Health Directive, POLST, or a discussion with a medical provider or your loved ones about your wishes): No, advance care planning information given to patient to review.  Patient declined advance care planning discussion at this time.    Social History     Tobacco Use     Smoking status: Never Smoker     Smokeless tobacco: Never Used   Substance Use Topics     Alcohol use: Yes     Frequency: Never     Comment: Occ     If you drink alcohol do you typically have >3 drinks per day or >7 drinks per week? No    No flowsheet data found.No flowsheet data found.    Reviewed orders with patient.  Reviewed health maintenance and updated orders accordingly - Yes  Lab work is in process  Labs reviewed in EPIC  BP Readings from Last 3 Encounters:   09/23/20 110/60   06/25/20 124/70   06/20/20 129/57    Wt Readings from Last 3 Encounters:   09/23/20 142.9 kg (315 lb)   06/25/20 146.9 kg (323 lb 12.8 oz)   06/17/20 136.1 kg (300 lb)                  Patient Active Problem List   Diagnosis     Diabetes mellitus, type 2 (H)     Morbid obesity (H)     Essential hypertension     Hypothyroidism     Past Surgical History:   Procedure Laterality Date     CHOLECYSTECTOMY         Social History     Tobacco Use     Smoking status: Never Smoker     Smokeless tobacco: Never Used   Substance Use Topics     Alcohol use: Yes     Frequency: Never     Comment: Occ     Family History   Problem Relation Age of Onset     Diabetes Mother         d age 80     Hypertension Mother       Coronary Artery Disease Father         d age 85     Brain Tumor Father      Diabetes Brother          Current Outpatient Medications   Medication Sig Dispense Refill     alcohol swab prep pads Use to swab area of injection/shubham as directed. 300 each 0     allopurinol (ZYLOPRIM) 300 MG tablet Take 300 mg by mouth daily       aspirin (ASA) 325 MG EC tablet Take 325 mg by mouth daily       atorvastatin (LIPITOR) 10 MG tablet Take 1 tablet (10 mg) by mouth daily 90 tablet 3     blood glucose (NO BRAND SPECIFIED) lancets standard Use to test blood sugar 4 times daily or as directed. 300 each 0     blood glucose (ONETOUCH VERIO IQ) test strip USE TO TEST BLOOD SUGAR 4 TIMES DAILY OR AS DIRECTED. 100 strip 2     blood glucose calibration (NO BRAND SPECIFIED) solution Use to calibrate blood glucose monitor as needed as directed. 1 Bottle 3     blood glucose monitoring (NO BRAND SPECIFIED) meter device kit Use to test blood sugar 4 times daily or as directed. 1 kit 0     clobetasol (TEMOVATE) 0.05 % external ointment Apply topically 2 times daily 45 g 0     clobetasol (TEMOVATE) 0.05 % external solution Apply topically as needed       dapagliflozin (FARXIGA) 10 MG TABS tablet Take 1 tablet (10 mg) by mouth daily 90 tablet 3     dulaglutide (TRULICITY) 1.5 MG/0.5ML pen Inject 3 mg Subcutaneous every 7 days 12 mL 11     furosemide (LASIX) 40 MG tablet Take 40 mg by mouth daily as needed (swelling)       insulin aspart (NOVOLOG FLEXPEN) 100 UNIT/ML pen GIVE BEFORE MEALS AND BEFORE BED PER SLIDING SCALE    Sig: Novolog Flexpen   Give before meals and before bed:   For Pre-meal glucose:   140 - 239 give 1 unit   240 - 339 give 2 units   > 340 give 3 units     For Bedtime Glucose:   200 - 239 give 0.5 unit   240 - 339 give 1 unit   > 340 give 1.5 units 15 mL 0     insulin glargine (LANTUS PEN) 100 UNIT/ML pen Inject 55 Units Subcutaneous every morning (before breakfast) (allow max dose 75 units/day for ongoing titration & needle  prime) 60 mL 3     insulin pen needle (BD CONOR U/F) 32G X 4 MM miscellaneous USE 4 PEN NEEDLES DAILY OR AS DIRECTED. 100 each 2     levothyroxine (SYNTHROID/LEVOTHROID) 100 MCG tablet Take 1 tablet (100 mcg) by mouth daily 90 tablet 0     losartan (COZAAR) 25 MG tablet Take 1 tablet (25 mg) by mouth daily 90 tablet 3     metFORMIN (GLUCOPHAGE-XR) 500 MG 24 hr tablet Take 2 tablets (1,000 mg) by mouth daily (with breakfast) 180 tablet 3     multivitamin, therapeutic (THERA-VIT) TABS tablet Take 1 tablet by mouth daily       pantoprazole (PROTONIX) 40 MG EC tablet TAKE 1 TABLET BY MOUTH EVERY MORNING BEFORE BREAKFAST 90 tablet 1     thin (NO BRAND SPECIFIED) lancets Use with lanceting device. 200 each 1     triamcinolone (KENALOG) 0.1 % external cream Apply topically 2 times daily 453 g 1     triamcinolone (KENALOG) 0.1 % external cream Apply topically daily as needed for irritation (psoriasis)       Continuous Blood Gluc  (FREESTYLE SJ 14 DAY READER) HILARY Use to read blood sugars as per 's instructions. (Patient not taking: Reported on 7/15/2020) 1 each 0     Continuous Blood Gluc Sensor (FREESTYLE SJ 14 DAY SENSOR) MISC Change every 14 days. (Patient not taking: Reported on 7/15/2020) 2 each 11     dapagliflozin (FARXIGA) 5 MG TABS tablet Take 1 tablet (5 mg) by mouth daily (Patient not taking: Reported on 9/23/2020) 30 tablet 11     triamterene-HCTZ (DYAZIDE) 37.5-25 MG capsule Take 2 capsules by mouth every morning       No Known Allergies  Recent Labs   Lab Test 09/14/20  1013 06/25/20  1214 06/19/20  0948  06/18/20  0001   A1C 7.0*  --   --   --  12.4*   LDL  --  72  --   --   --    HDL  --  29*  --   --   --    TRIG  --  141  --   --   --    ALT  --   --  25  --  31   CR 1.03 1.20* 0.73   < > 0.82   GFRESTIMATED 57* 48* 87   < > 76   GFRESTBLACK 66 55* >90   < > 88   POTASSIUM 3.6 3.8 3.9   < > 3.9   TSH  --  2.44  --   --   --     < > = values in this interval not displayed.         mammo is done    Pertinent mammograms are reviewed under the imaging tab.  History of abnormal Pap smear: NO - age 30-65 PAP every 5 years with negative HPV co-testing recommended     Reviewed and updated as needed this visit by clinical staff         Reviewed and updated as needed this visit by Provider        Past Medical History:   Diagnosis Date     Diabetes (H)       Past Surgical History:   Procedure Laterality Date     CHOLECYSTECTOMY         Review of Systems  CONSTITUTIONAL: NEGATIVE for fever, chills, change in weight  INTEGUMENTARY/SKIN: NEGATIVE for worrisome rashes, moles or lesions  EYES: NEGATIVE for vision changes or irritation  ENT: NEGATIVE for ear, mouth and throat problems  RESP: NEGATIVE for significant cough or SOB  BREAST: NEGATIVE for masses, tenderness or discharge  CV: NEGATIVE for chest pain, palpitations or peripheral edema  GI: NEGATIVE for nausea, abdominal pain, heartburn, or change in bowel habits  : NEGATIVE for unusual urinary or vaginal symptoms. No vaginal bleeding.  MUSCULOSKELETAL: NEGATIVE for significant arthralgias or myalgia  NEURO: NEGATIVE for weakness, dizziness or paresthesias  PSYCHIATRIC: NEGATIVE for changes in mood or affect      OBJECTIVE:   There were no vitals taken for this visit.  Physical Exam  GENERAL APPEARANCE: healthy, alert and no distress  GENERAL APPEARANCE: healthy, alert, no distress and morbidly obese  EYES: Eyes grossly normal to inspection, PERRL and conjunctivae and sclerae normal  HENT: ear canals and TM's normal, nose and mouth without ulcers or lesions, oropharynx clear and oral mucous membranes moist  NECK: no adenopathy, no asymmetry, masses, or scars and thyroid normal to palpation  RESP: lungs clear to auscultation - no rales, rhonchi or wheezes  BREAST: normal without masses, tenderness or nipple discharge and no palpable axillary masses or adenopathy  CV: regular rate and rhythm, normal S1 S2, no S3 or S4, no murmur, click or rub, no  peripheral edema and peripheral pulses strong  ABDOMEN: soft, nontender, no hepatosplenomegaly, no masses and bowel sounds normal   (female): normal female external genitalia, normal urethral meatus, vaginal mucosal atrophy noted, normal cervix, adnexae, and uterus without masses or abnormal discharge  MS: no musculoskeletal defects are noted and gait is age appropriate without ataxia  SKIN: no suspicious lesions or rashes  NEURO: Normal strength and tone, sensory exam grossly normal, mentation intact and speech normal  PSYCH: mentation appears normal and affect normal/bright    Diagnostic Test Results:  Labs reviewed in Epic    ASSESSMENT/PLAN:   1. Encounter for routine adult physical exam with abnormal findings      2. Essential hypertension  Stable   Refill done  - losartan (COZAAR) 25 MG tablet; Take 1 tablet (25 mg) by mouth daily  Dispense: 90 tablet; Refill: 3    3. Morbid obesity (H)  Low brianne diet  On GLP 1 and farxiga  Consider weight management    4. Gastroesophageal reflux disease, esophagitis presence not specified  Stable   - pantoprazole (PROTONIX) 40 MG EC tablet; TAKE 1 TABLET BY MOUTH EVERY MORNING BEFORE BREAKFAST  Dispense: 90 tablet; Refill: 1    5. Psoriasis  refilled  - clobetasol (TEMOVATE) 0.05 % external ointment; Apply topically 2 times daily  Dispense: 45 g; Refill: 0  - triamcinolone (KENALOG) 0.1 % external cream; Apply topically 2 times daily  Dispense: 453 g; Refill: 1    6. Screening for malignant neoplasm of cervix    - Pap imaged thin layer screen with HPV - recommended age 30 - 65 years (select HPV order below)  - HPV High Risk Types DNA Cervical    7. Need for hepatitis C screening test    - Hepatitis C Screen Reflex to HCV RNA Quant and Genotype  8 For Diabetes Pt sees Endocrine  Patient has been advised of split billing requirements and indicates understanding: Yes  COUNSELING:  Reviewed preventive health counseling, as reflected in patient instructions       Regular  "exercise       Healthy diet/nutrition       Vision screening       Hearing screening       Immunizations    Vaccinated for: Influenza             Aspirin Prophylaxsis       Osteoporosis Prevention/Bone Health       Colon cancer screening       Consider Hep C screening for patients born between 1945 and 1965       The ASCVD Risk score (Nilson MEI Jr., et al., 2013) failed to calculate for the following reasons:    The valid total cholesterol range is 130 to 320 mg/dL       Advance Care Planning    Estimated body mass index is 55.58 kg/m  as calculated from the following:    Height as of 6/25/20: 1.626 m (5' 4\").    Weight as of 6/25/20: 146.9 kg (323 lb 12.8 oz).    Weight management plan: Discussed healthy diet and exercise guidelines    She reports that she has never smoked. She has never used smokeless tobacco.      Counseling Resources:  ATP IV Guidelines  Pooled Cohorts Equation Calculator  Breast Cancer Risk Calculator  BRCA-Related Cancer Risk Assessment: FHS-7 Tool  FRAX Risk Assessment  ICSI Preventive Guidelines  Dietary Guidelines for Americans, 2010  USDA's MyPlate  ASA Prophylaxis  Lung CA Screening    Juani Mae MD  [facility]  "

## 2020-09-23 NOTE — TELEPHONE ENCOUNTER
MD gave written order for starting 3mg trulicity subcutaneous  Q 7days. Note this is a new dosing guideline that has not been added to our MAR.    Amanda Bojorquez RN Specialty Triage 9/23/2020 12:40 PM

## 2020-09-23 NOTE — TELEPHONE ENCOUNTER
Called pt and let her know her insurance does not offer tier exceptions. She verbalized understanding and has gotten some coupons to try to help with cost.    Amanda Bojorquez RN Specialty Triage 9/23/2020 12:36 PM

## 2020-09-26 LAB
COPATH REPORT: NORMAL
PAP: NORMAL

## 2020-09-28 ENCOUNTER — TELEPHONE (OUTPATIENT)
Dept: FAMILY MEDICINE | Facility: CLINIC | Age: 65
End: 2020-09-28

## 2020-09-28 NOTE — TELEPHONE ENCOUNTER
Prior Authorization Retail Medication Request    Medication/Dose: clobetasol (TEMOVATE) 0.05 % external ointment   ICD code (if different than what is on RX):    Previously Tried and Failed:    Rationale:      Insurance Name:  [facility]  Insurance ID:  [id number]       Pharmacy Information (if different than what is on RX)  Name:   [id number] - [place], [place] - [address] [facility] [address]  Phone: [phone] Fax: [phone]

## 2020-09-28 NOTE — TELEPHONE ENCOUNTER
Central Prior Authorization Team   Phone: [phone]      PA Initiation    Medication: clobetasol (TEMOVATE) 0.05 % external ointment -Initiated  Insurance Company: [employer] - Phone [phone] Fax [phone]  Pharmacy Filling the Rx: [id number] - [place], [place] - [address] [facility]  Filling Pharmacy Phone: [phone]  Filling Pharmacy Fax:    Start Date: 9/28/2020

## 2020-09-29 ENCOUNTER — TELEPHONE (OUTPATIENT)
Dept: FAMILY MEDICINE | Facility: CLINIC | Age: 65
End: 2020-09-29

## 2020-09-29 LAB
FINAL DIAGNOSIS: NORMAL
HPV HR 12 DNA CVX QL NAA+PROBE: NEGATIVE
HPV16 DNA SPEC QL NAA+PROBE: NEGATIVE
HPV18 DNA SPEC QL NAA+PROBE: NEGATIVE
SPECIMEN DESCRIPTION: NORMAL
SPECIMEN SOURCE CVX/VAG CYTO: NORMAL

## 2020-09-29 NOTE — TELEPHONE ENCOUNTER
Prior Authorization Approval    Authorization Effective Date: 8/29/2020  Authorization Expiration Date: 9/28/2021  Medication: clobetasol (TEMOVATE) 0.05 % external ointment -APPROVED  Approved Dose/Quantity:   Reference #:     Insurance Company: [employer] - Phone [phone] Fax [phone]  Expected CoPay:       CoPay Card Available:      Foundation Assistance Needed:    Which Pharmacy is filling the prescription (Not needed for infusion/clinic administered): [id number] - [place], [place] - [address] [facility]  Pharmacy Notified: Yes  Patient Notified: No    Pharmacy will notify patient when medication is ready.

## 2020-09-29 NOTE — TELEPHONE ENCOUNTER
Reason for Call:  Form, our goal is to have forms completed with 72 hours, however, some forms may require a visit or additional information.    Type of letter, form or note:  medical    Who is the form from?: Patient    Where did the form come from: Patient or family brought in       What clinic location was the form placed at?: [facility]    Where the form was placed: Dr. Mae's Box/Folder    What number is listed as a contact on the form?: [phone]       Additional comments: N/A    Call taken on 9/29/2020 at 12:40 PM by Belem Cordero

## 2020-09-29 NOTE — TELEPHONE ENCOUNTER
Form placed at the providers desk.    [facility] Pap and HPV testing confirmation.                                Laya Eaton,

## 2020-10-12 ENCOUNTER — ANCILLARY PROCEDURE (OUTPATIENT)
Dept: MAMMOGRAPHY | Facility: CLINIC | Age: 65
End: 2020-10-12
Payer: COMMERCIAL

## 2020-10-12 DIAGNOSIS — Z12.31 VISIT FOR SCREENING MAMMOGRAM: ICD-10-CM

## 2020-10-12 PROCEDURE — 77067 SCR MAMMO BI INCL CAD: CPT | Performed by: RADIOLOGY

## 2020-10-12 PROCEDURE — 77063 BREAST TOMOSYNTHESIS BI: CPT | Performed by: RADIOLOGY

## 2020-10-26 DIAGNOSIS — E11.00 TYPE 2 DIABETES MELLITUS WITH HYPEROSMOLARITY WITHOUT COMA, UNSPECIFIED WHETHER LONG TERM INSULIN USE (H): ICD-10-CM

## 2020-10-26 RX ORDER — LORATADINE 10 MG
TABLET ORAL
Qty: 100 EACH | Refills: 11 | Status: SHIPPED | OUTPATIENT
Start: 2020-10-26 | End: 2021-11-08

## 2020-10-26 NOTE — TELEPHONE ENCOUNTER
Routing refill request to provider for review/approval because:  Not on the [facility] refill protocol     Fransisca HERMANN, RN

## 2020-11-03 DIAGNOSIS — E03.9 HYPOTHYROIDISM, UNSPECIFIED TYPE: ICD-10-CM

## 2020-11-03 RX ORDER — LEVOTHYROXINE SODIUM 100 UG/1
TABLET ORAL
Qty: 90 TABLET | Refills: 1 | Status: SHIPPED | OUTPATIENT
Start: 2020-11-03 | End: 2021-06-01

## 2020-11-17 DIAGNOSIS — L40.9 PSORIASIS: ICD-10-CM

## 2020-11-17 RX ORDER — CLOBETASOL PROPIONATE 0.5 MG/G
OINTMENT TOPICAL
Qty: 45 G | Refills: 0 | Status: SHIPPED | OUTPATIENT
Start: 2020-11-17 | End: 2021-06-02

## 2020-11-17 NOTE — TELEPHONE ENCOUNTER
Routing refill request to provider for review/approval because:  Drug not on the [facility] refill protocol     Fransisca HERMANN, RN

## 2020-12-17 DIAGNOSIS — E11.00 TYPE 2 DIABETES MELLITUS WITH HYPEROSMOLARITY WITHOUT COMA, UNSPECIFIED WHETHER LONG TERM INSULIN USE (H): ICD-10-CM

## 2020-12-17 RX ORDER — BLOOD SUGAR DIAGNOSTIC
STRIP MISCELLANEOUS
Qty: 100 STRIP | Refills: 2 | Status: SHIPPED | OUTPATIENT
Start: 2020-12-17 | End: 2021-03-15

## 2020-12-17 NOTE — TELEPHONE ENCOUNTER
Medication filled per [facility] protocol.     Wesly Currie RN....12/17/2020 4:36 PM

## 2020-12-21 DIAGNOSIS — E11.00 TYPE 2 DIABETES MELLITUS WITH HYPEROSMOLARITY WITHOUT COMA, UNSPECIFIED WHETHER LONG TERM INSULIN USE (H): ICD-10-CM

## 2020-12-23 RX ORDER — LANCETS 33 GAUGE
EACH MISCELLANEOUS
Qty: 300 EACH | Refills: 0 | Status: SHIPPED | OUTPATIENT
Start: 2020-12-23 | End: 2021-03-30

## 2021-01-07 DIAGNOSIS — Z11.59 NEED FOR HEPATITIS C SCREENING TEST: ICD-10-CM

## 2021-01-07 DIAGNOSIS — E11.00 TYPE 2 DIABETES MELLITUS WITH HYPEROSMOLARITY WITHOUT COMA, UNSPECIFIED WHETHER LONG TERM INSULIN USE (H): ICD-10-CM

## 2021-01-07 LAB
ANION GAP SERPL CALCULATED.3IONS-SCNC: 3 MMOL/L (ref 3–14)
BUN SERPL-MCNC: 23 MG/DL (ref 7–30)
CALCIUM SERPL-MCNC: 9.5 MG/DL (ref 8.5–10.1)
CHLORIDE SERPL-SCNC: 110 MMOL/L (ref 94–109)
CO2 SERPL-SCNC: 29 MMOL/L (ref 20–32)
CREAT SERPL-MCNC: 0.88 MG/DL (ref 0.52–1.04)
GFR SERPL CREATININE-BSD FRML MDRD: 69 ML/MIN/{1.73_M2}
GLUCOSE SERPL-MCNC: 104 MG/DL (ref 70–99)
HBA1C MFR BLD: 6.2 % (ref 0–5.6)
HCV AB SERPL QL IA: NONREACTIVE
POTASSIUM SERPL-SCNC: 3.7 MMOL/L (ref 3.4–5.3)
SODIUM SERPL-SCNC: 142 MMOL/L (ref 133–144)

## 2021-01-07 PROCEDURE — 80048 BASIC METABOLIC PNL TOTAL CA: CPT | Performed by: FAMILY MEDICINE

## 2021-01-07 PROCEDURE — 86803 HEPATITIS C AB TEST: CPT | Performed by: FAMILY MEDICINE

## 2021-01-07 PROCEDURE — 83036 HEMOGLOBIN GLYCOSYLATED A1C: CPT | Performed by: FAMILY MEDICINE

## 2021-01-07 PROCEDURE — 36415 COLL VENOUS BLD VENIPUNCTURE: CPT | Performed by: FAMILY MEDICINE

## 2021-01-15 DIAGNOSIS — M10.9 GOUT, UNSPECIFIED CAUSE, UNSPECIFIED CHRONICITY, UNSPECIFIED SITE: Primary | ICD-10-CM

## 2021-01-18 ENCOUNTER — MYC MEDICAL ADVICE (OUTPATIENT)
Dept: ENDOCRINOLOGY | Facility: CLINIC | Age: 66
End: 2021-01-18

## 2021-01-18 RX ORDER — ALLOPURINOL 300 MG/1
300 TABLET ORAL DAILY
Qty: 90 TABLET | Refills: 1 | Status: SHIPPED | OUTPATIENT
Start: 2021-01-18 | End: 2021-07-26

## 2021-01-18 NOTE — NURSING NOTE
BG log(copied from [facility] message)    Erwin 4- 134, 92, 111    Erwin 5- 130, 106, 108   Erwin 6- 132, 110   Erwin 7- 115, 125, 117, 98   Erwin 8- 127, 114, 105, 124    Erwin 9- 104, 133, 103, 118   Erwin 10- 118, 96, 125   Erwin 11- 104, 120, 91   Erwin 12- 108, 126,   Erwin 13- 108, 157   Erwin 14- 123, 152, 131   Erwin 15- 132, 97, 132, 169   Erwin 16- 156, 110, 124,   Jan 17- 138, 110,   Jan 18- 132.

## 2021-01-19 ENCOUNTER — VIRTUAL VISIT (OUTPATIENT)
Dept: ENDOCRINOLOGY | Facility: CLINIC | Age: 66
End: 2021-01-19
Payer: COMMERCIAL

## 2021-01-19 DIAGNOSIS — I10 ESSENTIAL HYPERTENSION: ICD-10-CM

## 2021-01-19 DIAGNOSIS — E78.5 DYSLIPIDEMIA: ICD-10-CM

## 2021-01-19 DIAGNOSIS — Z79.4 LONG TERM (CURRENT) USE OF INSULIN (H): ICD-10-CM

## 2021-01-19 DIAGNOSIS — E11.9 TYPE 2 DIABETES MELLITUS WITHOUT COMPLICATION, WITH LONG-TERM CURRENT USE OF INSULIN (H): Primary | ICD-10-CM

## 2021-01-19 DIAGNOSIS — Z79.4 TYPE 2 DIABETES MELLITUS WITHOUT COMPLICATION, WITH LONG-TERM CURRENT USE OF INSULIN (H): Primary | ICD-10-CM

## 2021-01-19 PROCEDURE — 99214 OFFICE O/P EST MOD 30 MIN: CPT | Mod: 95 | Performed by: INTERNAL MEDICINE

## 2021-01-19 RX ORDER — DULAGLUTIDE 4.5 MG/.5ML
4.5 INJECTION, SOLUTION SUBCUTANEOUS WEEKLY
Qty: 6 ML | Refills: 3 | Status: SHIPPED | OUTPATIENT
Start: 2021-01-19 | End: 2021-06-08

## 2021-01-19 NOTE — PROGRESS NOTES
Crystal is a 65 year old who is being evaluated via a billable video visit.      How would you like to obtain your AVS? MyChart  If the video visit is dropped, the invitation should be resent by:   Will anyone else be joining your video visit? No    CONVERTED TO PHONE VISIT.    S:   Pt being seen in f/u for DM.  Estimates she was diagnosed about 5 years ago.   She was just on metformin.   Then admitted last month (6/2020) for PNA and HbA1C was 12.4%.   Discharged on insulin.     Since being discharged, trying to stick to 45-60 grams of carbs per meal.   Exercise video 3/week.      lantus 50 U every day     Novolog   For Pre-meal glucose:  140 - 179 2 units  180-219 4 units  220-259 6 units  260-299 8 units  300 and higher, 10 units.      For Bedtime Glucose:  160-179 1 units  180 - 199 2 units  200 - 239 3 units  240-279 5 units  280 and higher 7 units.     Trulicity 3.0 mg a week. ---> no change in appetite or weight with increase in dose.   Metformin 1000 mg every day   Farxiga 10 mg every day      Logs:  Jan 4- 134, 92, 111  Jan 5- 130, 106, 108 Jan 6- 132, 110 Jan 7- 115, 125, 117, 98 Jan 8- 127, 114, 105, 124  Jan 9- 104, 133, 103, 118 Jan 10- 118, 96, 125 Jan 11- 104, 120, 91 Jan 12- 108, 126, Jan 13- 108, 157 Jan 14- 123, 152, 131 Jan 15- 132, 97, 132, 169 Jan 16- 156, 110, 124, Jan 17- 138, 110, Jan 18- 132.    She is not eating extra to prevent lows.   She is asking if she can cut her insulin in half.     ROS: 10 point ROS neg other than the symptoms noted above in the HPI.    Exam:  GENERAL:  In NAD.    A/P:   Type 2 DM - Uncontrolled. Questionable MATTEO given brother's history of type 1 DM. Regardless, a GLP-1 agonist would be helpful given her weight. Risks/benefits reviewed.   Labs from 7/31/2020 consistent with type 2 DM.   In 8/2020, much improved control. Concerned about weight gain and would like to try to come off insulin.   Discussed increasing trulicity +/- SGLT-2 inhibitor.   We increased trulicity  to 1.5 mg and started farxiga.   In 9/2020, 7.0%. Barely needing to use her novolog. Discussed how trulicity now has 3.0 and 4.5 mg dosing available. However, I could not find these yet in the MAR.   In 1/2021, improved to 6.2%. Frustrated with lack of weight loss. Cautioned about decreasing her insulin dose too quickly.   -Continue metformin.   -Continue farxiga.   -Increase trulicity from 3 to 4.5 mg daily.   -Reduce lantus to 40 units every day. Long discussion that if she drops to 25 units I expect her to develop hyperglycemia.   -Increase activity as tolerated.   -Call me if you begin to have glucoses below 100. We will lower insulin at that time.   -Labs in 3 months.   -ASA taking.  -BP: controlled on last check.   -NAFL/WALLER: normal ALT and AST in 6/2020.   -Lipids: HDL 29, LDL 72, Trg 141 in 6/2020.               On atorvastatin.   -Microalbumin elevated in 6/2020. She is on losartan.   -Eyes: reports normal exam in 8/2020.   -Smoking: none.     Microalbuminuria - Chronic, stable. Management as above.     HTN - Chronic, stable. Management as above.     Due to the COVID 19 pandemic this visit was a telephone/video visit in order to help prevent spread of infection in this high risk patient and the general population. The patient gave verbal consent for the visit today.    I have independently reviewed and interpreted labs, imaging as indicated.     Visit Start time 1403  Visit Stop time 1424  Total time 21  If this was an in person visit, it would have been billed as [id number].    Renato Armijo MD on 1/19/2021 at 2:25 PM

## 2021-01-19 NOTE — LETTER
1/19/2021         RE: Crystal Nicole  [address]  [place] [facility] [address]        Dear Colleague,    Thank you for referring your patient, Crystal Nicole, to the [facility]. Please see a copy of my visit note below.    Crystal is a 65 year old who is being evaluated via a billable video visit.      How would you like to obtain your AVS? MyChart  If the video visit is dropped, the invitation should be resent by:   Will anyone else be joining your video visit? No    CONVERTED TO PHONE VISIT.    S:   Pt being seen in f/u for DM.  Estimates she was diagnosed about 5 years ago.   She was just on metformin.   Then admitted last month (6/2020) for PNA and HbA1C was 12.4%.   Discharged on insulin.     Since being discharged, trying to stick to 45-60 grams of carbs per meal.   Exercise video 3/week.      lantus 50 U every day     Novolog   For Pre-meal glucose:  140 - 179 2 units  180-219 4 units  220-259 6 units  260-299 8 units  300 and higher, 10 units.      For Bedtime Glucose:  160-179 1 units  180 - 199 2 units  200 - 239 3 units  240-279 5 units  280 and higher 7 units.     Trulicity 3.0 mg a week. ---> no change in appetite or weight with increase in dose.   Metformin 1000 mg every day   Farxiga 10 mg every day      Logs:  Jan 4- 134, 92, 111  Jan 5- 130, 106, 108 Jan 6- 132, 110 Jan 7- 115, 125, 117, 98 Jan 8- 127, 114, 105, 124  Jan 9- 104, 133, 103, 118 Jan 10- 118, 96, 125 Jan 11- 104, 120, 91 Jan 12- 108, 126, Jan 13- 108, 157 Jan 14- 123, 152, 131 Jan 15- 132, 97, 132, 169 Jan 16- 156, 110, 124, Jan 17- 138, 110, Jan 18- 132.    She is not eating extra to prevent lows.   She is asking if she can cut her insulin in half.     ROS: 10 point ROS neg other than the symptoms noted above in the HPI.    Exam:  GENERAL:  In NAD.    A/P:   Type 2 DM - Uncontrolled. Questionable MATTEO given brother's history of type 1 DM. Regardless, a GLP-1 agonist would be helpful given her  weight. Risks/benefits reviewed.   Labs from 7/31/2020 consistent with type 2 DM.   In 8/2020, much improved control. Concerned about weight gain and would like to try to come off insulin.   Discussed increasing trulicity +/- SGLT-2 inhibitor.   We increased trulicity to 1.5 mg and started farxiga.   In 9/2020, 7.0%. Barely needing to use her novolog. Discussed how trulicity now has 3.0 and 4.5 mg dosing available. However, I could not find these yet in the MAR.   In 1/2021, improved to 6.2%. Frustrated with lack of weight loss. Cautioned about decreasing her insulin dose too quickly.   -Continue metformin.   -Continue farxiga.   -Increase trulicity from 3 to 4.5 mg daily.   -Reduce lantus to 40 units every day. Long discussion that if she drops to 25 units I expect her to develop hyperglycemia.   -Increase activity as tolerated.   -Call me if you begin to have glucoses below 100. We will lower insulin at that time.   -Labs in 3 months.   -ASA taking.  -BP: controlled on last check.   -NAFL/WALLER: normal ALT and AST in 6/2020.   -Lipids: HDL 29, LDL 72, Trg 141 in 6/2020.               On atorvastatin.   -Microalbumin elevated in 6/2020. She is on losartan.   -Eyes: reports normal exam in 8/2020.   -Smoking: none.     Microalbuminuria - Chronic, stable. Management as above.     HTN - Chronic, stable. Management as above.     Due to the COVID 19 pandemic this visit was a telephone/video visit in order to help prevent spread of infection in this high risk patient and the general population. The patient gave verbal consent for the visit today.    I have independently reviewed and interpreted labs, imaging as indicated.     Visit Start time 1403  Visit Stop time 1424  Total time 21  If this was an in person visit, it would have been billed as [id number].    Renato Armijo MD on 1/19/2021 at 2:25 PM          Again, thank you for allowing me to participate in the care of your patient.        Sincerely,        Renato  Myron Armijo MD

## 2021-02-03 ENCOUNTER — MYC MEDICAL ADVICE (OUTPATIENT)
Dept: FAMILY MEDICINE | Facility: CLINIC | Age: 66
End: 2021-02-03

## 2021-03-03 DIAGNOSIS — K21.9 GASTROESOPHAGEAL REFLUX DISEASE: ICD-10-CM

## 2021-03-04 RX ORDER — PANTOPRAZOLE SODIUM 40 MG/1
TABLET, DELAYED RELEASE ORAL
Qty: 90 TABLET | Refills: 0 | Status: SHIPPED | OUTPATIENT
Start: 2021-03-04 | End: 2021-10-14

## 2021-03-04 NOTE — CONFIDENTIAL NOTE
"Pending Prescriptions:                       Disp   Refills    pantoprazole (PROTONIX) 40 MG EC tablet [*90 tab*1            Sig: TAKE 1 TABLET BY MOUTH EVERY DAY BEFORE BREAKFAST    RN refilled medication per [facility] Refill Protocol.     Molly Burton RN      Requested Prescriptions   Pending Prescriptions Disp Refills     pantoprazole (PROTONIX) 40 MG EC tablet [Pharmacy Med Name: PANTOPRAZOLE SOD DR 40 MG TAB] 90 tablet 1     Sig: TAKE 1 TABLET BY MOUTH EVERY DAY BEFORE BREAKFAST       PPI Protocol Passed - 3/3/2021 12:06 AM        Passed - Not on Clopidogrel (unless Pantoprazole ordered)        Passed - No diagnosis of osteoporosis on record        Passed - Recent (12 mo) or future (30 days) visit within the authorizing provider's specialty     Patient has had an office visit with the authorizing provider or a provider within the authorizing providers department within the previous 12 mos or has a future within next 30 days. See \"Patient Info\" tab in inbasket, or \"Choose Columns\" in Meds & Orders section of the refill encounter.              Passed - Medication is active on med list        Passed - Patient is age 18 or older        Passed - No active pregnacy on record        Passed - No positive pregnancy test in past 12 months             "

## 2021-03-06 DIAGNOSIS — I10 ESSENTIAL HYPERTENSION: ICD-10-CM

## 2021-03-08 RX ORDER — TRIAMTERENE AND HYDROCHLOROTHIAZIDE 37.5; 25 MG/1; MG/1
2 CAPSULE ORAL EVERY MORNING
Qty: 180 CAPSULE | Refills: 0 | Status: SHIPPED | OUTPATIENT
Start: 2021-03-08 | End: 2021-06-08

## 2021-03-10 ENCOUNTER — MYC MEDICAL ADVICE (OUTPATIENT)
Dept: FAMILY MEDICINE | Facility: CLINIC | Age: 66
End: 2021-03-10

## 2021-03-15 DIAGNOSIS — E11.00 TYPE 2 DIABETES MELLITUS WITH HYPEROSMOLARITY WITHOUT COMA, UNSPECIFIED WHETHER LONG TERM INSULIN USE (H): ICD-10-CM

## 2021-03-15 RX ORDER — BLOOD SUGAR DIAGNOSTIC
STRIP MISCELLANEOUS
Qty: 100 STRIP | Refills: 2 | Status: SHIPPED | OUTPATIENT
Start: 2021-03-15 | End: 2021-06-16

## 2021-03-15 NOTE — TELEPHONE ENCOUNTER
Prescription approved per [facility] Refill Protocol.    Wesly PEREIRA RN....3/15/2021 2:25 PM

## 2021-03-24 ENCOUNTER — IMMUNIZATION (OUTPATIENT)
Dept: NURSING | Facility: CLINIC | Age: 66
End: 2021-03-24
Payer: COMMERCIAL

## 2021-03-24 PROCEDURE — 91300 PR COVID VAC PFIZER DIL RECON 30 MCG/0.3 ML IM: CPT

## 2021-03-24 PROCEDURE — 0001A PR COVID VAC PFIZER DIL RECON 30 MCG/0.3 ML IM: CPT

## 2021-03-29 DIAGNOSIS — E11.00 TYPE 2 DIABETES MELLITUS WITH HYPEROSMOLARITY WITHOUT COMA, UNSPECIFIED WHETHER LONG TERM INSULIN USE (H): ICD-10-CM

## 2021-03-30 RX ORDER — LANCETS 33 GAUGE
EACH MISCELLANEOUS
Qty: 300 EACH | Refills: 0 | Status: SHIPPED | OUTPATIENT
Start: 2021-03-30 | End: 2021-08-09

## 2021-03-30 NOTE — TELEPHONE ENCOUNTER
Prescription approved per [facility] Refill Protocol.    Wesly PEREIRA RN....3/30/2021 3:38 PM

## 2021-04-14 ENCOUNTER — OFFICE VISIT (OUTPATIENT)
Dept: NURSING | Facility: CLINIC | Age: 66
End: 2021-04-14
Attending: INTERNAL MEDICINE
Payer: COMMERCIAL

## 2021-04-14 PROCEDURE — 0002A PR COVID VAC PFIZER DIL RECON 30 MCG/0.3 ML IM: CPT

## 2021-04-14 PROCEDURE — 91300 PR COVID VAC PFIZER DIL RECON 30 MCG/0.3 ML IM: CPT

## 2021-04-23 DIAGNOSIS — E11.9 TYPE 2 DIABETES MELLITUS WITHOUT COMPLICATION, WITH LONG-TERM CURRENT USE OF INSULIN (H): ICD-10-CM

## 2021-04-23 DIAGNOSIS — Z79.4 TYPE 2 DIABETES MELLITUS WITHOUT COMPLICATION, WITH LONG-TERM CURRENT USE OF INSULIN (H): ICD-10-CM

## 2021-04-23 LAB — HBA1C MFR BLD: 6.2 % (ref 0–5.6)

## 2021-04-23 PROCEDURE — 36415 COLL VENOUS BLD VENIPUNCTURE: CPT | Performed by: INTERNAL MEDICINE

## 2021-04-23 PROCEDURE — 83036 HEMOGLOBIN GLYCOSYLATED A1C: CPT | Performed by: INTERNAL MEDICINE

## 2021-05-11 ENCOUNTER — VIRTUAL VISIT (OUTPATIENT)
Dept: ENDOCRINOLOGY | Facility: CLINIC | Age: 66
End: 2021-05-11
Payer: COMMERCIAL

## 2021-05-11 DIAGNOSIS — E78.5 DYSLIPIDEMIA: ICD-10-CM

## 2021-05-11 DIAGNOSIS — Z79.4 TYPE 2 DIABETES MELLITUS WITHOUT COMPLICATION, WITH LONG-TERM CURRENT USE OF INSULIN (H): Primary | ICD-10-CM

## 2021-05-11 DIAGNOSIS — Z79.4 LONG TERM (CURRENT) USE OF INSULIN (H): ICD-10-CM

## 2021-05-11 DIAGNOSIS — E11.9 TYPE 2 DIABETES MELLITUS WITHOUT COMPLICATION, WITH LONG-TERM CURRENT USE OF INSULIN (H): Primary | ICD-10-CM

## 2021-05-11 DIAGNOSIS — I10 ESSENTIAL HYPERTENSION: ICD-10-CM

## 2021-05-11 PROCEDURE — 99214 OFFICE O/P EST MOD 30 MIN: CPT | Mod: 95 | Performed by: INTERNAL MEDICINE

## 2021-05-11 NOTE — LETTER
5/11/2021         RE: Crystal Nicole  [address]  [place] [address]        Dear Colleague,    Thank you for referring your patient, Crystal Nicole, to the [facility]. Please see a copy of my visit note below.    S:   Pt being seen in f/u for DM.  Estimates she was diagnosed about 5 years ago.   She was just on metformin.   Then admitted last month (6/2020) for PNA and HbA1C was 12.4%.   Discharged on insulin.     Since being discharged, trying to stick to 45-60 grams of carbs per meal.   Walking 3/week.      lantus 30 U every day     Novolog   For Pre-meal glucose:  140 - 179 2 units  180-219 4 units  220-259 6 units  260-299 8 units  300 and higher, 10 units.      For Bedtime Glucose:  160-179 1 units  180 - 199 2 units  200 - 239 3 units  240-279 5 units  280 and higher 7 units.     Trulicity 4.5 mg once a week  Metformin 1000 mg every day   Farxiga 10 mg every day     She has only needed to use her novolog sparingly.      Logs:  Checking glucose 2-3 times a day.   Usually checks in the AM, 1500, HS.  -140  1500 120-140  -130    Rare readings >180. Usually if she check too early after eating.   A few readings<100 when she was on a higher dose of lantus.     States her weight is stable.     ROS: 10 point ROS neg other than the symptoms noted above in the HPI.    Exam:  GENERAL: Healthy, alert and no distress  EYES: Eyes grossly normal to inspection.  No discharge or erythema, or obvious scleral/conjunctival abnormalities.  RESP: No audible wheeze, cough, or visible cyanosis.  No visible retractions or increased work of breathing.    SKIN: Visible skin clear. No significant rash, abnormal pigmentation or lesions.  NEURO: Cranial nerves grossly intact.  Mentation and speech appropriate for age.  PSYCH: Mentation appears normal, affect normal/bright, judgement and insight intact, normal speech and appearance well-groomed.     A/P:   Type 2 DM - Uncontrolled.  Questionable MATTEO given brother's history of type 1 DM. Regardless, a GLP-1 agonist would be helpful given her weight. Risks/benefits reviewed.   Labs from 7/31/2020 consistent with type 2 DM.   In 8/2020, much improved control. Concerned about weight gain and would like to try to come off insulin.   Discussed increasing trulicity +/- SGLT-2 inhibitor.   We increased trulicity to 1.5 mg and started farxiga.   In 9/2020, 7.0%. Barely needing to use her novolog. Discussed how trulicity now has 3.0 and 4.5 mg dosing available. However, I could not find these yet in the MAR.   In 1/2021, improved to 6.2%. Frustrated with lack of weight loss. Cautioned about decreasing her insulin dose too quickly.   In 5/2021, stable at 6.2%. She is not interested in actos as she already has problems with fluid retention. Also discussed acarbose. I would avoid glipizide as she is trying to loose weight.   -No change to medications.   -Work to increase intake of lean protein and reduce carbs.   -Increase activity as tolerated. Goal of 30 minutes of brisk walking daily.   -If you have glucose readings below 100, three or more days in a row, drop lantus by 5 units. Repeat process as needed.   -Labs in 3 months.   -ASA taking.  -BP: controlled on last check.   -NAFL/WALLER: normal ALT and AST in 6/2020.   -Lipids: HDL 29, LDL 72, Trg 141 in 6/2020.               On atorvastatin.   -Microalbumin elevated in 6/2020. She is on losartan.   -Eyes: reports normal exam in 8/2020.   -Smoking: none.     Microalbuminuria - Chronic, stable. Management as above.     HTN - Chronic, stable. Management as above.     Due to the COVID 19 pandemic this visit was a telephone/video visit in order to help prevent spread of infection in this high risk patient and the general population. The patient gave verbal consent for the visit today.    I have independently reviewed and interpreted labs, imaging as indicated.    Used Amaliya. Patient at home.    Visit Start  time 1300  Visit Stop time 1:22 PM   22 minutes spent on the date of the encounter doing chart review, history and exam, documentation and further activities as noted above.     Renato Armijo MD on 5/11/2021 at 1:22 PM          Again, thank you for allowing me to participate in the care of your patient.        Sincerely,        Renato Armijo MD

## 2021-05-11 NOTE — PROGRESS NOTES
S:   Pt being seen in f/u for DM.  Estimates she was diagnosed about 5 years ago.   She was just on metformin.   Then admitted last month (6/2020) for PNA and HbA1C was 12.4%.   Discharged on insulin.     Since being discharged, trying to stick to 45-60 grams of carbs per meal.   Walking 3/week.      lantus 30 U every day     Novolog   For Pre-meal glucose:  140 - 179 2 units  180-219 4 units  220-259 6 units  260-299 8 units  300 and higher, 10 units.      For Bedtime Glucose:  160-179 1 units  180 - 199 2 units  200 - 239 3 units  240-279 5 units  280 and higher 7 units.     Trulicity 4.5 mg once a week  Metformin 1000 mg every day   Farxiga 10 mg every day     She has only needed to use her novolog sparingly.      Logs:  Checking glucose 2-3 times a day.   Usually checks in the AM, 1500, HS.  -140  1500 120-140  -130    Rare readings >180. Usually if she check too early after eating.   A few readings<100 when she was on a higher dose of lantus.     States her weight is stable.     ROS: 10 point ROS neg other than the symptoms noted above in the HPI.    Exam:  GENERAL: Healthy, alert and no distress  EYES: Eyes grossly normal to inspection.  No discharge or erythema, or obvious scleral/conjunctival abnormalities.  RESP: No audible wheeze, cough, or visible cyanosis.  No visible retractions or increased work of breathing.    SKIN: Visible skin clear. No significant rash, abnormal pigmentation or lesions.  NEURO: Cranial nerves grossly intact.  Mentation and speech appropriate for age.  PSYCH: Mentation appears normal, affect normal/bright, judgement and insight intact, normal speech and appearance well-groomed.     A/P:   Type 2 DM - Uncontrolled. Questionable MATTEO given brother's history of type 1 DM. Regardless, a GLP-1 agonist would be helpful given her weight. Risks/benefits reviewed.   Labs from 7/31/2020 consistent with type 2 DM.   In 8/2020, much improved control. Concerned about weight gain and  would like to try to come off insulin.   Discussed increasing trulicity +/- SGLT-2 inhibitor.   We increased trulicity to 1.5 mg and started farxiga.   In 9/2020, 7.0%. Barely needing to use her novolog. Discussed how trulicity now has 3.0 and 4.5 mg dosing available. However, I could not find these yet in the MAR.   In 1/2021, improved to 6.2%. Frustrated with lack of weight loss. Cautioned about decreasing her insulin dose too quickly.   In 5/2021, stable at 6.2%. She is not interested in actos as she already has problems with fluid retention. Also discussed acarbose. I would avoid glipizide as she is trying to loose weight.   -No change to medications.   -Work to increase intake of lean protein and reduce carbs.   -Increase activity as tolerated. Goal of 30 minutes of brisk walking daily.   -If you have glucose readings below 100, three or more days in a row, drop lantus by 5 units. Repeat process as needed.   -Labs in 3 months.   -ASA taking.  -BP: controlled on last check.   -NAFL/WALLER: normal ALT and AST in 6/2020.   -Lipids: HDL 29, LDL 72, Trg 141 in 6/2020.               On atorvastatin.   -Microalbumin elevated in 6/2020. She is on losartan.   -Eyes: reports normal exam in 8/2020.   -Smoking: none.     Microalbuminuria - Chronic, stable. Management as above.     HTN - Chronic, stable. Management as above.     Due to the COVID 19 pandemic this visit was a telephone/video visit in order to help prevent spread of infection in this high risk patient and the general population. The patient gave verbal consent for the visit today.    I have independently reviewed and interpreted labs, imaging as indicated.    Used Amwell. Patient at home.    Visit Start time 1300  Visit Stop time 1:22 PM   22 minutes spent on the date of the encounter doing chart review, history and exam, documentation and further activities as noted above.     Renato Armijo MD on 5/11/2021 at 1:22 PM

## 2021-05-28 DIAGNOSIS — E11.00 TYPE 2 DIABETES MELLITUS WITH HYPEROSMOLARITY WITHOUT COMA, UNSPECIFIED WHETHER LONG TERM INSULIN USE (H): ICD-10-CM

## 2021-05-28 DIAGNOSIS — E78.5 HYPERLIPIDEMIA LDL GOAL <70: ICD-10-CM

## 2021-05-28 RX ORDER — PEN NEEDLE, DIABETIC 32GX 5/32"
NEEDLE, DISPOSABLE MISCELLANEOUS
Qty: 100 EACH | Refills: 0 | Status: SHIPPED | OUTPATIENT
Start: 2021-05-28 | End: 2021-07-07

## 2021-05-29 DIAGNOSIS — L40.9 PSORIASIS: ICD-10-CM

## 2021-05-29 NOTE — TELEPHONE ENCOUNTER
Patient requesting refill of diabetic pen needles.  Says she will run out before the weekend.    Mayda Moon RN  Triage Nurse Advisor

## 2021-05-29 NOTE — TELEPHONE ENCOUNTER
Pen needle request refilled per [facility] nurse protocol.  Failed due to no visit in 6 months, however patient did have virtual visit 5/21/2021 which fulfills this and program does not detect.    Called patient back and let her know refill was sent.    Mayda Moon, RN  Triage Nurse Advisor

## 2021-05-31 DIAGNOSIS — E03.9 HYPOTHYROIDISM, UNSPECIFIED TYPE: ICD-10-CM

## 2021-05-31 RX ORDER — ATORVASTATIN CALCIUM 10 MG/1
10 TABLET, FILM COATED ORAL DAILY
Qty: 90 TABLET | Refills: 0 | Status: SHIPPED | OUTPATIENT
Start: 2021-05-31 | End: 2021-09-08

## 2021-05-31 NOTE — TELEPHONE ENCOUNTER
Medication is being filled for 1 time refill only due to:  Patient needs to be seen because needs annual exam.

## 2021-06-01 RX ORDER — LEVOTHYROXINE SODIUM 100 UG/1
TABLET ORAL
Qty: 90 TABLET | Refills: 0 | Status: SHIPPED | OUTPATIENT
Start: 2021-06-01 | End: 2021-09-08

## 2021-06-01 NOTE — TELEPHONE ENCOUNTER
Routing refill request to provider for review/approval because:  Drug not on the [facility] refill protocol     Requested Prescriptions   Pending Prescriptions Disp Refills     clobetasol (TEMOVATE) 0.05 % external ointment [Pharmacy Med Name: CLOBETASOL 0.05% OINTMENT] 45 g 0     Sig: APPLY TO AFFECTED AREA TWICE A DAY       There is no refill protocol information for this order

## 2021-06-02 RX ORDER — CLOBETASOL PROPIONATE 0.5 MG/G
OINTMENT TOPICAL
Qty: 45 G | Refills: 0 | Status: SHIPPED | OUTPATIENT
Start: 2021-06-02 | End: 2022-01-02

## 2021-06-02 NOTE — TELEPHONE ENCOUNTER
Prescription approved per [facility] Refill Protocol.  Ashley Houston RN

## 2021-06-05 DIAGNOSIS — I10 ESSENTIAL HYPERTENSION: ICD-10-CM

## 2021-06-07 DIAGNOSIS — Z79.4 TYPE 2 DIABETES MELLITUS WITHOUT COMPLICATION, WITH LONG-TERM CURRENT USE OF INSULIN (H): ICD-10-CM

## 2021-06-07 DIAGNOSIS — E11.9 TYPE 2 DIABETES MELLITUS WITHOUT COMPLICATION, WITH LONG-TERM CURRENT USE OF INSULIN (H): ICD-10-CM

## 2021-06-08 RX ORDER — TRIAMTERENE AND HYDROCHLOROTHIAZIDE 37.5; 25 MG/1; MG/1
2 CAPSULE ORAL EVERY MORNING
Qty: 180 CAPSULE | Refills: 0 | Status: SHIPPED | OUTPATIENT
Start: 2021-06-08 | End: 2021-09-08

## 2021-06-08 RX ORDER — DULAGLUTIDE 4.5 MG/.5ML
4.5 INJECTION, SOLUTION SUBCUTANEOUS WEEKLY
OUTPATIENT
Start: 2021-06-08

## 2021-06-14 ENCOUNTER — MYC MEDICAL ADVICE (OUTPATIENT)
Dept: FAMILY MEDICINE | Facility: CLINIC | Age: 66
End: 2021-06-14

## 2021-06-14 NOTE — TELEPHONE ENCOUNTER
Medication entered into chart as historical.  He is also on triamterene hydrochlorothiazide combination; which diuretics.  She should need to schedule appointment to discuss the Lasix.

## 2021-06-14 NOTE — TELEPHONE ENCOUNTER
Routing request to provider for review/approval because:  Medication is reported/historical    Delia Moscoso RN on 6/14/2021 at 2:42 PM

## 2021-06-15 NOTE — TELEPHONE ENCOUNTER
"Reached out to patient to relay provider's message as written. She stated that she uses Lasix sparingly \"every once in awhile\" when this mild swelling occurs in her legs. She had been prescribed this originally by Dr. Francois, per patient report, at [facility] and does not wish to schedule an appointment to discuss at this time. She stated that she would wait until Dr. Mae is in office and call back to discuss with her then. No other reported symptoms and declined to schedule appointment for assessment.    RODERICK Stewart RN  [facility] [place]  "

## 2021-06-16 DIAGNOSIS — E11.00 TYPE 2 DIABETES MELLITUS WITH HYPEROSMOLARITY WITHOUT COMA, UNSPECIFIED WHETHER LONG TERM INSULIN USE (H): ICD-10-CM

## 2021-06-16 RX ORDER — BLOOD SUGAR DIAGNOSTIC
STRIP MISCELLANEOUS
Qty: 100 STRIP | Refills: 2 | Status: SHIPPED | OUTPATIENT
Start: 2021-06-16 | End: 2023-10-05

## 2021-06-16 NOTE — TELEPHONE ENCOUNTER
Prescription approved per [facility] Refill Protocol.    Wesly PEREIRA RN....6/16/2021 1:55 PM

## 2021-07-06 DIAGNOSIS — E11.00 TYPE 2 DIABETES MELLITUS WITH HYPEROSMOLARITY WITHOUT COMA, UNSPECIFIED WHETHER LONG TERM INSULIN USE (H): ICD-10-CM

## 2021-07-07 ENCOUNTER — TELEPHONE (OUTPATIENT)
Dept: ENDOCRINOLOGY | Facility: CLINIC | Age: 66
End: 2021-07-07

## 2021-07-07 DIAGNOSIS — E11.00 TYPE 2 DIABETES MELLITUS WITH HYPEROSMOLARITY WITHOUT COMA, UNSPECIFIED WHETHER LONG TERM INSULIN USE (H): ICD-10-CM

## 2021-07-07 RX ORDER — PEN NEEDLE, DIABETIC 32GX 5/32"
NEEDLE, DISPOSABLE MISCELLANEOUS
OUTPATIENT
Start: 2021-07-07

## 2021-07-07 RX ORDER — PEN NEEDLE, DIABETIC 32GX 5/32"
NEEDLE, DISPOSABLE MISCELLANEOUS
Qty: 100 EACH | Refills: 0 | Status: SHIPPED | OUTPATIENT
Start: 2021-07-07 | End: 2021-08-05

## 2021-07-07 NOTE — TELEPHONE ENCOUNTER
[facility] Call Center    Phone Message    May a detailed message be left on voicemail: yes     Reason for Call: Medication Refill Request    Has the patient contacted the pharmacy for the refill? Yes   Name of medication being requested: Flex pen needles    Provider who prescribed the medication: Dr. Armijo  Pharmacy: [facility] #[id number] - [place], [place] - [address]    Date medication is needed: patient has 2 days left.         Action Taken: Message routed to:  [facility] ([facility]): Endo    Travel Screening: Not Applicable

## 2021-07-07 NOTE — TELEPHONE ENCOUNTER
Refilled pen needles per [facility] protocol.    Wesly PEREIRA RN....7/7/2021 12:42 PM

## 2021-07-13 ENCOUNTER — MYC MEDICAL ADVICE (OUTPATIENT)
Dept: ENDOCRINOLOGY | Facility: CLINIC | Age: 66
End: 2021-07-13

## 2021-07-13 DIAGNOSIS — E11.00 TYPE 2 DIABETES MELLITUS WITH HYPEROSMOLARITY WITHOUT COMA, UNSPECIFIED WHETHER LONG TERM INSULIN USE (H): Primary | ICD-10-CM

## 2021-07-14 NOTE — TELEPHONE ENCOUNTER
See patient's message. Farxiga is not covered by insurance. Patient advised by pharmacy that Invokana or Jardiance are on the formulary.    Advised if PA needed or medication change.    Merry FRIEDMAN MA

## 2021-07-20 ENCOUNTER — MYC MEDICAL ADVICE (OUTPATIENT)
Dept: FAMILY MEDICINE | Facility: CLINIC | Age: 66
End: 2021-07-20

## 2021-07-22 NOTE — TELEPHONE ENCOUNTER
Provider, please advise when patients next appointment is due.  Ayde YNI CMA ([facility])

## 2021-07-30 ENCOUNTER — LAB (OUTPATIENT)
Dept: LAB | Facility: CLINIC | Age: 66
End: 2021-07-30
Payer: COMMERCIAL

## 2021-07-30 DIAGNOSIS — Z79.4 TYPE 2 DIABETES MELLITUS WITHOUT COMPLICATION, WITH LONG-TERM CURRENT USE OF INSULIN (H): ICD-10-CM

## 2021-07-30 DIAGNOSIS — E11.9 TYPE 2 DIABETES MELLITUS WITHOUT COMPLICATION, WITH LONG-TERM CURRENT USE OF INSULIN (H): ICD-10-CM

## 2021-07-30 LAB
ALT SERPL W P-5'-P-CCNC: 39 U/L (ref 0–50)
ANION GAP SERPL CALCULATED.3IONS-SCNC: 6 MMOL/L (ref 3–14)
AST SERPL W P-5'-P-CCNC: 22 U/L (ref 0–45)
BUN SERPL-MCNC: 22 MG/DL (ref 7–30)
CALCIUM SERPL-MCNC: 9.6 MG/DL (ref 8.5–10.1)
CHLORIDE BLD-SCNC: 106 MMOL/L (ref 94–109)
CHOLEST SERPL-MCNC: 141 MG/DL
CO2 SERPL-SCNC: 28 MMOL/L (ref 20–32)
CREAT SERPL-MCNC: 0.97 MG/DL (ref 0.52–1.04)
FASTING STATUS PATIENT QL REPORTED: YES
GFR SERPL CREATININE-BSD FRML MDRD: 61 ML/MIN/1.73M2
GLUCOSE BLD-MCNC: 123 MG/DL (ref 70–99)
HBA1C MFR BLD: 6.5 % (ref 0–5.6)
HDLC SERPL-MCNC: 45 MG/DL
LDLC SERPL CALC-MCNC: 72 MG/DL
NONHDLC SERPL-MCNC: 96 MG/DL
POTASSIUM BLD-SCNC: 3.8 MMOL/L (ref 3.4–5.3)
SODIUM SERPL-SCNC: 140 MMOL/L (ref 133–144)
TRIGL SERPL-MCNC: 118 MG/DL

## 2021-07-30 PROCEDURE — 83036 HEMOGLOBIN GLYCOSYLATED A1C: CPT

## 2021-07-30 PROCEDURE — 80048 BASIC METABOLIC PNL TOTAL CA: CPT

## 2021-07-30 PROCEDURE — 84450 TRANSFERASE (AST) (SGOT): CPT

## 2021-07-30 PROCEDURE — 36415 COLL VENOUS BLD VENIPUNCTURE: CPT

## 2021-07-30 PROCEDURE — 80061 LIPID PANEL: CPT

## 2021-07-30 PROCEDURE — 84460 ALANINE AMINO (ALT) (SGPT): CPT

## 2021-08-04 ENCOUNTER — VIRTUAL VISIT (OUTPATIENT)
Dept: ENDOCRINOLOGY | Facility: CLINIC | Age: 66
End: 2021-08-04
Payer: COMMERCIAL

## 2021-08-04 DIAGNOSIS — Z79.4 LONG TERM (CURRENT) USE OF INSULIN (H): ICD-10-CM

## 2021-08-04 DIAGNOSIS — E78.5 DYSLIPIDEMIA: ICD-10-CM

## 2021-08-04 DIAGNOSIS — I10 ESSENTIAL HYPERTENSION: ICD-10-CM

## 2021-08-04 DIAGNOSIS — Z79.4 TYPE 2 DIABETES MELLITUS WITHOUT COMPLICATION, WITH LONG-TERM CURRENT USE OF INSULIN (H): Primary | ICD-10-CM

## 2021-08-04 DIAGNOSIS — E11.9 TYPE 2 DIABETES MELLITUS WITHOUT COMPLICATION, WITH LONG-TERM CURRENT USE OF INSULIN (H): Primary | ICD-10-CM

## 2021-08-04 PROCEDURE — 99214 OFFICE O/P EST MOD 30 MIN: CPT | Mod: 95 | Performed by: INTERNAL MEDICINE

## 2021-08-04 NOTE — LETTER
8/4/2021         RE: Crystal Nicole  [address]  [facility] [address]        Dear Colleague,    Thank you for referring your patient, Crystal Nicole, to the [facility]. Please see a copy of my visit note below.    Crystal is a 66 year old who is being evaluated via a billable video visit.      How would you like to obtain your AVS? MyChart  If the video visit is dropped, the invitation should be resent by: Text to cell phone: [phone]  Will anyone else be joining your video visit? No      Video Start Time: 9:30 AM  S:   Pt being seen in f/u for DM.  Estimates she was diagnosed about 5 years ago.   She was just on metformin.   Then admitted last month (6/2020) for PNA and HbA1C was 12.4%.   Discharged on insulin.     Since being discharged, trying to stick to 45-60 grams of carbs per meal.   She is planning to cut back on pasta intake and try some leaner meats.   Her weight is stable.   Silver sneakers 2/week, aerobics.   Walks for 30 minutes two other days a week.   Exercise videos, 30 minutes.      lantus 30 U every day     Novolog   For Pre-meal glucose:  140 - 179 2 units  180-219 4 units  220-259 6 units  260-299 8 units  300 and higher, 10 units.      For Bedtime Glucose:  160-179 1 units  180 - 199 2 units  200 - 239 3 units  240-279 5 units  280 and higher 7 units.     Trulicity 4.5 mg once a week  Metformin 1000 mg every day   Jardiance 25 mg every day     She has only needed to use her novolog sparingly.     Logs:  Checking glucose 2-3 times a day.   Usually checks in the AM, 1500, HS.  130, 100, 130 on average respectively.   No hypoglycemia.     ROS: 10 point ROS neg other than the symptoms noted above in the HPI.    Exam:  GENERAL: Healthy, alert and no distress  EYES: Eyes grossly normal to inspection.  No discharge or erythema, or obvious scleral/conjunctival abnormalities.  RESP: No audible wheeze, cough, or visible cyanosis.  No visible retractions or  increased work of breathing.    SKIN: Visible skin clear. No significant rash, abnormal pigmentation or lesions.  NEURO: Cranial nerves grossly intact.  Mentation and speech appropriate for age.  PSYCH: Mentation appears normal, affect normal/bright, judgement and insight intact, normal speech and appearance well-groomed.     A/P:   Type 2 DM - Uncontrolled. Questionable MATTEO given brother's history of type 1 DM. Regardless, a GLP-1 agonist would be helpful given her weight. Risks/benefits reviewed.   Labs from 7/31/2020 consistent with type 2 DM.   In 8/2020, much improved control. Concerned about weight gain and would like to try to come off insulin.   Discussed increasing trulicity +/- SGLT-2 inhibitor.   We increased trulicity to 1.5 mg and started farxiga.   In 9/2020, 7.0%. Barely needing to use her novolog. Discussed how trulicity now has 3.0 and 4.5 mg dosing available. However, I could not find these yet in the MAR.   In 1/2021, improved to 6.2%. Frustrated with lack of weight loss. Cautioned about decreasing her insulin dose too quickly.   In 5/2021, stable at 6.2%. She is not interested in actos as she already has problems with fluid retention. Also discussed acarbose. I would avoid glipizide as she is trying to loose weight.   In 8/2021, 6.5%. BMR 1950. Continued goal of reducing insulin and losing weight.  -No change to metformin, jardiance, or trulicity.   -Lower lantus to 25 Units a day.   -If you have glucose readings below 100, three or more days in a row, drop lantus by 5 units. Repeat process as needed.    -Work to increase intake of lean protein and vegetables and reduce carbs.   -Start tracking your food intake. Recommend no more than 1500 calories a day.   -Continue your exercise program.   -Labs in 3 months.   -ASA taking.  -BP: controlled on last check.   -NAFL/WALLER: normal ALT and AST in 6/2020.    Normal in 7/2021.    -Lipids: HDL 29, LDL 72, Trg 141 in 6/2020.    HDL 45, LDL 72, Trg  118  in 7/2021.               On atorvastatin.   -Microalbumin elevated in 6/2020. She is on losartan.    Repeat with next labs.   -Eyes: reports normal exam in 8/2020.   -Smoking: none.     Microalbuminuria - Chronic, stable. Management as above.     HTN - Chronic, stable. Management as above.       Video-Visit Details    Type of service:  Video Visit    Video End Time:9:56 AM    Originating Location (pt. Location): Home    Distant Location (provider location):  [facility]     Platform used for Video Visit: [facility]   Renato Armijo MD on 8/5/2021 at 7:28 AM        Again, thank you for allowing me to participate in the care of your patient.        Sincerely,        Renato Armijo MD

## 2021-08-04 NOTE — PROGRESS NOTES
Crystal is a 66 year old who is being evaluated via a billable video visit.      How would you like to obtain your AVS? MyChart  If the video visit is dropped, the invitation should be resent by: Text to cell phone: [phone]  Will anyone else be joining your video visit? No      Video Start Time: 9:30 AM  S:   Pt being seen in f/u for DM.  Estimates she was diagnosed about 5 years ago.   She was just on metformin.   Then admitted last month (6/2020) for PNA and HbA1C was 12.4%.   Discharged on insulin.     Since being discharged, trying to stick to 45-60 grams of carbs per meal.   She is planning to cut back on pasta intake and try some leaner meats.   Her weight is stable.   Silver sneakers 2/week, aerobics.   Walks for 30 minutes two other days a week.   Exercise videos, 30 minutes.      lantus 30 U every day     Novolog   For Pre-meal glucose:  140 - 179 2 units  180-219 4 units  220-259 6 units  260-299 8 units  300 and higher, 10 units.      For Bedtime Glucose:  160-179 1 units  180 - 199 2 units  200 - 239 3 units  240-279 5 units  280 and higher 7 units.     Trulicity 4.5 mg once a week  Metformin 1000 mg every day   Jardiance 25 mg every day     She has only needed to use her novolog sparingly.     Logs:  Checking glucose 2-3 times a day.   Usually checks in the AM, 1500, HS.  130, 100, 130 on average respectively.   No hypoglycemia.     ROS: 10 point ROS neg other than the symptoms noted above in the HPI.    Exam:  GENERAL: Healthy, alert and no distress  EYES: Eyes grossly normal to inspection.  No discharge or erythema, or obvious scleral/conjunctival abnormalities.  RESP: No audible wheeze, cough, or visible cyanosis.  No visible retractions or increased work of breathing.    SKIN: Visible skin clear. No significant rash, abnormal pigmentation or lesions.  NEURO: Cranial nerves grossly intact.  Mentation and speech appropriate for age.  PSYCH: Mentation appears normal, affect normal/bright, judgement  and insight intact, normal speech and appearance well-groomed.     A/P:   Type 2 DM - Uncontrolled. Questionable MATTEO given brother's history of type 1 DM. Regardless, a GLP-1 agonist would be helpful given her weight. Risks/benefits reviewed.   Labs from 7/31/2020 consistent with type 2 DM.   In 8/2020, much improved control. Concerned about weight gain and would like to try to come off insulin.   Discussed increasing trulicity +/- SGLT-2 inhibitor.   We increased trulicity to 1.5 mg and started farxiga.   In 9/2020, 7.0%. Barely needing to use her novolog. Discussed how trulicity now has 3.0 and 4.5 mg dosing available. However, I could not find these yet in the MAR.   In 1/2021, improved to 6.2%. Frustrated with lack of weight loss. Cautioned about decreasing her insulin dose too quickly.   In 5/2021, stable at 6.2%. She is not interested in actos as she already has problems with fluid retention. Also discussed acarbose. I would avoid glipizide as she is trying to loose weight.   In 8/2021, 6.5%. BMR 1950. Continued goal of reducing insulin and losing weight.  -No change to metformin, jardiance, or trulicity.   -Lower lantus to 25 Units a day.   -If you have glucose readings below 100, three or more days in a row, drop lantus by 5 units. Repeat process as needed.    -Work to increase intake of lean protein and vegetables and reduce carbs.   -Start tracking your food intake. Recommend no more than 1500 calories a day.   -Continue your exercise program.   -Labs in 3 months.   -ASA taking.  -BP: controlled on last check.   -NAFL/WALLER: normal ALT and AST in 6/2020.    Normal in 7/2021.    -Lipids: HDL 29, LDL 72, Trg 141 in 6/2020.    HDL 45, LDL 72, Trg  118 in 7/2021.               On atorvastatin.   -Microalbumin elevated in 6/2020. She is on losartan.    Repeat with next labs.   -Eyes: reports normal exam in 8/2020.   -Smoking: none.     Microalbuminuria - Chronic, stable. Management as above.     HTN - Chronic,  stable. Management as above.       Video-Visit Details    Type of service:  Video Visit    Video End Time:9:56 AM    Originating Location (pt. Location): Home    Distant Location (provider location):  [facility]     Platform used for Video Visit: Javier Armijo MD on 8/5/2021 at 7:28 AM

## 2021-08-05 DIAGNOSIS — E11.00 TYPE 2 DIABETES MELLITUS WITH HYPEROSMOLARITY WITHOUT COMA, UNSPECIFIED WHETHER LONG TERM INSULIN USE (H): ICD-10-CM

## 2021-08-05 RX ORDER — PEN NEEDLE, DIABETIC 32GX 5/32"
NEEDLE, DISPOSABLE MISCELLANEOUS
Qty: 200 EACH | Refills: 3 | Status: SHIPPED | OUTPATIENT
Start: 2021-08-05 | End: 2022-07-05

## 2021-08-05 NOTE — TELEPHONE ENCOUNTER
Prescription approved per [facility] Refill Protocol.    Wesly PEREIRA RN....8/5/2021 1:35 PM

## 2021-08-08 DIAGNOSIS — E11.00 TYPE 2 DIABETES MELLITUS WITH HYPEROSMOLARITY WITHOUT COMA, UNSPECIFIED WHETHER LONG TERM INSULIN USE (H): ICD-10-CM

## 2021-08-09 RX ORDER — LANCETS 33 GAUGE
EACH MISCELLANEOUS
Qty: 300 EACH | Refills: 0 | Status: SHIPPED | OUTPATIENT
Start: 2021-08-09 | End: 2021-11-08

## 2021-08-09 NOTE — TELEPHONE ENCOUNTER
Prescription approved per [facility] Refill Protocol.    Wesly PEREIRA RN....8/9/2021 12:59 PM

## 2021-08-26 DIAGNOSIS — E11.00 TYPE 2 DIABETES MELLITUS WITH HYPEROSMOLARITY WITHOUT COMA, UNSPECIFIED WHETHER LONG TERM INSULIN USE (H): ICD-10-CM

## 2021-08-26 RX ORDER — METFORMIN HCL 500 MG
1000 TABLET, EXTENDED RELEASE 24 HR ORAL
Qty: 180 TABLET | Refills: 3 | Status: SHIPPED | OUTPATIENT
Start: 2021-08-26 | End: 2021-12-15

## 2021-08-26 NOTE — TELEPHONE ENCOUNTER
Prescription approved per [facility] Refill Protocol.    Osman ANDERS, RN Specialty Triage

## 2021-09-02 DIAGNOSIS — I10 ESSENTIAL HYPERTENSION: ICD-10-CM

## 2021-09-02 DIAGNOSIS — E78.5 HYPERLIPIDEMIA LDL GOAL <70: ICD-10-CM

## 2021-09-02 DIAGNOSIS — E03.9 HYPOTHYROIDISM, UNSPECIFIED TYPE: ICD-10-CM

## 2021-09-08 RX ORDER — ATORVASTATIN CALCIUM 10 MG/1
10 TABLET, FILM COATED ORAL DAILY
Qty: 90 TABLET | Refills: 0 | Status: SHIPPED | OUTPATIENT
Start: 2021-09-08 | End: 2021-10-14

## 2021-09-08 RX ORDER — LEVOTHYROXINE SODIUM 100 UG/1
TABLET ORAL
Qty: 90 TABLET | Refills: 0 | Status: SHIPPED | OUTPATIENT
Start: 2021-09-08 | End: 2021-10-14

## 2021-09-08 RX ORDER — TRIAMTERENE AND HYDROCHLOROTHIAZIDE 37.5; 25 MG/1; MG/1
2 CAPSULE ORAL EVERY MORNING
Qty: 180 CAPSULE | Refills: 0 | Status: SHIPPED | OUTPATIENT
Start: 2021-09-08 | End: 2021-10-14

## 2021-09-08 NOTE — TELEPHONE ENCOUNTER
Atorvastatin prescription approved per [facility] Refill Protocol.    Routing refill requests for levothyroxine and Dyazide to provider for review/approval because:  Labs out of range:  TSH     Drug interaction noted: Risk of hyperkalemia with Dyazide and losartan    Wesly PEREIRA RN....9/8/2021 10:36 AM

## 2021-09-13 ENCOUNTER — TELEPHONE (OUTPATIENT)
Dept: ENDOCRINOLOGY | Facility: CLINIC | Age: 66
End: 2021-09-13

## 2021-09-13 NOTE — TELEPHONE ENCOUNTER
Reason for Call:  Form, our goal is to have forms completed with 72 hours, however, some forms may require a visit or additional information.    Type of letter, form or note:  medical    Who is the form from?: Patient    Where did the form come from: Patient or family brought in       What clinic location was the form placed at?: [facility]    Where the form was placed: Dr. Armijo's Box/Folder    What number is listed as a contact on the form?: [phone]       Additional comments: N/A    Call taken on 9/13/2021 at 1:37 PM by Belem Cordero

## 2021-09-14 NOTE — TELEPHONE ENCOUNTER
Forms completed by provider. Copy sent to scanning.     Called and spoke to patient, informed that forms are ready for .   Maricarmen PIZARRO MA

## 2021-09-19 DIAGNOSIS — I87.8 VENOUS STASIS: ICD-10-CM

## 2021-09-21 RX ORDER — FUROSEMIDE 40 MG
40 TABLET ORAL DAILY PRN
Qty: 30 TABLET | Refills: 0 | Status: SHIPPED | OUTPATIENT
Start: 2021-09-21 | End: 2022-12-22

## 2021-09-21 NOTE — TELEPHONE ENCOUNTER
Medication is being filled for 1 time refill only due to:  Patient needs to be seen because it has been more than one year since last visit.     Tiffanie Razo RN

## 2021-09-22 ENCOUNTER — TELEPHONE (OUTPATIENT)
Dept: ENDOCRINOLOGY | Facility: CLINIC | Age: 66
End: 2021-09-22

## 2021-09-22 NOTE — TELEPHONE ENCOUNTER
Reason for Call:  Form, our goal is to have forms completed with 72 hours, however, some forms may require a visit or additional information.    Type of letter, form or note:  medical    Who is the form from?: Patient    Where did the form come from: Patient or family brought in       What clinic location was the form placed at?: [facility]    Where the form was placed: put in doctors box Box/Folder    What number is listed as a contact on the form?: [phone]       Additional comments: Please call patient when done filling out.    Call taken on 9/22/2021 at 11:46 AM by Anny Saavedra

## 2021-09-24 ENCOUNTER — MEDICAL CORRESPONDENCE (OUTPATIENT)
Dept: HEALTH INFORMATION MANAGEMENT | Facility: CLINIC | Age: 66
End: 2021-09-24

## 2021-09-24 ENCOUNTER — TRANSFERRED RECORDS (OUTPATIENT)
Dept: HEALTH INFORMATION MANAGEMENT | Facility: CLINIC | Age: 66
End: 2021-09-24

## 2021-09-24 LAB — RETINOPATHY: NORMAL

## 2021-09-24 NOTE — TELEPHONE ENCOUNTER
Called patient and left VM, informed that her form has been completed and is ready for  at the [address] . Copy sent for scanning.     Maricarmen PIZARRO MA

## 2021-10-14 ENCOUNTER — OFFICE VISIT (OUTPATIENT)
Dept: FAMILY MEDICINE | Facility: CLINIC | Age: 66
End: 2021-10-14
Payer: COMMERCIAL

## 2021-10-14 ENCOUNTER — ANCILLARY PROCEDURE (OUTPATIENT)
Dept: MAMMOGRAPHY | Facility: CLINIC | Age: 66
End: 2021-10-14
Payer: COMMERCIAL

## 2021-10-14 VITALS
HEART RATE: 87 BPM | WEIGHT: 293 LBS | BODY MASS INDEX: 50.02 KG/M2 | SYSTOLIC BLOOD PRESSURE: 132 MMHG | OXYGEN SATURATION: 97 % | HEIGHT: 64 IN | TEMPERATURE: 98.6 F | RESPIRATION RATE: 20 BRPM | DIASTOLIC BLOOD PRESSURE: 83 MMHG

## 2021-10-14 DIAGNOSIS — L40.9 PSORIASIS: ICD-10-CM

## 2021-10-14 DIAGNOSIS — I87.8 VENOUS STASIS: ICD-10-CM

## 2021-10-14 DIAGNOSIS — E03.9 HYPOTHYROIDISM, UNSPECIFIED TYPE: ICD-10-CM

## 2021-10-14 DIAGNOSIS — M10.9 GOUT, UNSPECIFIED CAUSE, UNSPECIFIED CHRONICITY, UNSPECIFIED SITE: ICD-10-CM

## 2021-10-14 DIAGNOSIS — E66.01 MORBID OBESITY (H): ICD-10-CM

## 2021-10-14 DIAGNOSIS — Z12.4 SCREENING FOR MALIGNANT NEOPLASM OF CERVIX: ICD-10-CM

## 2021-10-14 DIAGNOSIS — K21.9 GASTROESOPHAGEAL REFLUX DISEASE WITHOUT ESOPHAGITIS: ICD-10-CM

## 2021-10-14 DIAGNOSIS — I10 ESSENTIAL HYPERTENSION: ICD-10-CM

## 2021-10-14 DIAGNOSIS — Z00.00 ENCOUNTER FOR MEDICARE ANNUAL WELLNESS EXAM: ICD-10-CM

## 2021-10-14 DIAGNOSIS — Z78.0 ASYMPTOMATIC POSTMENOPAUSAL STATUS: ICD-10-CM

## 2021-10-14 DIAGNOSIS — Z11.4 SCREENING FOR HIV (HUMAN IMMUNODEFICIENCY VIRUS): ICD-10-CM

## 2021-10-14 DIAGNOSIS — E78.5 HYPERLIPIDEMIA LDL GOAL <70: ICD-10-CM

## 2021-10-14 DIAGNOSIS — Z12.31 VISIT FOR SCREENING MAMMOGRAM: ICD-10-CM

## 2021-10-14 DIAGNOSIS — E11.00 TYPE 2 DIABETES MELLITUS WITH HYPEROSMOLARITY WITHOUT COMA, UNSPECIFIED WHETHER LONG TERM INSULIN USE (H): ICD-10-CM

## 2021-10-14 LAB
CREAT UR-MCNC: 109 MG/DL
MICROALBUMIN UR-MCNC: 10 MG/L
MICROALBUMIN/CREAT UR: 9.17 MG/G CR (ref 0–25)
TSH SERPL DL<=0.005 MIU/L-ACNC: 1.66 MU/L (ref 0.4–4)
URATE SERPL-MCNC: 4.9 MG/DL (ref 2.6–6)

## 2021-10-14 PROCEDURE — 87389 HIV-1 AG W/HIV-1&-2 AB AG IA: CPT | Performed by: FAMILY MEDICINE

## 2021-10-14 PROCEDURE — 87624 HPV HI-RISK TYP POOLED RSLT: CPT | Performed by: FAMILY MEDICINE

## 2021-10-14 PROCEDURE — 36415 COLL VENOUS BLD VENIPUNCTURE: CPT | Performed by: FAMILY MEDICINE

## 2021-10-14 PROCEDURE — 84443 ASSAY THYROID STIM HORMONE: CPT | Performed by: FAMILY MEDICINE

## 2021-10-14 PROCEDURE — G0145 SCR C/V CYTO,THINLAYER,RESCR: HCPCS | Performed by: FAMILY MEDICINE

## 2021-10-14 PROCEDURE — 99213 OFFICE O/P EST LOW 20 MIN: CPT | Mod: 25 | Performed by: FAMILY MEDICINE

## 2021-10-14 PROCEDURE — 77067 SCR MAMMO BI INCL CAD: CPT | Mod: TC | Performed by: RADIOLOGY

## 2021-10-14 PROCEDURE — 84550 ASSAY OF BLOOD/URIC ACID: CPT | Performed by: FAMILY MEDICINE

## 2021-10-14 PROCEDURE — 99207 PR FOOT EXAM NO CHARGE: CPT | Mod: 25 | Performed by: FAMILY MEDICINE

## 2021-10-14 PROCEDURE — 99397 PER PM REEVAL EST PAT 65+ YR: CPT | Performed by: FAMILY MEDICINE

## 2021-10-14 PROCEDURE — 82043 UR ALBUMIN QUANTITATIVE: CPT | Performed by: FAMILY MEDICINE

## 2021-10-14 RX ORDER — LOSARTAN POTASSIUM 25 MG/1
25 TABLET ORAL DAILY
Qty: 90 TABLET | Refills: 3 | Status: SHIPPED | OUTPATIENT
Start: 2021-10-14 | End: 2022-08-18

## 2021-10-14 RX ORDER — ATORVASTATIN CALCIUM 10 MG/1
10 TABLET, FILM COATED ORAL DAILY
Qty: 90 TABLET | Refills: 3 | Status: SHIPPED | OUTPATIENT
Start: 2021-10-14 | End: 2022-10-18

## 2021-10-14 RX ORDER — LANCETS 33 GAUGE
EACH MISCELLANEOUS
Qty: 300 EACH | Refills: 0 | Status: CANCELLED | OUTPATIENT
Start: 2021-10-14

## 2021-10-14 RX ORDER — PANTOPRAZOLE SODIUM 40 MG/1
TABLET, DELAYED RELEASE ORAL
Qty: 90 TABLET | Refills: 0 | Status: SHIPPED | OUTPATIENT
Start: 2021-10-14 | End: 2022-08-03

## 2021-10-14 RX ORDER — LEVOTHYROXINE SODIUM 100 UG/1
100 TABLET ORAL DAILY
Qty: 90 TABLET | Refills: 3 | Status: SHIPPED | OUTPATIENT
Start: 2021-10-14 | End: 2022-12-19

## 2021-10-14 RX ORDER — TRIAMTERENE AND HYDROCHLOROTHIAZIDE 37.5; 25 MG/1; MG/1
2 CAPSULE ORAL EVERY MORNING
Qty: 180 CAPSULE | Refills: 3 | Status: SHIPPED | OUTPATIENT
Start: 2021-10-14 | End: 2022-11-21

## 2021-10-14 RX ORDER — ALLOPURINOL 300 MG/1
1 TABLET ORAL DAILY
Qty: 90 TABLET | Refills: 3 | Status: SHIPPED | OUTPATIENT
Start: 2021-10-14 | End: 2022-08-18

## 2021-10-14 RX ORDER — FUROSEMIDE 40 MG
40 TABLET ORAL DAILY PRN
Qty: 30 TABLET | Refills: 0 | Status: CANCELLED | OUTPATIENT
Start: 2021-10-14

## 2021-10-14 RX ORDER — CLOBETASOL PROPIONATE 0.5 MG/G
OINTMENT TOPICAL
Qty: 45 G | Refills: 0 | Status: CANCELLED | OUTPATIENT
Start: 2021-10-14

## 2021-10-14 ASSESSMENT — ACTIVITIES OF DAILY LIVING (ADL): CURRENT_FUNCTION: NO ASSISTANCE NEEDED

## 2021-10-14 ASSESSMENT — MIFFLIN-ST. JEOR: SCORE: 1995.12

## 2021-10-14 NOTE — PATIENT INSTRUCTIONS
Patient Education   Personalized Prevention Plan  You are due for the preventive services outlined below.  Your care team is available to assist you in scheduling these services.  If you have already completed any of these items, please share that information with your care team to update in your medical record.  Health Maintenance Due   Topic Date Due     Osteoporosis Screening  Never done     Diabetic Foot Exam  Never done     ANNUAL REVIEW OF HM ORDERS  Never done     HIV Screening  Never done     Pneumococcal Vaccine (1 of 2 - PPSV23) 12/19/2018     Pneumococcal Vaccine (1 of 2 - PPSV23) 12/19/2018     Kidney Microalbumin Urine Test  06/25/2021     FALL RISK ASSESSMENT  Never done     Eye Exam  07/31/2021     PAP  09/23/2021     HPV Follow Up  09/23/2021       Urinary Incontinence, Female (Adult)   Urinary incontinence means loss of bladder control. This problem affects many women, especially as they get older. If you have incontinence, you may be embarrassed to ask for help. But know that this problem can be treated.   Types of Incontinence  There are different types of incontinence. Two of the main types are described here. You can have more than one type.     Stress incontinence. With this type, urine leaks when pressure (stress) is put on the bladder. This may happen when you cough, sneeze, or laugh. Stress incontinence most often occurs because the pelvic floor muscles that support the bladder and urethra are weak. This can happen after pregnancy and vaginal childbirth or a hysterectomy. It can also be due to excess body weight or hormone changes.    Urge incontinence (also called overactive bladder). With this type, a sudden urge to urinate is felt often. This may happen even though there may not be much urine in the bladder. The need to urinate often during the night is common. Urge incontinence most often occurs because of bladder spasms. This may be due to bladder irritation or infection. Damage to  bladder nerves or pelvic muscles, constipation, and certain medicines can also lead to urge incontinence.  Treatment depends on the cause. Further evaluation is needed to find the type you have. This will likely include an exam and certain tests. Based on the results, you and your healthcare provider can then plan treatment. Until a diagnosis is made, the home care tips below can help ease symptoms.   Home care    Do pelvic floor muscle exercises, if they are prescribed. The pelvic floor muscles help support the bladder and urethra. Many women find that their symptoms improve when doing special exercises that strengthen these muscles. To do the exercises, contract the muscles you would use to stop your stream of urine. But do this when you re not urinating. Hold for 10 seconds, then relax. Repeat 10 to 20 times in a row, at least 3 times a day. Your healthcare provider may give you other instructions for how to do the exercises and how often.    Keep a bladder diary. This helps track how often and how much you urinate over a set period of time. Bring this diary with you to your next visit with the provider. The information can help your provider learn more about your bladder problem.    Lose weight, if advised to by your provider. Extra weight puts pressure on the bladder. Your provider can help you create a weight-loss plan that s right for you. This may include exercising more and making certain diet changes.    Don't have foods and drinks that may irritate the bladder. These can include alcohol and caffeinated drinks.    Quit smoking. Smoking and other tobacco use can lead to a long-term (chronic) cough that strains the pelvic floor muscles. Smoking may also damage the bladder and urethra. Talk with your provider about treatments or methods you can use to quit smoking.    If drinking large amounts of fluid makes you have symptoms, you may be advised to limit your fluid intake. You may also be advised to drink  most of your fluids during the day and to limit fluids at night.    If you re worried about urine leakage or accidents, you may wear absorbent pads to catch urine. Change the pads often. This helps reduce discomfort. It may also reduce the risk of skin or bladder infections.    Follow-up care  Follow up with your healthcare provider, or as directed. It may take some to find the right treatment for your problem. But healthy lifestyle changes can be made right away. These include such things as exercising on a regular basis, eating a healthy diet, losing weight (if needed), and quitting smoking. Your treatment plan may include special therapies or medicines. Certain procedures or surgery may also be options. Talk about any questions you have with your provider.   When to seek medical advice  Call the healthcare provider right away if any of these occur:    Fever of 100.4 F (38 C) or higher, or as directed by your provider    Bladder pain or fullness    Belly swelling    Nausea or vomiting    Back pain    Weakness, dizziness, or fainting  Jared last reviewed this educational content on 1/1/2020 2000-2021 [employer]. All rights reserved. This information is not intended as a substitute for professional medical care. Always follow your healthcare professional's instructions.

## 2021-10-14 NOTE — PROGRESS NOTES
"SUBJECTIVE:   Crystal Nicole is a 66 year old female who presents for Preventive Visit.      Patient has been advised of split billing requirements and indicates understanding: Yes   Are you in the first 12 months of your Medicare coverage?  Advised annual eye exam    Healthy Habits:     In general, how would you rate your overall health?  Good    Frequency of exercise:  2-3 days/week    Duration of exercise:  45-60 minutes    Do you usually eat at least 4 servings of fruit and vegetables a day, include whole grains    & fiber and avoid regularly eating high fat or \"junk\" foods?  Yes    Taking medications regularly:  0    Barriers to taking medications:  None    Medication side effects:  None    Ability to successfully perform activities of daily living:  No assistance needed    Home Safety:  No safety concerns identified    Hearing Impairment:  No hearing concerns    In the past 6 months, have you been bothered by leaking of urine? Yes    In general, how would you rate your overall mental or emotional health?  Good      PHQ-2 Total Score: 0    Additional concerns today:  Yes (check right ear )  History of Present Illness       Diabetes:   She presents for follow up of diabetes.  She is checking home blood glucose three times daily. She checks blood glucose before and after meals.  Blood glucose is sometimes over 200 and never under 70. She has no concerns regarding her diabetes at this time.  She is not experiencing numbness or burning in feet, excessive thirst, blurry vision, weight changes or redness, sores or blisters on feet. The patient has had a diabetic eye exam in the last 12 months. Eye exam performed on 9/24/21. Location of last eye exam [facility].        Hypothyroidism:     Since last visit, patient describes the following symptoms::  None    She eats 2-3 servings of fruits and vegetables daily.She consumes 0 sweetened beverage(s) daily.She exercises with enough effort to increase her heart rate 30 to " 60 minutes per day.  She exercises with enough effort to increase her heart rate 3 or less days per week.   She is taking medications regularly.  She is not taking prescribed medications regularly due to None.    Do you feel safe in your environment? Yes    Have you ever done Advance Care Planning? (For example, a Health Directive, POLST, or a discussion with a medical provider or your loved ones about your wishes): No, advance care planning information given to patient to review.  Patient declined advance care planning discussion at this time.       Fall risk  Fallen 2 or more times in the past year?: No  Any fall with injury in the past year?: No    Cognitive Screening   1) Repeat 3 items (Leader, Season, Table)    2) Clock draw: NORMAL  3) 3 item recall: Recalls 3 objects  Results: 3 items recalled: COGNITIVE IMPAIRMENT LESS LIKELY    Mini-CogTM Copyright S Burak. Licensed by the author for use in [facility]; reprinted with permission (gilberto@[facility]). All rights reserved.      Do you have sleep apnea, excessive snoring or daytime drowsiness?: no    Reviewed and updated as needed this visit by clinical staff  Tobacco  Allergies  Meds  Problems  Med Hx  Surg Hx  Fam Hx          Reviewed and updated as needed this visit by Provider  Tobacco  Allergies  Meds  Problems  Med Hx  Surg Hx  Fam Hx         Social History     Tobacco Use     Smoking status: Never Smoker     Smokeless tobacco: Never Used   Substance Use Topics     Alcohol use: Yes     Comment: Occ     If you drink alcohol do you typically have >3 drinks per day or >7 drinks per week? No    No flowsheet data found.        Hyperlipidemia Follow-Up      Are you regularly taking any medication or supplement to lower your cholesterol?   Yes- statins    Are you having muscle aches or other side effects that you think could be caused by your cholesterol lowering medication?  No    Hypertension Follow-up      Do you check your blood  pressure regularly outside of the clinic?no    Are you following a low salt diet? Yes    Are your blood pressures ever more than 140 on the top number (systolic) OR more   than 90 on the bottom number (diastolic), for example 140/90? No        Current providers sharing in care for this patient include:   Patient Care Team:  Jia Francois MD as PCP - General (Family Practice)  Renato Armijo MD as Assigned Endocrinology Provider  Juani Mae MD as Assigned PCP    The following health maintenance items are reviewed in Epic and correct as of today:  Health Maintenance Due   Topic Date Due     DEXA  Never done     HIV SCREENING  Never done     Pneumococcal Vaccine: Pediatrics (0 to 5 Years) and At-Risk Patients (6 to 64 Years) (1 of 2 - PPSV23) 12/19/2018     Pneumococcal Vaccine: 65+ Years (1 of 2 - PPSV23) 12/19/2018     MICROALBUMIN  06/25/2021     TSH W/FREE T4 REFLEX  06/25/2021     FALL RISK ASSESSMENT  Never done     EYE EXAM  07/31/2021     PAP FOLLOW-UP  09/23/2021     HPV FOLLOW-UP  09/23/2021     Lab work is in process  Labs reviewed in EPIC  BP Readings from Last 3 Encounters:   10/14/21 132/83   09/23/20 110/60   06/25/20 124/70    Wt Readings from Last 3 Encounters:   10/14/21 146.5 kg (323 lb)   09/23/20 142.9 kg (315 lb)   06/25/20 146.9 kg (323 lb 12.8 oz)                  Patient Active Problem List   Diagnosis     Diabetes mellitus, type 2 (H)     Morbid obesity (H)     Essential hypertension     Hypothyroidism     Hyperlipidemia LDL goal <70     Gout, unspecified cause, unspecified chronicity, unspecified site     Psoriasis     Gastroesophageal reflux disease     Past Surgical History:   Procedure Laterality Date     CHOLECYSTECTOMY         Social History     Tobacco Use     Smoking status: Never Smoker     Smokeless tobacco: Never Used   Substance Use Topics     Alcohol use: Yes     Comment: Occ     Family History   Problem Relation Age of Onset     Diabetes Mother         d age  80     Hypertension Mother      Coronary Artery Disease Father         d age 85     Brain Tumor Father      Diabetes Brother          Current Outpatient Medications   Medication Sig Dispense Refill     Alcohol Swabs ([facility] PREP) 70 % PADS USE TO SWAB AREA OF INJECTION/JUSTINE AS DIRECTED. 100 each 11     allopurinol (ZYLOPRIM) 300 MG tablet Take 1 tablet (300 mg) by mouth daily Needs follow up labs 90 tablet 3     aspirin (ASA) 325 MG EC tablet Take 325 mg by mouth daily       atorvastatin (LIPITOR) 10 MG tablet Take 1 tablet (10 mg) by mouth daily 90 tablet 3     BD PEN NEEDLE CONOR 2ND GEN 32G X 4 MM miscellaneous USE 4 PEN NEEDLES DAILY OR AS DIRECTED. 200 each 3     blood glucose calibration (NO BRAND SPECIFIED) solution Use to calibrate blood glucose monitor as needed as directed. 1 Bottle 3     blood glucose monitoring (NO BRAND SPECIFIED) meter device kit Use to test blood sugar 4 times daily or as directed. 1 kit 0     clobetasol (TEMOVATE) 0.05 % external ointment APPLY TO AFFECTED AREA TWICE A DAY 45 g 0     clobetasol (TEMOVATE) 0.05 % external solution Apply topically as needed       Continuous Blood Gluc  (FREESTYLE SJ 14 DAY READER) HILARY Use to read blood sugars as per 's instructions. 1 each 0     Dulaglutide (TRULICITY) 4.5 MG/0.5ML SOPN Inject 4.5 mg Subcutaneous once a week 6 mL 3     empagliflozin (JARDIANCE) 25 MG TABS tablet Take 1 tablet (25 mg) by mouth daily 90 tablet 3     furosemide (LASIX) 40 MG tablet TAKE 1 TABLET (40 MG) BY MOUTH DAILY AS NEEDED (SWELLING) 30 tablet 0     insulin aspart (NOVOLOG FLEXPEN) 100 UNIT/ML pen GIVE BEFORE MEALS AND BEFORE BED PER SLIDING SCALE Sig: Novolog Flexpen Give before meals and before bed: For Pre-meal glucose: 140 - 239 give 1 unit 240 - 339 give 2 units > 340 give 3 units For Bedtime Glucose: 200 - 239 give 0.5 unit 240 - 339 give 1 unit > 340 give 1.5 units 15 mL 0     levothyroxine (SYNTHROID/LEVOTHROID) 100 MCG tablet Take 1  tablet (100 mcg) by mouth daily 90 tablet 3     losartan (COZAAR) 25 MG tablet Take 1 tablet (25 mg) by mouth daily 90 tablet 3     metFORMIN (GLUCOPHAGE-XR) 500 MG 24 hr tablet TAKE 2 TABLETS (1,000 MG) BY MOUTH DAILY (WITH BREAKFAST) 180 tablet 3     multivitamin, therapeutic (THERA-VIT) TABS tablet Take 1 tablet by mouth daily       OneTouch Delica Lancets 33G MISC USE TO TEST BLOOD SUGAR 4 TIMES DAILY OR AS DIRECTED. 300 each 0     ONETOUCH VERIO IQ test strip USE TO TEST BLOOD SUGAR 4 TIMES DAILY OR AS DIRECTED. 100 strip 2     pantoprazole (PROTONIX) 40 MG EC tablet TAKE 1 TABLET BY MOUTH EVERY DAY BEFORE BREAKFAST 90 tablet 0     thin (NO BRAND SPECIFIED) lancets Use with lanceting device. 200 each 1     triamcinolone (KENALOG) 0.1 % external cream Apply topically 2 times daily 453 g 1     triamterene-HCTZ (DYAZIDE) 37.5-25 MG capsule Take 2 capsules by mouth every morning 180 capsule 3     insulin glargine (LANTUS PEN) 100 UNIT/ML pen Inject 55 Units Subcutaneous every morning (before breakfast) (allow max dose 75 units/day for ongoing titration & needle prime) 60 mL 3     No Known Allergies  Mammogram Screening: Mammogram Screening: Recommended mammography every 1-2 years with patient discussion and risk factor consideration  Any new diagnosis of family breast, ovarian, or bowel cancer? No    FHS-7:   Breast CA Risk Assessment (FHS-7) 10/14/2021   Did any of your first-degree relatives have breast or ovarian cancer? Yes   Did any of your relatives have bilateral breast cancer? No   Did any man in your family have breast cancer? No   Did any woman in your family have breast and ovarian cancer? No   Did any woman in your family have breast cancer before age 50 y? No   Do you have 2 or more relatives with breast and/or ovarian cancer? No   Do you have 2 or more relatives with breast and/or bowel cancer? No       Mammogram Screening: Recommended mammography every 1-2 years with patient discussion and risk  "factor consideration  Pertinent mammograms are reviewed under the imaging tab.    Review of Systems  CONSTITUTIONAL: NEGATIVE for fever, chills, change in weight  INTEGUMENTARY/SKIN: NEGATIVE for worrisome rashes, moles or lesions  EYES: NEGATIVE for vision changes or irritation  ENT/MOUTH: NEGATIVE for ear, mouth and throat problems  RESP: NEGATIVE for significant cough or SOB  BREAST: NEGATIVE for masses, tenderness or discharge  CV: NEGATIVE for chest pain, palpitations or peripheral edema  GI: NEGATIVE for nausea, abdominal pain, heartburn, or change in bowel habits  : NEGATIVE for frequency, dysuria, or hematuria  MUSCULOSKELETAL: NEGATIVE for significant arthralgias or myalgia  NEURO: NEGATIVE for weakness, dizziness or paresthesias  ENDOCRINE: NEGATIVE for temperature intolerance, skin/hair changes  HEME: NEGATIVE for bleeding problems  PSYCHIATRIC: NEGATIVE for changes in mood or affect    OBJECTIVE:   /83   Pulse 87   Temp 98.6  F (37  C) (Oral)   Resp 20   Ht 1.626 m (5' 4\")   Wt 146.5 kg (323 lb)   SpO2 97%   BMI 55.44 kg/m   Estimated body mass index is 55.44 kg/m  as calculated from the following:    Height as of this encounter: 1.626 m (5' 4\").    Weight as of this encounter: 146.5 kg (323 lb).  Physical Exam  GENERAL APPEARANCE: healthy, alert, no distress and obese  EYES: Eyes grossly normal to inspection, PERRL and conjunctivae and sclerae normal  HENT: ear canals and TM's normal, nose and mouth without ulcers or lesions, oropharynx clear and oral mucous membranes moist  NECK: no adenopathy, no asymmetry, masses, or scars and thyroid normal to palpation  RESP: lungs clear to auscultation - no rales, rhonchi or wheezes  BREAST: normal without masses, tenderness or nipple discharge and no palpable axillary masses or adenopathy  CV: regular rate and rhythm, normal S1 S2, no S3 or S4, no murmur, click or rub, no peripheral edema and peripheral pulses strong  ABDOMEN: soft, nontender, no " hepatosplenomegaly, no masses and bowel sounds normal   (female): normal female external genitalia, normal urethral meatus, vaginal mucosal atrophy noted, normal cervix, adnexae, and uterus without masses or abnormal discharge  MS: no musculoskeletal defects are noted and gait is age appropriate without ataxia  SKIN: no suspicious lesions or rashes  NEURO: Normal strength and tone, sensory exam grossly normal, mentation intact and speech normal  PSYCH: mentation appears normal and affect normal/bright    Diagnostic Test Results:  Labs reviewed in Epic  Pending     ASSESSMENT / PLAN:   (Z00.00) Encounter for Medicare annual wellness exam  Comment:   Plan:     (E11.00) Type 2 diabetes mellitus with hyperosmolarity without coma, unspecified whether long term insulin use (H)  Comment: doing well  Plan: FOOT EXAM, Albumin Random Urine Quantitative         with Creat Ratio            (E66.01) Morbid obesity (H)  Comment: low brianne diet  Plan: pt sees Dr Armijo and he is helping with weight management    (L40.9) Psoriasis  Comment: uses ointment  Plan:     (M10.9) Gout, unspecified cause, unspecified chronicity, unspecified site  Comment: refilled  Plan: allopurinol (ZYLOPRIM) 300 MG tablet, Uric acid            (E78.5) Hyperlipidemia LDL goal <70  Comment: stable   Plan: atorvastatin (LIPITOR) 10 MG tablet            (I87.8) Venous stasis  Comment: elevate legs  Plan    (E03.9) Hypothyroidism, unspecified type  Comment: pending   Plan: levothyroxine (SYNTHROID/LEVOTHROID) 100 MCG         tablet, TSH with free T4 reflex            (I10) Essential hypertension  Comment: stable  Plan: losartan (COZAAR) 25 MG tablet,         triamterene-HCTZ (DYAZIDE) 37.5-25 MG capsule            (K21.9) Gastroesophageal reflux disease without esophagitis  Comment: stable  Plan: pantoprazole (PROTONIX) 40 MG EC tablet            (Z11.4) Screening for HIV (human immunodeficiency virus)  Comment: discussed   Plan: HIV Antigen Antibody  "Combo            (Z78.0) Asymptomatic postmenopausal status  Comment:  Plan: DEXA HIP/PELVIS/SPINE - Future            (Z12.4) Screening for malignant neoplasm of cervix  Comment:   Plan: Pap screen with HPV - recommended age 30 - 65         years        No further pap      Patient has been advised of split billing requirements and indicates understanding: handouts  COUNSELING:  Reviewed preventive health counseling, as reflected in patient instructions       Regular exercise       Healthy diet/nutrition       Vision screening       Hearing screening       Dental care       Bladder control       Fall risk prevention       Osteoporosis prevention/bone health       Colon cancer screening       HIV screening for high risk patient       The 10-year ASCVD risk score (Nilson MEI Jr., et al., 2013) is: 13.4%    Values used to calculate the score:      Age: 66 years      Sex: Female      Is Non- : No      Diabetic: Yes      Tobacco smoker: No      Systolic Blood Pressure: 132 mmHg      Is BP treated: Yes      HDL Cholesterol: 45 mg/dL      Total Cholesterol: 141 mg/dL       Advanced Planning     Estimated body mass index is 55.44 kg/m  as calculated from the following:    Height as of this encounter: 1.626 m (5' 4\").    Weight as of this encounter: 146.5 kg (323 lb).    Weight management plan: as above    She reports that she has never smoked. She has never used smokeless tobacco.      Appropriate preventive services were discussed with this patient, including applicable screening as appropriate for cardiovascular disease, diabetes, osteopenia/osteoporosis, and glaucoma.  As appropriate for age/gender, discussed screening for colorectal cancer, prostate cancer, breast cancer, and cervical cancer. Checklist reviewing preventive services available has been given to the patient.    Reviewed patients plan of care and provided an AVS. The Complex Care Plan (for patients with higher acuity and needing more " deliberate coordination of services) for Crystal meets the Care Plan requirement. This Care Plan has been established and reviewed with the Patient.    Counseling Resources:  ATP IV Guidelines  Pooled Cohorts Equation Calculator  Breast Cancer Risk Calculator  Breast Cancer: Medication to Reduce Risk  FRAX Risk Assessment  ICSI Preventive Guidelines  Dietary Guidelines for Americans, 2010  USDA's MyPlate  ASA Prophylaxis  Lung CA Screening    Juani Mae MD  [facility]    Identified Health Risks:

## 2021-10-15 LAB — HIV 1+2 AB+HIV1 P24 AG SERPL QL IA: NONREACTIVE

## 2021-10-19 LAB
BKR LAB AP GYN ADEQUACY: NORMAL
BKR LAB AP GYN INTERPRETATION: NORMAL
BKR LAB AP HPV REFLEX: NORMAL
BKR LAB AP PREVIOUS ABNORMAL: NORMAL
PATH REPORT.COMMENTS IMP SPEC: NORMAL
PATH REPORT.RELEVANT HX SPEC: NORMAL

## 2021-10-21 LAB
HUMAN PAPILLOMA VIRUS 16 DNA: NEGATIVE
HUMAN PAPILLOMA VIRUS 18 DNA: NEGATIVE
HUMAN PAPILLOMA VIRUS FINAL DIAGNOSIS: NORMAL
HUMAN PAPILLOMA VIRUS OTHER HR: NEGATIVE

## 2021-11-07 DIAGNOSIS — E11.00 TYPE 2 DIABETES MELLITUS WITH HYPEROSMOLARITY WITHOUT COMA, UNSPECIFIED WHETHER LONG TERM INSULIN USE (H): ICD-10-CM

## 2021-11-08 RX ORDER — LANCETS 33 GAUGE
EACH MISCELLANEOUS
Qty: 300 EACH | Refills: 1 | Status: SHIPPED | OUTPATIENT
Start: 2021-11-08 | End: 2022-09-09

## 2021-11-08 RX ORDER — UBIQUINOL 100 MG
CAPSULE ORAL
Qty: 100 EACH | Refills: 11 | Status: SHIPPED | OUTPATIENT
Start: 2021-11-08 | End: 2022-11-21

## 2021-11-08 NOTE — TELEPHONE ENCOUNTER
Prescription approved per [facility] Refill Protocol.    Ayde Wong RN  [facility]

## 2021-11-08 NOTE — TELEPHONE ENCOUNTER
Prescription approved per [facility] Refill Protocol.    Wesly PEREIRA RN....11/8/2021 11:13 AM

## 2021-11-12 ENCOUNTER — TELEPHONE (OUTPATIENT)
Dept: LAB | Facility: CLINIC | Age: 66
End: 2021-11-12

## 2021-11-12 DIAGNOSIS — Z79.4 TYPE 2 DIABETES MELLITUS WITHOUT COMPLICATION, WITH LONG-TERM CURRENT USE OF INSULIN (H): ICD-10-CM

## 2021-11-12 DIAGNOSIS — E78.5 HYPERLIPIDEMIA LDL GOAL <70: Primary | ICD-10-CM

## 2021-11-12 DIAGNOSIS — E11.9 TYPE 2 DIABETES MELLITUS WITHOUT COMPLICATION, WITH LONG-TERM CURRENT USE OF INSULIN (H): ICD-10-CM

## 2021-11-12 NOTE — TELEPHONE ENCOUNTER
[facility] Call Center    Phone Message    May a detailed message be left on voicemail: yes     Reason for Call: Order(s): Other:   Reason for requested: labs for upcoming appt, include BMP  Date needed: December  Provider name: Aleksander Damon stated Dr. Mae did not include certain labs that she thought would be needed.  Please review labs from 10/14/21 and add labs needed for diabetic care.        Action Taken: Message routed to:  Other: endo    Travel Screening: Not Applicable

## 2021-11-12 NOTE — TELEPHONE ENCOUNTER
Current orders for pt from Dr Armijo. Do you want to add anything else? She is requesting a BMP as well.    Amanda FLETCHER RN Specialty Triage 11/12/2021 12:39 PM

## 2021-11-15 NOTE — TELEPHONE ENCOUNTER
Renato Armijo MD  You 2 hours ago (11:41 AM)     MT    Ok to add BMP.   Renato Armijo MD on 11/15/2021 at 11:41 AM

## 2021-11-22 ENCOUNTER — TELEPHONE (OUTPATIENT)
Dept: ENDOCRINOLOGY | Facility: CLINIC | Age: 66
End: 2021-11-22
Payer: COMMERCIAL

## 2021-11-22 DIAGNOSIS — E11.00 TYPE 2 DIABETES MELLITUS WITH HYPEROSMOLARITY WITHOUT COMA, UNSPECIFIED WHETHER LONG TERM INSULIN USE (H): Primary | ICD-10-CM

## 2021-11-22 NOTE — TELEPHONE ENCOUNTER
Patient states her 1 touch Verio broke today. She has replaced batteries 2xs and it won't work. She also wants a 90 day supply of test strips so they last longer. Ok to leave a detailed message.

## 2021-11-22 NOTE — TELEPHONE ENCOUNTER
Patient was told by pharmacy they no longer make that machine. She needs a 1 touch ultra meter with test strips for 90 days. Ok to leave a detailed message. Her current machine is broken.,

## 2021-11-23 RX ORDER — BLOOD-GLUCOSE METER
EACH MISCELLANEOUS
Qty: 1 KIT | Refills: 3 | Status: SHIPPED | OUTPATIENT
Start: 2021-11-23 | End: 2022-12-19

## 2021-11-23 NOTE — TELEPHONE ENCOUNTER
Sent prescriptions for the one touch ultra meter and one touch test strips (90 day supply.) Called to notify the patient.    Wesly PEREIRA RN....11/23/2021 8:49 AM

## 2021-12-01 ENCOUNTER — TELEPHONE (OUTPATIENT)
Dept: ENDOCRINOLOGY | Facility: CLINIC | Age: 66
End: 2021-12-01
Payer: COMMERCIAL

## 2021-12-01 NOTE — TELEPHONE ENCOUNTER
Reason for Call:  Form, our goal is to have forms completed with 72 hours, however, some forms may require a visit or additional information.    Type of letter, form or note:  medical    Who is the form from?: Patient    Where did the form come from: Patient or family brought in       What clinic location was the form placed at?: [facility]    Where the form was placed: Put in doctor mail box Box/Folder    What number is listed as a contact on the form?: [phone]       Additional comments: Please call patient to come  after you have signed forms. Thank you    Call taken on 12/1/2021 at 12:25 PM by Anny Saavedra

## 2021-12-06 ENCOUNTER — LAB (OUTPATIENT)
Dept: LAB | Facility: CLINIC | Age: 66
End: 2021-12-06
Payer: COMMERCIAL

## 2021-12-06 DIAGNOSIS — E11.9 TYPE 2 DIABETES MELLITUS WITHOUT COMPLICATION, WITH LONG-TERM CURRENT USE OF INSULIN (H): ICD-10-CM

## 2021-12-06 DIAGNOSIS — E78.5 HYPERLIPIDEMIA LDL GOAL <70: ICD-10-CM

## 2021-12-06 DIAGNOSIS — Z79.4 TYPE 2 DIABETES MELLITUS WITHOUT COMPLICATION, WITH LONG-TERM CURRENT USE OF INSULIN (H): ICD-10-CM

## 2021-12-06 LAB
ANION GAP SERPL CALCULATED.3IONS-SCNC: 6 MMOL/L (ref 3–14)
BUN SERPL-MCNC: 26 MG/DL (ref 7–30)
CALCIUM SERPL-MCNC: 9.9 MG/DL (ref 8.5–10.1)
CHLORIDE BLD-SCNC: 108 MMOL/L (ref 94–109)
CO2 SERPL-SCNC: 27 MMOL/L (ref 20–32)
CREAT SERPL-MCNC: 1 MG/DL (ref 0.52–1.04)
GFR SERPL CREATININE-BSD FRML MDRD: 59 ML/MIN/1.73M2
GLUCOSE BLD-MCNC: 73 MG/DL (ref 70–99)
HBA1C MFR BLD: 6.9 % (ref 0–5.6)
POTASSIUM BLD-SCNC: 3.7 MMOL/L (ref 3.4–5.3)
SODIUM SERPL-SCNC: 141 MMOL/L (ref 133–144)

## 2021-12-06 PROCEDURE — 83036 HEMOGLOBIN GLYCOSYLATED A1C: CPT

## 2021-12-06 PROCEDURE — 80048 BASIC METABOLIC PNL TOTAL CA: CPT

## 2021-12-06 PROCEDURE — 36415 COLL VENOUS BLD VENIPUNCTURE: CPT

## 2021-12-15 ENCOUNTER — VIRTUAL VISIT (OUTPATIENT)
Dept: ENDOCRINOLOGY | Facility: CLINIC | Age: 66
End: 2021-12-15
Payer: COMMERCIAL

## 2021-12-15 DIAGNOSIS — Z79.4 LONG TERM (CURRENT) USE OF INSULIN (H): ICD-10-CM

## 2021-12-15 DIAGNOSIS — E11.00 TYPE 2 DIABETES MELLITUS WITH HYPEROSMOLARITY WITHOUT COMA, UNSPECIFIED WHETHER LONG TERM INSULIN USE (H): Primary | ICD-10-CM

## 2021-12-15 DIAGNOSIS — E78.5 DYSLIPIDEMIA: ICD-10-CM

## 2021-12-15 PROCEDURE — 99214 OFFICE O/P EST MOD 30 MIN: CPT | Mod: 95 | Performed by: INTERNAL MEDICINE

## 2021-12-15 RX ORDER — METFORMIN HCL 500 MG
1000 TABLET, EXTENDED RELEASE 24 HR ORAL
Qty: 360 TABLET | Refills: 3 | Status: SHIPPED | OUTPATIENT
Start: 2021-12-15 | End: 2022-08-15

## 2021-12-15 NOTE — PROGRESS NOTES
Crystal is a 66 year old who is being evaluated via a billable video visit.      How would you like to obtain your AVS? MyChart  If the video visit is dropped, the invitation should be resent by: Text to cell phone: [phone]  Will anyone else be joining your video visit? No      Video Start Time: 11:32 AM    S:   Pt being seen in f/u for DM.  Estimates she was diagnosed about 5 years ago.   She was just on metformin.   Then admitted last month (6/2020) for PNA and HbA1C was 12.4%.   Discharged on insulin.     Since being discharged, trying to stick to 45-60 grams of carbs per meal.   She is planning to cut back on pasta intake and try some leaner meats.   Her weight is stable.   Silver sneakers 2/week, aerobics.   Walks for 30 minutes two other days a week.   Exercise videos, 30 minutes.      In 12/2021, she has not been going to [employer] as the instructor was ill.   She has also stopped her walks and exercise videos. Not tracking her calorie intake.     lantus 25 U every day     Novolog   For Pre-meal glucose:  140 - 179 2 units  180-219 4 units  220-259 6 units  260-299 8 units  300 and higher, 10 units.      For Bedtime Glucose:  160-179 1 units  180 - 199 2 units  200 - 239 3 units  240-279 5 units  280 and higher 7 units.     Trulicity 4.5 mg once a week  Metformin 1000 mg every day   Jardiance 25 mg every day     She has only needed to use her novolog sparingly.     Logs:  AM - 141 today. 120-140's.  Pre-dinner - 110-120  HS -140's    No hypoglycemia.     ROS: 10 point ROS neg other than the symptoms noted above in the HPI.    Exam:  GENERAL: Healthy, alert and no distress  EYES: Eyes grossly normal to inspection.  No discharge or erythema, or obvious scleral/conjunctival abnormalities.  RESP: No audible wheeze, cough, or visible cyanosis.  No visible retractions or increased work of breathing.    SKIN: Visible skin clear. No significant rash, abnormal pigmentation or lesions.  NEURO: Cranial nerves  grossly intact.  Mentation and speech appropriate for age.  PSYCH: Mentation appears normal, affect normal/bright, judgement and insight intact, normal speech and appearance well-groomed.     A/P:   Type 2 DM - Uncontrolled. Questionable MATTEO given brother's history of type 1 DM. Regardless, a GLP-1 agonist would be helpful given her weight. Risks/benefits reviewed.   Labs from 7/31/2020 consistent with type 2 DM.   In 8/2020, much improved control. Concerned about weight gain and would like to try to come off insulin.   Discussed increasing trulicity +/- SGLT-2 inhibitor.   We increased trulicity to 1.5 mg and started farxiga.   In 9/2020, 7.0%. Barely needing to use her novolog. Discussed how trulicity now has 3.0 and 4.5 mg dosing available. However, I could not find these yet in the MAR.   In 1/2021, improved to 6.2%. Frustrated with lack of weight loss. Cautioned about decreasing her insulin dose too quickly.   In 5/2021, stable at 6.2%. She is not interested in actos as she already has problems with fluid retention. Also discussed acarbose. I would avoid glipizide as she is trying to loose weight.   In 8/2021, 6.5%. BMR 1950. Continued goal of reducing insulin and losing weight.  In 12/2021, up a little to 6.9%. She has stopped exercising and not tracking calories. Asking about increasing metformin.   -Increase metformin to 1000 mg in the AM and 500 mg with dinner. After one week, increase to 1000 mg twice a day.   -Work to increase intake of lean protein and vegetables and reduce carbs.   -Start tracking your food intake. Recommend no more than 1500 calories a day.   -Resume your exercise program.   -Labs in 3 months.   -ASA taking.  -BP: controlled on last check.   -NAFL/WALLER: normal ALT and AST in 6/2020.    Normal in 7/2021.    -Lipids: HDL 29, LDL 72, Trg 141 in 6/2020.    HDL 45, LDL 72, Trg  118 in 7/2021.               On atorvastatin.   -Microalbumin elevated in 6/2020. She is on losartan.    Normal  in 10/2021.   -Eyes: reports normal exam in 9/2021.   -Smoking: none.     Microalbuminuria - Chronic, stable. Management as above.     HTN - Chronic, stable. Management as above.       Video-Visit Details    Type of service:  Video Visit    Video End Time:11:51 AM    Originating Location (pt. Location): Home    Distant Location (provider location):  [facility]     Platform used for Video Visit: Javier Armijo MD on 12/15/2021 at 11:51 AM

## 2021-12-15 NOTE — Clinical Note
12/15/2021         RE: Crystal Nicole  [address]  [facility] [address]        Dear Colleague,    Thank you for referring your patient, Crystal Nicole, to the [facility]. Please see a copy of my visit note below.    Crystal is a 66 year old who is being evaluated via a billable video visit.      How would you like to obtain your AVS? MyChart  If the video visit is dropped, the invitation should be resent by: Text to cell phone: [phone]  Will anyone else be joining your video visit? No  {If patient encounters technical issues they should call [phone] :[id number]}    Video Start Time: 11:32 AM    S:   Pt being seen in f/u for DM.  Estimates she was diagnosed about 5 years ago.   She was just on metformin.   Then admitted last month (6/2020) for PNA and HbA1C was 12.4%.   Discharged on insulin.     Since being discharged, trying to stick to 45-60 grams of carbs per meal.   She is planning to cut back on pasta intake and try some leaner meats.   Her weight is stable.   Silver sneakers 2/week, aerobics.   Walks for 30 minutes two other days a week.   Exercise videos, 30 minutes.      In 12/2021, she has not been going to [employer] as the instructor was ill.   She has also stopped her walks and exercise videos. Not tracking her calorie intake.     lantus 25 U every day     Novolog   For Pre-meal glucose:  140 - 179 2 units  180-219 4 units  220-259 6 units  260-299 8 units  300 and higher, 10 units.      For Bedtime Glucose:  160-179 1 units  180 - 199 2 units  200 - 239 3 units  240-279 5 units  280 and higher 7 units.     Trulicity 4.5 mg once a week  Metformin 1000 mg every day   Jardiance 25 mg every day     She has only needed to use her novolog sparingly.     Logs:  AM - 141 today. 120-140's.  Pre-dinner - 110-120  HS -140's    No hypoglycemia.     ROS: 10 point ROS neg other than the symptoms noted above in the HPI.    Exam:  GENERAL: Healthy, alert and no  distress  EYES: Eyes grossly normal to inspection.  No discharge or erythema, or obvious scleral/conjunctival abnormalities.  RESP: No audible wheeze, cough, or visible cyanosis.  No visible retractions or increased work of breathing.    SKIN: Visible skin clear. No significant rash, abnormal pigmentation or lesions.  NEURO: Cranial nerves grossly intact.  Mentation and speech appropriate for age.  PSYCH: Mentation appears normal, affect normal/bright, judgement and insight intact, normal speech and appearance well-groomed.     A/P:   Type 2 DM - Uncontrolled. Questionable MATTEO given brother's history of type 1 DM. Regardless, a GLP-1 agonist would be helpful given her weight. Risks/benefits reviewed.   Labs from 7/31/2020 consistent with type 2 DM.   In 8/2020, much improved control. Concerned about weight gain and would like to try to come off insulin.   Discussed increasing trulicity +/- SGLT-2 inhibitor.   We increased trulicity to 1.5 mg and started farxiga.   In 9/2020, 7.0%. Barely needing to use her novolog. Discussed how trulicity now has 3.0 and 4.5 mg dosing available. However, I could not find these yet in the MAR.   In 1/2021, improved to 6.2%. Frustrated with lack of weight loss. Cautioned about decreasing her insulin dose too quickly.   In 5/2021, stable at 6.2%. She is not interested in actos as she already has problems with fluid retention. Also discussed acarbose. I would avoid glipizide as she is trying to loose weight.   In 8/2021, 6.5%. BMR 1950. Continued goal of reducing insulin and losing weight.  In 12/2021, up a little to 6.9%. She has stopped exercising and not tracking calories. Asking about increasing metformin.   -Increase metformin to 1000 mg in the AM and 500 mg with dinner. After one week, increase to 1000 mg twice a day.   -Work to increase intake of lean protein and vegetables and reduce carbs.   -Start tracking your food intake. Recommend no more than 1500 calories a day.    -Resume your exercise program.   -Labs in 3 months.   -ASA taking.  -BP: controlled on last check.   -NAFL/WALLER: normal ALT and AST in 6/2020.    Normal in 7/2021.    -Lipids: HDL 29, LDL 72, Trg 141 in 6/2020.    HDL 45, LDL 72, Trg  118 in 7/2021.               On atorvastatin.   -Microalbumin elevated in 6/2020. She is on losartan.    Normal in 10/2021.   -Eyes: reports normal exam in 9/2021.   -Smoking: none.     Microalbuminuria - Chronic, stable. Management as above.     HTN - Chronic, stable. Management as above.       Video-Visit Details    Type of service:  Video Visit    Video End Time:11:51 AM    Originating Location (pt. Location): Home    Distant Location (provider location):  [facility]     Platform used for Video Visit: [facility]     Renato Armijo MD on 12/15/2021 at 11:51 AM        Again, thank you for allowing me to participate in the care of your patient.        Sincerely,        Renato Armijo MD

## 2021-12-30 DIAGNOSIS — L40.9 PSORIASIS: ICD-10-CM

## 2022-01-02 RX ORDER — CLOBETASOL PROPIONATE 0.5 MG/G
OINTMENT TOPICAL
Qty: 45 G | Refills: 0 | Status: SHIPPED | OUTPATIENT
Start: 2022-01-02 | End: 2022-06-15

## 2022-02-09 ENCOUNTER — MYC MEDICAL ADVICE (OUTPATIENT)
Dept: ENDOCRINOLOGY | Facility: CLINIC | Age: 67
End: 2022-02-09
Payer: COMMERCIAL

## 2022-02-09 DIAGNOSIS — E11.00 TYPE 2 DIABETES MELLITUS WITH HYPEROSMOLARITY WITHOUT COMA, UNSPECIFIED WHETHER LONG TERM INSULIN USE (H): Primary | ICD-10-CM

## 2022-02-10 NOTE — TELEPHONE ENCOUNTER
Patient called the clinic stating [facility] did not receive a prescription for Januvia. RN provided a verbal order for 100 mg Januvia daily. The prescription is covered but it is still over $200, depending on whether she met her deductible.    Called patient who said she has not met her deductible. Recommended signing up for a coupon however patient stated she is not eligible for any coupons with Medicare.    Patient stated she may call her pharmacy to see if she can purchase Januvia OTC. If it is very expensive, she will try to be off it. She will contact us if her BG becomes very high. She has no further questions.    Wesly PEREIRA RN....2/10/2022 12:06 PM

## 2022-03-01 ENCOUNTER — LAB (OUTPATIENT)
Dept: LAB | Facility: CLINIC | Age: 67
End: 2022-03-01
Payer: COMMERCIAL

## 2022-03-01 DIAGNOSIS — E11.00 TYPE 2 DIABETES MELLITUS WITH HYPEROSMOLARITY WITHOUT COMA, UNSPECIFIED WHETHER LONG TERM INSULIN USE (H): ICD-10-CM

## 2022-03-01 LAB
ANION GAP SERPL CALCULATED.3IONS-SCNC: 6 MMOL/L (ref 3–14)
BUN SERPL-MCNC: 21 MG/DL (ref 7–30)
CALCIUM SERPL-MCNC: 9.7 MG/DL (ref 8.5–10.1)
CHLORIDE BLD-SCNC: 106 MMOL/L (ref 94–109)
CO2 SERPL-SCNC: 29 MMOL/L (ref 20–32)
CREAT SERPL-MCNC: 1.11 MG/DL (ref 0.52–1.04)
GFR SERPL CREATININE-BSD FRML MDRD: 55 ML/MIN/1.73M2
GLUCOSE BLD-MCNC: 116 MG/DL (ref 70–99)
HBA1C MFR BLD: 6.7 % (ref 0–5.6)
POTASSIUM BLD-SCNC: 3.8 MMOL/L (ref 3.4–5.3)
SODIUM SERPL-SCNC: 141 MMOL/L (ref 133–144)

## 2022-03-01 PROCEDURE — 80048 BASIC METABOLIC PNL TOTAL CA: CPT

## 2022-03-01 PROCEDURE — 36415 COLL VENOUS BLD VENIPUNCTURE: CPT

## 2022-03-01 PROCEDURE — 83036 HEMOGLOBIN GLYCOSYLATED A1C: CPT

## 2022-03-04 ENCOUNTER — MYC MEDICAL ADVICE (OUTPATIENT)
Dept: ENDOCRINOLOGY | Facility: CLINIC | Age: 67
End: 2022-03-04

## 2022-03-04 NOTE — TELEPHONE ENCOUNTER
Called patient and left VM, informed that I was calling to help reschedule her appt. Advised to call us back to reschedule. Clinic number provided.     Maricarmen PIZARRO MA

## 2022-03-09 ENCOUNTER — VIRTUAL VISIT (OUTPATIENT)
Dept: ENDOCRINOLOGY | Facility: CLINIC | Age: 67
End: 2022-03-09
Payer: COMMERCIAL

## 2022-03-09 DIAGNOSIS — E78.5 DYSLIPIDEMIA: ICD-10-CM

## 2022-03-09 DIAGNOSIS — Z79.4 LONG TERM (CURRENT) USE OF INSULIN (H): ICD-10-CM

## 2022-03-09 DIAGNOSIS — E11.00 TYPE 2 DIABETES MELLITUS WITH HYPEROSMOLARITY WITHOUT COMA, UNSPECIFIED WHETHER LONG TERM INSULIN USE (H): Primary | ICD-10-CM

## 2022-03-09 PROCEDURE — 99214 OFFICE O/P EST MOD 30 MIN: CPT | Mod: 95 | Performed by: INTERNAL MEDICINE

## 2022-03-09 NOTE — PROGRESS NOTES
Crystal is a 66 year old who is being evaluated via a billable video visit.      How would you like to obtain your AVS? MyChart  If the video visit is dropped, the invitation should be resent by: Text to cell phone: [phone]  Will anyone else be joining your video visit? No      Video Start Time: 11:27 AM     S:   Pt being seen in f/u for DM.  Estimates she was diagnosed about 5 years ago.   She was just on metformin.   Then admitted last month (6/2020) for PNA and HbA1C was 12.4%.   Discharged on insulin.     Since being discharged, trying to stick to 45-60 grams of carbs per meal.   She is planning to cut back on pasta intake and try some leaner meats.   Her weight is stable.   [employer] sneakers 2/week, aerobics.   Walks for 30 minutes two other days a week.   Exercise videos, 30 minutes.      In 12/2021, she has not been going to [employer] as the instructor was ill.   She has also stopped her walks and exercise videos. Not tracking her calorie intake.     In 3/2022:    lantus 20 U every day     Novolog   For Pre-meal glucose:  140 - 179 2 units  180-219 4 units  220-259 6 units  260-299 8 units  300 and higher, 10 units.      For Bedtime Glucose:  160-179 1 units  180 - 199 2 units  200 - 239 3 units  240-279 5 units  280 and higher 7 units.     Trulicity 4.5 mg once a week  Metformin 1000 mg BID   Jardiance 25 mg every day ---> stopped one month ago due to high deductible plan.     She has only needed to use her novolog sparingly.     Logs:  -150  Lunch 120's   Dinner <150    No hypoglycemia.     She has not been exercising recently.   She has been trying to limit her bread intake.     ROS: 10 point ROS neg other than the symptoms noted above in the HPI.    Exam:  GENERAL: Healthy, alert and no distress  EYES: Eyes grossly normal to inspection.  No discharge or erythema, or obvious scleral/conjunctival abnormalities.  RESP: No audible wheeze, cough, or visible cyanosis.  No visible retractions or  increased work of breathing.    SKIN: Visible skin clear. No significant rash, abnormal pigmentation or lesions.  NEURO: Cranial nerves grossly intact.  Mentation and speech appropriate for age.  PSYCH: Mentation appears normal, affect normal/bright, judgement and insight intact, normal speech and appearance well-groomed.     A/P:   Type 2 DM - Uncontrolled. Questionable MATTEO given brother's history of type 1 DM. Regardless, a GLP-1 agonist would be helpful given her weight. Risks/benefits reviewed.   Labs from 7/31/2020 consistent with type 2 DM.   In 8/2020, much improved control. Concerned about weight gain and would like to try to come off insulin.   Discussed increasing trulicity +/- SGLT-2 inhibitor.   We increased trulicity to 1.5 mg and started farxiga.   In 9/2020, 7.0%. Barely needing to use her novolog. Discussed how trulicity now has 3.0 and 4.5 mg dosing available. However, I could not find these yet in the MAR.   In 1/2021, improved to 6.2%. Frustrated with lack of weight loss. Cautioned about decreasing her insulin dose too quickly.   In 5/2021, stable at 6.2%. She is not interested in actos as she already has problems with fluid retention. Also discussed acarbose. I would avoid glipizide as she is trying to loose weight.   In 8/2021, 6.5%. BMR 1950. Continued goal of reducing insulin and losing weight.  In 12/2021, up a little to 6.9%. She has stopped exercising and not tracking calories. Asking about increasing metformin.   In 3/2022, 6.7%. Stopped jardiance one month ago due to high deductible plan.   -Continue to hold the jardiance.  -No change to other medications today.   -Work to increase intake of lean protein and vegetables and reduce carbs.   -Resume your exercise program.   -Recommend future care with Dr Araujo as planned.   -ASA taking.  -BP: controlled on last check.   -NAFL/WALLER: normal ALT and AST in 6/2020.    Normal in 7/2021.    -Lipids: HDL 29, LDL 72, Trg 141 in 6/2020.    HDL 45,  LDL 72, Trg  118 in 7/2021.               On atorvastatin.   -Microalbumin elevated in 6/2020. She is on losartan.    Normal in 10/2021.    Serum Cr 1.11 in 2/2022. On allopurinol for gout. No NSAID's.   Repeat labs in 3 months. Encouraged hydration.   -Eyes: reports normal exam in 9/2021.   -Smoking: none.     Microalbuminuria - Chronic, stable. Management as above.     HTN - Chronic, stable. Management as above.   Renato Armijo MD on 3/9/2022 at 11:48 AM      Video-Visit Details    Type of service:  Video Visit    Video End Time:11:48 AM    Originating Location (pt. Location): Home    Distant Location (provider location):  [facility]     Platform used for Video Visit: [employer]

## 2022-03-09 NOTE — LETTER
3/9/2022         RE: Crystal Nicole  [address]  [facility] [address]        Dear Colleague,    Thank you for referring your patient, Crystal Nicole, to the [facility]. Please see a copy of my visit note below.    Crystal is a 66 year old who is being evaluated via a billable video visit.      How would you like to obtain your AVS? MyChart  If the video visit is dropped, the invitation should be resent by: Text to cell phone: [phone]  Will anyone else be joining your video visit? No      Video Start Time: 11:27 AM     S:   Pt being seen in f/u for DM.  Estimates she was diagnosed about 5 years ago.   She was just on metformin.   Then admitted last month (6/2020) for PNA and HbA1C was 12.4%.   Discharged on insulin.     Since being discharged, trying to stick to 45-60 grams of carbs per meal.   She is planning to cut back on pasta intake and try some leaner meats.   Her weight is stable.   Silver sneakers 2/week, aerobics.   Walks for 30 minutes two other days a week.   Exercise videos, 30 minutes.      In 12/2021, she has not been going to [employer] as the instructor was ill.   She has also stopped her walks and exercise videos. Not tracking her calorie intake.     In 3/2022:    lantus 20 U every day     Novolog   For Pre-meal glucose:  140 - 179 2 units  180-219 4 units  220-259 6 units  260-299 8 units  300 and higher, 10 units.      For Bedtime Glucose:  160-179 1 units  180 - 199 2 units  200 - 239 3 units  240-279 5 units  280 and higher 7 units.     Trulicity 4.5 mg once a week  Metformin 1000 mg BID   Jardiance 25 mg every day ---> stopped one month ago due to high deductible plan.     She has only needed to use her novolog sparingly.     Logs:  -150  Lunch 120's   Dinner <150    No hypoglycemia.     She has not been exercising recently.   She has been trying to limit her bread intake.     ROS: 10 point ROS neg other than the symptoms noted above in the  HPI.    Exam:  GENERAL: Healthy, alert and no distress  EYES: Eyes grossly normal to inspection.  No discharge or erythema, or obvious scleral/conjunctival abnormalities.  RESP: No audible wheeze, cough, or visible cyanosis.  No visible retractions or increased work of breathing.    SKIN: Visible skin clear. No significant rash, abnormal pigmentation or lesions.  NEURO: Cranial nerves grossly intact.  Mentation and speech appropriate for age.  PSYCH: Mentation appears normal, affect normal/bright, judgement and insight intact, normal speech and appearance well-groomed.     A/P:   Type 2 DM - Uncontrolled. Questionable MATTEO given brother's history of type 1 DM. Regardless, a GLP-1 agonist would be helpful given her weight. Risks/benefits reviewed.   Labs from 7/31/2020 consistent with type 2 DM.   In 8/2020, much improved control. Concerned about weight gain and would like to try to come off insulin.   Discussed increasing trulicity +/- SGLT-2 inhibitor.   We increased trulicity to 1.5 mg and started farxiga.   In 9/2020, 7.0%. Barely needing to use her novolog. Discussed how trulicity now has 3.0 and 4.5 mg dosing available. However, I could not find these yet in the MAR.   In 1/2021, improved to 6.2%. Frustrated with lack of weight loss. Cautioned about decreasing her insulin dose too quickly.   In 5/2021, stable at 6.2%. She is not interested in actos as she already has problems with fluid retention. Also discussed acarbose. I would avoid glipizide as she is trying to loose weight.   In 8/2021, 6.5%. BMR 1950. Continued goal of reducing insulin and losing weight.  In 12/2021, up a little to 6.9%. She has stopped exercising and not tracking calories. Asking about increasing metformin.   In 3/2022, 6.7%. Stopped jardiance one month ago due to high deductible plan.   -Continue to hold the jardiance.  -No change to other medications today.   -Work to increase intake of lean protein and vegetables and reduce carbs.    -Resume your exercise program.   -Recommend future care with Dr Araujo as planned.   -ASA taking.  -BP: controlled on last check.   -NAFL/WALLER: normal ALT and AST in 6/2020.    Normal in 7/2021.    -Lipids: HDL 29, LDL 72, Trg 141 in 6/2020.    HDL 45, LDL 72, Trg  118 in 7/2021.               On atorvastatin.   -Microalbumin elevated in 6/2020. She is on losartan.    Normal in 10/2021.    Serum Cr 1.11 in 2/2022. On allopurinol for gout. No NSAID's.   Repeat labs in 3 months. Encouraged hydration.   -Eyes: reports normal exam in 9/2021.   -Smoking: none.     Microalbuminuria - Chronic, stable. Management as above.     HTN - Chronic, stable. Management as above.   Renato Armijo MD on 3/9/2022 at 11:48 AM      Video-Visit Details    Type of service:  Video Visit    Video End Time:11:48 AM    Originating Location (pt. Location): Home    Distant Location (provider location):  [facility]     Platform used for Video Visit: [facility]        Again, thank you for allowing me to participate in the care of your patient.        Sincerely,        Renato Armijo MD

## 2022-03-23 ENCOUNTER — OFFICE VISIT (OUTPATIENT)
Dept: FAMILY MEDICINE | Facility: CLINIC | Age: 67
End: 2022-03-23
Payer: COMMERCIAL

## 2022-03-23 VITALS
SYSTOLIC BLOOD PRESSURE: 120 MMHG | WEIGHT: 293 LBS | TEMPERATURE: 98.4 F | HEART RATE: 100 BPM | OXYGEN SATURATION: 95 % | DIASTOLIC BLOOD PRESSURE: 80 MMHG | RESPIRATION RATE: 22 BRPM | HEIGHT: 64 IN | BODY MASS INDEX: 50.02 KG/M2

## 2022-03-23 DIAGNOSIS — E11.22 TYPE 2 DIABETES MELLITUS WITH STAGE 3A CHRONIC KIDNEY DISEASE, WITH LONG-TERM CURRENT USE OF INSULIN (H): ICD-10-CM

## 2022-03-23 DIAGNOSIS — N18.31 TYPE 2 DIABETES MELLITUS WITH STAGE 3A CHRONIC KIDNEY DISEASE, WITH LONG-TERM CURRENT USE OF INSULIN (H): ICD-10-CM

## 2022-03-23 DIAGNOSIS — N18.31 CHRONIC KIDNEY DISEASE, STAGE 3A (H): ICD-10-CM

## 2022-03-23 DIAGNOSIS — M10.9 GOUT, UNSPECIFIED CAUSE, UNSPECIFIED CHRONICITY, UNSPECIFIED SITE: ICD-10-CM

## 2022-03-23 DIAGNOSIS — N18.30 BENIGN HYPERTENSION WITH CKD (CHRONIC KIDNEY DISEASE) STAGE III (H): ICD-10-CM

## 2022-03-23 DIAGNOSIS — Z01.818 PREOP GENERAL PHYSICAL EXAM: Primary | ICD-10-CM

## 2022-03-23 DIAGNOSIS — Z79.4 TYPE 2 DIABETES MELLITUS WITH STAGE 3A CHRONIC KIDNEY DISEASE, WITH LONG-TERM CURRENT USE OF INSULIN (H): ICD-10-CM

## 2022-03-23 DIAGNOSIS — E66.01 MORBID OBESITY (H): ICD-10-CM

## 2022-03-23 DIAGNOSIS — E11.00 TYPE 2 DIABETES MELLITUS WITH HYPEROSMOLARITY WITHOUT COMA, UNSPECIFIED WHETHER LONG TERM INSULIN USE (H): ICD-10-CM

## 2022-03-23 DIAGNOSIS — E03.9 HYPOTHYROIDISM, UNSPECIFIED TYPE: ICD-10-CM

## 2022-03-23 DIAGNOSIS — I12.9 BENIGN HYPERTENSION WITH CKD (CHRONIC KIDNEY DISEASE) STAGE III (H): ICD-10-CM

## 2022-03-23 LAB
HBA1C MFR BLD: 6.6 % (ref 0–5.6)
HGB BLD-MCNC: 13.8 G/DL (ref 11.7–15.7)

## 2022-03-23 PROCEDURE — 80048 BASIC METABOLIC PNL TOTAL CA: CPT | Performed by: FAMILY MEDICINE

## 2022-03-23 PROCEDURE — 99214 OFFICE O/P EST MOD 30 MIN: CPT | Performed by: FAMILY MEDICINE

## 2022-03-23 PROCEDURE — 93000 ELECTROCARDIOGRAM COMPLETE: CPT | Performed by: FAMILY MEDICINE

## 2022-03-23 PROCEDURE — 85018 HEMOGLOBIN: CPT | Performed by: FAMILY MEDICINE

## 2022-03-23 PROCEDURE — 36415 COLL VENOUS BLD VENIPUNCTURE: CPT | Performed by: FAMILY MEDICINE

## 2022-03-23 PROCEDURE — 83036 HEMOGLOBIN GLYCOSYLATED A1C: CPT | Performed by: FAMILY MEDICINE

## 2022-03-23 NOTE — PATIENT INSTRUCTIONS
Preparing for Your Surgery  Getting started  A nurse will call you to review your health history and instructions. They will give you an arrival time based on your scheduled surgery time. Please be ready to share:    Your doctor's clinic name and phone number    Your medical, surgical and anesthesia history    A list of allergies and sensitivities    A list of medicines, including herbal treatments and over-the-counter drugs    Whether the patient has a legal guardian (ask how to send us the papers in advance)  Please tell us if you're pregnant--or if there's any chance you might be pregnant. Some surgeries may injure a fetus (unborn baby), so they require a pregnancy test. Surgeries that are safe for a fetus don't always need a test, and you can choose whether to have one.   If you have a child who's having surgery, please ask for a copy of Preparing for Your Child's Surgery.    Preparing for surgery    Within 30 days of surgery: Have a pre-op exam (sometimes called an H&P, or History and Physical). This can be done at a clinic or pre-operative center.  ? If you're having a , you may not need this exam. Talk to your care team.    At your pre-op exam, talk to your care team about all medicines you take. If you need to stop any medicines before surgery, ask when to start taking them again.  ? We do this for your safety. Many medicines can make you bleed too much during surgery. Some change how well surgery (anesthesia) drugs work.    Call your insurance company to let them know you're having surgery. (If you don't have insurance, call [phone].)    Call your clinic if there's any change in your health. This includes signs of a cold or flu (sore throat, runny nose, cough, rash, fever). It also includes a scrape or scratch near the surgery site.    If you have questions on the day of surgery, call your hospital or surgery center.  COVID testing  You may need to be tested for COVID-19 before having  surgery. If so, your surgical team will give you instructions for scheduling this test, separate from your preoperative history and physical.  Eating and drinking guidelines  For your safety: Unless your surgeon tells you otherwise, follow the guidelines below.    Eat and drink as usual until 8 hours before surgery. After that, no food or milk.    Drink clear liquids until 2 hours before surgery. These are liquids you can see through, like water, Gatorade and Propel Water. You may also have black coffee and tea (no cream or milk).    Nothing by mouth within 2 hours of surgery. This includes gum, candy and breath mints.    If you drink alcohol: Stop drinking it the night before surgery.    If your care team tells you to take medicine on the morning of surgery, it's okay to take it with a sip of water.  Preventing infection    Shower or bathe the night before and morning of your surgery. Follow the instructions your clinic gave you. (If no instructions, use regular soap.)    Don't shave or clip hair near your surgery site. We'll remove the hair if needed.    Don't smoke or vape the morning of surgery. You may chew nicotine gum up to 2 hours before surgery. A nicotine patch is okay.  ? Note: Some surgeries require you to completely quit smoking and nicotine. Check with your surgeon.    Your care team will make every effort to keep you safe from infection. We will:  ? Clean our hands often with soap and water (or an alcohol-based hand rub).  ? Clean the skin at your surgery site with a special soap that kills germs.  ? Give you a special gown to keep you warm. (Cold raises the risk of infection.)  ? Wear special hair covers, masks, gowns and gloves during surgery.  ? Give antibiotic medicine, if prescribed. Not all surgeries need antibiotics.  What to bring on the day of surgery    Photo ID and insurance card    Copy of your health care directive, if you have one    Glasses and hearing aides (bring cases)  ? You can't  wear contacts during surgery    Inhaler and eye drops, if you use them (tell us about these when you arrive)    CPAP machine or breathing device, if you use them    A few personal items, if spending the night    If you have . . .  ? A pacemaker, ICD (cardiac defibrillator) or other implant: Bring the ID card.  ? An implanted stimulator: Bring the remote control.  ? A legal guardian: Bring a copy of the certified (court-stamped) guardianship papers.  Please remove any jewelry, including body piercings. Leave jewelry and other valuables at home.  If you're going home the day of surgery    You must have a responsible adult drive you home. They should stay with you overnight as well.    If you don't have someone to stay with you, and you aren't safe to go home alone, we may keep you overnight. Insurance often won't pay for this.  After surgery  If it's hard to control your pain or you need more pain medicine, please call your surgeon's office.  Questions?   If you have any questions for your care team, list them here: _________________________________________________________________________________________________________________________________________________________________________ ____________________________________ ____________________________________ ____________________________________  For informational purposes only. Not to replace the advice of your health care provider. Copyright   2003, 2019 [facility]. All rights reserved. Clinically reviewed by Zahira Crenshaw MD. [employer] [id number] - REV 07/21.        Medication Instructions:  Patient is to take all scheduled medications on the day of surgery EXCEPT for modifications listed below:   - aspirin: Discontinue aspirin 7-10 days prior to procedure to reduce bleeding risk. It should be resumed postoperatively.    - ACE/ARB: May be continued on the day of surgery.    - Diuretics: HOLD on the day of surgery.   - Statins: Continue taking on the day of  surgery.    - Long acting insulin (e.g. glargine, detemir): Take 80% of the usual evening or morning dose before surgery.   - short acting insulin (e.g. regular, lispro, aspart): HOLD on the morning of surgery.    - metformin: HOLD day of surgery.   - GLP-1 Injectable (exenitide, liraglutide, semaglutide, dulaglutide, etc.): HOLD day of surgery    - Continuous Glucose Monitor (CGM): Patient was made aware on the day of surgery, they should be prepared to remove the Continuous Glucose Monitor (CGM) prior to the operation in order to avoid damage to the equipment during the procedure. The CGM will not be the source of glucose monitoring during the operation  Juani Mae MD

## 2022-03-24 LAB
ANION GAP SERPL CALCULATED.3IONS-SCNC: 5 MMOL/L (ref 3–14)
BUN SERPL-MCNC: 17 MG/DL (ref 7–30)
CALCIUM SERPL-MCNC: 9.9 MG/DL (ref 8.5–10.1)
CHLORIDE BLD-SCNC: 107 MMOL/L (ref 94–109)
CO2 SERPL-SCNC: 29 MMOL/L (ref 20–32)
CREAT SERPL-MCNC: 0.82 MG/DL (ref 0.52–1.04)
FASTING STATUS PATIENT QL REPORTED: NO
GFR SERPL CREATININE-BSD FRML MDRD: 79 ML/MIN/1.73M2
GLUCOSE BLD-MCNC: 146 MG/DL (ref 70–99)
GLUCOSE BLD-MCNC: 146 MG/DL (ref 70–99)
POTASSIUM BLD-SCNC: 3.9 MMOL/L (ref 3.4–5.3)
SODIUM SERPL-SCNC: 141 MMOL/L (ref 133–144)

## 2022-04-07 ENCOUNTER — TRANSFERRED RECORDS (OUTPATIENT)
Dept: HEALTH INFORMATION MANAGEMENT | Facility: CLINIC | Age: 67
End: 2022-04-07

## 2022-05-27 ENCOUNTER — TELEPHONE (OUTPATIENT)
Dept: ENDOCRINOLOGY | Facility: CLINIC | Age: 67
End: 2022-05-27
Payer: COMMERCIAL

## 2022-05-27 NOTE — TELEPHONE ENCOUNTER
Reason for call:  Other   Patient called regarding (reason for call): call back  Additional comments: Patient states that Dr. Armijo was going to put in a lab order for a A1C and BMP..     Phone number to reach patient:  Cell number on file:    Telephone Information:   Mobile [phone]       Best Time:  Anytime     Can we leave a detailed message on this number?  YES    Travel screening: Not Applicable

## 2022-05-31 NOTE — TELEPHONE ENCOUNTER
Patient calling for PCP to place order for A1c    Please call patient when completed.    Nithya Pate

## 2022-05-31 NOTE — TELEPHONE ENCOUNTER
Dr Armijo did order A1C and BMP after last visit and was done already.  A1C is done every 3 months and it's only been 2 months. She will get new orders lon she sees the new provider in June Anita Collazo RN on 5/31/2022 at 12:36 PM

## 2022-06-01 NOTE — PROGRESS NOTES
Patient has an upcoming lab appointment on 06/08/22. Please review and place future orders that are needed.    Nalini Blas on 6/1/2022 at 1:02 PM

## 2022-06-06 ENCOUNTER — MYC MEDICAL ADVICE (OUTPATIENT)
Dept: FAMILY MEDICINE | Facility: CLINIC | Age: 67
End: 2022-06-06
Payer: COMMERCIAL

## 2022-06-08 ENCOUNTER — LAB (OUTPATIENT)
Dept: LAB | Facility: CLINIC | Age: 67
End: 2022-06-08
Payer: COMMERCIAL

## 2022-06-08 DIAGNOSIS — E11.22 TYPE 2 DIABETES MELLITUS WITH STAGE 3A CHRONIC KIDNEY DISEASE, WITH LONG-TERM CURRENT USE OF INSULIN (H): ICD-10-CM

## 2022-06-08 DIAGNOSIS — N18.31 TYPE 2 DIABETES MELLITUS WITH STAGE 3A CHRONIC KIDNEY DISEASE, WITH LONG-TERM CURRENT USE OF INSULIN (H): ICD-10-CM

## 2022-06-08 DIAGNOSIS — Z79.4 TYPE 2 DIABETES MELLITUS WITH STAGE 3A CHRONIC KIDNEY DISEASE, WITH LONG-TERM CURRENT USE OF INSULIN (H): ICD-10-CM

## 2022-06-08 LAB
CHOLEST SERPL-MCNC: 107 MG/DL
FASTING STATUS PATIENT QL REPORTED: YES
HBA1C MFR BLD: 7.2 % (ref 0–5.6)
HDLC SERPL-MCNC: 49 MG/DL
LDLC SERPL CALC-MCNC: 39 MG/DL
NONHDLC SERPL-MCNC: 58 MG/DL
TRIGL SERPL-MCNC: 93 MG/DL

## 2022-06-08 PROCEDURE — 80061 LIPID PANEL: CPT

## 2022-06-08 PROCEDURE — 83036 HEMOGLOBIN GLYCOSYLATED A1C: CPT

## 2022-06-08 PROCEDURE — 36415 COLL VENOUS BLD VENIPUNCTURE: CPT

## 2022-06-15 ENCOUNTER — OFFICE VISIT (OUTPATIENT)
Dept: FAMILY MEDICINE | Facility: CLINIC | Age: 67
End: 2022-06-15
Payer: COMMERCIAL

## 2022-06-15 VITALS
TEMPERATURE: 98.5 F | WEIGHT: 293 LBS | DIASTOLIC BLOOD PRESSURE: 80 MMHG | SYSTOLIC BLOOD PRESSURE: 122 MMHG | HEART RATE: 102 BPM | OXYGEN SATURATION: 97 % | BODY MASS INDEX: 56.3 KG/M2

## 2022-06-15 DIAGNOSIS — N18.31 TYPE 2 DIABETES MELLITUS WITH STAGE 3A CHRONIC KIDNEY DISEASE, WITH LONG-TERM CURRENT USE OF INSULIN (H): ICD-10-CM

## 2022-06-15 DIAGNOSIS — E66.01 MORBID OBESITY (H): ICD-10-CM

## 2022-06-15 DIAGNOSIS — Z79.4 TYPE 2 DIABETES MELLITUS WITH STAGE 3A CHRONIC KIDNEY DISEASE, WITH LONG-TERM CURRENT USE OF INSULIN (H): ICD-10-CM

## 2022-06-15 DIAGNOSIS — E03.9 HYPOTHYROIDISM, UNSPECIFIED TYPE: ICD-10-CM

## 2022-06-15 DIAGNOSIS — I10 ESSENTIAL HYPERTENSION: ICD-10-CM

## 2022-06-15 DIAGNOSIS — L40.9 PSORIASIS: ICD-10-CM

## 2022-06-15 DIAGNOSIS — N18.31 CHRONIC KIDNEY DISEASE, STAGE 3A (H): ICD-10-CM

## 2022-06-15 DIAGNOSIS — E11.22 TYPE 2 DIABETES MELLITUS WITH STAGE 3A CHRONIC KIDNEY DISEASE, WITH LONG-TERM CURRENT USE OF INSULIN (H): ICD-10-CM

## 2022-06-15 DIAGNOSIS — Z01.818 PREOP GENERAL PHYSICAL EXAM: Primary | ICD-10-CM

## 2022-06-15 DIAGNOSIS — E78.5 HYPERLIPIDEMIA LDL GOAL <70: ICD-10-CM

## 2022-06-15 LAB — HGB BLD-MCNC: 13.9 G/DL (ref 11.7–15.7)

## 2022-06-15 PROCEDURE — 36415 COLL VENOUS BLD VENIPUNCTURE: CPT | Performed by: FAMILY MEDICINE

## 2022-06-15 PROCEDURE — 85018 HEMOGLOBIN: CPT | Performed by: FAMILY MEDICINE

## 2022-06-15 PROCEDURE — 99214 OFFICE O/P EST MOD 30 MIN: CPT | Performed by: FAMILY MEDICINE

## 2022-06-15 PROCEDURE — 84132 ASSAY OF SERUM POTASSIUM: CPT | Performed by: FAMILY MEDICINE

## 2022-06-15 RX ORDER — INSULIN GLARGINE 100 [IU]/ML
15 INJECTION, SOLUTION SUBCUTANEOUS DAILY
COMMUNITY
End: 2022-12-22

## 2022-06-15 RX ORDER — CLOBETASOL PROPIONATE 0.5 MG/G
OINTMENT TOPICAL
Qty: 45 G | Refills: 0 | Status: SHIPPED | OUTPATIENT
Start: 2022-06-15 | End: 2022-08-18

## 2022-06-15 ASSESSMENT — PAIN SCALES - GENERAL: PAINLEVEL: NO PAIN (0)

## 2022-06-15 NOTE — PATIENT INSTRUCTIONS
Medication Instructions:   - aspirin: Discontinue aspirin 7-10 days prior to procedure to reduce bleeding risk. It should be resumed postoperatively.    - ACE/ARB: Hold on the day of surgery   - Diuretics: HOLD on the day of surgery.   - Statins: Continue taking on the day of surgery.    - Long acting insulin (e.g. glargine, detemir): Take 80% of the usual evening or morning dose before surgery.   - short acting insulin (e.g. regular, lispro, aspart): HOLD on the morning of surgery.    - metformin: HOLD day of surgery.   - GLP-1 Injectable (exenitide, liraglutide, semaglutide, dulaglutide, etc.): HOLD day of surgery   Sincerely,  Juani Mae MD

## 2022-06-15 NOTE — TELEPHONE ENCOUNTER
Routing refill request to provider for review/approval because:  Drug not on the [facility] refill protocol     Requested Prescriptions   Pending Prescriptions Disp Refills    clobetasol (TEMOVATE) 0.05 % external ointment [Pharmacy Med Name: CLOBETASOL 0.05% OINTMENT] 45 g 0     Sig: APPLY TO AFFECTED AREA TWICE A DAY        There is no refill protocol information for this order           Marino Hyman RN  [facility]

## 2022-06-15 NOTE — PROGRESS NOTES
[facility]  [address]  [place] [address]  Phone: [phone]  Primary Provider: Juani Vick  Pre-op Performing Provider: JUANI VICK      PREOPERATIVE EVALUATION:  Today's date: 6/15/2022    Crystal Nicole is a 66 year old female who presents for a preoperative evaluation.    Surgical Information:  Surgery/Procedure: ROBOTIC HYSTERECTOMY, BILATERAL SALPINGO OOPHORECTOMY  Surgery Location: [facility]  Surgeon: Dr. Jefferson garcia  Surgery Date: July 6, 2022  Time of Surgery: 10:00 am   Where patient plans to recover: At home alone  Fax number for surgical facility: Note does not need to be faxed, will be available electronically in Epic.    Type of Anesthesia Anticipated: General    Assessment & Plan     The proposed surgical procedure is considered INTERMEDIATE risk.    Preop general physical exam    - Hemoglobin; Future  - Potassium; Future    Chronic kidney disease, stage 3a (H)  Stable     Type 2 diabetes mellitus with stage 3a chronic kidney disease, with long-term current use of insulin (H)  Reviewed las Hba1c  7.2 on 6-8-22    Essential hypertension  controlled    Hyperlipidemia LDL goal <70  Stable     Hypothyroidism, unspecified type  Last TSH is Therapeutic    Morbid obesity (H)  Pt needs to lose wt to decrease ASCVD risk    Psoriasis             Risks and Recommendations:  The patient has the following additional risks and recommendations for perioperative complications:   - Morbid obesity (BMI >40)  Diabetes:  - Patient is on insulin therapy; diabetic NPO guidelines provided and discussed.    Medication Instructions:   - aspirin: Discontinue aspirin 7-10 days prior to procedure to reduce bleeding risk. It should be resumed postoperatively.    - ACE/ARB: hold on the day of surgery    - Diuretics: HOLD on the day of surgery.   - Statins: Continue taking on the day of surgery.    - Long acting insulin (e.g. glargine, detemir): Take 80% of the usual evening or morning dose  before surgery.   - short acting insulin (e.g. regular, lispro, aspart): HOLD on the morning of surgery.    - metformin: HOLD day of surgery.   - GLP-1 Injectable (exenitide, liraglutide, semaglutide, dulaglutide, etc.): HOLD day of surgery   DVT precautions _history of ? DVT in the past    RECOMMENDATION:  APPROVAL GIVEN to proceed with proposed procedure, without further diagnostic evaluation.        Subjective     HPI related to upcoming procedure: pt is scheduled for above Procedure  She has complex endometrial Hyperplasia and has had some Bleeding    Preop Questions 6/15/2022   1. Have you ever had a heart attack or stroke? No   2. Have you ever had surgery on your heart or blood vessels, such as a stent placement, a coronary artery bypass, or surgery on an artery in your head, neck, heart, or legs? No   3. Do you have chest pain with activity? No   4. Do you have a history of  heart failure? No   5. Do you currently have a cold, bronchitis or symptoms of other infection? No   6. Do you have a cough, shortness of breath, or wheezing? No   7. Do you or anyone in your family have previous history of blood clots? YES -/ was in coumadin after travel   8. Do you or does anyone in your family have a serious bleeding problem such as prolonged bleeding following surgeries or cuts? No   9. Have you ever had problems with anemia or been told to take iron pills? No   10. Have you had any abnormal blood loss such as black, tarry or bloody stools, or abnormal vaginal bleeding? No   11. Have you ever had a blood transfusion? YES - when she had her daughter   11a. Have you ever had a transfusion reaction? No   12. Are you willing to have a blood transfusion if it is medically needed before, during, or after your surgery? Yes   13. Have you or any of your relatives ever had problems with anesthesia? No   14. Do you have sleep apnea, excessive snoring or daytime drowsiness? No   15. Do you have any artifical heart valves or  other implanted medical devices like a pacemaker, defibrillator, or continuous glucose monitor? No   16. Do you have artificial joints? No   17. Are you allergic to latex? No       Health Care Directive:  Patient does not have a Health Care Directive or Living Will: Discussed advance care planning with patient; information given to patient to review.    Preoperative Review of :   reviewed - controlled substances reflected in medication list.      Status of Chronic Conditions:  DIABETES - Patient has a longstanding history of DiabetesType Type II . Patient is being treated with diet, oral agents and insulin injections and denies significant side effects. Control has been good. Complicating factors include but are not limited to: hypertension and hyperlipidemia.     HYPERLIPIDEMIA - Patient has a long history of significant Hyperlipidemia requiring medication for treatment with recent good control. Patient reports no problems or side effects with the medication.     HYPERTENSION - Patient has longstanding history of HTN , currently denies any symptoms referable to elevated blood pressure. Specifically denies chest pain, palpitations, dyspnea, orthopnea, PND or peripheral edema. Blood pressure readings have been in normal range. Current medication regimen is as listed below. Patient denies any side effects of medication.     HYPOTHYROIDISM - Patient has a longstanding history of chronic Hypothyroidism. Patient has been doing well, noting no tremor, insomnia, hair loss or changes in skin texture. Continues to take medications as directed, without adverse reactions or side effects. Last TSH   Lab Results   Component Value Date    TSH 1.66 10/14/2021   .      RENAL INSUFFICIENCY - Patient has a longstanding history of moderate-severe chronic renal insufficiency. Last Cr see [facility].   Past medical history, family history, medications and social history reviewed today and updated in EPIC.        Review of  Systems  CONSTITUTIONAL: NEGATIVE for fever, chills, change in weight  INTEGUMENTARY/SKIN: NEGATIVE for worrisome rashes, moles or lesions  EYES: NEGATIVE for vision changes or irritation  ENT/MOUTH: NEGATIVE for ear, mouth and throat problems  RESP: NEGATIVE for significant cough or SOB  CV: NEGATIVE for chest pain, palpitations or peripheral edema  GI: NEGATIVE for nausea, abdominal pain, heartburn, or change in bowel habits  : NEGATIVE for frequency, dysuria, or hematuria  MUSCULOSKELETAL: NEGATIVE for significant arthralgias or myalgia  NEURO: NEGATIVE for weakness, dizziness or paresthesias  ENDOCRINE: NEGATIVE for temperature intolerance, skin/hair changes  HEME: NEGATIVE for bleeding problems  PSYCHIATRIC: NEGATIVE for changes in mood or affect  GYN as above  Patient Active Problem List    Diagnosis Date Noted     Chronic kidney disease, stage 3a (H) 03/23/2022     Priority: Medium     Hyperlipidemia LDL goal <70 10/14/2021     Priority: Medium     Gout, unspecified cause, unspecified chronicity, unspecified site 10/14/2021     Priority: Medium     Psoriasis 10/14/2021     Priority: Medium     Gastroesophageal reflux disease 10/14/2021     Priority: Medium     Diabetes mellitus, type 2 (H) 06/25/2020     Priority: Medium     Morbid obesity (H) 06/25/2020     Priority: Medium     Hypothyroidism 12/27/2010     Priority: Medium     Hypothyroidism Acquired       Essential hypertension 12/03/2010     Priority: Medium     Hypertension        Past Medical History:   Diagnosis Date     Diabetes (H)      History of blood transfusion      Hypertension Lorsartan, triamterineHCTZ     Thyroid disease Hypo     Past Surgical History:   Procedure Laterality Date     CHOLECYSTECTOMY       HERNIA REPAIR  1990 s     Current Outpatient Medications   Medication Sig Dispense Refill     Alcohol Swabs (ALCOHOL PREP) 70 % PADS USE TO SWAB AREA OF INJECTION/JUSTINE AS DIRECTED. 100 each 11     allopurinol (ZYLOPRIM) 300 MG tablet Take  1 tablet (300 mg) by mouth daily Needs follow up labs 90 tablet 3     aspirin (ASA) 325 MG EC tablet Take 325 mg by mouth daily       atorvastatin (LIPITOR) 10 MG tablet Take 1 tablet (10 mg) by mouth daily 90 tablet 3     BD PEN NEEDLE CONOR 2ND GEN 32G X 4 MM miscellaneous USE 4 PEN NEEDLES DAILY OR AS DIRECTED. 200 each 3     blood glucose (NO BRAND SPECIFIED) test strip Use to test blood sugar 4 times daily or as directed. 400 strip 3     blood glucose calibration (NO BRAND SPECIFIED) solution Use to calibrate blood glucose monitor as needed as directed. 1 Bottle 3     blood glucose monitoring (NO BRAND SPECIFIED) meter device kit Use to test blood sugar 4 times daily or as directed. 1 kit 0     clobetasol (TEMOVATE) 0.05 % external ointment APPLY TO AFFECTED AREA TWICE A DAY 45 g 0     clobetasol (TEMOVATE) 0.05 % external solution Apply topically as needed       Dulaglutide (TRULICITY) 4.5 MG/0.5ML SOPN Inject 4.5 mg Subcutaneous once a week 6 mL 3     furosemide (LASIX) 40 MG tablet TAKE 1 TABLET (40 MG) BY MOUTH DAILY AS NEEDED (SWELLING) 30 tablet 0     Glucosamine-Chondroit-Vit C-Mn (GLUCOSAMINE 1500 COMPLEX PO) daily       insulin glargine (BASAGLAR KWIKPEN) 100 UNIT/ML pen Inject 15 Units Subcutaneous daily 15 units in the morning.       Insulin Lispro (HUMALOG KWIKPEN SC) Siding scale starting at 140       levothyroxine (SYNTHROID/LEVOTHROID) 100 MCG tablet Take 1 tablet (100 mcg) by mouth daily 90 tablet 3     losartan (COZAAR) 25 MG tablet Take 1 tablet (25 mg) by mouth daily 90 tablet 3     metFORMIN (GLUCOPHAGE-XR) 500 MG 24 hr tablet Take 2 tablets (1,000 mg) by mouth daily (with breakfast) 2 tabs BID (Patient taking differently: Take 1,000 mg by mouth 2 times daily 2 tabs BID) 360 tablet 3     multivitamin, therapeutic (THERA-VIT) TABS tablet Take 1 tablet by mouth daily       pantoprazole (PROTONIX) 40 MG EC tablet TAKE 1 TABLET BY MOUTH EVERY DAY BEFORE BREAKFAST 90 tablet 0     triamcinolone  (KENALOG) 0.1 % external cream Apply topically 2 times daily 453 g 1     triamterene-HCTZ (DYAZIDE) 37.5-25 MG capsule Take 2 capsules by mouth every morning 180 capsule 3     blood glucose (NO BRAND SPECIFIED) test strip Use to test blood sugar 4 times daily or as directed. (Patient not taking: Reported on 6/15/2022) 400 strip 3     blood glucose monitoring (ONE TOUCH ULTRA 2) meter device kit Use to test blood sugar 4 times daily or as directed. 1 kit 3     Continuous Blood Gluc  ([employer] [place] 14 DAY READER) HILARY Use to read blood sugars as per 's instructions. (Patient not taking: Reported on 6/15/2022) 1 each 0     insulin aspart (NOVOLOG FLEXPEN) 100 UNIT/ML pen GIVE BEFORE MEALS AND BEFORE BED PER SLIDING SCALE Sig: Novolog Flexpen Give before meals and before bed: For Pre-meal glucose: 140 - 239 give 1 unit 240 - 339 give 2 units > 340 give 3 units For Bedtime Glucose: 200 - 239 give 0.5 unit 240 - 339 give 1 unit > 340 give 1.5 units (Patient not taking: Reported on 6/15/2022) 15 mL 0     insulin glargine (LANTUS PEN) 100 UNIT/ML pen Inject 15 Units Subcutaneous every morning (before breakfast) (allow max dose 75 units/day for ongoing titration & needle prime) (Patient not taking: Reported on 6/15/2022)  3     OneTouch Delica Lancets 33G MISC USE TO TEST BLOOD SUGAR 4 TIMES DAILY OR AS DIRECTED. (Patient not taking: Reported on 6/15/2022) 300 each 1     ONETOUCH VERIO IQ test strip USE TO TEST BLOOD SUGAR 4 TIMES DAILY OR AS DIRECTED. (Patient not taking: Reported on 6/15/2022) 100 strip 2     thin (NO BRAND SPECIFIED) lancets Use with lanceting device. (Patient not taking: Reported on 6/15/2022) 200 each 1       No Known Allergies     Social History     Tobacco Use     Smoking status: Never Smoker     Smokeless tobacco: Never Used   Substance Use Topics     Alcohol use: Yes     Comment: Occ     Family History   Problem Relation Age of Onset     Diabetes Mother         d age 80      Hypertension Mother      Coronary Artery Disease Father         d age 85     Brain Tumor Father      Hypertension Father      Diabetes Brother      History   Drug Use Unknown         Objective     /80   Pulse 102   Temp 98.5  F (36.9  C) (Tympanic)   Wt 148.8 kg (328 lb)   SpO2 97%   BMI 56.30 kg/m      Physical Exam    GENERAL APPEARANCE: healthy, alert and no distress-obese     EYES: EOMI, PERRL     HENT: ear canals and TM's normal and nose and mouth without ulcers or lesions     NECK: no adenopathy, no asymmetry, masses, or scars and thyroid normal to palpation     RESP: lungs clear to auscultation - no rales, rhonchi or wheezes     CV: regular rates and rhythm, normal S1 S2, no S3 or S4 and no murmur, click or rub     ABDOMEN:  soft, nontender, no HSM or masses and bowel sounds normal     MS: extremities normal- no gross deformities noted, no evidence of inflammation in joints, FROM in all extremities.     SKIN: no suspicious lesions or rashes     NEURO: Normal strength and tone, sensory exam grossly normal, mentation intact and speech normal     PSYCH: mentation appears normal. and affect normal/bright     LYMPHATICS: No cervical adenopathy    Recent Labs   Lab Test 06/08/22  1149 03/23/22  1641 03/01/22  0951 06/25/20  1214 06/19/20  0948 06/18/20  2042 06/18/20  0001   HGB  --  13.8  --   --  12.7  --  14.7   PLT  --   --   --   --  299  --  358   NA  --  141 141   < > 135   < > 130*   POTASSIUM  --  3.9 3.8   < > 3.9   < > 3.9   CR  --  0.82 1.11*   < > 0.73   < > 0.82   A1C 7.2* 6.6* 6.7*   < >  --   --  12.4*    < > = values in this interval not displayed.        Diagnostics:  Labs pending at this time.  Results will be reviewed when available.   EKG: appears normal, NSR, normal axis, normal intervals, no acute ST/T changes c/w ischemia, no LVH by voltage criteria, done 3-23-22  PVC few  Revised Cardiac Risk Index (RCRI):  The patient has the following serious cardiovascular risks for  perioperative complications:   - High risk surgery (>5% cardiac complication risk) = 1 point   - Diabetes Mellitus (on Insulin) = 1 point     RCRI Interpretation: 2 points: Class III (moderate risk - 6.6% complication rate)     Estimated Functional Capacity: Performs 4 METS exercise without symptoms (e.g., light housework, stairs, 4 mph walk, 7 mph bike, slow step dance)           Signed Electronically by: Juani Mae MD  Copy of this evaluation report is provided to requesting physician.

## 2022-06-16 LAB — POTASSIUM BLD-SCNC: 3.7 MMOL/L (ref 3.4–5.3)

## 2022-06-16 RX ORDER — TRIAMCINOLONE ACETONIDE 1 MG/G
CREAM TOPICAL
Qty: 454 G | Refills: 1 | Status: SHIPPED | OUTPATIENT
Start: 2022-06-16 | End: 2022-12-19

## 2022-06-16 NOTE — TELEPHONE ENCOUNTER
"Requested Prescriptions   Signed Prescriptions Disp Refills    triamcinolone (KENALOG) 0.1 % external cream 454 g 1     Sig: APPLY TO AFFECTED AREA TWICE A DAY       Topical Steroids and Nonsteroidals Protocol Passed - 6/15/2022  2:16 PM        Passed - Patient is age 6 or older        Passed - Authorizing prescriber's most recent note related to this medication read.     If refill request is for ophthalmic use, please forward request to provider for approval.          Passed - High potency steroid not ordered        Passed - Recent (12 mo) or future (30 days) visit within the authorizing provider's specialty     Patient has had an office visit with the authorizing provider or a provider within the authorizing providers department within the previous 12 mos or has a future within next 30 days. See \"Patient Info\" tab in inbasket, or \"Choose Columns\" in Meds & Orders section of the refill encounter.              Passed - Medication is active on med list           Lea Torres RN  [facility]     "

## 2022-06-21 ENCOUNTER — VIRTUAL VISIT (OUTPATIENT)
Dept: ENDOCRINOLOGY | Facility: CLINIC | Age: 67
End: 2022-06-21
Payer: COMMERCIAL

## 2022-06-21 DIAGNOSIS — Z79.4 TYPE 2 DIABETES MELLITUS WITHOUT COMPLICATION, WITH LONG-TERM CURRENT USE OF INSULIN (H): Primary | ICD-10-CM

## 2022-06-21 DIAGNOSIS — E11.9 TYPE 2 DIABETES MELLITUS WITHOUT COMPLICATION, WITH LONG-TERM CURRENT USE OF INSULIN (H): Primary | ICD-10-CM

## 2022-06-21 DIAGNOSIS — E66.01 MORBID OBESITY (H): ICD-10-CM

## 2022-06-21 PROCEDURE — 99215 OFFICE O/P EST HI 40 MIN: CPT | Mod: 95 | Performed by: INTERNAL MEDICINE

## 2022-06-21 NOTE — PROGRESS NOTES
Patient is being evaluated via a billable video visit.      How would you like to obtain your AVS? Reviewed verbally  If the video visit is dropped, the invitation should be resent by cell phone  Will anyone else be joining your video visit? no      Video Start Time: 2:05 pm     Video-Visit Details    Type of service:  Video Visit    Video End Time: 2:30 pm    Originating Location (pt. Location): home    Distant Location (provider location):  [facility]    Platform used for Video Visit:  [employer]      Name: Crystal Nicole is a 66 year old woman, self referred for evaluation of     Chief Complaint   Patient presents with     Diabetes       HPI:  Recent issues:  Here for evaluation of diabetes.  Had seen Dr. MERON Armijo, now transferring to my practice  Planning to have hysterectomy soon  Reviewed medical history from patient and Epic chart record        History of pre-diabetes  ~2016. Initial diagnosis of diabetes mellitus  Had seen physician in [place], [place]  Started treatment with metformin   ~6/2020. Acute back pain?, diagnosed with pneumonia, high glucose and hgbA1c 12%   Hospital treatment included insulin treatment   Treatment with Abx, pain resolved   Recalls basal insulin 55U and use of rapid acting Humalog     7/15/20 Endocrinology evaluation with Dr. MERON Armijo/[facility]  Addition of Farxiga but expensive, then change to Jardiance medication  ~Fall '21. Discontinue Jardiance since expensive      Previous FV hgbA1c trends include:     Lab Test 06/08/22  1149 03/23/22  1641 03/01/22  0951 12/06/21  1132 07/30/21  1236   A1C 7.2* 6.6* 6.7* 6.9* 6.5*     Current DM medications:  Metformin 500 mg  2-tabs in morning and evening  Trulicity 4.5 mg  Subcutaneous weekly  Basaglar Kwikpen 15U Subcutaneous in morning  Humalog sscale   140-179  2U   180-219  4U   220-259  6U   260-299  8U   >300   10U    Blood glucose (BG) meter:  One Touch Verio   Tests  2-3/day   Recent BG trends:  130-160 mg/dl overall    Fam Hx Diabetes:  Recent [facility] labs include:  Lab Results   Component Value Date    A1C 7.2 (H) 06/08/2022     03/23/2022    POTASSIUM 3.7 06/15/2022    CHLORIDE 107 03/23/2022    CO2 29 03/23/2022    ANIONGAP 5 03/23/2022     (H) 03/23/2022     (H) 03/23/2022    BUN 17 03/23/2022    CR 0.82 03/23/2022    GFRESTIMATED 79 03/23/2022    GFRESTBLACK 80 01/07/2021    TRISTIAN 9.9 03/23/2022    CPEPT 9.8 (H) 07/31/2020    CHOL 107 06/08/2022    TRIG 93 06/08/2022    HDL 49 (L) 06/08/2022    LDL 39 06/08/2022    NHDL 58 06/08/2022    UCRR 109 10/14/2021    MICROL 10 10/14/2021    UMALCR 9.17 10/14/2021    TSH 1.66 10/14/2021     Last eye exam 2021, no DR per patient  DM Complications:  None known      Lives in [facility]  Sees Dr. Juani Mae/[facility] for general medicine evaluations.    PMH/PSH:  Past Medical History:   Diagnosis Date     Acquired hypothyroidism      History of blood transfusion      Hypertension Lorsartan, triamterineHCTZ     Obesity due to excess calories      Type 2 diabetes mellitus without complication, with long-term current use of insulin (H)      Past Surgical History:   Procedure Laterality Date     CHOLECYSTECTOMY       HERNIA REPAIR  1990 s       Family Hx:  Family History   Problem Relation Age of Onset     Diabetes Mother         d age 80     Hypertension Mother      Coronary Artery Disease Father         d age 85     Brain Tumor Father      Hypertension Father      Diabetes Brother          Social Hx:  Social History     Socioeconomic History     Marital status:      Spouse name: Not on file     Number of children: Not on file     Years of education: Not on file     Highest education level: Not on file   Occupational History     Not on file   Tobacco Use     Smoking status: Never Smoker     Smokeless tobacco: Never Used   Vaping Use     Vaping Use: Never used   Substance and Sexual Activity      Alcohol use: Yes     Comment: Occ     Drug use: Never     Sexual activity: Not Currently   Other Topics Concern     Parent/sibling w/ CABG, MI or angioplasty before 65F 55M? No   Social History Narrative     Not on file     Social Determinants of Health     Financial Resource Strain: Not on file   Food Insecurity: Not on file   Transportation Needs: Not on file   Physical Activity: Not on file   Stress: Not on file   Social Connections: Not on file   Intimate Partner Violence: Not on file   Housing Stability: Not on file          MEDICATIONS:  has a current medication list which includes the following prescription(s): allopurinol, aspirin, atorvastatin, clobetasol, trulicity, furosemide, glucosamine-chondroit-vit c-mn, insulin glargine, insulin lispro, levothyroxine, losartan, metformin, multivitamin, therapeutic, pantoprazole, triamcinolone, triamterene-hctz, alcohol prep, bd pen needle fadi 2nd gen, blood glucose, blood glucose, blood glucose calibration, blood glucose monitoring, blood glucose monitoring, clobetasol, freestyle yanely 14 day reader, novolog flexpen, insulin glargine, onetouch delica lancets 33g, onetouch verio iq, and thin.    ROS:     ROS: 10 point ROS neg other than the symptoms noted above in the HPI.    GENERAL: some fatigue, wt stable; denies fevers, chills, night sweats.   HEENT: no dysphagia, odonophagia, diplopia, neck pain  THYROID:  no apparent hyper or hypothyroid symptoms  CV: no chest pain, pressure, palpitations  LUNGS: no SOB, NICHOLS, cough, wheezing   ABDOMEN: some indigestion; no diarrhea, constipation, abdominal pain  EXTREMITIES: no rashes, ulcers, edema  NEUROLOGY: no headaches, denies changes in vision, tingling, extremitiy numbness   MSK: no muscle aches or pains, weakness  SKIN: no rashes or lesions  : no menses  PSYCH:  stable mood, no significant anxiety or depression  ENDOCRINE: no heat or cold intolerance    Physical Exam (visual exam)  VS:  no vital signs taken for video  visit  CONSTITUTIONAL: healthy, alert and NAD, well dressed, answering questions appropriately  ENT: no nose swelling or nasal discharge, mouth redness or gum changes.  EYES: eyes grossly normal to inspection, conjunctivae and sclerae normal, no exophthalmos or proptosis  THYROID:  no apparent nodules or goiter  LUNGS: no audible wheeze, cough or visible cyanosis, no visible retractions or increased work of breathing  ABDOMEN: abdomen not evaluated  EXTREMITIES: no hand tremors, limited exam  NEUROLOGY: CN grossly intact, mentation intact and speech normal   SKIN:  no apparent skin lesions, rash, or edema with visualized skin appearance  PSYCH: mentation appears normal, affect normal/bright, judgement and insight intact,   normal speech and appearance well groomed    LABS:    All pertinent notes, labs, and images personally reviewed by me.     A/P:  Encounter Diagnoses   Name Primary?     Type 2 diabetes mellitus without complication, with long-term current use of insulin (H) Yes     Morbid obesity (H)        Comments:  Reviewed health history and diabetes issues.  Overall glycemic control good  Reviewed and interpreted tests that I previously ordered.   Ordered appropriate tests for the endocrinology disease management.    Management options discussed and implemented after shared medical decision making with the patient.  T2DM problem is chronic-stable    Plan:  Discussed general issues with the diabetes diagnosis and management  We discussed the hgbA1c test which reflects previous overall glucose levels or control  Discussed the importance of blood glucose (BG) testing to assess glucose trends  Provided general overview of the diabetes medication options and medication treatment plan.    Recommend:  Continue the current metformin, Trulicity, Basaglar insulin, and Humalog correction scale  Reviewed use of Humalog correction scale if premeal or bedtime hyperglycemia  Discussed potential GI SE's with GLP1RA meds such  as Trulicity  Discussed dose titration options of the basal insulin med  Goal target premeal glucose 80- 150 mg/dl  No labs ordered at this time  Plan to see one of our [facility] Diabetes Educators   T2DM overview   Nutrition and diet plan review for weight loss   Wear diagnostic Hi  Keep focus on diet, exercise, weight management.  Continue losartan, atorvastatin, levothyroxine med use, per PCP  Advise having fasting lipid panel testing and dilated eye examination, at least annually    Addressed patient questions today    There are no Patient Instructions on file for this visit.    Future labs ordered today:   Orders Placed This Encounter   Procedures     AMB Adult Diabetes Educator Referral     Radiology/Consults ordered today: AMBULATORY ADULT DIABETES EDUCATOR REFERRAL    Total time spent in with the patient evaluation:  25 min  Additional time spent reviewing pertinent lab tests and chart notes, and documentation:  15 min    Follow-up:  7/26/22 at 3pm, Bert Araujo MD, MS  Endocrinology  [facility]    CC: Juani Mae

## 2022-06-21 NOTE — LETTER
6/21/2022         RE: Crystal Nicole  [address]  [place] [address]        Dear Colleague,    Thank you for referring your patient, Crystal Nicole, to the [facility]. Please see a copy of my visit note below.    Patient is being evaluated via a billable video visit.      How would you like to obtain your AVS? Reviewed verbally  If the video visit is dropped, the invitation should be resent by cell phone  Will anyone else be joining your video visit? no      Video Start Time: 2:05 pm     Video-Visit Details    Type of service:  Video Visit    Video End Time: 2:30 pm    Originating Location (pt. Location): home    Distant Location (provider location):  [facility]/home    Platform used for Video Visit:  [employer]      Name: Crystal Nicole is a 66 year old woman, self referred for evaluation of     Chief Complaint   Patient presents with     Diabetes       HPI:  Recent issues:  Here for evaluation of diabetes.  Had seen Dr. MERON Armijo, now transferring to my practice  Planning to have hysterectomy soon  Reviewed medical history from patient and Epic chart record        History of pre-diabetes  ~2016. Initial diagnosis of diabetes mellitus  Had seen physician in [place], [place]  Started treatment with metformin   ~6/2020. Acute back pain?, diagnosed with pneumonia, high glucose and hgbA1c 12%   Hospital treatment included insulin treatment   Treatment with Abx, pain resolved   Recalls basal insulin 55U and use of rapid acting Humalog     7/15/20 Endocrinology evaluation with Dr. MERON Armijo/[facility]  Addition of Farxiga but expensive, then change to Jardiance medication  ~Fall '21. Discontinue Jardiance since expensive      Previous FV hgbA1c trends include:     Lab Test 06/08/22  1149 03/23/22  1641 03/01/22  0951 12/06/21  1132 07/30/21  1236   A1C 7.2* 6.6* 6.7* 6.9* 6.5*     Current DM medications:  Metformin 500  mg  2-tabs in morning and evening  Trulicity 4.5 mg  Subcutaneous weekly  Basaglar Kwikpen 15U Subcutaneous in morning  Humalog sscale   140-179  2U   180-219  4U   220-259  6U   260-299  8U   >300   10U    Blood glucose (BG) meter:  One Touch Verio   Tests 2-3/day   Recent BG trends:  130-160 mg/dl overall    Fam Hx Diabetes:  Recent FV labs include:  Lab Results   Component Value Date    A1C 7.2 (H) 06/08/2022     03/23/2022    POTASSIUM 3.7 06/15/2022    CHLORIDE 107 03/23/2022    CO2 29 03/23/2022    ANIONGAP 5 03/23/2022     (H) 03/23/2022     (H) 03/23/2022    BUN 17 03/23/2022    CR 0.82 03/23/2022    GFRESTIMATED 79 03/23/2022    GFRESTBLACK 80 01/07/2021    TRISTIAN 9.9 03/23/2022    CPEPT 9.8 (H) 07/31/2020    CHOL 107 06/08/2022    TRIG 93 06/08/2022    HDL 49 (L) 06/08/2022    LDL 39 06/08/2022    NHDL 58 06/08/2022    UCRR 109 10/14/2021    MICROL 10 10/14/2021    UMALCR 9.17 10/14/2021    TSH 1.66 10/14/2021     Last eye exam 2021, no DR per patient  DM Complications:  None known      Lives in [facility]  Sees Dr. Juani Mae/[facility] for general medicine evaluations.    PMH/PSH:  Past Medical History:   Diagnosis Date     Acquired hypothyroidism      History of blood transfusion      Hypertension Lorsartan, triamterineHCTZ     Obesity due to excess calories      Type 2 diabetes mellitus without complication, with long-term current use of insulin (H)      Past Surgical History:   Procedure Laterality Date     CHOLECYSTECTOMY       HERNIA REPAIR  1990 s       Family Hx:  Family History   Problem Relation Age of Onset     Diabetes Mother         d age 80     Hypertension Mother      Coronary Artery Disease Father         d age 85     Brain Tumor Father      Hypertension Father      Diabetes Brother          Social Hx:  Social History     Socioeconomic History     Marital status:      Spouse name: Not on file     Number of children: Not on file     Years of  education: Not on file     Highest education level: Not on file   Occupational History     Not on file   Tobacco Use     Smoking status: Never Smoker     Smokeless tobacco: Never Used   Vaping Use     Vaping Use: Never used   Substance and Sexual Activity     Alcohol use: Yes     Comment: Occ     Drug use: Never     Sexual activity: Not Currently   Other Topics Concern     Parent/sibling w/ CABG, MI or angioplasty before 65F 55M? No   Social History Narrative     Not on file     Social Determinants of Health     Financial Resource Strain: Not on file   Food Insecurity: Not on file   Transportation Needs: Not on file   Physical Activity: Not on file   Stress: Not on file   Social Connections: Not on file   Intimate Partner Violence: Not on file   Housing Stability: Not on file          MEDICATIONS:  has a current medication list which includes the following prescription(s): allopurinol, aspirin, atorvastatin, clobetasol, trulicity, furosemide, glucosamine-chondroit-vit c-mn, insulin glargine, insulin lispro, levothyroxine, losartan, metformin, multivitamin, therapeutic, pantoprazole, triamcinolone, triamterene-hctz, alcohol prep, bd pen needle fadi 2nd gen, blood glucose, blood glucose, blood glucose calibration, blood glucose monitoring, blood glucose monitoring, clobetasol, freestyle yanely 14 day reader, novolog flexpen, insulin glargine, onetouch delica lancets 33g, onetouch verio iq, and thin.    ROS:     ROS: 10 point ROS neg other than the symptoms noted above in the HPI.    GENERAL: some fatigue, wt stable; denies fevers, chills, night sweats.   HEENT: no dysphagia, odonophagia, diplopia, neck pain  THYROID:  no apparent hyper or hypothyroid symptoms  CV: no chest pain, pressure, palpitations  LUNGS: no SOB, NICHOLS, cough, wheezing   ABDOMEN: some indigestion; no diarrhea, constipation, abdominal pain  EXTREMITIES: no rashes, ulcers, edema  NEUROLOGY: no headaches, denies changes in vision, tingling, extremitiy  numbness   MSK: no muscle aches or pains, weakness  SKIN: no rashes or lesions  : no menses  PSYCH:  stable mood, no significant anxiety or depression  ENDOCRINE: no heat or cold intolerance    Physical Exam (visual exam)  VS:  no vital signs taken for video visit  CONSTITUTIONAL: healthy, alert and NAD, well dressed, answering questions appropriately  ENT: no nose swelling or nasal discharge, mouth redness or gum changes.  EYES: eyes grossly normal to inspection, conjunctivae and sclerae normal, no exophthalmos or proptosis  THYROID:  no apparent nodules or goiter  LUNGS: no audible wheeze, cough or visible cyanosis, no visible retractions or increased work of breathing  ABDOMEN: abdomen not evaluated  EXTREMITIES: no hand tremors, limited exam  NEUROLOGY: CN grossly intact, mentation intact and speech normal   SKIN:  no apparent skin lesions, rash, or edema with visualized skin appearance  PSYCH: mentation appears normal, affect normal/bright, judgement and insight intact,   normal speech and appearance well groomed    LABS:    All pertinent notes, labs, and images personally reviewed by me.     A/P:  Encounter Diagnoses   Name Primary?     Type 2 diabetes mellitus without complication, with long-term current use of insulin (H) Yes     Morbid obesity (H)        Comments:  Reviewed health history and diabetes issues.  Overall glycemic control good  Reviewed and interpreted tests that I previously ordered.   Ordered appropriate tests for the endocrinology disease management.    Management options discussed and implemented after shared medical decision making with the patient.  T2DM problem is chronic-stable    Plan:  Discussed general issues with the diabetes diagnosis and management  We discussed the hgbA1c test which reflects previous overall glucose levels or control  Discussed the importance of blood glucose (BG) testing to assess glucose trends  Provided general overview of the diabetes medication options and  medication treatment plan.    Recommend:  Continue the current metformin, Trulicity, Basaglar insulin, and Humalog correction scale  Reviewed use of Humalog correction scale if premeal or bedtime hyperglycemia  Discussed potential GI SE's with GLP1RA meds such as Trulicity  Discussed dose titration options of the basal insulin med  Goal target premeal glucose 80- 150 mg/dl  No labs ordered at this time  Plan to see one of our [facility] Diabetes Educators   T2DM overview   Nutrition and diet plan review for weight loss   Wear diagnostic Hi  Keep focus on diet, exercise, weight management.  Continue losartan, atorvastatin, levothyroxine med use, per PCP  Advise having fasting lipid panel testing and dilated eye examination, at least annually    Addressed patient questions today    There are no Patient Instructions on file for this visit.    Future labs ordered today:   Orders Placed This Encounter   Procedures     AMB Adult Diabetes Educator Referral     Radiology/Consults ordered today: AMBULATORY ADULT DIABETES EDUCATOR REFERRAL    Total time spent in with the patient evaluation:  25 min  Additional time spent reviewing pertinent lab tests and chart notes, and documentation:  15 min    Follow-up:  7/26/22 at 3pm, Bert Araujo MD, MS  Endocrinology  [facility]    CC: Juani Mae           Again, thank you for allowing me to participate in the care of your patient.        Sincerely,        Eladio Araujo MD

## 2022-06-22 ENCOUNTER — TELEPHONE (OUTPATIENT)
Dept: ENDOCRINOLOGY | Facility: CLINIC | Age: 67
End: 2022-06-22

## 2022-06-22 NOTE — TELEPHONE ENCOUNTER
Diabetes Education Scheduling Outreach #1:    Call to patient to schedule. Patient is in the car right now.     Sent [employer] message for patient to call back at her convenience.    Delia Sullivan OnCall  Diabetes and Nutrition Scheduling

## 2022-06-22 NOTE — TELEPHONE ENCOUNTER
LVM for PT to call [phone] to schedule f/u appt virtual with Dr. Araujo.  Please use the 3:00 virtual spot either 7.26, 27, or 28th.  Provider and Nalini approval.

## 2022-07-03 ENCOUNTER — LAB (OUTPATIENT)
Dept: LAB | Facility: CLINIC | Age: 67
End: 2022-07-03
Attending: FAMILY MEDICINE
Payer: COMMERCIAL

## 2022-07-03 DIAGNOSIS — Z20.822 ENCOUNTER FOR LABORATORY TESTING FOR COVID-19 VIRUS: ICD-10-CM

## 2022-07-03 PROCEDURE — U0005 INFEC AGEN DETEC AMPLI PROBE: HCPCS

## 2022-07-04 LAB — SARS-COV-2 RNA RESP QL NAA+PROBE: NEGATIVE

## 2022-07-05 ENCOUNTER — TELEPHONE (OUTPATIENT)
Dept: FAMILY MEDICINE | Facility: CLINIC | Age: 67
End: 2022-07-05

## 2022-07-05 DIAGNOSIS — E11.00 TYPE 2 DIABETES MELLITUS WITH HYPEROSMOLARITY WITHOUT COMA, UNSPECIFIED WHETHER LONG TERM INSULIN USE (H): ICD-10-CM

## 2022-07-05 RX ORDER — INSULIN GLARGINE 100 [IU]/ML
15 INJECTION, SOLUTION SUBCUTANEOUS DAILY
Qty: 15 ML | Status: CANCELLED | OUTPATIENT
Start: 2022-07-05

## 2022-07-05 RX ORDER — PEN NEEDLE, DIABETIC 32GX 5/32"
NEEDLE, DISPOSABLE MISCELLANEOUS
Qty: 200 EACH | Refills: 3 | Status: SHIPPED | OUTPATIENT
Start: 2022-07-05 | End: 2023-05-11

## 2022-07-05 NOTE — TELEPHONE ENCOUNTER
Reason for call:  Medication   If this is a refill request, has the caller requested the refill from the pharmacy already? Yes  Will the patient be using a [facility]? No  Name of the pharmacy and phone number for the current request: [facility]/pharmacy #[id number] - [place], [place] - [address]     Name of the medication requested: kwikpen needles    Other request: Patient only has 5 needles left    Phone number to reach patient:  Home number on file [phone] (home)    Best Time:  Any time    Can we leave a detailed message on this number?  YES    Travel screening: Not Applicable

## 2022-07-05 NOTE — TELEPHONE ENCOUNTER
Prescription approved per [facility] Refill Protocol.    Josee Mcfarlane RN   [facility]

## 2022-07-06 ENCOUNTER — HOSPITAL ENCOUNTER (OUTPATIENT)
Facility: HOSPITAL | Age: 67
Discharge: HOME | End: 2022-07-06
Attending: OBSTETRICS & GYNECOLOGY | Admitting: OBSTETRICS & GYNECOLOGY
Payer: COMMERCIAL

## 2022-07-06 ENCOUNTER — ANESTHESIA EVENT (OUTPATIENT)
Dept: SURGERY | Facility: HOSPITAL | Age: 67
End: 2022-07-06
Payer: COMMERCIAL

## 2022-07-06 ENCOUNTER — ANESTHESIA (OUTPATIENT)
Dept: SURGERY | Facility: HOSPITAL | Age: 67
End: 2022-07-06
Payer: COMMERCIAL

## 2022-07-06 VITALS
BODY MASS INDEX: 50.02 KG/M2 | HEART RATE: 91 BPM | DIASTOLIC BLOOD PRESSURE: 93 MMHG | HEIGHT: 64 IN | RESPIRATION RATE: 18 BRPM | WEIGHT: 293 LBS | TEMPERATURE: 99.7 F | SYSTOLIC BLOOD PRESSURE: 173 MMHG | OXYGEN SATURATION: 100 %

## 2022-07-06 DIAGNOSIS — N85.02 COMPLEX ATYPICAL ENDOMETRIAL HYPERPLASIA: Primary | ICD-10-CM

## 2022-07-06 LAB
ABO/RH(D): NORMAL
ANTIBODY SCREEN: NEGATIVE
BASOPHILS # BLD AUTO: 0.1 10E3/UL (ref 0–0.2)
BASOPHILS NFR BLD AUTO: 1 %
EOSINOPHIL # BLD AUTO: 0.3 10E3/UL (ref 0–0.7)
EOSINOPHIL NFR BLD AUTO: 2 %
ERYTHROCYTE [DISTWIDTH] IN BLOOD BY AUTOMATED COUNT: 13.5 % (ref 10–15)
GLUCOSE BLDC GLUCOMTR-MCNC: 184 MG/DL (ref 70–99)
GLUCOSE BLDC GLUCOMTR-MCNC: 187 MG/DL (ref 70–99)
HCT VFR BLD AUTO: 43.1 % (ref 35–47)
HGB BLD-MCNC: 13.8 G/DL (ref 11.7–15.7)
IMM GRANULOCYTES # BLD: 0.1 10E3/UL
IMM GRANULOCYTES NFR BLD: 1 %
LYMPHOCYTES # BLD AUTO: 0.6 10E3/UL (ref 0.8–5.3)
LYMPHOCYTES NFR BLD AUTO: 6 %
MCH RBC QN AUTO: 30.7 PG (ref 26.5–33)
MCHC RBC AUTO-ENTMCNC: 32 G/DL (ref 31.5–36.5)
MCV RBC AUTO: 96 FL (ref 78–100)
MONOCYTES # BLD AUTO: 1 10E3/UL (ref 0–1.3)
MONOCYTES NFR BLD AUTO: 10 %
NEUTROPHILS # BLD AUTO: 8.5 10E3/UL (ref 1.6–8.3)
NEUTROPHILS NFR BLD AUTO: 80 %
NRBC # BLD AUTO: 0 10E3/UL
NRBC BLD AUTO-RTO: 0 /100
PLATELET # BLD AUTO: 264 10E3/UL (ref 150–450)
RBC # BLD AUTO: 4.5 10E6/UL (ref 3.8–5.2)
SPECIMEN EXPIRATION DATE: NORMAL
WBC # BLD AUTO: 10.5 10E3/UL (ref 4–11)

## 2022-07-06 PROCEDURE — 250N000009 HC RX 250: Performed by: NURSE ANESTHETIST, CERTIFIED REGISTERED

## 2022-07-06 PROCEDURE — 258N000003 HC RX IP 258 OP 636: Performed by: ANESTHESIOLOGY

## 2022-07-06 PROCEDURE — 88307 TISSUE EXAM BY PATHOLOGIST: CPT | Mod: 26 | Performed by: PATHOLOGY

## 2022-07-06 PROCEDURE — 88307 TISSUE EXAM BY PATHOLOGIST: CPT | Mod: TC | Performed by: OBSTETRICS & GYNECOLOGY

## 2022-07-06 PROCEDURE — 999N000127 HC STATISTIC PERIPHERAL IV START W US GUIDANCE

## 2022-07-06 PROCEDURE — 250N000011 HC RX IP 250 OP 636: Performed by: NURSE ANESTHETIST, CERTIFIED REGISTERED

## 2022-07-06 PROCEDURE — 370N000017 HC ANESTHESIA TECHNICAL FEE, PER MIN: Performed by: OBSTETRICS & GYNECOLOGY

## 2022-07-06 PROCEDURE — 36415 COLL VENOUS BLD VENIPUNCTURE: CPT | Performed by: PHYSICIAN ASSISTANT

## 2022-07-06 PROCEDURE — 250N000013 HC RX MED GY IP 250 OP 250 PS 637: Performed by: PHYSICIAN ASSISTANT

## 2022-07-06 PROCEDURE — 250N000013 HC RX MED GY IP 250 OP 250 PS 637: Performed by: OBSTETRICS & GYNECOLOGY

## 2022-07-06 PROCEDURE — 85025 COMPLETE CBC W/AUTO DIFF WBC: CPT | Performed by: PHYSICIAN ASSISTANT

## 2022-07-06 PROCEDURE — 710N000009 HC RECOVERY PHASE 1, LEVEL 1, PER MIN: Performed by: OBSTETRICS & GYNECOLOGY

## 2022-07-06 PROCEDURE — 999N000141 HC STATISTIC PRE-PROCEDURE NURSING ASSESSMENT: Performed by: OBSTETRICS & GYNECOLOGY

## 2022-07-06 PROCEDURE — 360N000080 HC SURGERY LEVEL 7, PER MIN: Performed by: OBSTETRICS & GYNECOLOGY

## 2022-07-06 PROCEDURE — 710N000012 HC RECOVERY PHASE 2, PER MINUTE: Performed by: OBSTETRICS & GYNECOLOGY

## 2022-07-06 PROCEDURE — 250N000013 HC RX MED GY IP 250 OP 250 PS 637: Performed by: ANESTHESIOLOGY

## 2022-07-06 PROCEDURE — 250N000011 HC RX IP 250 OP 636: Performed by: ANESTHESIOLOGY

## 2022-07-06 PROCEDURE — 250N000011 HC RX IP 250 OP 636: Performed by: PHYSICIAN ASSISTANT

## 2022-07-06 PROCEDURE — 86850 RBC ANTIBODY SCREEN: CPT | Performed by: PHYSICIAN ASSISTANT

## 2022-07-06 PROCEDURE — 272N000001 HC OR GENERAL SUPPLY STERILE: Performed by: OBSTETRICS & GYNECOLOGY

## 2022-07-06 PROCEDURE — 82962 GLUCOSE BLOOD TEST: CPT

## 2022-07-06 PROCEDURE — 250N000025 HC SEVOFLURANE, PER MIN: Performed by: OBSTETRICS & GYNECOLOGY

## 2022-07-06 PROCEDURE — 258N000003 HC RX IP 258 OP 636: Performed by: NURSE ANESTHETIST, CERTIFIED REGISTERED

## 2022-07-06 PROCEDURE — 88311 DECALCIFY TISSUE: CPT | Mod: 26 | Performed by: PATHOLOGY

## 2022-07-06 PROCEDURE — 250N000011 HC RX IP 250 OP 636: Performed by: OBSTETRICS & GYNECOLOGY

## 2022-07-06 RX ORDER — IBUPROFEN 200 MG
600 TABLET ORAL ONCE
Status: COMPLETED | OUTPATIENT
Start: 2022-07-06 | End: 2022-07-06

## 2022-07-06 RX ORDER — ACETAMINOPHEN 325 MG/1
975 TABLET ORAL ONCE
Status: COMPLETED | OUTPATIENT
Start: 2022-07-06 | End: 2022-07-06

## 2022-07-06 RX ORDER — NALOXONE HYDROCHLORIDE 1 MG/ML
0.4 INJECTION INTRAMUSCULAR; INTRAVENOUS; SUBCUTANEOUS
Status: DISCONTINUED | OUTPATIENT
Start: 2022-07-06 | End: 2022-07-06 | Stop reason: HOSPADM

## 2022-07-06 RX ORDER — LIDOCAINE HYDROCHLORIDE 10 MG/ML
INJECTION, SOLUTION INFILTRATION; PERINEURAL PRN
Status: DISCONTINUED | OUTPATIENT
Start: 2022-07-06 | End: 2022-07-06

## 2022-07-06 RX ORDER — ONDANSETRON 2 MG/ML
4 INJECTION INTRAMUSCULAR; INTRAVENOUS EVERY 30 MIN PRN
Status: DISCONTINUED | OUTPATIENT
Start: 2022-07-06 | End: 2022-07-06 | Stop reason: HOSPADM

## 2022-07-06 RX ORDER — ACETAMINOPHEN 10 MG/ML
1000 INJECTION, SOLUTION INTRAVENOUS ONCE
Status: DISCONTINUED | OUTPATIENT
Start: 2022-07-06 | End: 2022-07-06 | Stop reason: CLARIF

## 2022-07-06 RX ORDER — SODIUM CHLORIDE, SODIUM LACTATE, POTASSIUM CHLORIDE, CALCIUM CHLORIDE 600; 310; 30; 20 MG/100ML; MG/100ML; MG/100ML; MG/100ML
INJECTION, SOLUTION INTRAVENOUS CONTINUOUS
Status: DISCONTINUED | OUTPATIENT
Start: 2022-07-06 | End: 2022-07-06 | Stop reason: HOSPADM

## 2022-07-06 RX ORDER — OXYCODONE HYDROCHLORIDE 5 MG/1
5 TABLET ORAL EVERY 4 HOURS PRN
Status: DISCONTINUED | OUTPATIENT
Start: 2022-07-06 | End: 2022-07-06 | Stop reason: HOSPADM

## 2022-07-06 RX ORDER — OXYCODONE HYDROCHLORIDE 5 MG/1
5-10 TABLET ORAL EVERY 4 HOURS PRN
Qty: 12 TABLET | Refills: 0 | Status: SHIPPED | OUTPATIENT
Start: 2022-07-06 | End: 2022-12-22

## 2022-07-06 RX ORDER — FENTANYL CITRATE 50 UG/ML
25 INJECTION, SOLUTION INTRAMUSCULAR; INTRAVENOUS
Status: DISCONTINUED | OUTPATIENT
Start: 2022-07-06 | End: 2022-07-06 | Stop reason: HOSPADM

## 2022-07-06 RX ORDER — ONDANSETRON 4 MG/1
4 TABLET, ORALLY DISINTEGRATING ORAL EVERY 30 MIN PRN
Status: DISCONTINUED | OUTPATIENT
Start: 2022-07-06 | End: 2022-07-06 | Stop reason: HOSPADM

## 2022-07-06 RX ORDER — FENTANYL CITRATE 50 UG/ML
INJECTION, SOLUTION INTRAMUSCULAR; INTRAVENOUS PRN
Status: DISCONTINUED | OUTPATIENT
Start: 2022-07-06 | End: 2022-07-06

## 2022-07-06 RX ORDER — NALOXONE HYDROCHLORIDE 1 MG/ML
0.2 INJECTION INTRAMUSCULAR; INTRAVENOUS; SUBCUTANEOUS
Status: DISCONTINUED | OUTPATIENT
Start: 2022-07-06 | End: 2022-07-06 | Stop reason: HOSPADM

## 2022-07-06 RX ORDER — MEPERIDINE HYDROCHLORIDE 25 MG/ML
12.5 INJECTION INTRAMUSCULAR; INTRAVENOUS; SUBCUTANEOUS
Status: DISCONTINUED | OUTPATIENT
Start: 2022-07-06 | End: 2022-07-06 | Stop reason: HOSPADM

## 2022-07-06 RX ORDER — MAGNESIUM SULFATE 4 G/50ML
4 INJECTION INTRAVENOUS ONCE
Status: COMPLETED | OUTPATIENT
Start: 2022-07-06 | End: 2022-07-06

## 2022-07-06 RX ORDER — DEXAMETHASONE SODIUM PHOSPHATE 10 MG/ML
INJECTION, SOLUTION INTRAMUSCULAR; INTRAVENOUS PRN
Status: DISCONTINUED | OUTPATIENT
Start: 2022-07-06 | End: 2022-07-06

## 2022-07-06 RX ORDER — BUPIVACAINE HYDROCHLORIDE 2.5 MG/ML
INJECTION, SOLUTION EPIDURAL; INFILTRATION; INTRACAUDAL PRN
Status: DISCONTINUED | OUTPATIENT
Start: 2022-07-06 | End: 2022-07-06 | Stop reason: HOSPADM

## 2022-07-06 RX ORDER — SODIUM CHLORIDE, SODIUM LACTATE, POTASSIUM CHLORIDE, CALCIUM CHLORIDE 600; 310; 30; 20 MG/100ML; MG/100ML; MG/100ML; MG/100ML
INJECTION, SOLUTION INTRAVENOUS CONTINUOUS PRN
Status: DISCONTINUED | OUTPATIENT
Start: 2022-07-06 | End: 2022-07-06

## 2022-07-06 RX ORDER — ACETAMINOPHEN 325 MG/1
975 TABLET ORAL ONCE
Status: DISCONTINUED | OUTPATIENT
Start: 2022-07-06 | End: 2022-07-06 | Stop reason: HOSPADM

## 2022-07-06 RX ORDER — CEFAZOLIN SODIUM/WATER 3 G/30 ML
3 SYRINGE (ML) INTRAVENOUS
Status: COMPLETED | OUTPATIENT
Start: 2022-07-06 | End: 2022-07-06

## 2022-07-06 RX ORDER — CEFAZOLIN SODIUM/WATER 3 G/30 ML
3 SYRINGE (ML) INTRAVENOUS SEE ADMIN INSTRUCTIONS
Status: DISCONTINUED | OUTPATIENT
Start: 2022-07-06 | End: 2022-07-06 | Stop reason: HOSPADM

## 2022-07-06 RX ORDER — PROPOFOL 10 MG/ML
INJECTION, EMULSION INTRAVENOUS PRN
Status: DISCONTINUED | OUTPATIENT
Start: 2022-07-06 | End: 2022-07-06

## 2022-07-06 RX ORDER — FENTANYL CITRATE 50 UG/ML
25 INJECTION, SOLUTION INTRAMUSCULAR; INTRAVENOUS EVERY 5 MIN PRN
Status: DISCONTINUED | OUTPATIENT
Start: 2022-07-06 | End: 2022-07-06 | Stop reason: HOSPADM

## 2022-07-06 RX ORDER — LIDOCAINE 40 MG/G
CREAM TOPICAL
Status: DISCONTINUED | OUTPATIENT
Start: 2022-07-06 | End: 2022-07-06 | Stop reason: HOSPADM

## 2022-07-06 RX ORDER — ONDANSETRON 2 MG/ML
INJECTION INTRAMUSCULAR; INTRAVENOUS PRN
Status: DISCONTINUED | OUTPATIENT
Start: 2022-07-06 | End: 2022-07-06

## 2022-07-06 RX ADMIN — SODIUM CHLORIDE, POTASSIUM CHLORIDE, SODIUM LACTATE AND CALCIUM CHLORIDE: 600; 310; 30; 20 INJECTION, SOLUTION INTRAVENOUS at 11:06

## 2022-07-06 RX ADMIN — ROCURONIUM BROMIDE 10 MG: 50 INJECTION, SOLUTION INTRAVENOUS at 12:07

## 2022-07-06 RX ADMIN — ROCURONIUM BROMIDE 10 MG: 50 INJECTION, SOLUTION INTRAVENOUS at 12:41

## 2022-07-06 RX ADMIN — PROPOFOL 180 MG: 10 INJECTION, EMULSION INTRAVENOUS at 11:21

## 2022-07-06 RX ADMIN — ROCURONIUM BROMIDE 50 MG: 50 INJECTION, SOLUTION INTRAVENOUS at 11:21

## 2022-07-06 RX ADMIN — SODIUM CHLORIDE, POTASSIUM CHLORIDE, SODIUM LACTATE AND CALCIUM CHLORIDE: 600; 310; 30; 20 INJECTION, SOLUTION INTRAVENOUS at 14:00

## 2022-07-06 RX ADMIN — OXYCODONE HYDROCHLORIDE 5 MG: 5 TABLET ORAL at 16:30

## 2022-07-06 RX ADMIN — ACETAMINOPHEN 975 MG: 325 TABLET ORAL at 08:26

## 2022-07-06 RX ADMIN — FENTANYL CITRATE 100 MCG: 50 INJECTION, SOLUTION INTRAMUSCULAR; INTRAVENOUS at 11:21

## 2022-07-06 RX ADMIN — ONDANSETRON 4 MG: 2 INJECTION INTRAMUSCULAR; INTRAVENOUS at 13:27

## 2022-07-06 RX ADMIN — FENTANYL CITRATE 25 MCG: 50 INJECTION, SOLUTION INTRAMUSCULAR; INTRAVENOUS at 16:04

## 2022-07-06 RX ADMIN — IBUPROFEN 600 MG: 200 TABLET, FILM COATED ORAL at 17:22

## 2022-07-06 RX ADMIN — MAGNESIUM SULFATE HEPTAHYDRATE 4 G: 80 INJECTION, SOLUTION INTRAVENOUS at 11:06

## 2022-07-06 RX ADMIN — Medication 3 G: at 11:10

## 2022-07-06 RX ADMIN — SUGAMMADEX 400 MG: 100 INJECTION, SOLUTION INTRAVENOUS at 13:40

## 2022-07-06 RX ADMIN — LIDOCAINE HYDROCHLORIDE 50 MG: 10 INJECTION, SOLUTION INFILTRATION; PERINEURAL at 11:21

## 2022-07-06 RX ADMIN — DEXAMETHASONE SODIUM PHOSPHATE 4 MG: 10 INJECTION, SOLUTION INTRAMUSCULAR; INTRAVENOUS at 11:51

## 2022-07-06 RX ADMIN — ROCURONIUM BROMIDE 10 MG: 50 INJECTION, SOLUTION INTRAVENOUS at 13:12

## 2022-07-06 RX ADMIN — PROPOFOL 20 MG: 10 INJECTION, EMULSION INTRAVENOUS at 11:51

## 2022-07-06 NOTE — BRIEF OP NOTE
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]   Post-operative diagnosis Same plus severe pelvic adhesions   Procedure: Procedure(s):  ROBOTIC HYSTERECTOMY, BILATERAL SALPINGO OOPHORECTOMY, LYSIS OF ADHESIONS  REMOVAL OF INTRAUTERINE DEVICE   Surgeon(s): Surgeon(s) and Role:     * Jefferson Chavez MD - Primary   Estimated blood loss: * No values recorded between 7/6/2022 11:46 AM and 7/6/2022  1:40 PM *    Specimens: ID Type Source Tests Collected by Time Destination   1 : UTERUS, CERVIX, BILATERLA FALLOIAN TUBES AND OVARIES  Tissue Uterus, Cervix, Bilateral Fallopian Tubes & Ovaries SURGICAL PATHOLOGY EXAM Jefferson Chavez MD 7/6/2022  1:37 PM       Findings: See dictated note

## 2022-07-06 NOTE — ANESTHESIA PROCEDURE NOTES
Airway       Patient location during procedure: OR       Procedure Start/Stop Times: 7/6/2022 11:23 AM  Staff -        CRNA: Nir Ferreira APRN CRNA       Performed By: CRNA  Consent for Airway        Urgency: elective  Indications and Patient Condition       Indications for airway management: davi-procedural       Induction type:intravenous       Mask difficulty assessment: 1 - vent by mask    Final Airway Details       Final airway type: endotracheal airway       Successful airway: ETT - single and Oral  Endotracheal Airway Details        ETT size (mm): 7.0       Cuffed: yes       Successful intubation technique: direct laryngoscopy and video laryngoscopy       VL Blade Size: Glidescope 3       Grade View of Cords: 1       Adjucts: stylet       Position: Right       Measured from: gums/teeth       Secured at (cm): 22       Bite block used: None    Post intubation assessment        Placement verified by: capnometry, equal breath sounds and chest rise        Number of attempts at approach: 1       Secured with: silk tape       Ease of procedure: easy       Dentition: Intact and Unchanged    Medication(s) Administered   Medication Administration Time: 7/6/2022 11:23 AM

## 2022-07-06 NOTE — ANESTHESIA POSTPROCEDURE EVALUATION
Patient: Crystal Nicole    Procedure: Procedure(s):  ROBOTIC HYSTERECTOMY, BILATERAL SALPINGO OOPHORECTOMY, LYSIS OF ADHESIONS  REMOVAL OF INTRAUTERINE DEVICE       Anesthesia Type:  General    Note:  Disposition: Outpatient   Postop Pain Control: Uneventful            Sign Out: Well controlled pain   PONV: No   Neuro/Psych: Uneventful            Sign Out: Acceptable/Baseline neuro status   Airway/Respiratory: Uneventful            Sign Out: Acceptable/Baseline resp. status   CV/Hemodynamics: Uneventful            Sign Out: Acceptable CV status; No obvious hypovolemia; No obvious fluid overload   Other NRE: NONE   DID A NON-ROUTINE EVENT OCCUR? No    Event details/Postop Comments:  No issues.             Last vitals:  Vitals Value Taken Time   /71 07/06/22 1430   Temp 37.6  C (99.7  F) 07/06/22 1352   Pulse 82 07/06/22 1440   Resp 15 07/06/22 1440   SpO2 98 % 07/06/22 1440   Vitals shown include unvalidated device data.    Electronically Signed By: Dayna Cole MD  July 6, 2022  2:41 PM

## 2022-07-06 NOTE — ANESTHESIA PREPROCEDURE EVALUATION
Anesthesia Pre-Procedure Evaluation    Patient: Crystal Nicole   MRN: [id number] : 1955        Procedure : Procedure(s):  ROBOTIC HYSTERECTOMY, BILATERAL SALPINGO OOPHORECTOMY          Past Medical History:   Diagnosis Date     Acquired hypothyroidism      History of blood transfusion      Hypertension Lorsartan, triamterineHCTZ     Obesity due to excess calories      Type 2 diabetes mellitus without complication, with long-term current use of insulin (H)       Past Surgical History:   Procedure Laterality Date     CHOLECYSTECTOMY       HERNIA REPAIR  1990      No Known Allergies   Social History     Tobacco Use     Smoking status: Never Smoker     Smokeless tobacco: Never Used   Substance Use Topics     Alcohol use: Yes     Comment: Occ      Wt Readings from Last 1 Encounters:   22 146.8 kg (323 lb 9 oz)        Anesthesia Evaluation            ROS/MED HX  ENT/Pulmonary:  - neg pulmonary ROS     Neurologic:  - neg neurologic ROS     Cardiovascular:     (+) Dyslipidemia hypertension-----    METS/Exercise Tolerance: 4 - Raking leaves, gardening    Hematologic:  - neg hematologic  ROS     Musculoskeletal:   (+) arthritis,     GI/Hepatic:     (+) GERD,     Renal/Genitourinary:     (+) renal disease, type: CRI,     Endo:     (+) type II DM, thyroid problem, Obesity,     Psychiatric/Substance Use:  - neg psychiatric ROS     Infectious Disease:       Malignancy:       Other:            Physical Exam    Airway        Mallampati: IV   TM distance: > 3 FB   Neck ROM: full   Mouth opening: > 3 cm    Respiratory Devices and Support         Dental       (+) chipped      Cardiovascular   cardiovascular exam normal          Pulmonary   pulmonary exam normal                OUTSIDE LABS:  CBC:   Lab Results   Component Value Date    WBC 10.5 2022    WBC 13.9 (H) 2020    HGB 13.8 2022    HGB 13.9 06/15/2022    HCT 43.1 2022    HCT 40.0 2020     2022     2020     BMP:    Lab Results   Component Value Date     03/23/2022     03/01/2022    POTASSIUM 3.7 06/15/2022    POTASSIUM 3.9 03/23/2022    CHLORIDE 107 03/23/2022    CHLORIDE 106 03/01/2022    CO2 29 03/23/2022    CO2 29 03/01/2022    BUN 17 03/23/2022    BUN 21 03/01/2022    CR 0.82 03/23/2022    CR 1.11 (H) 03/01/2022     (H) 07/06/2022     (H) 03/23/2022     (H) 03/23/2022     COAGS: No results found for: PTT, INR, FIBR  POC:   Lab Results   Component Value Date     (H) 06/20/2020     HEPATIC:   Lab Results   Component Value Date    ALBUMIN 2.7 (L) 06/19/2020    PROTTOTAL 7.8 06/19/2020    ALT 39 07/30/2021    AST 22 07/30/2021    ALKPHOS 75 06/19/2020    BILITOTAL 0.6 06/19/2020     OTHER:   Lab Results   Component Value Date    A1C 7.2 (H) 06/08/2022    TRISTIAN 9.9 03/23/2022    PHOS 3.2 06/20/2020    MAG 1.7 06/18/2020    LIPASE 191 06/18/2020    TSH 1.66 10/14/2021       Anesthesia Plan    ASA Status:  4   NPO Status:  NPO Appropriate    Anesthesia Type: General.     - Airway: ETT   Induction: Intravenous, RSI, Propofol.   Maintenance: Balanced.        Consents    Anesthesia Plan(s) and associated risks, benefits, and realistic alternatives discussed. Questions answered and patient/representative(s) expressed understanding.     - Discussed: Risks, Benefits and Alternatives for BOTH SEDATION and the PROCEDURE were discussed     - Discussed with:  Patient      - Extended Intubation/Ventilatory Support Discussed: No.      - Patient is DNR/DNI Status: No    Use of blood products discussed: No .     Postoperative Care    Pain management: IV analgesics, Multi-modal analgesia.   PONV prophylaxis: Ondansetron (or other 5HT-3), Dexamethasone or Solumedrol     Comments:    Other Comments: GETA    RSI with succ    Ponv ppx    Mg gtt            Dayna Cole MD

## 2022-07-06 NOTE — H&P
History and Physical Update    I have examined the patient and reviewed the history and physical that is present on this chart. The changes in the patient's history and physical condition are as follows:    None    Jefferson Chavez MD

## 2022-07-06 NOTE — ANESTHESIA CARE TRANSFER NOTE
Patient: Crystal Nicole    Procedure: Procedure(s):  ROBOTIC HYSTERECTOMY, BILATERAL SALPINGO OOPHORECTOMY, LYSIS OF ADHESIONS  REMOVAL OF INTRAUTERINE DEVICE       Diagnosis: Complex endometrial hyperplasia [N85.01]  Diagnosis Additional Information: No value filed.    Anesthesia Type:   General     Note:    Oropharynx: oropharynx clear of all foreign objects and spontaneously breathing  Level of Consciousness: awake  Oxygen Supplementation: face mask  Level of Supplemental Oxygen (L/min / FiO2): 8  Independent Airway: airway patency satisfactory and stable  Dentition: dentition unchanged  Vital Signs Stable: post-procedure vital signs reviewed and stable  Report to RN Given: handoff report given  Patient transferred to: PACU    Handoff Report: Identifed the Patient, Identified the Reponsible Provider, Reviewed the pertinent medical history, Discussed the surgical course, Reviewed Intra-OP anesthesia mangement and issues during anesthesia, Set expectations for post-procedure period and Allowed opportunity for questions and acknowledgement of understanding      Vitals:  Vitals Value Taken Time   /93 07/06/22 1354   Temp 37.6  C (99.7  F) 07/06/22 1352   Pulse 86 07/06/22 1357   Resp 37 07/06/22 1357   SpO2 96 % 07/06/22 1357   Vitals shown include unvalidated device data.    Electronically Signed By: KATHRYN Lugo CRNA  July 6, 2022  1:59 PM

## 2022-07-07 ENCOUNTER — TELEPHONE (OUTPATIENT)
Dept: ENDOCRINOLOGY | Facility: CLINIC | Age: 67
End: 2022-07-07

## 2022-07-07 NOTE — OP NOTE
Procedure Date: 07/06/2022    PREOPERATIVE DIAGNOSIS:  Atypical complex endometrial hyperplasia, morbid obesity.    POSTOPERATIVE DIAGNOSES:  Atypical complex endometrial hyperplasia, morbid obesity, plus severe pelvic adhesions.    PROCEDURE:  Robotic total laparoscopic hysterectomy, bilateral salpingo-oophorectomy, lysis of adhesions and removal of intrauterine device with a modifier 22 for over 30 minutes of adhesiolysis.    SURGEON:  Jefferson Chavez MD    ASSISTANT:  Nithya Vick.    ANESTHESIA:  General.    ESTIMATED BLOOD LOSS:  10 mL, replaced with lactated Ringer's.    DRAINS:  None.    COMPLICATIONS:  None.    INDICATIONS FOR PROCEDURE:  This is a 67-year-old female with a history of endometrial hyperplasia.  She had multiple attempts at treatment including most recently an IUD, which were unsuccessful.  Based on this, the decision was made to proceed with hysterectomy.  The risks, benefits and alternatives were discussed at length.  She expressed understanding and wished to proceed.    OPERATIVE FINDINGS:  The patient had had a large lower abdominal hernia repair approximately 20 years ago and there were severe adhesions between the colon, omentum and the underlying mesh.  This did change the placement of the trocars and that required extensive dissection to safely take the bowel off the anterior abdominal wall, before the uterus could be exposed.  Uterus contained multiple fibroids and was enlarged, but no discrete abnormalities were noted.  There were no excrescences or papillations.  Both tubes and ovaries appeared normal.    DESCRIPTION OF PROCEDURE:  The patient was brought to the operating room and after induction of general anesthesia, was prepped and draped in the dorsal lithotomy position.  A timeout was called and the patient and the procedure were verified.  A EMMIE catheter was attached to the cervix.  A Chowdhury was placed and attention was directed to the midline.  A supraumbilical incision was  made just to the right of the umbilicus and the Veress needle was inserted.  The abdomen was insufflated with 3 liters of CO2.  An 8 mm trocar and trocar sheath was inserted.  Laparoscope was introduced and a second incision was then made lower in the pelvis, specifically in the left lower quadrant and this did go through the abdominal wall mesh, but avoided the adhesions mentioned above.  Two additional incisions were then made, both to the right of the supraumbilical incision and these were performed after careful dissection of the omentum and bowel from the anterior abdominal wall.  The adhesions were extensive and both traction and the LigaSure was used to ultimately take these down.  There were more adhesions above the umbilicus, but these were not interfering with the operative field and so these were not dissected.    Thirty minutes were spent carefully and meticulously dissecting the adhesions.  Once this was done, both ureters were identified transperitoneally.  The robot had been docked and I took my place at the console.  The vessel sealer was used to come across the infundibulopelvic ligament and then the round and broad ligaments.  A series of parametrial bites were then obtained.  Once the uterine arteries were cauterized, the robotic scissors was used to take down the bladder flap and the same instrument was used to perform colpotomy.  The incision was extended around the cervical cup with all bleeders cauterized as the procedure progressed.  Once it was completely freed, the specimen was delivered vaginally and the cuff was closed in 2 layers of V-Loc suture of the same suture was then used to reapproximate the uterosacral ligaments.  Copious amounts of irrigation were then used.  Good hemostasis was noted and at this point, the decision was made to terminate the procedure.  Instruments were removed.  The robot had been undocked.  Trocars were removed and incisions were closed with nylon.  Sponge and  instrument counts were correct.  The patient tolerated the procedure well and was taken to the recovery room in good condition.    Jefferson Chavez MD   Female Pelvic Medicine and Reconstructive Surgery          D: 2022   T: 2022   MT: jason    Name:     DEIRDRE ALEXANDRE  MRN:      [id number]        Account:        [id number]   :      1955           Procedure Date: 2022     Document: [id number]

## 2022-07-07 NOTE — TELEPHONE ENCOUNTER
Health Call Center    Phone Message    May a detailed message be left on voicemail: yes     Reason for Call: Other: Dr. Araujo needs to call insurance [phone] for [facility] Medicare and explain what exactly he wants.  It has to be for the CGM diagnostic for the two week trial.  Pt called her insurance and they have no idea what the pt is talking about.     Please call pt with questions    Action Taken: Message routed to:  [facility] ([facility] Endo    Travel Screening: Not Applicable

## 2022-07-08 LAB
PATH REPORT.COMMENTS IMP SPEC: NORMAL
PATH REPORT.COMMENTS IMP SPEC: NORMAL
PATH REPORT.FINAL DX SPEC: NORMAL
PATH REPORT.GROSS SPEC: NORMAL
PATH REPORT.MICROSCOPIC SPEC OTHER STN: NORMAL
PATH REPORT.RELEVANT HX SPEC: NORMAL
PHOTO IMAGE: NORMAL

## 2022-07-08 NOTE — TELEPHONE ENCOUNTER
Message noted, called patient.  I explained that Medicare does cover the charges for the (personal and diagnostic) Hi CGM sensor use.  This billing charge is placed by the [facility] Diabetes Educator when they see the patient for the Diab Ed evaluation.    JONELLE Araujo MD, MS  Endocrinology  [facility]

## 2022-07-21 DIAGNOSIS — I10 ESSENTIAL HYPERTENSION: ICD-10-CM

## 2022-07-21 DIAGNOSIS — M10.9 GOUT, UNSPECIFIED CAUSE, UNSPECIFIED CHRONICITY, UNSPECIFIED SITE: ICD-10-CM

## 2022-07-23 RX ORDER — LOSARTAN POTASSIUM 25 MG/1
TABLET ORAL
Qty: 90 TABLET | Refills: 3 | OUTPATIENT
Start: 2022-07-23

## 2022-07-23 RX ORDER — ALLOPURINOL 300 MG/1
1 TABLET ORAL DAILY
Qty: 90 TABLET | Refills: 3 | OUTPATIENT
Start: 2022-07-23

## 2022-08-02 DIAGNOSIS — K21.9 GASTROESOPHAGEAL REFLUX DISEASE WITHOUT ESOPHAGITIS: ICD-10-CM

## 2022-08-03 RX ORDER — PANTOPRAZOLE SODIUM 40 MG/1
TABLET, DELAYED RELEASE ORAL
Qty: 90 TABLET | Refills: 0 | Status: SHIPPED | OUTPATIENT
Start: 2022-08-03 | End: 2022-12-19

## 2022-08-03 NOTE — TELEPHONE ENCOUNTER
Prescription approved per [facility] Refill Protocol.    Ekta Epstein RN BSN  [facility]

## 2022-08-15 DIAGNOSIS — E11.00 TYPE 2 DIABETES MELLITUS WITH HYPEROSMOLARITY WITHOUT COMA, UNSPECIFIED WHETHER LONG TERM INSULIN USE (H): ICD-10-CM

## 2022-08-15 RX ORDER — METFORMIN HCL 500 MG
1000 TABLET, EXTENDED RELEASE 24 HR ORAL
Qty: 360 TABLET | Refills: 3 | OUTPATIENT
Start: 2022-08-15

## 2022-08-15 RX ORDER — METFORMIN HCL 500 MG
TABLET, EXTENDED RELEASE 24 HR ORAL
Qty: 180 TABLET | Refills: 3 | Status: SHIPPED | OUTPATIENT
Start: 2022-08-15 | End: 2022-08-19

## 2022-08-15 NOTE — TELEPHONE ENCOUNTER
Dr. Mae I  m totally out of my Metformin I think  [facility] has been asking Dr Armijo but he is no longer there that s why they haven t filled it yet, so could you please ASAP a 90 day supply to the [facility] on [address] [facility]. Thanks,  Crystal Nicole

## 2022-08-15 NOTE — TELEPHONE ENCOUNTER
Routing refill request to provider for review/approval because:  Patient is out as of this morning.   Directions need clarifications- is is BID or daily?    Tiffanie Razo, RN, BSN

## 2022-08-15 NOTE — TELEPHONE ENCOUNTER
Patient called the clinic requesting refills for the metFORMIN (GLUCOPHAGE-XR) 500 MG 24 hr tablet. She took the last dose this morning.

## 2022-08-15 NOTE — TELEPHONE ENCOUNTER
"Requested Prescriptions   Signed Prescriptions Disp Refills    metFORMIN (GLUCOPHAGE XR) 500 MG 24 hr tablet 180 tablet 3     Sig: TAKE 2 TABLETS (1,000 MG) BY MOUTH DAILY (WITH BREAKFAST)       Biguanide Agents Passed - 8/15/2022 12:35 PM        Passed - Patient is age 10 or older        Passed - Patient has documented A1c within the specified period of time.     If HgbA1C is 8 or greater, it needs to be on file within the past 3 months.  If less than 8, must be on file within the past 6 months.     Recent Labs   Lab Test 06/08/22  1149   A1C 7.2*             Passed - Patient's CR is NOT>1.4 OR Patient's EGFR is NOT<45 within past 12 mos.     Recent Labs   Lab Test 03/23/22  1641 07/30/21  1236 01/07/21  1040   GFRESTIMATED 79   < > 69   GFRESTBLACK  --   --  80    < > = values in this interval not displayed.       Recent Labs   Lab Test 03/23/22  1641   CR 0.82             Passed - Patient does NOT have a diagnosis of CHF.        Passed - Medication is active on med list        Passed - Patient is not pregnant        Passed - Patient has not had a positive pregnancy test within the past 12 mos.         Passed - Recent (6 mo) or future (30 days) visit within the authorizing provider's specialty     Patient had office visit in the last 6 months or has a visit in the next 30 days with authorizing provider or within the authorizing provider's specialty.  See \"Patient Info\" tab in inbasket, or \"Choose Columns\" in Meds & Orders section of the refill encounter.               Thanks,  SHERIE Tate  [facility]     "

## 2022-08-16 DIAGNOSIS — M10.9 GOUT, UNSPECIFIED CAUSE, UNSPECIFIED CHRONICITY, UNSPECIFIED SITE: ICD-10-CM

## 2022-08-16 DIAGNOSIS — L40.9 PSORIASIS: ICD-10-CM

## 2022-08-16 DIAGNOSIS — I10 ESSENTIAL HYPERTENSION: ICD-10-CM

## 2022-08-17 ENCOUNTER — ALLIED HEALTH/NURSE VISIT (OUTPATIENT)
Dept: EDUCATION SERVICES | Facility: CLINIC | Age: 67
End: 2022-08-17
Attending: INTERNAL MEDICINE
Payer: COMMERCIAL

## 2022-08-17 DIAGNOSIS — K21.9 GASTROESOPHAGEAL REFLUX DISEASE WITHOUT ESOPHAGITIS: ICD-10-CM

## 2022-08-17 DIAGNOSIS — E66.01 MORBID OBESITY (H): ICD-10-CM

## 2022-08-17 DIAGNOSIS — Z79.4 TYPE 2 DIABETES MELLITUS WITHOUT COMPLICATION, WITH LONG-TERM CURRENT USE OF INSULIN (H): ICD-10-CM

## 2022-08-17 DIAGNOSIS — E11.9 TYPE 2 DIABETES MELLITUS WITHOUT COMPLICATION, WITH LONG-TERM CURRENT USE OF INSULIN (H): ICD-10-CM

## 2022-08-17 PROCEDURE — 95250 CONT GLUC MNTR PHYS/QHP EQP: CPT | Performed by: DIETITIAN, REGISTERED

## 2022-08-17 NOTE — PATIENT INSTRUCTIONS
Goals:  1) Use the plate planner to set up meals  2) Exercise 3x/week   Websites:  Diabetesfoodhub.org  Fitfoodiefinds[employer]  Heart.org    Sample 2 Carb Breakfast Choices- Carbohydrate choices found in (  )   1/2 cup cooked cereal  (1)  1 tablespoon raisins (1)  2 tablespoons of nuts (0)      1 whole grain English muffin (2)    1 tablespoon of peanut butter (0)  6 oz light yogurt (1)  1 cup berries (1)    1 hard-boiled egg (0) 1 slice whole grain toast (1) with 1 tsp butter (0)  1 poached egg (0)    banana (1)   8 oz skim milk (1) +  1 packet of no-sugar-added instant breakfast mix (1) 2 slices whole grain toast (2)   1 scrambled egg (0)  1 slice low fat cheese (0) 1 piece whole grain toast (1)    cup breakfast potatoes (1)  1 boiled egg (0)  Vegetables (0)   1 cup skim milk (1)     medium banana (1)  1 Tbsp peanut butter (0)       2 Carbohydrate Choice Meal Plans for Lunch or Supper   2 cups of spaghetti squash (for noodles) (1) +   cup low sodium pasta sauce (1)+ 4 oz lean ground beef or turkey (0) +  2 cups lettuce + veggies (0) + 1-2 Tsbp salad dressing (0) 2 slices of whole grain bread (2)  3 oz lean turkey (0) + lettuce/tomato (0)  2 tsp braun (0)  Carrots/celery sticks + dip (0) 1 cup of low sodium broth based soup (1) +  1 slices of whole grain bread (1)   2 oz of low fat cheese (0) + 1 tsp butter (0)  Spinach salad with dressing (0) 2 cups lettuce salad (0) +   cup garbanzo beans (1) + +   cup tuna (0) + 2 Tablespoons low-fat vinaigrette salad dressing (0) +   cup grapes (1)    1 cup low sodium broth based soup (1)+ 1 small apple (1) + broccoli/cauliflower and low fat dip (0)   3 oz lean steak (0) + 1 small 3 oz baked potato (1) + 1 tsp low fat sour cream (0) + 1 cup green beans (0) + 1 cup berries (1) + 1 tbsp light whipped cream (0) 2 cups lettuce salad (0)   3 oz grilled chicken (0)  1-2 Tsbp light Caesar dressing (0)  + 1 Tbps grated cheese (0)    cup grapes (1)  8 oz skim milk (1)   3 oz chicken (0)  1/2  cup sweet potato (1) + 1 cup asparagus (0)+ 1 small apple (1)  16 oz sparkling water (unsweetened)   1 hamburger isabel (0) on hamburger bun (2) + + 1 garden salad (0) with 1 package of vinaigrette (0) + 1 cup baby carrots +   cup green beans-cooked (0) 2 slices lite bread (1)+ 2 oz lean ham (0) + leaf lettuce/tomato/onion (0)  2 teaspoons braun (0)  1 small apple (1)  1 cup raw veggie sticks (0) 2 slices thin crust pizza (2) + 2 cups lettuce salad (0) + 10 cherry tomatoes (0) + 2 Tbsp light salad dressing (0)  +   cup steamed cauliflower (0) 2 small whole grain tortillas (2)  + 3 oz shredded lean beef (0) + 2 Tsbp salsa (0)+ lettuce/tomato (0) + 1 Tbsp light sour cream (0)   1 cup low sodium chicken noodle soup (1) + 1 slice whole grain bread (1) + 2 oz lean turkey (0) + 1 oz low fat cheese (0) + 1-2 teaspoons of butter or margarine 3 oz turkey (0) + 1/2 cup mashed potatoes (1) + 1 tsp butter (0) + 1 cup green beans (0) +   cup unsweetened apple sauce (1) 3 oz chicken + 2 medium pierogis (2) + 1/3 cup cabbage (0) +   cup green beans (0) + 2 cups jason/veggies + dressing  I cup of beef stroganoff (0) + 2/3 cup egg noodles (2) + 1 cup broccoli (0) + 1 cup carrots (0)        Dennise Moon RD [facility]

## 2022-08-17 NOTE — TELEPHONE ENCOUNTER
Routing refill request to provider for review/approval because:  Failed Protocol    Ekta Epstein RN BSN  [facility]

## 2022-08-17 NOTE — LETTER
8/17/2022         RE: Crystal Nicole  [address]  [place] [address]        Dear Colleague,    Thank you for referring your patient, rCystal Nicole, to the [facility]. Please see a copy of my visit note below.    Diabetes Self-Management Education & Support    Presents for: Professional CGM Start    CDE VISIT MODE: In Person    Assessment Type:   Sensor Type: LibrePro  Lot #: [id number]  Serial #: [id number]  Expiration Date: 12/31/22      WRITTEN AND VERBAL INFORMATION GIVEN TO SUPPORT UNDERSTANDING OF: LibrePro CGM: Sensor insertion, intention of monitoring for 14 days. Keep records of BG, food intake, exercise, and medication dosing during wear.     Opportunities for ongoing education and support in diabetes-self management were discussed.    Pt verbalized understanding of concepts discussed and recommendations provided today.       Continue education with the following diabetes management concepts: Healthy Eating, Being Active, Monitoring, Taking Medication, Problem Solving, Reducing Risks and Healthy Coping    ASSESSMENT:  Today we reviewed Crystal's BG, overall BG has been elevated. We discussed healthy eating for weight loss.     Sensor was inserted with no resistance or bleeding at insertion site.  Pt verbalized understanding of concepts discussed and recommendations provided today.  Time spent in DSMT: 20 minutes   Time spent in CGM insertion: 10 minutes, in addition to time spent in DSMT      PLAN    Wear Hi Pro sensor for 14 days  Work on using plate planner to set up meals   Exercise 3 times/week    Topics to cover at upcoming visits: Healthy Eating, Being Active, Monitoring, Taking Medication, Problem Solving, Reducing Risks and Healthy Coping    Follow-up: 8/31    See Care Plan for co-developed, patient-state behavior change goals.  AVS provided for patient today.    Education Materials Provided:  My Plate Planner      SUBJECTIVE/OBJECTIVE:  Presents for:  "Professional CGM Start  Accompanied by: Self  Diabetes education in the past 24mo: Yes  Focus of Visit: CGM  Type of CGM visit: Professional CGM  Diabetes type: Type 2  Date of diagnosis: 2008-9  Disease course: Stable  Transportation concerns: No  Difficulty affording diabetes medication?: Yes  Difficulty affording diabetes testing supplies?: No  Cultural Influences/Ethnic Background:  Not  or     Diabetes Symptoms & Complications:  Fatigue: No  Neuropathy: No  Polydipsia: No  Polyphagia: No  Polyuria: No  Visual change: No  Slow healing wounds: No  Symptom course: Stable  Weight trend: Stable  Complications assessed today?: No    Patient Problem List and Family Medical History reviewed for relevant medical history, current medical status, and diabetes risk factors.    Vitals:  There were no vitals taken for this visit.  Estimated body mass index is 55.54 kg/m  as calculated from the following:    Height as of 7/6/22: 1.626 m (5' 4\").    Weight as of 7/6/22: 146.8 kg (323 lb 9 oz).   Last 3 BP:   BP Readings from Last 3 Encounters:   07/06/22 (!) 173/93   06/15/22 122/80   03/23/22 120/80       History   Smoking Status     Never Smoker   Smokeless Tobacco     Never Used       Labs:  Lab Results   Component Value Date    A1C 7.2 06/08/2022    A1C 6.2 04/23/2021     Lab Results   Component Value Date     07/06/2022     03/23/2022     03/23/2022     01/07/2021     Lab Results   Component Value Date    LDL 39 06/08/2022    LDL 72 06/25/2020     HDL Cholesterol   Date Value Ref Range Status   06/25/2020 29 (L) >49 mg/dL Final     Direct Measure HDL   Date Value Ref Range Status   06/08/2022 49 (L) >=50 mg/dL Final   ]  GFR Estimate   Date Value Ref Range Status   03/23/2022 79 >60 mL/min/1.73m2 Final     Comment:     Effective December 21, 2021 eGFRcr in adults is calculated using the 2021 CKD-EPI creatinine equation which includes age and gender (Urbano emmanuel al., NEJ, DOI: " 10.1056/XBOPqz0475237)   01/07/2021 69 >60 mL/min/[1.73_m2] Final     Comment:     Non  GFR Calc  Starting 12/18/2018, serum creatinine based estimated GFR (eGFR) will be   calculated using the Chronic Kidney Disease Epidemiology Collaboration   (CKD-EPI) equation.       GFR Estimate If Black   Date Value Ref Range Status   01/07/2021 80 >60 mL/min/[1.73_m2] Final     Comment:      GFR Calc  Starting 12/18/2018, serum creatinine based estimated GFR (eGFR) will be   calculated using the Chronic Kidney Disease Epidemiology Collaboration   (CKD-EPI) equation.       Lab Results   Component Value Date    CR 0.82 03/23/2022    CR 0.88 01/07/2021     No results found for: MICROALBUMIN    Healthy Eating:  Healthy Eating Assessed Today: Yes  Meals include: Breakfast, Lunch, Dinner  Breakfast: Eggs with toast and some type of meat OR bagel with butter or blueberry cream cheese  Lunch: [place] - mustard, braun, meat and cheese with fruit OR leftovers OR cereal  Dinner: vegetable, carbohydrates and meat and salad  Snacks: usually not snacking  Beverages: Water, Diet soda, Milk (frost water, diet pepsi occasional)  Has patient met with a dietitian in the past?: Yes    Being Active:  Being Active Assessed Today: Yes  Exercise:: Currently not exercising (daughter go headphones)    Monitoring:  Monitoring Assessed Today: Yes    Date Breakfast  Lunch  Dinner  Bedtime    Before After Before After Before After    8/17 207         8/16 175  153       8/15 192      226   8/14   237       8/13 177         8/12 162      223   8/11 137    179         Taking Medications:  Diabetes Medication(s)     Biguanides       metFORMIN (GLUCOPHAGE XR) 500 MG 24 hr tablet    Take 2 tablets (1,000 mg) by mouth 2 times daily (with meals)    Insulin       insulin glargine (BASAGLAR KWIKPEN) 100 UNIT/ML pen    Inject 15 Units Subcutaneous daily 15 units in the morning. Pt took 12 units 7/6/22.     insulin glargine (LANTUS  PEN) 100 UNIT/ML pen    Inject 15 Units Subcutaneous every morning (before breakfast) (allow max dose 75 units/day for ongoing titration & needle prime)     Insulin Lispro (HUMALOG KWIKPEN SC)    Siding scale starting at 140    Incretin Mimetic Agents (GLP-1 Receptor Agonists)       Dulaglutide (TRULICITY) 4.5 MG/0.5ML SOPN    Inject 4.5 mg Subcutaneous once a week               Problem Solving:                 Reducing Risks:       Healthy Coping:     Patient Activation Measure Survey Score:  No flowsheet data found.      Care Plan and Education Provided:  There are no care plans that you recently modified to display for this patient.      Dennise Moon RD LD [facility]    Time Spent: 30 minutes  Encounter Type: Individual    Any diabetes medication dose changes were made via the CDE Protocol per the patient's referring provider. A copy of this encounter was shared with the provider.

## 2022-08-17 NOTE — PROGRESS NOTES
Diabetes Self-Management Education & Support    Presents for: Professional CGM Start    CDE VISIT MODE: In Person    Assessment Type:   Sensor Type: LibrePro  Lot #: [id number]  Serial #: [id number]  Expiration Date: 12/31/22      WRITTEN AND VERBAL INFORMATION GIVEN TO SUPPORT UNDERSTANDING OF: LibrePro CGM: Sensor insertion, intention of monitoring for 14 days. Keep records of BG, food intake, exercise, and medication dosing during wear.     Opportunities for ongoing education and support in diabetes-self management were discussed.    Pt verbalized understanding of concepts discussed and recommendations provided today.       Continue education with the following diabetes management concepts: Healthy Eating, Being Active, Monitoring, Taking Medication, Problem Solving, Reducing Risks and Healthy Coping    ASSESSMENT:  Today we reviewed Crystal's BG, overall BG has been elevated. We discussed healthy eating for weight loss.     Sensor was inserted with no resistance or bleeding at insertion site.  Pt verbalized understanding of concepts discussed and recommendations provided today.  Time spent in DSMT: 20 minutes   Time spent in CGM insertion: 10 minutes, in addition to time spent in DSMT      PLAN    Wear Hi Pro sensor for 14 days  Work on using plate planner to set up meals   Exercise 3 times/week    Topics to cover at upcoming visits: Healthy Eating, Being Active, Monitoring, Taking Medication, Problem Solving, Reducing Risks and Healthy Coping    Follow-up: 8/31    See Care Plan for co-developed, patient-state behavior change goals.  AVS provided for patient today.    Education Materials Provided:  My Plate Planner      SUBJECTIVE/OBJECTIVE:  Presents for: Professional CGM Start  Accompanied by: Self  Diabetes education in the past 24mo: Yes  Focus of Visit: CGM  Type of CGM visit: Professional CGM  Diabetes type: Type 2  Date of diagnosis: 2008-9  Disease course: Stable  Transportation concerns: No  Difficulty  "affording diabetes medication?: Yes  Difficulty affording diabetes testing supplies?: No  Cultural Influences/Ethnic Background:  Not  or     Diabetes Symptoms & Complications:  Fatigue: No  Neuropathy: No  Polydipsia: No  Polyphagia: No  Polyuria: No  Visual change: No  Slow healing wounds: No  Symptom course: Stable  Weight trend: Stable  Complications assessed today?: No    Patient Problem List and Family Medical History reviewed for relevant medical history, current medical status, and diabetes risk factors.    Vitals:  There were no vitals taken for this visit.  Estimated body mass index is 55.54 kg/m  as calculated from the following:    Height as of 7/6/22: 1.626 m (5' 4\").    Weight as of 7/6/22: 146.8 kg (323 lb 9 oz).   Last 3 BP:   BP Readings from Last 3 Encounters:   07/06/22 (!) 173/93   06/15/22 122/80   03/23/22 120/80       History   Smoking Status     Never Smoker   Smokeless Tobacco     Never Used       Labs:  Lab Results   Component Value Date    A1C 7.2 06/08/2022    A1C 6.2 04/23/2021     Lab Results   Component Value Date     07/06/2022     03/23/2022     03/23/2022     01/07/2021     Lab Results   Component Value Date    LDL 39 06/08/2022    LDL 72 06/25/2020     HDL Cholesterol   Date Value Ref Range Status   06/25/2020 29 (L) >49 mg/dL Final     Direct Measure HDL   Date Value Ref Range Status   06/08/2022 49 (L) >=50 mg/dL Final   ]  GFR Estimate   Date Value Ref Range Status   03/23/2022 79 >60 mL/min/1.73m2 Final     Comment:     Effective December 21, 2021 eGFRcr in adults is calculated using the 2021 CKD-EPI creatinine equation which includes age and gender (Urbano emmanuel al., NEJM, DOI: 10.1056/LWNWgn8123268)   01/07/2021 69 >60 mL/min/[1.73_m2] Final     Comment:     Non  GFR Calc  Starting 12/18/2018, serum creatinine based estimated GFR (eGFR) will be   calculated using the Chronic Kidney Disease Epidemiology Collaboration "   (CKD-EPI) equation.       GFR Estimate If Black   Date Value Ref Range Status   01/07/2021 80 >60 mL/min/[1.73_m2] Final     Comment:      GFR Calc  Starting 12/18/2018, serum creatinine based estimated GFR (eGFR) will be   calculated using the Chronic Kidney Disease Epidemiology Collaboration   (CKD-EPI) equation.       Lab Results   Component Value Date    CR 0.82 03/23/2022    CR 0.88 01/07/2021     No results found for: MICROALBUMIN    Healthy Eating:  Healthy Eating Assessed Today: Yes  Meals include: Breakfast, Lunch, Dinner  Breakfast: Eggs with toast and some type of meat OR bagel with butter or blueberry cream cheese  Lunch: [place] - mustard, braun, meat and cheese with fruit OR leftovers OR cereal  Dinner: vegetable, carbohydrates and meat and salad  Snacks: usually not snacking  Beverages: Water, Diet soda, Milk (frost water, diet pepsi occasional)  Has patient met with a dietitian in the past?: Yes    Being Active:  Being Active Assessed Today: Yes  Exercise:: Currently not exercising (daughter go headphones)    Monitoring:  Monitoring Assessed Today: Yes    Date Breakfast  Lunch  Dinner  Bedtime    Before After Before After Before After    8/17 207         8/16 175  153       8/15 192      226   8/14   237       8/13 177         8/12 162      223   8/11 137    179         Taking Medications:  Diabetes Medication(s)     Biguanides       metFORMIN (GLUCOPHAGE XR) 500 MG 24 hr tablet    Take 2 tablets (1,000 mg) by mouth 2 times daily (with meals)    Insulin       insulin glargine (BASAGLAR KWIKPEN) 100 UNIT/ML pen    Inject 15 Units Subcutaneous daily 15 units in the morning. Pt took 12 units 7/6/22.     insulin glargine (LANTUS PEN) 100 UNIT/ML pen    Inject 15 Units Subcutaneous every morning (before breakfast) (allow max dose 75 units/day for ongoing titration & needle prime)     Insulin Lispro (HUMALOG KWIKPEN SC)    Siding scale starting at 140    Incretin Mimetic Agents (GLP-1  Receptor Agonists)       Dulaglutide (TRULICITY) 4.5 MG/0.5ML SOPN    Inject 4.5 mg Subcutaneous once a week               Problem Solving:                 Reducing Risks:       Healthy Coping:     Patient Activation Measure Survey Score:  No flowsheet data found.      Care Plan and Education Provided:  There are no care plans that you recently modified to display for this patient.      NEEL Dickens [facility]    Time Spent: 30 minutes  Encounter Type: Individual    Any diabetes medication dose changes were made via the CDE Protocol per the patient's referring provider. A copy of this encounter was shared with the provider.

## 2022-08-18 RX ORDER — CLOBETASOL PROPIONATE 0.5 MG/G
OINTMENT TOPICAL
Qty: 45 G | Refills: 0 | Status: SHIPPED | OUTPATIENT
Start: 2022-08-18 | End: 2022-12-19

## 2022-08-18 RX ORDER — LOSARTAN POTASSIUM 25 MG/1
TABLET ORAL
Qty: 30 TABLET | Refills: 0 | Status: SHIPPED | OUTPATIENT
Start: 2022-08-18 | End: 2022-10-31

## 2022-08-18 RX ORDER — ALLOPURINOL 300 MG/1
1 TABLET ORAL DAILY
Qty: 90 TABLET | Refills: 3 | Status: SHIPPED | OUTPATIENT
Start: 2022-08-18 | End: 2023-09-26

## 2022-08-18 RX ORDER — PANTOPRAZOLE SODIUM 40 MG/1
TABLET, DELAYED RELEASE ORAL
Qty: 90 TABLET | Refills: 0 | OUTPATIENT
Start: 2022-08-18

## 2022-08-18 NOTE — TELEPHONE ENCOUNTER
Sent to requesting pharmacy:  E-Prescribing Status: Receipt confirmed by pharmacy (8/3/2022 10:19 AM CDT) for 90 tablets.    Ekta Epstein RN BSN  [facility]

## 2022-08-22 NOTE — TELEPHONE ENCOUNTER
Left message for patient to return call. If patient returns call please schedule ancillary only visit for bp check.. Ciara Rodarte MA

## 2022-08-24 NOTE — TELEPHONE ENCOUNTER
Patient has visit 8/31/22 with diabetic education, will check bp at that visit. Ciara Rodarte MA

## 2022-08-31 ENCOUNTER — ALLIED HEALTH/NURSE VISIT (OUTPATIENT)
Dept: EDUCATION SERVICES | Facility: CLINIC | Age: 67
End: 2022-08-31
Payer: COMMERCIAL

## 2022-08-31 DIAGNOSIS — Z79.4 TYPE 2 DIABETES MELLITUS WITHOUT COMPLICATION, WITH LONG-TERM CURRENT USE OF INSULIN (H): Primary | ICD-10-CM

## 2022-08-31 DIAGNOSIS — E11.9 TYPE 2 DIABETES MELLITUS WITHOUT COMPLICATION, WITH LONG-TERM CURRENT USE OF INSULIN (H): Primary | ICD-10-CM

## 2022-08-31 PROCEDURE — G0108 DIAB MANAGE TRN  PER INDIV: HCPCS | Performed by: DIETITIAN, REGISTERED

## 2022-08-31 NOTE — LETTER
2022         RE: Crystal Nicole  [address]  [place] [address]        Dear Colleague,    Thank you for referring your patient, Crystal Nicole, to the [facility]. Please see a copy of my visit note below.    Diabetes Self-Management Education & Support    Presents for: CGM Review    CDE VISIT MODE: In Person    Assessment Type:   REPORTS:              Pt verbalized understanding of concepts discussed and recommendations provided today.       Continue education with the following diabetes management concepts: Healthy Eating, Being Active, Monitoring, Taking Medication, Problem Solving, Reducing Risks and Healthy Coping    ASSESSMENT:    Crystal states that the last 2 weeks she was not in her normal routine, she had kids visiting from out of state, her car  and she had to help a friend. She states that she was not eating her normal food - eating out more and eating more sweets.  We discussed the Hi reports, explained that her BG are elevated and would recommend increasing insulin. Crystal would prefer to try a different oral medication. She has been on Farxiga (too expensive) and is currently taking Metformin. Discussed medication options, discussed AGUILERA- Crystal states that she has friends that have taken Glipizide and she would like to have a trial with Glipizide. Explained the medication and potential side effects. Will route to Dr. Araujo.     Crystal's ultimate goal is to wean off of insulin. She was able to self wean down from 55 units to 15 units of Basaglar.  We discussed lifestyle changes and weight loss. Crystal is going to work on adding in more walking and strength exercise. We discussed healthy eating for weight loss, discussed well balanced meals. Crystal feels that her biggest problem is that she often doesn't eat in the morning and then is so hungry in the evenings and isn't able to limit portion sizes and make healthy choices. We discussed quick breakfast  "options and put together a list in her AVS. She will work on eating breakfast more regularly.     Glucose Patterns & Trends:  Hyperglycemia, weekend- premeal, postmeal and nocturnal and weekday- premeal, postmeal and nocturnal      PLAN    Work on having breakfast every morning  Add in some exercise - walking and strength training  I will ask Dr. Araujo about an additional medication- I will call you once I hear back   Let me know if you are interested in the personal Hi  Topics to cover at upcoming visits: Healthy Eating, Being Active, Monitoring, Taking Medication, Problem Solving, Reducing Risks and Healthy Coping    Follow-up: 10/26    See Care Plan for co-developed, patient-state behavior change goals.  AVS provided for patient today.    Education Materials Provided:  No new materials provided today      SUBJECTIVE/OBJECTIVE:  Presents for: CGM Review  Accompanied by: Self  Diabetes education in the past 24mo: Yes  Focus of Visit: CGM  Type of CGM visit: Professional CGM  Diabetes type: Type 2  Date of diagnosis: 2008-9  Disease course: Stable  Transportation concerns: No  Difficulty affording diabetes medication?: Yes  Difficulty affording diabetes testing supplies?: No  Cultural Influences/Ethnic Background:  Not  or     Diabetes Symptoms & Complications:  Fatigue: No  Neuropathy: No  Polydipsia: No  Polyphagia: No  Polyuria: No  Visual change: No  Slow healing wounds: No  Symptom course: Stable  Weight trend: Stable  Complications assessed today?: No    Patient Problem List and Family Medical History reviewed for relevant medical history, current medical status, and diabetes risk factors.    Vitals:  There were no vitals taken for this visit.  Estimated body mass index is 55.54 kg/m  as calculated from the following:    Height as of 7/6/22: 1.626 m (5' 4\").    Weight as of 7/6/22: 146.8 kg (323 lb 9 oz).   Last 3 BP:   BP Readings from Last 3 Encounters:   07/06/22 (!) 173/93   06/15/22 " 122/80   03/23/22 120/80       History   Smoking Status     Never Smoker   Smokeless Tobacco     Never Used       Labs:  Lab Results   Component Value Date    A1C 7.2 06/08/2022    A1C 6.2 04/23/2021     Lab Results   Component Value Date     07/06/2022     03/23/2022     03/23/2022     01/07/2021     Lab Results   Component Value Date    LDL 39 06/08/2022    LDL 72 06/25/2020     HDL Cholesterol   Date Value Ref Range Status   06/25/2020 29 (L) >49 mg/dL Final     Direct Measure HDL   Date Value Ref Range Status   06/08/2022 49 (L) >=50 mg/dL Final   ]  GFR Estimate   Date Value Ref Range Status   03/23/2022 79 >60 mL/min/1.73m2 Final     Comment:     Effective December 21, 2021 eGFRcr in adults is calculated using the 2021 CKD-EPI creatinine equation which includes age and gender (Urbano et al., NE, DOI: 10.1056/VJPRvj4973025)   01/07/2021 69 >60 mL/min/[1.73_m2] Final     Comment:     Non  GFR Calc  Starting 12/18/2018, serum creatinine based estimated GFR (eGFR) will be   calculated using the Chronic Kidney Disease Epidemiology Collaboration   (CKD-EPI) equation.       GFR Estimate If Black   Date Value Ref Range Status   01/07/2021 80 >60 mL/min/[1.73_m2] Final     Comment:      GFR Calc  Starting 12/18/2018, serum creatinine based estimated GFR (eGFR) will be   calculated using the Chronic Kidney Disease Epidemiology Collaboration   (CKD-EPI) equation.       Lab Results   Component Value Date    CR 0.82 03/23/2022    CR 0.88 01/07/2021     No results found for: MICROALBUMIN    Healthy Eating:  Healthy Eating Assessed Today: Yes  Cultural/Synagogue diet restrictions?: No  Meal planning/habits: Avoiding sweets  Meals include: Breakfast, Lunch, Dinner  Breakfast: Eggs with toast and some type of meat OR bagel with butter or blueberry cream cheese  Lunch: [place] - mustard, braun, meat and cheese with fruit OR leftovers OR cereal  Dinner: vegetable,  carbohydrates and meat and salad  Snacks: usually not snacking  Beverages: Water, Diet soda, Milk (frost water, diet pepsi occasional)  Has patient met with a dietitian in the past?: Yes    Being Active:  Being Active Assessed Today: Yes  Exercise:: Currently not exercising (daughter go headphones)    Monitoring:  Monitoring Assessed Today: Yes    Taking Medications:  Diabetes Medication(s)     Biguanides       metFORMIN (GLUCOPHAGE XR) 500 MG 24 hr tablet    Take 2 tablets (1,000 mg) by mouth 2 times daily (with meals)    Insulin       insulin glargine (BASAGLAR KWIKPEN) 100 UNIT/ML pen    Inject 15 Units Subcutaneous daily 15 units in the morning. Pt took 12 units 7/6/22.     insulin glargine (LANTUS PEN) 100 UNIT/ML pen    Inject 15 Units Subcutaneous every morning (before breakfast) (allow max dose 75 units/day for ongoing titration & needle prime)     Insulin Lispro (HUMALOG KWIKPEN SC)    Siding scale starting at 140    Incretin Mimetic Agents (GLP-1 Receptor Agonists)       Dulaglutide (TRULICITY) 4.5 MG/0.5ML SOPN    Inject 4.5 mg Subcutaneous once a week          Taking Medication Assessed Today: Yes  Current Treatments: Insulin Injections, Oral Medication (taken by mouth)  Dose schedule: Pre-breakfast, Pre-lunch, Pre-dinner, At bedtime  Given by: Patient  Problems taking diabetes medications regularly?: Yes (doesn't take humalog with her when she leaves for the day, might miss lunch dose)  Diabetes medication side effects?: No    Problem Solving:  Problem Solving Assessed Today: Yes  Is the patient at risk for hypoglycemia?: Yes  Hypoglycemia Frequency: Rarely    Reducing Risks:  Reducing Risks Assessed Today: Yes  Diabetes Risks: Age over 45 years, Sedentary Lifestyle  CAD Risks: Diabetes Mellitus, Obesity, Sedentary lifestyle    Healthy Coping:  Healthy Coping Assessed Today: Yes  Emotional response to diabetes: Acceptance, Confidence diabetes can be controlled  Stage of change: PREPARATION (Decided to  change - considering how)  Patient Activation Measure Survey Score:  No flowsheet data found.      Care Plan and Education Provided:  Care Plan: Diabetes   Updates made by Dennise Moon RD since 9/1/2022 12:00 AM      Problem: Diabetes Self-Management Education Needed to Optimize Self-Care Behaviors       Goal: Healthy Eating - follow a healthy eating pattern for diabetes    Start Date: 8/17/2022   This Visit's Progress: 20%      Task: Develop individualized healthy eating plan with patient Completed 9/1/2022   Responsible User: Dennise Moon RD      Goal: Being Active - get regular physical activity, working up to at least 150 minutes per week       Task: Provide education on relationship of activity to glucose and precautions to take if at risk for low glucose Completed 9/1/2022   Responsible User: Dennise Moon RD      Task: Discuss barriers to physical activity with patient Completed 9/1/2022   Responsible User: Dennise Moon RD      Task: Develop physical activity plan with patient Completed 9/1/2022   Responsible User: Dennise Moon RD Katie Halverson, RD [facility]    Time Spent: 45 minutes  Encounter Type: Individual    Any diabetes medication dose changes were made via the CDE Protocol per the patient's referring provider. A copy of this encounter was shared with the provider.

## 2022-08-31 NOTE — PATIENT INSTRUCTIONS
[place] Bars: Kind Bars, Ankita Bar, RX Bar   Chobani yogurt drink  Fairlife Protein Drink   Cheese stick and a few wheat thins       Work on having breakfast every morning  Add in some exercise - walking and strength training  I will ask Dr. Araujo about an additional medication- I will call you once I hear back   Let me know if you are interested in the personal Hi    NEEL Dickens [facility]

## 2022-08-31 NOTE — PROGRESS NOTES
Diabetes Self-Management Education & Support    Presents for: CGM Review    CDE VISIT MODE: In Person    Assessment Type:   REPORTS:              Pt verbalized understanding of concepts discussed and recommendations provided today.       Continue education with the following diabetes management concepts: Healthy Eating, Being Active, Monitoring, Taking Medication, Problem Solving, Reducing Risks and Healthy Coping    ASSESSMENT:    Crystal states that the last 2 weeks she was not in her normal routine, she had kids visiting from out of state, her car  and she had to help a friend. She states that she was not eating her normal food - eating out more and eating more sweets.  We discussed the Hi reports, explained that her BG are elevated and would recommend increasing insulin. Crystal would prefer to try a different oral medication. She has been on Farxiga (too expensive) and is currently taking Metformin. Discussed medication options, discussed AGUILERA- Crystal states that she has friends that have taken Glipizide and she would like to have a trial with Glipizide. Explained the medication and potential side effects. Will route to Dr. Araujo.     Crystal's ultimate goal is to wean off of insulin. She was able to self wean down from 55 units to 15 units of Basaglar.  We discussed lifestyle changes and weight loss. Crystal is going to work on adding in more walking and strength exercise. We discussed healthy eating for weight loss, discussed well balanced meals. Crystal feels that her biggest problem is that she often doesn't eat in the morning and then is so hungry in the evenings and isn't able to limit portion sizes and make healthy choices. We discussed quick breakfast options and put together a list in her AVS. She will work on eating breakfast more regularly.     Glucose Patterns & Trends:  Hyperglycemia, weekend- premeal, postmeal and nocturnal and weekday- premeal, postmeal and nocturnal      PLAN    Work on having  "breakfast every morning  Add in some exercise - walking and strength training  I will ask Dr. Araujo about an additional medication- I will call you once I hear back   Let me know if you are interested in the personal Hi  Topics to cover at upcoming visits: Healthy Eating, Being Active, Monitoring, Taking Medication, Problem Solving, Reducing Risks and Healthy Coping    Follow-up: 10/26    See Care Plan for co-developed, patient-state behavior change goals.  AVS provided for patient today.    Education Materials Provided:  No new materials provided today      SUBJECTIVE/OBJECTIVE:  Presents for: CGM Review  Accompanied by: Self  Diabetes education in the past 24mo: Yes  Focus of Visit: CGM  Type of CGM visit: Professional CGM  Diabetes type: Type 2  Date of diagnosis: 2008-9  Disease course: Stable  Transportation concerns: No  Difficulty affording diabetes medication?: Yes  Difficulty affording diabetes testing supplies?: No  Cultural Influences/Ethnic Background:  Not  or     Diabetes Symptoms & Complications:  Fatigue: No  Neuropathy: No  Polydipsia: No  Polyphagia: No  Polyuria: No  Visual change: No  Slow healing wounds: No  Symptom course: Stable  Weight trend: Stable  Complications assessed today?: No    Patient Problem List and Family Medical History reviewed for relevant medical history, current medical status, and diabetes risk factors.    Vitals:  There were no vitals taken for this visit.  Estimated body mass index is 55.54 kg/m  as calculated from the following:    Height as of 7/6/22: 1.626 m (5' 4\").    Weight as of 7/6/22: 146.8 kg (323 lb 9 oz).   Last 3 BP:   BP Readings from Last 3 Encounters:   07/06/22 (!) 173/93   06/15/22 122/80   03/23/22 120/80       History   Smoking Status     Never Smoker   Smokeless Tobacco     Never Used       Labs:  Lab Results   Component Value Date    A1C 7.2 06/08/2022    A1C 6.2 04/23/2021     Lab Results   Component Value Date     " 07/06/2022     03/23/2022     03/23/2022     01/07/2021     Lab Results   Component Value Date    LDL 39 06/08/2022    LDL 72 06/25/2020     HDL Cholesterol   Date Value Ref Range Status   06/25/2020 29 (L) >49 mg/dL Final     Direct Measure HDL   Date Value Ref Range Status   06/08/2022 49 (L) >=50 mg/dL Final   ]  GFR Estimate   Date Value Ref Range Status   03/23/2022 79 >60 mL/min/1.73m2 Final     Comment:     Effective December 21, 2021 eGFRcr in adults is calculated using the 2021 CKD-EPI creatinine equation which includes age and gender (Urbano et al., NEJM, DOI: 10.1056/KLIHrq6718327)   01/07/2021 69 >60 mL/min/[1.73_m2] Final     Comment:     Non  GFR Calc  Starting 12/18/2018, serum creatinine based estimated GFR (eGFR) will be   calculated using the Chronic Kidney Disease Epidemiology Collaboration   (CKD-EPI) equation.       GFR Estimate If Black   Date Value Ref Range Status   01/07/2021 80 >60 mL/min/[1.73_m2] Final     Comment:      GFR Calc  Starting 12/18/2018, serum creatinine based estimated GFR (eGFR) will be   calculated using the Chronic Kidney Disease Epidemiology Collaboration   (CKD-EPI) equation.       Lab Results   Component Value Date    CR 0.82 03/23/2022    CR 0.88 01/07/2021     No results found for: MICROALBUMIN    Healthy Eating:  Healthy Eating Assessed Today: Yes  Cultural/Alevism diet restrictions?: No  Meal planning/habits: Avoiding sweets  Meals include: Breakfast, Lunch, Dinner  Breakfast: Eggs with toast and some type of meat OR bagel with butter or blueberry cream cheese  Lunch: [place] - mustard, braun, meat and cheese with fruit OR leftovers OR cereal  Dinner: vegetable, carbohydrates and meat and salad  Snacks: usually not snacking  Beverages: Water, Diet soda, Milk (frost water, diet pepsi occasional)  Has patient met with a dietitian in the past?: Yes    Being Active:  Being Active Assessed Today: Yes  Exercise::  Currently not exercising (daughter go headphones)    Monitoring:  Monitoring Assessed Today: Yes    Taking Medications:  Diabetes Medication(s)     Biguanides       metFORMIN (GLUCOPHAGE XR) 500 MG 24 hr tablet    Take 2 tablets (1,000 mg) by mouth 2 times daily (with meals)    Insulin       insulin glargine (BASAGLAR KWIKPEN) 100 UNIT/ML pen    Inject 15 Units Subcutaneous daily 15 units in the morning. Pt took 12 units 7/6/22.     insulin glargine (LANTUS PEN) 100 UNIT/ML pen    Inject 15 Units Subcutaneous every morning (before breakfast) (allow max dose 75 units/day for ongoing titration & needle prime)     Insulin Lispro (HUMALOG KWIKPEN SC)    Siding scale starting at 140    Incretin Mimetic Agents (GLP-1 Receptor Agonists)       Dulaglutide (TRULICITY) 4.5 MG/0.5ML SOPN    Inject 4.5 mg Subcutaneous once a week          Taking Medication Assessed Today: Yes  Current Treatments: Insulin Injections, Oral Medication (taken by mouth)  Dose schedule: Pre-breakfast, Pre-lunch, Pre-dinner, At bedtime  Given by: Patient  Problems taking diabetes medications regularly?: Yes (doesn't take humalog with her when she leaves for the day, might miss lunch dose)  Diabetes medication side effects?: No    Problem Solving:  Problem Solving Assessed Today: Yes  Is the patient at risk for hypoglycemia?: Yes  Hypoglycemia Frequency: Rarely    Reducing Risks:  Reducing Risks Assessed Today: Yes  Diabetes Risks: Age over 45 years, Sedentary Lifestyle  CAD Risks: Diabetes Mellitus, Obesity, Sedentary lifestyle    Healthy Coping:  Healthy Coping Assessed Today: Yes  Emotional response to diabetes: Acceptance, Confidence diabetes can be controlled  Stage of change: PREPARATION (Decided to change - considering how)  Patient Activation Measure Survey Score:  No flowsheet data found.      Care Plan and Education Provided:  Care Plan: Diabetes   Updates made by Dennise Moon RD since 9/1/2022 12:00 AM      Problem: Diabetes  Self-Management Education Needed to Optimize Self-Care Behaviors       Goal: Healthy Eating - follow a healthy eating pattern for diabetes    Start Date: 8/17/2022   This Visit's Progress: 20%      Task: Develop individualized healthy eating plan with patient Completed 9/1/2022   Responsible User: Dennise Moon RD      Goal: Being Active - get regular physical activity, working up to at least 150 minutes per week       Task: Provide education on relationship of activity to glucose and precautions to take if at risk for low glucose Completed 9/1/2022   Responsible User: Dennise Moon RD      Task: Discuss barriers to physical activity with patient Completed 9/1/2022   Responsible User: Dennise Moon RD      Task: Develop physical activity plan with patient Completed 9/1/2022   Responsible User: Dennise Moon RD Katie Halverson, RD [facility]    Time Spent: 45 minutes  Encounter Type: Individual    Any diabetes medication dose changes were made via the CDE Protocol per the patient's referring provider. A copy of this encounter was shared with the provider.

## 2022-09-01 ENCOUNTER — TELEPHONE (OUTPATIENT)
Dept: EDUCATION SERVICES | Facility: CLINIC | Age: 67
End: 2022-09-01

## 2022-09-01 DIAGNOSIS — Z79.4 TYPE 2 DIABETES MELLITUS WITHOUT COMPLICATION, WITH LONG-TERM CURRENT USE OF INSULIN (H): Primary | ICD-10-CM

## 2022-09-01 DIAGNOSIS — E11.9 TYPE 2 DIABETES MELLITUS WITHOUT COMPLICATION, WITH LONG-TERM CURRENT USE OF INSULIN (H): Primary | ICD-10-CM

## 2022-09-05 RX ORDER — GLIPIZIDE 2.5 MG/1
2.5 TABLET, EXTENDED RELEASE ORAL DAILY
Qty: 30 TABLET | Refills: 3 | Status: SHIPPED | OUTPATIENT
Start: 2022-09-05 | End: 2024-03-19

## 2022-09-06 DIAGNOSIS — K21.9 GASTROESOPHAGEAL REFLUX DISEASE WITHOUT ESOPHAGITIS: ICD-10-CM

## 2022-09-08 ENCOUNTER — LAB (OUTPATIENT)
Dept: LAB | Facility: CLINIC | Age: 67
End: 2022-09-08
Payer: COMMERCIAL

## 2022-09-08 ENCOUNTER — DOCUMENTATION ONLY (OUTPATIENT)
Dept: LAB | Facility: CLINIC | Age: 67
End: 2022-09-08

## 2022-09-08 DIAGNOSIS — E11.9 DIABETES MELLITUS, TYPE 2 (H): ICD-10-CM

## 2022-09-08 DIAGNOSIS — E11.9 DIABETES MELLITUS, TYPE 2 (H): Primary | ICD-10-CM

## 2022-09-08 LAB — HBA1C MFR BLD: 7.4 % (ref 0–5.6)

## 2022-09-08 PROCEDURE — 36415 COLL VENOUS BLD VENIPUNCTURE: CPT

## 2022-09-08 PROCEDURE — 83036 HEMOGLOBIN GLYCOSYLATED A1C: CPT

## 2022-09-08 RX ORDER — PANTOPRAZOLE SODIUM 40 MG/1
TABLET, DELAYED RELEASE ORAL
Qty: 90 TABLET | Refills: 0 | OUTPATIENT
Start: 2022-09-08

## 2022-09-08 NOTE — TELEPHONE ENCOUNTER
90 day supply with 0 refill sent 8/3/22. Refill request too soon. Refused.     Catie Lamas, RN, BSN, PHN  [facility]: [place]

## 2022-09-14 ENCOUNTER — VIRTUAL VISIT (OUTPATIENT)
Dept: ENDOCRINOLOGY | Facility: CLINIC | Age: 67
End: 2022-09-14
Payer: COMMERCIAL

## 2022-09-14 DIAGNOSIS — Z79.4 TYPE 2 DIABETES MELLITUS WITHOUT COMPLICATION, WITH LONG-TERM CURRENT USE OF INSULIN (H): Primary | ICD-10-CM

## 2022-09-14 DIAGNOSIS — E11.9 TYPE 2 DIABETES MELLITUS WITHOUT COMPLICATION, WITH LONG-TERM CURRENT USE OF INSULIN (H): Primary | ICD-10-CM

## 2022-09-14 PROCEDURE — 99213 OFFICE O/P EST LOW 20 MIN: CPT | Mod: 95 | Performed by: INTERNAL MEDICINE

## 2022-09-14 NOTE — PROGRESS NOTES
Patient is being evaluated via a billable video visit.      How would you like to obtain your AVS? Reviewed verbally  If the video visit is dropped, the invitation should be resent by cell phone  Will anyone else be joining your video visit? no      Video Start Time: 2:57 pm     Video-Visit Details    Type of service:  Video Visit    Video End Time:  3:16 pm    Originating Location (pt. Location): home    Distant Location (provider location):  [facility] [place]/home    Platform used for Video Visit:  [employer]        Recent issues:  Here for evaluation of diabetes.  Had hysterectomy surgery in 7/2022  Reviewed medical history from patient and Epic chart record        History of pre-diabetes  ~2016. Initial diagnosis of diabetes mellitus  Had seen physician in [place], [place]  Started treatment with metformin   ~6/2020. Acute back pain?, diagnosed with pneumonia, high glucose and hgbA1c 12%   Hospital treatment included insulin treatment   Treatment with Abx, pain resolved   Recalls basal insulin 55U and use of rapid acting Humalog     7/15/20 Endocrinology evaluation with Dr. MERON Armijo/[facility] clinic  Addition of Farxiga but expensive, then change to Jardiance medication  ~Fall '21. Discontinue Jardiance since expensive      Previous [facility] hgbA1c trends include:     Lab Test 06/08/22  1149 03/23/22  1641 03/01/22  0951 12/06/21  1132 07/30/21  1236   A1C 7.2* 6.6* 6.7* 6.9* 6.5*       6/21/22. Initial diabetes evaluation with me at [place]  Reviewed health history and diabetes issues  [facility] Diab Ed eval and diagnostic Hi  Added glipizideER medication    Current DM medications:  Metformin 500 mg  2-tabs in morning and evening  Trulicity 4.5 mg  Subcutaneous weekly  Basaglar Kwikpen 15U Subcutaneous in morning  glipizideER 2.5 mg  1-tab daily  Humalog sscale  (uses approx 2x/day)   140-179  2U   180-219  4U   220-259  6U   260-299  8U   >300   10U    Blood glucose (BG) meter:  One Touch  Verio   Tests 2-3/day   Recent trends avg near 160 mg/dl    Fam Hx Diabetes:  Recent [facility] labs include:  Lab Results   Component Value Date    A1C 7.4 (H) 09/08/2022     03/23/2022    POTASSIUM 3.7 06/15/2022    CHLORIDE 107 03/23/2022    CO2 29 03/23/2022    ANIONGAP 5 03/23/2022     (H) 07/06/2022    BUN 17 03/23/2022    CR 0.82 03/23/2022    GFRESTIMATED 79 03/23/2022    GFRESTBLACK 80 01/07/2021    TRISTIAN 9.9 03/23/2022    CPEPT 9.8 (H) 07/31/2020    CHOL 107 06/08/2022    TRIG 93 06/08/2022    HDL 49 (L) 06/08/2022    LDL 39 06/08/2022    NHDL 58 06/08/2022    UCRR 109 10/14/2021    MICROL 10 10/14/2021    UMALCR 9.17 10/14/2021    TSH 1.66 10/14/2021     Last eye exam 2021, no DR per patient  DM Complications:  None known      Lives in [facility]  Sees Dr. Juani Mae/[facility] for general medicine evaluations.    PMH/PSH:  Past Medical History:   Diagnosis Date     Acquired hypothyroidism      History of blood transfusion      Hypertension      Obesity due to excess calories      Type 2 diabetes mellitus without complication, with long-term current use of insulin (H)      Past Surgical History:   Procedure Laterality Date     CHOLECYSTECTOMY       DAVINCI HYSTERECTOMY TOTAL, BILATERAL SALPINGO-OOPHORECTOMY, COMBINED Bilateral 7/6/2022    Procedure: ROBOTIC HYSTERECTOMY, BILATERAL SALPINGO OOPHORECTOMY, LYSIS OF ADHESIONS;  Surgeon: Jefferson Chavez MD;  Location: [facility] [place] [facility]     HERNIA REPAIR  1990 s     INSERT INTRAUTERINE DEVICE N/A 7/6/2022    Procedure: REMOVAL OF INTRAUTERINE DEVICE;  Surgeon: Jefferson Chavez MD;  Location: [facility] [place] [facility]       Family Hx:  Family History   Problem Relation Age of Onset     Diabetes Mother         d age 80     Hypertension Mother      Coronary Artery Disease Father         d age 85     Brain Tumor Father      Hypertension Father      Diabetes Brother          Social Hx:  Social History     Socioeconomic History      Marital status:      Spouse name: Not on file     Number of children: Not on file     Years of education: Not on file     Highest education level: Not on file   Occupational History     Not on file   Tobacco Use     Smoking status: Never Smoker     Smokeless tobacco: Never Used   Vaping Use     Vaping Use: Never used   Substance and Sexual Activity     Alcohol use: Yes     Comment: Occ     Drug use: Never     Sexual activity: Not Currently   Other Topics Concern     Parent/sibling w/ CABG, MI or angioplasty before 65F 55M? No   Social History Narrative     Not on file     Social Determinants of Health     Financial Resource Strain: Not on file   Food Insecurity: Not on file   Transportation Needs: Not on file   Physical Activity: Not on file   Stress: Not on file   Social Connections: Not on file   Intimate Partner Violence: Not on file   Housing Stability: Not on file          MEDICATIONS:  has a current medication list which includes the following prescription(s): allopurinol, aspirin, atorvastatin, clobetasol, trulicity, furosemide, glipizide, glucosamine-chondroit-vit c-mn, insulin glargine, insulin lispro, levothyroxine, losartan, metformin, multivitamin, therapeutic, pantoprazole, triamcinolone, triamterene-hctz, alcohol prep, blood glucose, blood glucose, blood glucose calibration, blood glucose monitoring, blood glucose monitoring, clobetasol, freestyle yanely 14 day reader, insulin glargine, bd pen needle fadi 2nd gen, onetouch delica lancets 33g, onetouch verio iq, oxycodone, and thin.    ROS:     ROS: 10 point ROS neg other than the symptoms noted above in the HPI.    GENERAL: some fatigue, wt stable; denies fevers, chills, night sweats.   HEENT: no dysphagia, odonophagia, diplopia, neck pain  THYROID:  no apparent hyper or hypothyroid symptoms  CV: no chest pain, pressure, palpitations  LUNGS: no SOB, NICHOLS, cough, wheezing   ABDOMEN: some indigestion; no diarrhea, constipation, abdominal  pain  EXTREMITIES: no rashes, ulcers, edema  NEUROLOGY: no headaches, denies changes in vision, tingling, extremitiy numbness   MSK: no muscle aches or pains, weakness  SKIN: no rashes or lesions  : no menses  PSYCH:  stable mood, no significant anxiety or depression  ENDOCRINE: no heat or cold intolerance    Physical Exam (visual exam)  VS:  no vital signs taken for video visit  CONSTITUTIONAL: healthy, alert and NAD, well dressed, answering questions appropriately  ENT: no nose swelling or nasal discharge, mouth redness or gum changes.  EYES: eyes grossly normal to inspection, conjunctivae and sclerae normal, no exophthalmos or proptosis  THYROID:  no apparent nodules or goiter  LUNGS: no audible wheeze, cough or visible cyanosis, no visible retractions or increased work of breathing  ABDOMEN: abdomen not evaluated  EXTREMITIES: no hand tremors, limited exam  NEUROLOGY: CN grossly intact, mentation intact and speech normal   SKIN:  no apparent skin lesions, rash, or edema with visualized skin appearance  PSYCH: mentation appears normal, affect normal/bright, judgement and insight intact,   normal speech and appearance well groomed    LABS:    All pertinent notes, labs, and images personally reviewed by me.     A/P:  Encounter Diagnosis   Name Primary?     Type 2 diabetes mellitus without complication, with long-term current use of insulin (H) Yes       Comments:  Reviewed health history and diabetes issues.  Overall glycemic control good  Reviewed and interpreted tests that I previously ordered.   Ordered appropriate tests for the endocrinology disease management.    Management options discussed and implemented after shared medical decision making with the patient.  T2DM problem is chronic-stable    Plan:  Discussed general issues with the diabetes diagnosis and management  We discussed the hgbA1c test which reflects previous overall glucose levels or control  Discussed the importance of blood glucose (BG)  testing to assess glucose trends  Provided general overview of the diabetes medication options and medication treatment plan.    Recommend:  Continue the current metformin, Trulicity, Basaglar insulin, and Humalog correction scale  Treatment goal to continue weight loss, minimize and discontinue mealtime insulin  Diabetes med plan:  Metformin 500 mg  2-tabs in morning and evening  Trulicity 4.5 mg  Subcutaneous weekly  Basaglar Kwikpen 17U Subcutaneous in morning  glipizideER 2.5 mg  1-tab daily  Humalog sscale  (uses approx 2x/day)   140-179  2U   180-219  4U   220-259  6U   260-299  8U   >300   10U    Monitor for possible GI side effects  Goal target premeal glucose 80- 150 mg/dl  Reviewed option of acquiring Personal Freestyle Hi CGM  Plan repeat non-fasting labs in late 12/2022   Consider lab testing at [facility]   Lab orders placed  Keep focus on diet, exercise, weight management.  Continue losartan, atorvastatin, levothyroxine med use, per PCP  Advise having fasting lipid panel testing and dilated eye examination, at least annually    Addressed patient questions today    There are no Patient Instructions on file for this visit.    Future labs ordered today:   Orders Placed This Encounter   Procedures     Hemoglobin A1c     Basic metabolic panel     TSH     Radiology/Consults ordered today: None    Total time spent in with the patient evaluation:  19 min  Additional time spent reviewing pertinent lab tests and chart notes, and documentation:  4 min    Follow-up:  1/3/23 at 3:30 pm, Bert Araujo MD, MS  Endocrinology  [facility]    CC:  VALORIE Mae

## 2022-09-14 NOTE — LETTER
9/14/2022         RE: Crystal Nicole  [address]  [facility] [address]        Dear Colleague,    Thank you for referring your patient, Crystal Nicole, to the [facility]. Please see a copy of my visit note below.    Patient is being evaluated via a billable video visit.      How would you like to obtain your AVS? Reviewed verbally  If the video visit is dropped, the invitation should be resent by cell phone  Will anyone else be joining your video visit? no      Video Start Time: 2:57 pm     Video-Visit Details    Type of service:  Video Visit    Video End Time:  3:16 pm    Originating Location (pt. Location): home    Distant Location (provider location):  [facility]/home    Platform used for Video Visit:  eSentire        Recent issues:  Here for evaluation of diabetes.  Had hysterectomy surgery in 7/2022  Reviewed medical history from patient and Epic chart record        History of pre-diabetes  ~2016. Initial diagnosis of diabetes mellitus  Had seen physician in [place], [place]  Started treatment with metformin   ~6/2020. Acute back pain?, diagnosed with pneumonia, high glucose and hgbA1c 12%   Hospital treatment included insulin treatment   Treatment with Abx, pain resolved   Recalls basal insulin 55U and use of rapid acting Humalog     7/15/20 Endocrinology evaluation with Dr. MERON Armijo/[facility] clinic  Addition of Farxiga but expensive, then change to Jardiance medication  ~Fall '21. Discontinue Jardiance since expensive      Previous [facility] hgbA1c trends include:     Lab Test 06/08/22  1149 03/23/22  1641 03/01/22  0951 12/06/21  1132 07/30/21  1236   A1C 7.2* 6.6* 6.7* 6.9* 6.5*       6/21/22. Initial diabetes evaluation with me at [facility]  Reviewed health history and diabetes issues  [facility] Diab Ed eval and diagnostic Hi  Added glipizideER medication    Current DM medications:  Metformin 500 mg  2-tabs in morning and  evening  Trulicity 4.5 mg  Subcutaneous weekly  Basaglar Kwikpen 15U Subcutaneous in morning  glipizideER 2.5 mg  1-tab daily  Humalog sscale  (uses approx 2x/day)   140-179  2U   180-219  4U   220-259  6U   260-299  8U   >300   10U    Blood glucose (BG) meter:  One Touch Verio   Tests 2-3/day   Recent trends avg near 160 mg/dl    Fam Hx Diabetes:  Recent [facility] labs include:  Lab Results   Component Value Date    A1C 7.4 (H) 09/08/2022     03/23/2022    POTASSIUM 3.7 06/15/2022    CHLORIDE 107 03/23/2022    CO2 29 03/23/2022    ANIONGAP 5 03/23/2022     (H) 07/06/2022    BUN 17 03/23/2022    CR 0.82 03/23/2022    GFRESTIMATED 79 03/23/2022    GFRESTBLACK 80 01/07/2021    TRISTIAN 9.9 03/23/2022    CPEPT 9.8 (H) 07/31/2020    CHOL 107 06/08/2022    TRIG 93 06/08/2022    HDL 49 (L) 06/08/2022    LDL 39 06/08/2022    NHDL 58 06/08/2022    UCRR 109 10/14/2021    MICROL 10 10/14/2021    UMALCR 9.17 10/14/2021    TSH 1.66 10/14/2021     Last eye exam 2021, no DR per patient  DM Complications:  None known      Lives in [facility]  Sees Dr. Juani Mae/[facility] for general medicine evaluations.    PMH/PSH:  Past Medical History:   Diagnosis Date     Acquired hypothyroidism      History of blood transfusion      Hypertension      Obesity due to excess calories      Type 2 diabetes mellitus without complication, with long-term current use of insulin (H)      Past Surgical History:   Procedure Laterality Date     CHOLECYSTECTOMY       DAVINCI HYSTERECTOMY TOTAL, BILATERAL SALPINGO-OOPHORECTOMY, COMBINED Bilateral 7/6/2022    Procedure: ROBOTIC HYSTERECTOMY, BILATERAL SALPINGO OOPHORECTOMY, LYSIS OF ADHESIONS;  Surgeon: Jefferson Chavez MD;  Location: [facility] OR     HERNIA REPAIR  1990 s     INSERT INTRAUTERINE DEVICE N/A 7/6/2022    Procedure: REMOVAL OF INTRAUTERINE DEVICE;  Surgeon: Jefferson Chavez MD;  Location: [facility] OR       Family Hx:  Family History   Problem  Relation Age of Onset     Diabetes Mother         d age 80     Hypertension Mother      Coronary Artery Disease Father         d age 85     Brain Tumor Father      Hypertension Father      Diabetes Brother          Social Hx:  Social History     Socioeconomic History     Marital status:      Spouse name: Not on file     Number of children: Not on file     Years of education: Not on file     Highest education level: Not on file   Occupational History     Not on file   Tobacco Use     Smoking status: Never Smoker     Smokeless tobacco: Never Used   Vaping Use     Vaping Use: Never used   Substance and Sexual Activity     Alcohol use: Yes     Comment: Occ     Drug use: Never     Sexual activity: Not Currently   Other Topics Concern     Parent/sibling w/ CABG, MI or angioplasty before 65F 55M? No   Social History Narrative     Not on file     Social Determinants of Health     Financial Resource Strain: Not on file   Food Insecurity: Not on file   Transportation Needs: Not on file   Physical Activity: Not on file   Stress: Not on file   Social Connections: Not on file   Intimate Partner Violence: Not on file   Housing Stability: Not on file          MEDICATIONS:  has a current medication list which includes the following prescription(s): allopurinol, aspirin, atorvastatin, clobetasol, trulicity, furosemide, glipizide, glucosamine-chondroit-vit c-mn, insulin glargine, insulin lispro, levothyroxine, losartan, metformin, multivitamin, therapeutic, pantoprazole, triamcinolone, triamterene-hctz, alcohol prep, blood glucose, blood glucose, blood glucose calibration, blood glucose monitoring, blood glucose monitoring, clobetasol, freestyle yanely 14 day reader, insulin glargine, bd pen needle fadi 2nd gen, onetouch delica lancets 33g, onetouch verio iq, oxycodone, and thin.    ROS:     ROS: 10 point ROS neg other than the symptoms noted above in the HPI.    GENERAL: some fatigue, wt stable; denies fevers, chills, night  sweats.   HEENT: no dysphagia, odonophagia, diplopia, neck pain  THYROID:  no apparent hyper or hypothyroid symptoms  CV: no chest pain, pressure, palpitations  LUNGS: no SOB, NICHOLS, cough, wheezing   ABDOMEN: some indigestion; no diarrhea, constipation, abdominal pain  EXTREMITIES: no rashes, ulcers, edema  NEUROLOGY: no headaches, denies changes in vision, tingling, extremitiy numbness   MSK: no muscle aches or pains, weakness  SKIN: no rashes or lesions  : no menses  PSYCH:  stable mood, no significant anxiety or depression  ENDOCRINE: no heat or cold intolerance    Physical Exam (visual exam)  VS:  no vital signs taken for video visit  CONSTITUTIONAL: healthy, alert and NAD, well dressed, answering questions appropriately  ENT: no nose swelling or nasal discharge, mouth redness or gum changes.  EYES: eyes grossly normal to inspection, conjunctivae and sclerae normal, no exophthalmos or proptosis  THYROID:  no apparent nodules or goiter  LUNGS: no audible wheeze, cough or visible cyanosis, no visible retractions or increased work of breathing  ABDOMEN: abdomen not evaluated  EXTREMITIES: no hand tremors, limited exam  NEUROLOGY: CN grossly intact, mentation intact and speech normal   SKIN:  no apparent skin lesions, rash, or edema with visualized skin appearance  PSYCH: mentation appears normal, affect normal/bright, judgement and insight intact,   normal speech and appearance well groomed    LABS:    All pertinent notes, labs, and images personally reviewed by me.     A/P:  Encounter Diagnosis   Name Primary?     Type 2 diabetes mellitus without complication, with long-term current use of insulin (H) Yes       Comments:  Reviewed health history and diabetes issues.  Overall glycemic control good  Reviewed and interpreted tests that I previously ordered.   Ordered appropriate tests for the endocrinology disease management.    Management options discussed and implemented after shared medical decision making with  the patient.  T2DM problem is chronic-stable    Plan:  Discussed general issues with the diabetes diagnosis and management  We discussed the hgbA1c test which reflects previous overall glucose levels or control  Discussed the importance of blood glucose (BG) testing to assess glucose trends  Provided general overview of the diabetes medication options and medication treatment plan.    Recommend:  Continue the current metformin, Trulicity, Basaglar insulin, and Humalog correction scale  Treatment goal to continue weight loss, minimize and discontinue mealtime insulin  Diabetes med plan:  Metformin 500 mg  2-tabs in morning and evening  Trulicity 4.5 mg  Subcutaneous weekly  Basaglar Kwikpen 17U Subcutaneous in morning  glipizideER 2.5 mg  1-tab daily  Humalog sscale  (uses approx 2x/day)   140-179  2U   180-219  4U   220-259  6U   260-299  8U   >300   10U    Monitor for possible GI side effects  Goal target premeal glucose 80- 150 mg/dl  Reviewed option of acquiring Personal Freestyle Hi CGM  Plan repeat non-fasting labs in late 12/2022   Consider lab testing at [facility]   Lab orders placed  Keep focus on diet, exercise, weight management.  Continue losartan, atorvastatin, levothyroxine med use, per PCP  Advise having fasting lipid panel testing and dilated eye examination, at least annually    Addressed patient questions today    There are no Patient Instructions on file for this visit.    Future labs ordered today:   Orders Placed This Encounter   Procedures     Hemoglobin A1c     Basic metabolic panel     TSH     Radiology/Consults ordered today: None    Total time spent in with the patient evaluation:  19 min  Additional time spent reviewing pertinent lab tests and chart notes, and documentation:  4 min    Follow-up:  1/3/23 at 3:30 pm, Bert Araujo MD, MS  Endocrinology  [facility]    CC:  VALORIE Mae              Again, thank you for allowing me to participate in the care of your  patient.        Sincerely,        Eladio Araujo MD

## 2022-09-29 DIAGNOSIS — E11.9 TYPE 2 DIABETES MELLITUS WITHOUT COMPLICATION, WITH LONG-TERM CURRENT USE OF INSULIN (H): ICD-10-CM

## 2022-09-29 DIAGNOSIS — Z79.4 TYPE 2 DIABETES MELLITUS WITHOUT COMPLICATION, WITH LONG-TERM CURRENT USE OF INSULIN (H): ICD-10-CM

## 2022-09-29 RX ORDER — GLIPIZIDE 2.5 MG/1
TABLET, EXTENDED RELEASE ORAL
Qty: 30 TABLET | Refills: 3 | OUTPATIENT
Start: 2022-09-29

## 2022-10-26 ENCOUNTER — ALLIED HEALTH/NURSE VISIT (OUTPATIENT)
Dept: EDUCATION SERVICES | Facility: CLINIC | Age: 67
End: 2022-10-26
Payer: COMMERCIAL

## 2022-10-26 DIAGNOSIS — E11.9 TYPE 2 DIABETES MELLITUS WITHOUT COMPLICATION, WITH LONG-TERM CURRENT USE OF INSULIN (H): Primary | ICD-10-CM

## 2022-10-26 DIAGNOSIS — Z79.4 TYPE 2 DIABETES MELLITUS WITHOUT COMPLICATION, WITH LONG-TERM CURRENT USE OF INSULIN (H): Primary | ICD-10-CM

## 2022-10-26 PROCEDURE — 95249 CONT GLUC MNTR PT PROV EQP: CPT | Performed by: DIETITIAN, REGISTERED

## 2022-10-26 RX ORDER — GLIPIZIDE 5 MG/1
5 TABLET, FILM COATED, EXTENDED RELEASE ORAL DAILY
Qty: 30 TABLET | Refills: 3 | Status: SHIPPED | OUTPATIENT
Start: 2022-10-26 | End: 2023-03-16

## 2022-10-26 RX ORDER — PROCHLORPERAZINE 25 MG/1
1 SUPPOSITORY RECTAL
Qty: 1 EACH | Refills: 1 | OUTPATIENT
Start: 2022-10-26 | End: 2022-10-27

## 2022-10-26 RX ORDER — PROCHLORPERAZINE 25 MG/1
1 SUPPOSITORY RECTAL
Qty: 3 EACH | Refills: 5 | OUTPATIENT
Start: 2022-10-26 | End: 2022-10-27

## 2022-10-26 NOTE — LETTER
10/26/2022         RE: Crystal Nicole  [address]  [facility] [address]        Dear Colleague,    Thank you for referring your patient, Crystal Nicole, to the [facility]. Please see a copy of my visit note below.    Diabetes Self-Management Education & Support    Presents for: Personal CGM Start    Type of Service: In Person Visit    Assessment Type:   Patient completed homework (online training and/or Getting started with CGM guide)? : No  Sensor started:: Started today in clinic  CGM Initial Settings: Dexcom  Dexcom Initial Settings: Low Glucose Alarm: On (fixed at 55 mg/dL and can't be changed), High Glucose Alert, Low Glucose Alert    Sensor was inserted with no resistance or bleeding at insertion site.    CGM-specific education:   Dexcom sensor: insertion technique, sensor site location and rotation, insulin administration in relation to sensor placement, Use of trends and graphs for pattern management and problem solving, Dosing insulin based on sensor glucose results, Dexcom Mobile laura and Dexcom Clarity software         ASSESSMENT:  Crystal states that weight loss has been stable. She is interested in CGM- discussed Hi and Dexcom. Patient prefers Dexcom, started patient on sample in clinic.     Crystal's BG have been more elevated, seeing fasting BG in the 150-160's instead of 130's. Discussed increasing Glipizide 2.5 mg --> 5 mg    Pt verbalized understanding of concepts discussed and recommendations provided today.    Continue education with the following diabetes management concepts: Healthy Eating, Being Active, Monitoring, Taking Medication, Problem Solving, Reducing Risks and Healthy Coping    PLAN    Started Dexcom in clinic- connected to Sunshine Diaz and [place] Clarity  Increase Glipizide from 2.5 mg daily --> 5 mg daily  Patient will brianne to schedule follow-up in end of Nov/beg of December    Topics to cover at upcoming visits: Healthy Eating, Being Active,  "Monitoring, Taking Medication, Problem Solving, Reducing Risks and Healthy Coping    Follow-up: patient will call to schedule    See Care Plan for co-developed, patient-state behavior change goals.  AVS provided for patient today.    Education Materials Provided:  No new materials provided today      SUBJECTIVE/OBJECTIVE:  Presents for: Personal CGM Start  Accompanied by: Self  Diabetes education in the past 24mo: Yes  Focus of Visit: CGM  Type of CGM visit: Personal CGM Start  Diabetes type: Type 2  Disease course: Improving  Difficulty affording diabetes medication?: Sometimes  Difficulty affording diabetes testing supplies?: No  Other concerns:: None  Cultural Influences/Ethnic Background:  Not  or     Diabetes Symptoms & Complications:  Fatigue: No  Neuropathy: No  Polydipsia: No  Polyphagia: No  Polyuria: Sometimes  Visual change: No  Slow healing wounds: No  Complications assessed today?: No  Autonomic neuropathy: No  CVA: No  Heart disease: No  Nephropathy: No  Peripheral neuropathy: No  Peripheral Vascular Disease: No  Retinopathy: No  Sexual dysfunction: No    Patient Problem List and Family Medical History reviewed for relevant medical history, current medical status, and diabetes risk factors.    Vitals:  There were no vitals taken for this visit.  Estimated body mass index is 55.54 kg/m  as calculated from the following:    Height as of 7/6/22: 1.626 m (5' 4\").    Weight as of 7/6/22: 146.8 kg (323 lb 9 oz).   Last 3 BP:   BP Readings from Last 3 Encounters:   07/06/22 (!) 173/93   06/15/22 122/80   03/23/22 120/80       History   Smoking Status     Never   Smokeless Tobacco     Never       Labs:  Lab Results   Component Value Date    A1C 7.4 09/08/2022    A1C 6.2 04/23/2021     Lab Results   Component Value Date     07/06/2022     03/23/2022     03/23/2022     01/07/2021     Lab Results   Component Value Date    LDL 39 06/08/2022    LDL 72 06/25/2020     HDL " Cholesterol   Date Value Ref Range Status   06/25/2020 29 (L) >49 mg/dL Final     Direct Measure HDL   Date Value Ref Range Status   06/08/2022 49 (L) >=50 mg/dL Final   ]  GFR Estimate   Date Value Ref Range Status   03/23/2022 79 >60 mL/min/1.73m2 Final     Comment:     Effective December 21, 2021 eGFRcr in adults is calculated using the 2021 CKD-EPI creatinine equation which includes age and gender (Urbano et al., NE, DOI: 10.1056/LCAMpf5093234)   01/07/2021 69 >60 mL/min/[1.73_m2] Final     Comment:     Non  GFR Calc  Starting 12/18/2018, serum creatinine based estimated GFR (eGFR) will be   calculated using the Chronic Kidney Disease Epidemiology Collaboration   (CKD-EPI) equation.       GFR Estimate If Black   Date Value Ref Range Status   01/07/2021 80 >60 mL/min/[1.73_m2] Final     Comment:      GFR Calc  Starting 12/18/2018, serum creatinine based estimated GFR (eGFR) will be   calculated using the Chronic Kidney Disease Epidemiology Collaboration   (CKD-EPI) equation.       Lab Results   Component Value Date    CR 0.82 03/23/2022    CR 0.88 01/07/2021     No results found for: MICROALBUMIN    Healthy Eating:  Healthy Eating Assessed Today: No  Cultural/Mu-ism diet restrictions?: No  Meal planning/habits: Avoiding sweets, Low carb, Low salt  How many times a week on average do you eat food made away from home (restaurant/take-out)?: 2  Meals include: Lunch, Dinner, Morning Snack, Afternoon Snack, Evening Snack  Beverages: Water, Milk, Diet soda    Being Active:  Being Active Assessed Today: No  Days per week of moderate to strenuous exercise (like a brisk walk): (P) 3  On average, minutes per day of exercise at this level: (P) 10  How intense was your typical exercise? : (P) Light (like stretching or slow walking)  Exercise Minutes per Week: (P) 30  Barrier to exercise: None    Monitoring:  Monitoring Assessed Today: Yes  Blood Glucose Meter: One Touch  Times checking  blood sugar at home (number): 3  Times checking blood sugar at home (per): Day  Blood glucose trend: Fluctuating    BG higher 150-160, a couple 200's   Fasting 130-140    Taking Medications:  Diabetes Medication(s)     Biguanides       metFORMIN (GLUCOPHAGE XR) 500 MG 24 hr tablet    Take 2 tablets (1,000 mg) by mouth 2 times daily (with meals)    Insulin       insulin glargine (BASAGLAR KWIKPEN) 100 UNIT/ML pen    Inject 15 Units Subcutaneous daily 15 units in the morning. Pt took 12 units 7/6/22.     insulin glargine (LANTUS PEN) 100 UNIT/ML pen    Inject 15 Units Subcutaneous every morning (before breakfast) (allow max dose 75 units/day for ongoing titration & needle prime)     Insulin Lispro (HUMALOG KWIKPEN SC)    Siding scale starting at 140    Sulfonylureas       glipiZIDE (GLUCOTROL XL) 5 MG 24 hr tablet    Take 1 tablet (5 mg) by mouth daily     glipiZIDE (GLUCOTROL XL) 2.5 MG 24 hr tablet    Take 1 tablet (2.5 mg) by mouth daily    Incretin Mimetic Agents (GLP-1 Receptor Agonists)       Dulaglutide (TRULICITY) 4.5 MG/0.5ML SOPN    Inject 4.5 mg Subcutaneous once a week          Taking Medication Assessed Today: Yes  Current Treatments: Diet, Insulin Injections, Non-insulin Injectables, Oral Medication (taken by mouth)  Dose schedule: Pre-breakfast, Pre-lunch, Pre-dinner, At bedtime  Given by: Patient  Problems taking diabetes medications regularly?: No  Diabetes medication side effects?: No    Problem Solving:  Problem Solving Assessed Today: Yes  Is the patient at risk for hypoglycemia?: Yes  Hypoglycemia Frequency: Rarely  Is the patient at risk for DKA?: No        Reducing Risks:  Reducing Risks Assessed Today: No  CAD Risks: Diabetes Mellitus, Obesity, Post-menopausal, Sedentary lifestyle  Has dilated eye exam at least once a year?: Yes  Sees dentist every 6 months?: Yes  Feet checked by healthcare provider in the last year?: Yes    Healthy Coping:  Informal Support system:: Family, Friends,  Significant other  Patient Activation Measure Survey Score:  No flowsheet data found.      Care Plan and Education Provided:  There are no care plans that you recently modified to display for this patient.      NEEL Dickens CDCES    Time Spent: 45 minutes on CGM Start  15 minutes on DSMT      Encounter Type: Individual    Any diabetes medication dose changes were made via the CDE Protocol per the patient's referring provider. A copy of this encounter was shared with the provider.

## 2022-10-26 NOTE — PATIENT INSTRUCTIONS
Call us to schedule the follow-up end of November/beginning of December with [facility] - [phone]    Dennise Moon RD LD [facility]

## 2022-10-26 NOTE — PROGRESS NOTES
Diabetes Self-Management Education & Support    Presents for: Personal CGM Start    Type of Service: In Person Visit    Assessment Type:   Patient completed homework (online training and/or Getting started with CGM guide)? : No  Sensor started:: Started today in clinic  CGM Initial Settings: Dexcom  Dexcom Initial Settings: Low Glucose Alarm: On (fixed at 55 mg/dL and can't be changed), High Glucose Alert, Low Glucose Alert    Sensor was inserted with no resistance or bleeding at insertion site.    CGM-specific education:   Dexcom sensor: insertion technique, sensor site location and rotation, insulin administration in relation to sensor placement, Use of trends and graphs for pattern management and problem solving, Dosing insulin based on sensor glucose results, Dexcom Mobile laura and Dexcom Clarity software         ASSESSMENT:  Crystal states that weight loss has been stable. She is interested in CGM- discussed Hi and Dexcom. Patient prefers Dexcom, started patient on sample in clinic.     Crystal's BG have been more elevated, seeing fasting BG in the 150-160's instead of 130's. Discussed increasing Glipizide 2.5 mg --> 5 mg    Pt verbalized understanding of concepts discussed and recommendations provided today.    Continue education with the following diabetes management concepts: Healthy Eating, Being Active, Monitoring, Taking Medication, Problem Solving, Reducing Risks and Healthy Coping    PLAN    Started Dexcom in clinic- connected to [employer] and [place] Clarity  Increase Glipizide from 2.5 mg daily --> 5 mg daily  Patient will brianne to schedule follow-up in end of Nov/beg of December    Topics to cover at upcoming visits: Healthy Eating, Being Active, Monitoring, Taking Medication, Problem Solving, Reducing Risks and Healthy Coping    Follow-up: patient will call to schedule    See Care Plan for co-developed, patient-state behavior change goals.  AVS provided for patient today.    Education Materials  "Provided:  No new materials provided today      SUBJECTIVE/OBJECTIVE:  Presents for: Personal CGM Start  Accompanied by: Self  Diabetes education in the past 24mo: Yes  Focus of Visit: CGM  Type of CGM visit: Personal CGM Start  Diabetes type: Type 2  Disease course: Improving  Difficulty affording diabetes medication?: Sometimes  Difficulty affording diabetes testing supplies?: No  Other concerns:: None  Cultural Influences/Ethnic Background:  Not  or     Diabetes Symptoms & Complications:  Fatigue: No  Neuropathy: No  Polydipsia: No  Polyphagia: No  Polyuria: Sometimes  Visual change: No  Slow healing wounds: No  Complications assessed today?: No  Autonomic neuropathy: No  CVA: No  Heart disease: No  Nephropathy: No  Peripheral neuropathy: No  Peripheral Vascular Disease: No  Retinopathy: No  Sexual dysfunction: No    Patient Problem List and Family Medical History reviewed for relevant medical history, current medical status, and diabetes risk factors.    Vitals:  There were no vitals taken for this visit.  Estimated body mass index is 55.54 kg/m  as calculated from the following:    Height as of 7/6/22: 1.626 m (5' 4\").    Weight as of 7/6/22: 146.8 kg (323 lb 9 oz).   Last 3 BP:   BP Readings from Last 3 Encounters:   07/06/22 (!) 173/93   06/15/22 122/80   03/23/22 120/80       History   Smoking Status     Never   Smokeless Tobacco     Never       Labs:  Lab Results   Component Value Date    A1C 7.4 09/08/2022    A1C 6.2 04/23/2021     Lab Results   Component Value Date     07/06/2022     03/23/2022     03/23/2022     01/07/2021     Lab Results   Component Value Date    LDL 39 06/08/2022    LDL 72 06/25/2020     HDL Cholesterol   Date Value Ref Range Status   06/25/2020 29 (L) >49 mg/dL Final     Direct Measure HDL   Date Value Ref Range Status   06/08/2022 49 (L) >=50 mg/dL Final   ]  GFR Estimate   Date Value Ref Range Status   03/23/2022 79 >60 mL/min/1.73m2 Final "     Comment:     Effective December 21, 2021 eGFRcr in adults is calculated using the 2021 CKD-EPI creatinine equation which includes age and gender (Urbano emmanuel al., NEJM, DOI: 10.1056/HMSDux3682508)   01/07/2021 69 >60 mL/min/[1.73_m2] Final     Comment:     Non  GFR Calc  Starting 12/18/2018, serum creatinine based estimated GFR (eGFR) will be   calculated using the Chronic Kidney Disease Epidemiology Collaboration   (CKD-EPI) equation.       GFR Estimate If Black   Date Value Ref Range Status   01/07/2021 80 >60 mL/min/[1.73_m2] Final     Comment:      GFR Calc  Starting 12/18/2018, serum creatinine based estimated GFR (eGFR) will be   calculated using the Chronic Kidney Disease Epidemiology Collaboration   (CKD-EPI) equation.       Lab Results   Component Value Date    CR 0.82 03/23/2022    CR 0.88 01/07/2021     No results found for: MICROALBUMIN    Healthy Eating:  Healthy Eating Assessed Today: No  Cultural/Mosque diet restrictions?: No  Meal planning/habits: Avoiding sweets, Low carb, Low salt  How many times a week on average do you eat food made away from home (restaurant/take-out)?: 2  Meals include: Lunch, Dinner, Morning Snack, Afternoon Snack, Evening Snack  Beverages: Water, Milk, Diet soda    Being Active:  Being Active Assessed Today: No  Days per week of moderate to strenuous exercise (like a brisk walk): (P) 3  On average, minutes per day of exercise at this level: (P) 10  How intense was your typical exercise? : (P) Light (like stretching or slow walking)  Exercise Minutes per Week: (P) 30  Barrier to exercise: None    Monitoring:  Monitoring Assessed Today: Yes  Blood Glucose Meter: One Touch  Times checking blood sugar at home (number): 3  Times checking blood sugar at home (per): Day  Blood glucose trend: Fluctuating    BG higher 150-160, a couple 200's   Fasting 130-140    Taking Medications:  Diabetes Medication(s)     Biguanides       metFORMIN (GLUCOPHAGE  XR) 500 MG 24 hr tablet    Take 2 tablets (1,000 mg) by mouth 2 times daily (with meals)    Insulin       insulin glargine (BASAGLAR KWIKPEN) 100 UNIT/ML pen    Inject 15 Units Subcutaneous daily 15 units in the morning. Pt took 12 units 7/6/22.     insulin glargine (LANTUS PEN) 100 UNIT/ML pen    Inject 15 Units Subcutaneous every morning (before breakfast) (allow max dose 75 units/day for ongoing titration & needle prime)     Insulin Lispro (HUMALOG KWIKPEN SC)    Siding scale starting at 140    Sulfonylureas       glipiZIDE (GLUCOTROL XL) 5 MG 24 hr tablet    Take 1 tablet (5 mg) by mouth daily     glipiZIDE (GLUCOTROL XL) 2.5 MG 24 hr tablet    Take 1 tablet (2.5 mg) by mouth daily    Incretin Mimetic Agents (GLP-1 Receptor Agonists)       Dulaglutide (TRULICITY) 4.5 MG/0.5ML SOPN    Inject 4.5 mg Subcutaneous once a week          Taking Medication Assessed Today: Yes  Current Treatments: Diet, Insulin Injections, Non-insulin Injectables, Oral Medication (taken by mouth)  Dose schedule: Pre-breakfast, Pre-lunch, Pre-dinner, At bedtime  Given by: Patient  Problems taking diabetes medications regularly?: No  Diabetes medication side effects?: No    Problem Solving:  Problem Solving Assessed Today: Yes  Is the patient at risk for hypoglycemia?: Yes  Hypoglycemia Frequency: Rarely  Is the patient at risk for DKA?: No        Reducing Risks:  Reducing Risks Assessed Today: No  CAD Risks: Diabetes Mellitus, Obesity, Post-menopausal, Sedentary lifestyle  Has dilated eye exam at least once a year?: Yes  Sees dentist every 6 months?: Yes  Feet checked by healthcare provider in the last year?: Yes    Healthy Coping:  Informal Support system:: Family, Friends, Significant other  Patient Activation Measure Survey Score:  No flowsheet data found.      Care Plan and Education Provided:  There are no care plans that you recently modified to display for this patient.      [facility]    Time Spent: 45 minutes on  CGM Start  15 minutes on DSMT      Encounter Type: Individual    Any diabetes medication dose changes were made via the CDE Protocol per the patient's referring provider. A copy of this encounter was shared with the provider.

## 2022-10-27 RX ORDER — PROCHLORPERAZINE 25 MG/1
1 SUPPOSITORY RECTAL
Qty: 1 EACH | Refills: 1 | Status: SHIPPED | OUTPATIENT
Start: 2022-10-27

## 2022-10-27 RX ORDER — PROCHLORPERAZINE 25 MG/1
1 SUPPOSITORY RECTAL
Qty: 3 EACH | Refills: 5 | Status: SHIPPED | OUTPATIENT
Start: 2022-10-27

## 2022-10-28 ENCOUNTER — ANCILLARY PROCEDURE (OUTPATIENT)
Dept: MAMMOGRAPHY | Facility: CLINIC | Age: 67
End: 2022-10-28
Attending: FAMILY MEDICINE
Payer: COMMERCIAL

## 2022-10-28 DIAGNOSIS — Z12.31 VISIT FOR SCREENING MAMMOGRAM: ICD-10-CM

## 2022-10-28 PROCEDURE — 77067 SCR MAMMO BI INCL CAD: CPT | Mod: TC | Performed by: RADIOLOGY

## 2022-11-23 ENCOUNTER — ALLIED HEALTH/NURSE VISIT (OUTPATIENT)
Dept: EDUCATION SERVICES | Facility: CLINIC | Age: 67
End: 2022-11-23
Payer: COMMERCIAL

## 2022-11-23 DIAGNOSIS — Z79.4 TYPE 2 DIABETES MELLITUS WITH STAGE 3A CHRONIC KIDNEY DISEASE, WITH LONG-TERM CURRENT USE OF INSULIN (H): Primary | ICD-10-CM

## 2022-11-23 DIAGNOSIS — N18.31 TYPE 2 DIABETES MELLITUS WITH STAGE 3A CHRONIC KIDNEY DISEASE, WITH LONG-TERM CURRENT USE OF INSULIN (H): Primary | ICD-10-CM

## 2022-11-23 DIAGNOSIS — E11.22 TYPE 2 DIABETES MELLITUS WITH STAGE 3A CHRONIC KIDNEY DISEASE, WITH LONG-TERM CURRENT USE OF INSULIN (H): Primary | ICD-10-CM

## 2022-11-23 PROCEDURE — G0108 DIAB MANAGE TRN  PER INDIV: HCPCS | Performed by: DIETITIAN, REGISTERED

## 2022-11-23 NOTE — PROGRESS NOTES
Diabetes Self-Management Education & Support    Presents for: CGM Review    Type of Service: In Person Visit    Assessment Type:   REPORTS:              Pt verbalized understanding of concepts discussed and recommendations provided today.       Continue education with the following diabetes management concepts: Healthy Eating, Being Active, Monitoring, Taking Medication, Problem Solving, Reducing Risks and Healthy Coping    ASSESSMENT:  Patient here to follow up with writer from last visit on 10/26/22 with ROS colleague at which time she was started on the Dexcom G6 using a starter kit and glipizide XL was increased to 5 mg/day.  Has not continued the Dexcom G6 beyond the initial 10 days as she does not meet her insurance's coverage criteria of taking 3 or more daily injections of insulin and the insurance appeal was denied. Together called and spoke with [facility] regarding personal CGMS coverage. [place] patient's insurance has a strict policy of taking 3 or more daily injections of insulin for coverage.     Most recent Dexcom G6 data shows patient not meeting time in  mg/dL target of above 70% and not meeting time above 180 mg/dL target of below 25%.  Patient's most recent Dexcom G6 data shows patient is meeting time below 70 mg/dL of below 4% and time above 250 mg/dL of below 5%.  Recent fingerstick glucose data (see below) shows 10% of fasting glucoses in target, 75% of each before lunch and dinner glucoses in target, and 67% of before bed glucoses in target.  Based on most recent fingerstick glucose data and her report of correcting glucoses above 140 mg/dL at the start of meals and before bed with Humalog (notes gives 1 unit less than scale below indicates for corrections at HS), patient is taking 0-2 doses on Humalog per day.  In the past 10 days, the average is 1 dose of Humalog per day.  Daily dose of Lantus is in addition.  Thus, with current data available patient taking an  average of 2 injections of insulin per day.     Discussed concern for mid to late afternoon hypoglycemia with an increase to Lantus OR glipizide XL dose especially given patient's report of an inconsistent eating schedule especially if she is out of the home in the afternoon.  Patient reports she tends to overeat in the evenings. Patient reiterates goal to completely come off insulin.  Discussed the need for consistent intake while taking glipizide XL.  Remainder of visit focused on a food/meal planning with an emphasis of eating every 4-6 hours while awake and consistent carbohydrate intake. Reviewed carbohydrate counting.     Patient needed to leave to get to another appointment and stated awareness of AVS availability on [facility].  Routing to Dr. Araujo to clarify Humalog dosing at .      PLAN  1. Test glucose at the start of each meal and before bed.   Goals:  mg/dL before meals and  mg/dL before bed    2.   Diabetes Medications:   -continue glipizide extended release as 1 tablet (5 mg) once a day in the morning   -continue metformin extended release as 2 tablets (1000 mg) twice a day   -continue Trulicity as 4.5 mg once a week   -continue Lantus as 17 units once a day   -continue Humalog via Correction Scale at the start of each meal       Humalog Correction Scale    Before Meal Glucose  Humalog dose    Below 140 mg/dL  None    140-179 mg/dL                    2 units               180-219 mg/dL                    4 units               220-259 mg/dL                       6 units               260-299 mg/dL                       8 units               300 mg/dL and above            10 units     No Humalog at bedtime for now, Jennie has a message out to Dr. Araujo to clarify.     3.  Meal Planning   -Aim for a meal or snack every 4-6 hours while awake    One plan to consider:      Meal 1: 10 AM - 12 PM     Meal 2: 3-4 PM     Meal 3: 7-8 PM     Snack: 11 PM   -Need to have carbohydrates when you take  "your glipizide extended release   -About 30-45 grams of carbohydrate per meal   -About 15 grams of carbohydrate at a snack   -snack ideas:   an individual container of Greek yogurt (try Chobani Less Sugar)  whole grain crackers and a stick of string cheese  1 cup chocolate fairlife milk  a Kashi or KIND bar  a baseball size piece of whole fruit + nut butter (apple + peanut butter)  fruit canned in it's own juice + cottage cheese     Jennie Pop, MPH, RD, CDCES, LD 11/24/2022    Topics to cover at upcoming visits: Healthy Eating, Being Active, Monitoring, Taking Medication, Problem Solving, Reducing Risks and Healthy Coping    Follow-up: Patient to send writer Hopela message in 2-3 weeks with update on blood sugars    See Care Plan for co-developed, patient-state behavior change goals.  AVS available to patient via Hopela.    Education Materials Provided:  My Plate Planner      SUBJECTIVE/OBJECTIVE:  Presents for: CGM Review  Accompanied by: Self  Diabetes education in the past 24mo: Yes  Focus of Visit: CGM  Type of CGM visit: Personal CGM Follow-up  Diabetes type: Type 2  How confident are you filling out medical forms by yourself:: Not Assessed  Diabetes management related comments/concerns: coverage of personal CGM  Difficulty affording diabetes testing supplies?: Yes (does not meet insurance coverage criteria for personal CGM, appeal denied)  Other concerns:: None  Cultural Influences/Ethnic Background:  Not  or     Diabetes Symptoms & Complications:     Complications assessed today?: No    Patient Problem List and Family Medical History reviewed for relevant medical history, current medical status, and diabetes risk factors.    Vitals:  There were no vitals taken for this visit.  Estimated body mass index is 55.54 kg/m  as calculated from the following:    Height as of 7/6/22: 1.626 m (5' 4\").    Weight as of 7/6/22: 146.8 kg (323 lb 9 oz).   Last 3 BP:   BP Readings from Last 3 Encounters: "   07/06/22 (!) 173/93   06/15/22 122/80   03/23/22 120/80       History   Smoking Status     Never   Smokeless Tobacco     Never       Labs:  Lab Results   Component Value Date    A1C 7.4 09/08/2022    A1C 6.2 04/23/2021     Lab Results   Component Value Date     07/06/2022     03/23/2022     03/23/2022     01/07/2021     Lab Results   Component Value Date    LDL 39 06/08/2022    LDL 72 06/25/2020     HDL Cholesterol   Date Value Ref Range Status   06/25/2020 29 (L) >49 mg/dL Final     Direct Measure HDL   Date Value Ref Range Status   06/08/2022 49 (L) >=50 mg/dL Final   ]  GFR Estimate   Date Value Ref Range Status   03/23/2022 79 >60 mL/min/1.73m2 Final     Comment:     Effective December 21, 2021 eGFRcr in adults is calculated using the 2021 CKD-EPI creatinine equation which includes age and gender (Urbano et al., NEJ, DOI: 10.1056/XMKOam2300774)   01/07/2021 69 >60 mL/min/[1.73_m2] Final     Comment:     Non  GFR Calc  Starting 12/18/2018, serum creatinine based estimated GFR (eGFR) will be   calculated using the Chronic Kidney Disease Epidemiology Collaboration   (CKD-EPI) equation.       GFR Estimate If Black   Date Value Ref Range Status   01/07/2021 80 >60 mL/min/[1.73_m2] Final     Comment:      GFR Calc  Starting 12/18/2018, serum creatinine based estimated GFR (eGFR) will be   calculated using the Chronic Kidney Disease Epidemiology Collaboration   (CKD-EPI) equation.       Lab Results   Component Value Date    CR 0.82 03/23/2022    CR 0.88 01/07/2021     No results found for: MICROALBUMIN    Healthy Eating:  Healthy Eating Assessed Today: Yes  Meals include: Breakfast, Lunch, Dinner  Breakfast: 10 AM - 12 PM  Lunch: 3-4 PM  Has patient met with a dietitian in the past?: Yes    Being Active:  Being Active Assessed Today: No    Monitoring:  Monitoring Assessed Today: Yes  Did patient bring glucose meter to appointment? : Yes  Blood Glucose Meter:  One Touch  Times checking blood sugar at home (number):  (1-3 times/day)  Times checking blood sugar at home (per): Day    From patient's meter (as best understood by writer given meter with incorrect time and patient's inconsistent meal schedule).  Date Breakfast  (10%in target)  Lunch (75%  In target) (above target) Dinner (75% in target) (in target) Bedtime (67% in target)    Before After Before After Before After    11/23 198         11/22 141  115    114   11/21 173  94    178   11/20 134  135 184 (3 hours after)      11/19 123    90/93     11/18 161         11/17 142    144     11/16 141  103    109   11/15 214    89 115    11/14 148    121         Taking Medications:  Diabetes Medication(s)     Biguanides       metFORMIN (GLUCOPHAGE XR) 500 MG 24 hr tablet    Take 2 tablets (1,000 mg) by mouth 2 times daily (with meals)    Insulin       Insulin Lispro (HUMALOG KWIKPEN SC)    Siding scale starting at 140     insulin glargine (LANTUS PEN) 100 UNIT/ML pen    Inject 15 Units Subcutaneous every morning (before breakfast) (allow max dose 75 units/day for ongoing titration & needle prime) Patient states taking 17 units/day    Sulfonylureas       glipiZIDE (GLUCOTROL XL) 5 MG 24 hr tablet    Take 1 tablet (5 mg) by mouth daily    Incretin Mimetic Agents       Dulaglutide (TRULICITY) 4.5 MG/0.5ML SOPN    Inject 4.5 mg Subcutaneous once a week          Taking Medication Assessed Today: Yes  Current Treatments: Diet, Insulin Injections, Non-insulin Injectables, Oral Medication (taken by mouth)  Dose schedule: Pre-breakfast, Pre-lunch, Pre-dinner, At bedtime  Given by: Patient    Problem Solving:  Problem Solving Assessed Today: Yes  Is the patient at risk for hypoglycemia?: Yes  Hypoglycemia Frequency: Rarely    Reducing Risks:  Reducing Risks Assessed Today: No  Diabetes Risks: Age over 45 years, Hyperlipidemia  CAD Risks: Diabetes Mellitus, Obesity, Post-menopausal, Dyslipidemia    Healthy Coping:  Healthy Coping  Assessed Today: Yes  Emotional response to diabetes: Ready to learn  Informal Support system:: Family, Friends, Significant other  Stage of change: PREPARATION (Decided to change - considering how)  Patient Activation Measure Survey Score:  No flowsheet data found.      Care Plan and Education Provided:  Care Plan: Diabetes   Updates made by Karen Pop RD since 11/24/2022 12:00 AM      Problem: Diabetes Self-Management Education Needed to Optimize Self-Care Behaviors       Goal: Monitoring - monitor glucose and ketones as directed    This Visit's Progress: 60%   Note:    Test glucose 4 times/day - before each meal and before bed.      Goal: Taking Medication - patient is consistently taking medications as directed       Task: Provide education on action of prescribed medication, including when to take and possible side effects Completed 11/24/2022   Responsible User: Dennise Moon RD Beth Reisdorf, MPH, RD, CDCES, LD 11/24/2022      Time Spent: 55 minutes  Encounter Type: Individual    Any diabetes medication dose changes were made via the CDE Protocol per the patient's referring provider and primary care provider. A copy of this encounter was shared with the provider.

## 2022-11-23 NOTE — LETTER
11/23/2022         RE: Crystal Nicole  [address]  [facility] [address]        Dear Colleague,    Thank you for referring your patient, Crystal Nicole, to the [facility]. Please see a copy of my visit note below.    Diabetes Self-Management Education & Support    Presents for: CGM Review    Type of Service: In Person Visit    Assessment Type:   REPORTS:              Pt verbalized understanding of concepts discussed and recommendations provided today.       Continue education with the following diabetes management concepts: Healthy Eating, Being Active, Monitoring, Taking Medication, Problem Solving, Reducing Risks and Healthy Coping    ASSESSMENT:  Patient here to follow up with writer [facility] which time she was started on the Dexcom G6 using a starter kit and glipizide XL was increased to 5 mg/day.  Has not continued the Dexcom G6 beyond the initial 10 days as she does not meet her insurance's coverage criteria of taking 3 or more daily injections of insulin and the insurance appeal was denied. Together called and spoke with [facility] regarding personal CGMS coverage. [place] patient's insurance has a strict policy of taking 3 or more daily injections of insulin for coverage.     Most recent Dexcom G6 data shows patient not meeting time in  mg/dL target of above 70% and not meeting time above 180 mg/dL target of below 25%.  Patient's most recent Dexcom G6 data shows patient is meeting time below 70 mg/dL of below 4% and time above 250 mg/dL of below 5%.  Recent fingerstick glucose data (see below) shows 10% of fasting glucoses in target, 75% of each before lunch and dinner glucoses in target, and 67% of before bed glucoses in target.  Based on most recent fingerstick glucose data and her report of correcting glucoses above 140 mg/dL at the start of meals and before bed with Humalog (notes gives 1 unit less  than scale below indicates for corrections at HS), patient is taking 0-2 doses on Humalog per day.  In the past 10 days, the average is 1 dose of Humalog per day.  Daily dose of Lantus is in addition.  Thus, with current data available patient taking an average of 2 injections of insulin per day.     Discussed concern for mid to late afternoon hypoglycemia with an increase to Lantus OR glipizide XL dose especially given patient's report of an inconsistent eating schedule especially if she is out of the home in the afternoon.  Patient reports she tends to overeat in the evenings. Patient reiterates goal to completely come off insulin.  Discussed the need for consistent intake while taking glipizide XL.  Remainder of visit focused on a food/meal planning with an emphasis of eating every 4-6 hours while awake and consistent carbohydrate intake. Reviewed carbohydrate counting.     Patient needed to leave to get to another appointment and stated awareness of AVS availability on [facility].  Routing to Dr. Araujo to clarify Humalog dosing at HS.      PLAN  1. Test glucose at the start of each meal and before bed.   Goals:  mg/dL before meals and  mg/dL before bed    2.   Diabetes Medications:   -continue glipizide extended release as 1 tablet (5 mg) once a day in the morning   -continue metformin extended release as 2 tablets (1000 mg) twice a day   -continue Trulicity as 4.5 mg once a week   -continue Lantus as 17 units once a day   -continue Humalog via Correction Scale at the start of each meal       Humalog Correction Scale    Before Meal Glucose  Humalog dose    Below 140 mg/dL  None    140-179 mg/dL                    2 units               180-219 mg/dL                    4 units               220-259 mg/dL                       6 units               260-299 mg/dL                       8 units               300 mg/dL and above            10 units     No Humalog at bedtime for now, Jennie has a message out to  Dr. Araujo to clarify.     3.  Meal Planning   -Aim for a meal or snack every 4-6 hours while awake    One plan to consider:      Meal 1: 10 AM - 12 PM     Meal 2: 3-4 PM     Meal 3: 7-8 PM     Snack: 11 PM   -Need to have carbohydrates when you take your glipizide extended release   -About 30-45 grams of carbohydrate per meal   -About 15 grams of carbohydrate at a snack   -snack ideas:   an individual container of Greek yogurt (try Chobani Less Sugar)  whole grain crackers and a stick of string cheese  1 cup chocolate fairlife milk  a Kashi or KIND bar  a baseball size piece of whole fruit + nut butter (apple + peanut butter)  fruit canned in it's own juice + cottage cheese     Jennie Pop, MPH, RD, CDCES, LD 11/24/2022    Topics to cover at upcoming visits: Healthy Eating, Being Active, Monitoring, Taking Medication, Problem Solving, Reducing Risks and Healthy Coping    Follow-up: Patient to send writer [employer] message in 2-3 weeks with update on blood sugars    See Care Plan for co-developed, patient-state behavior change goals.  AVS available to patient via [employer].    Education Materials Provided:  My Plate Planner      SUBJECTIVE/OBJECTIVE:  Presents for: CGM Review  Accompanied by: Self  Diabetes education in the past 24mo: Yes  Focus of Visit: CGM  Type of CGM visit: Personal CGM Follow-up  Diabetes type: Type 2  How confident are you filling out medical forms by yourself:: Not Assessed  Diabetes management related comments/concerns: coverage of personal CGM  Difficulty affording diabetes testing supplies?: Yes (does not meet insurance coverage criteria for personal CGM, appeal denied)  Other concerns:: None  Cultural Influences/Ethnic Background:  Not  or     Diabetes Symptoms & Complications:     Complications assessed today?: No    Patient Problem List and Family Medical History reviewed for relevant medical history, current medical status, and diabetes risk factors.    Vitals:  There  "were no vitals taken for this visit.  Estimated body mass index is 55.54 kg/m  as calculated from the following:    Height as of 7/6/22: 1.626 m (5' 4\").    Weight as of 7/6/22: 146.8 kg (323 lb 9 oz).   Last 3 BP:   BP Readings from Last 3 Encounters:   07/06/22 (!) 173/93   06/15/22 122/80   03/23/22 120/80       History   Smoking Status     Never   Smokeless Tobacco     Never       Labs:  Lab Results   Component Value Date    A1C 7.4 09/08/2022    A1C 6.2 04/23/2021     Lab Results   Component Value Date     07/06/2022     03/23/2022     03/23/2022     01/07/2021     Lab Results   Component Value Date    LDL 39 06/08/2022    LDL 72 06/25/2020     HDL Cholesterol   Date Value Ref Range Status   06/25/2020 29 (L) >49 mg/dL Final     Direct Measure HDL   Date Value Ref Range Status   06/08/2022 49 (L) >=50 mg/dL Final   ]  GFR Estimate   Date Value Ref Range Status   03/23/2022 79 >60 mL/min/1.73m2 Final     Comment:     Effective December 21, 2021 eGFRcr in adults is calculated using the 2021 CKD-EPI creatinine equation which includes age and gender (Urbano et al., NEJ, DOI: 10.1056/XSXAvu7918737)   01/07/2021 69 >60 mL/min/[1.73_m2] Final     Comment:     Non  GFR Calc  Starting 12/18/2018, serum creatinine based estimated GFR (eGFR) will be   calculated using the Chronic Kidney Disease Epidemiology Collaboration   (CKD-EPI) equation.       GFR Estimate If Black   Date Value Ref Range Status   01/07/2021 80 >60 mL/min/[1.73_m2] Final     Comment:      GFR Calc  Starting 12/18/2018, serum creatinine based estimated GFR (eGFR) will be   calculated using the Chronic Kidney Disease Epidemiology Collaboration   (CKD-EPI) equation.       Lab Results   Component Value Date    CR 0.82 03/23/2022    CR 0.88 01/07/2021     No results found for: MICROALBUMIN    Healthy Eating:  Healthy Eating Assessed Today: Yes  Meals include: Breakfast, Lunch, Dinner  Breakfast: " 10 AM - 12 PM  Lunch: 3-4 PM  Has patient met with a dietitian in the past?: Yes    Being Active:  Being Active Assessed Today: No    Monitoring:  Monitoring Assessed Today: Yes  Did patient bring glucose meter to appointment? : Yes  Blood Glucose Meter: One Touch  Times checking blood sugar at home (number):  (1-3 times/day)  Times checking blood sugar at home (per): Day    From patient's meter (as best understood by writer given meter with incorrect time and patient's inconsistent meal schedule).  Date Breakfast  (10%in target)  Lunch (75%  In target) (above target) Dinner (75% in target) (in target) Bedtime (67% in target)    Before After Before After Before After    11/23 198         11/22 141  115    114   11/21 173  94    178   11/20 134  135 184 (3 hours after)      11/19 123    90/93     11/18 161         11/17 142    144     11/16 141  103    109   11/15 214    89 115    11/14 148    121         Taking Medications:  Diabetes Medication(s)     Biguanides       metFORMIN (GLUCOPHAGE XR) 500 MG 24 hr tablet    Take 2 tablets (1,000 mg) by mouth 2 times daily (with meals)    Insulin       Insulin Lispro (HUMALOG KWIKPEN SC)    Siding scale starting at 140     insulin glargine (LANTUS PEN) 100 UNIT/ML pen    Inject 15 Units Subcutaneous every morning (before breakfast) (allow max dose 75 units/day for ongoing titration & needle prime) Patient states taking 17 units/day    Sulfonylureas       glipiZIDE (GLUCOTROL XL) 5 MG 24 hr tablet    Take 1 tablet (5 mg) by mouth daily    Incretin Mimetic Agents       Dulaglutide (TRULICITY) 4.5 MG/0.5ML SOPN    Inject 4.5 mg Subcutaneous once a week          Taking Medication Assessed Today: Yes  Current Treatments: Diet, Insulin Injections, Non-insulin Injectables, Oral Medication (taken by mouth)  Dose schedule: Pre-breakfast, Pre-lunch, Pre-dinner, At bedtime  Given by: Patient    Problem Solving:  Problem Solving Assessed Today: Yes  Is the patient at risk for  hypoglycemia?: Yes  Hypoglycemia Frequency: Rarely    Reducing Risks:  Reducing Risks Assessed Today: No  Diabetes Risks: Age over 45 years, Hyperlipidemia  CAD Risks: Diabetes Mellitus, Obesity, Post-menopausal, Dyslipidemia    Healthy Coping:  Healthy Coping Assessed Today: Yes  Emotional response to diabetes: Ready to learn  Informal Support system:: Family, Friends, Significant other  Stage of change: PREPARATION (Decided to change - considering how)  Patient Activation Measure Survey Score:  No flowsheet data found.      Care Plan and Education Provided:  Care Plan: Diabetes   Updates made by Karen Pop RD since 11/24/2022 12:00 AM      Problem: Diabetes Self-Management Education Needed to Optimize Self-Care Behaviors       Goal: Monitoring - monitor glucose and ketones as directed    This Visit's Progress: 60%   Note:    Test glucose 4 times/day - before each meal and before bed.      Goal: Taking Medication - patient is consistently taking medications as directed       Task: Provide education on action of prescribed medication, including when to take and possible side effects Completed 11/24/2022   Responsible User: Dennise Moon RD Beth Reisdorf, MPH, RD, CDCES, LD 11/24/2022      Time Spent: 55 minutes  Encounter Type: Individual    Any diabetes medication dose changes were made via the CDE Protocol per the patient's referring provider and primary care provider. A copy of this encounter was shared with the provider.

## 2022-11-23 NOTE — LETTER
11/23/2022         RE: Crystal Nicole  [address]  [facility] [address]        Dear Colleague,    Thank you for referring your patient, Crystal Nicole, to the [facility]. Please see a copy of my visit note below.    Date Breakfast  Lunch  Dinner  Bedtime    Before After Before After Before After    11/23 198 *** *** *** *** *** ***   11/22 141 *** 115 ***  *** 114   11/21 173 *** 94 ***  *** 178   11/20 134 *** 135 184 (3 hours after) *** *** ***   11/19 123 *** *** *** 90/93 *** ***   11/18 161 *** *** *** *** *** ***   11/17 142 *** *** *** 144 *** ***   11/16 141  103    109   11/15 214    89 115    11/14 148    121

## 2022-11-24 NOTE — PATIENT INSTRUCTIONS
Test glucose at the start of each meal and before bed.   Goals:  mg/dL before meals and  mg/dL before bed    2.   Diabetes Medications:   -continue glipizide extended release as 1 tablet (5 mg) once a day in the morning   -continue metformin extended release as 2 tablets (1000 mg) twice a day   -continue Trulicity as 4.5 mg once a week   -continue Lantus as 17 units once a day   -continue Humalog via Correction Scale at the start of each meal       Humalog Correction Scale    Before Meal Glucose  Humalog dose    Below 140 mg/dL  None    140-179 mg/dL                    2 units               180-219 mg/dL                    4 units               220-259 mg/dL                       6 units               260-299 mg/dL                       8 units               300 mg/dL and above            10 units     No Humalog at bedtime for now, Jennie has a message out to Dr. Araujo to clarify.     3.  Meal Planning   -Aim for a meal or snack every 4-6 hours while awake    One plan to consider:      Meal 1: 10 AM - 12 PM     Meal 2: 3-4 PM     Meal 3: 7-8 PM     Snack: 11 PM   -Need to have carbohydrates when you take your glipizide extended release   -About 30-45 grams of carbohydrate per meal   -About 15 grams of carbohydrate at a snack   -snack ideas:   an individual container of Greek yogurt (try Chobani Less Sugar)  whole grain crackers and a stick of string cheese  1 cup chocolate fairlife milk  a Kashi or KIND bar  a baseball size piece of whole fruit + nut butter (apple + peanut butter)  fruit canned in it's own juice + cottage cheese     4.  Send Jennie a [employer] message with an update on your blood sugars in 2-3 weeks (early the week of December 12th)    Please call or send a [employer] message with any questions or concerns, low glucoses, and glucoses consistently in the 200s.     Jennie Pop, MPH, RD, ROS, LD  Diabetes Education Triage Line: [phone]  Diabetes Education Appointment Scheduling:  [phone]

## 2022-12-03 ENCOUNTER — HEALTH MAINTENANCE LETTER (OUTPATIENT)
Age: 67
End: 2022-12-03

## 2022-12-08 DIAGNOSIS — E11.9 TYPE 2 DIABETES MELLITUS WITHOUT COMPLICATION, WITH LONG-TERM CURRENT USE OF INSULIN (H): ICD-10-CM

## 2022-12-08 DIAGNOSIS — Z79.4 TYPE 2 DIABETES MELLITUS WITHOUT COMPLICATION, WITH LONG-TERM CURRENT USE OF INSULIN (H): ICD-10-CM

## 2022-12-08 RX ORDER — GLIPIZIDE 5 MG/1
TABLET, FILM COATED, EXTENDED RELEASE ORAL
Qty: 30 TABLET | Refills: 3 | OUTPATIENT
Start: 2022-12-08

## 2022-12-17 DIAGNOSIS — E03.9 HYPOTHYROIDISM, UNSPECIFIED TYPE: ICD-10-CM

## 2022-12-18 DIAGNOSIS — Z79.4 TYPE 2 DIABETES MELLITUS WITHOUT COMPLICATION, WITH LONG-TERM CURRENT USE OF INSULIN (H): ICD-10-CM

## 2022-12-18 DIAGNOSIS — L40.9 PSORIASIS: ICD-10-CM

## 2022-12-18 DIAGNOSIS — K21.9 GASTROESOPHAGEAL REFLUX DISEASE WITHOUT ESOPHAGITIS: ICD-10-CM

## 2022-12-18 DIAGNOSIS — E11.9 TYPE 2 DIABETES MELLITUS WITHOUT COMPLICATION, WITH LONG-TERM CURRENT USE OF INSULIN (H): ICD-10-CM

## 2022-12-19 RX ORDER — LEVOTHYROXINE SODIUM 100 UG/1
100 TABLET ORAL DAILY
Qty: 90 TABLET | Refills: 0 | Status: SHIPPED | OUTPATIENT
Start: 2022-12-19 | End: 2022-12-22

## 2022-12-19 RX ORDER — PANTOPRAZOLE SODIUM 40 MG/1
TABLET, DELAYED RELEASE ORAL
Qty: 90 TABLET | Refills: 0 | Status: SHIPPED | OUTPATIENT
Start: 2022-12-19 | End: 2022-12-22

## 2022-12-19 RX ORDER — CLOBETASOL PROPIONATE 0.5 MG/G
OINTMENT TOPICAL
Qty: 45 G | Refills: 0 | Status: SHIPPED | OUTPATIENT
Start: 2022-12-19

## 2022-12-19 RX ORDER — GLIPIZIDE 2.5 MG/1
TABLET, EXTENDED RELEASE ORAL
Qty: 90 TABLET | Refills: 1 | OUTPATIENT
Start: 2022-12-19

## 2022-12-19 RX ORDER — TRIAMCINOLONE ACETONIDE 1 MG/G
CREAM TOPICAL
Qty: 454 G | Refills: 1 | Status: SHIPPED | OUTPATIENT
Start: 2022-12-19 | End: 2023-05-24

## 2022-12-21 DIAGNOSIS — E11.00 TYPE 2 DIABETES MELLITUS WITH HYPEROSMOLARITY WITHOUT COMA, UNSPECIFIED WHETHER LONG TERM INSULIN USE (H): ICD-10-CM

## 2022-12-21 NOTE — TELEPHONE ENCOUNTER
"Last Written Prescription Date: 11/23/21  Last Fill Quantity: 400,  # refills: 3   Last office visit: 9/14/22 with Dr. Araujo  Future Office Visit: 1/3/23    Next 5 appointments (look out 90 days)    Dec 22, 2022 10:00 AM  (Arrive by 9:40 AM)  Annual Wellness Visit with Juani Mae MD  [facility] ([facility] ) [address] [address]  [phone]         Unable to refill as it was last rx'd by a different provider  Routing to provider  Gisel Flannery, SHERIE        Requested Prescriptions   Pending Prescriptions Disp Refills     blood glucose (NO BRAND SPECIFIED) test strip 400 strip 3     Sig: Use to test blood sugar 4 times daily or as directed.       Diabetic Supplies Protocol Passed - 12/21/2022 12:20 PM        Passed - Medication is active on med list        Passed - Patient is 18 years of age or older        Passed - Recent (6 mo) or future (30 days) visit within the authorizing provider's specialty     Patient had office visit in the last 6 months or has a visit in the next 30 days with authorizing provider.  See \"Patient Info\" tab in inbasket, or \"Choose Columns\" in Meds & Orders section of the refill encounter.                 "

## 2022-12-22 ENCOUNTER — OFFICE VISIT (OUTPATIENT)
Dept: FAMILY MEDICINE | Facility: CLINIC | Age: 67
End: 2022-12-22
Payer: COMMERCIAL

## 2022-12-22 VITALS
TEMPERATURE: 98.4 F | WEIGHT: 293 LBS | SYSTOLIC BLOOD PRESSURE: 129 MMHG | OXYGEN SATURATION: 95 % | DIASTOLIC BLOOD PRESSURE: 72 MMHG | HEIGHT: 64 IN | HEART RATE: 96 BPM | BODY MASS INDEX: 50.02 KG/M2 | RESPIRATION RATE: 22 BRPM

## 2022-12-22 DIAGNOSIS — E03.9 HYPOTHYROIDISM, UNSPECIFIED TYPE: ICD-10-CM

## 2022-12-22 DIAGNOSIS — M10.9 GOUT, UNSPECIFIED CAUSE, UNSPECIFIED CHRONICITY, UNSPECIFIED SITE: ICD-10-CM

## 2022-12-22 DIAGNOSIS — K21.9 GASTROESOPHAGEAL REFLUX DISEASE WITHOUT ESOPHAGITIS: ICD-10-CM

## 2022-12-22 DIAGNOSIS — E66.01 MORBID OBESITY (H): ICD-10-CM

## 2022-12-22 DIAGNOSIS — Z79.4 TYPE 2 DIABETES MELLITUS WITHOUT COMPLICATION, WITH LONG-TERM CURRENT USE OF INSULIN (H): ICD-10-CM

## 2022-12-22 DIAGNOSIS — N18.31 CHRONIC KIDNEY DISEASE, STAGE 3A (H): ICD-10-CM

## 2022-12-22 DIAGNOSIS — I87.8 VENOUS STASIS: ICD-10-CM

## 2022-12-22 DIAGNOSIS — E11.9 TYPE 2 DIABETES MELLITUS WITHOUT COMPLICATION, WITH LONG-TERM CURRENT USE OF INSULIN (H): ICD-10-CM

## 2022-12-22 DIAGNOSIS — Z78.0 ASYMPTOMATIC POSTMENOPAUSAL STATUS: ICD-10-CM

## 2022-12-22 DIAGNOSIS — I10 ESSENTIAL HYPERTENSION: ICD-10-CM

## 2022-12-22 DIAGNOSIS — E78.5 HYPERLIPIDEMIA LDL GOAL <70: ICD-10-CM

## 2022-12-22 DIAGNOSIS — Z00.00 ENCOUNTER FOR MEDICARE ANNUAL WELLNESS EXAM: ICD-10-CM

## 2022-12-22 LAB
ANION GAP SERPL CALCULATED.3IONS-SCNC: 7 MMOL/L (ref 3–14)
BUN SERPL-MCNC: 23 MG/DL (ref 7–30)
CALCIUM SERPL-MCNC: 9.6 MG/DL (ref 8.5–10.1)
CHLORIDE BLD-SCNC: 100 MMOL/L (ref 94–109)
CO2 SERPL-SCNC: 31 MMOL/L (ref 20–32)
CREAT SERPL-MCNC: 1.02 MG/DL (ref 0.52–1.04)
CREAT UR-MCNC: 105 MG/DL
GFR SERPL CREATININE-BSD FRML MDRD: 60 ML/MIN/1.73M2
GLUCOSE BLD-MCNC: 147 MG/DL (ref 70–99)
HBA1C MFR BLD: 7.2 % (ref 0–5.6)
MICROALBUMIN UR-MCNC: 15 MG/L
MICROALBUMIN/CREAT UR: 14.29 MG/G CR (ref 0–25)
POTASSIUM BLD-SCNC: 3.4 MMOL/L (ref 3.4–5.3)
SODIUM SERPL-SCNC: 138 MMOL/L (ref 133–144)
TSH SERPL DL<=0.005 MIU/L-ACNC: 1.78 MU/L (ref 0.4–4)
URATE SERPL-MCNC: 6.2 MG/DL (ref 2.6–6)

## 2022-12-22 PROCEDURE — 36415 COLL VENOUS BLD VENIPUNCTURE: CPT | Performed by: FAMILY MEDICINE

## 2022-12-22 PROCEDURE — 84550 ASSAY OF BLOOD/URIC ACID: CPT | Performed by: FAMILY MEDICINE

## 2022-12-22 PROCEDURE — 80048 BASIC METABOLIC PNL TOTAL CA: CPT | Performed by: FAMILY MEDICINE

## 2022-12-22 PROCEDURE — 82570 ASSAY OF URINE CREATININE: CPT | Performed by: FAMILY MEDICINE

## 2022-12-22 PROCEDURE — 99213 OFFICE O/P EST LOW 20 MIN: CPT | Mod: 25 | Performed by: FAMILY MEDICINE

## 2022-12-22 PROCEDURE — 83036 HEMOGLOBIN GLYCOSYLATED A1C: CPT | Performed by: FAMILY MEDICINE

## 2022-12-22 PROCEDURE — 84443 ASSAY THYROID STIM HORMONE: CPT | Performed by: FAMILY MEDICINE

## 2022-12-22 PROCEDURE — G0439 PPPS, SUBSEQ VISIT: HCPCS | Performed by: FAMILY MEDICINE

## 2022-12-22 PROCEDURE — 82043 UR ALBUMIN QUANTITATIVE: CPT | Performed by: FAMILY MEDICINE

## 2022-12-22 RX ORDER — LOSARTAN POTASSIUM 25 MG/1
25 TABLET ORAL DAILY
Qty: 90 TABLET | Refills: 3 | Status: SHIPPED | OUTPATIENT
Start: 2022-12-22 | End: 2023-08-25

## 2022-12-22 RX ORDER — PANTOPRAZOLE SODIUM 40 MG/1
40 TABLET, DELAYED RELEASE ORAL
Qty: 90 TABLET | Refills: 0 | Status: SHIPPED | OUTPATIENT
Start: 2022-12-22 | End: 2024-03-19

## 2022-12-22 RX ORDER — LEVOTHYROXINE SODIUM 100 UG/1
100 TABLET ORAL DAILY
Qty: 90 TABLET | Refills: 3 | Status: SHIPPED | OUTPATIENT
Start: 2022-12-22 | End: 2024-02-09

## 2022-12-22 RX ORDER — FUROSEMIDE 40 MG
40 TABLET ORAL DAILY PRN
Qty: 90 TABLET | Refills: 3 | Status: SHIPPED | OUTPATIENT
Start: 2022-12-22

## 2022-12-22 RX ORDER — ATORVASTATIN CALCIUM 10 MG/1
10 TABLET, FILM COATED ORAL DAILY
Qty: 90 TABLET | Refills: 3 | Status: SHIPPED | OUTPATIENT
Start: 2022-12-22 | End: 2024-02-09

## 2022-12-22 RX ORDER — TRIAMTERENE AND HYDROCHLOROTHIAZIDE 37.5; 25 MG/1; MG/1
CAPSULE ORAL
Qty: 180 CAPSULE | Refills: 3 | Status: SHIPPED | OUTPATIENT
Start: 2022-12-22 | End: 2024-02-09

## 2022-12-22 ASSESSMENT — ENCOUNTER SYMPTOMS
HEMATURIA: 0
SHORTNESS OF BREATH: 0
MYALGIAS: 0
ABDOMINAL PAIN: 0
HEMATOCHEZIA: 0
PALPITATIONS: 0
DYSURIA: 0
CHILLS: 0
HEARTBURN: 0
EYE PAIN: 0
CONSTIPATION: 0
JOINT SWELLING: 0
HEADACHES: 0
WEAKNESS: 0
DIZZINESS: 0
NERVOUS/ANXIOUS: 0
PARESTHESIAS: 0
SORE THROAT: 0
FREQUENCY: 0
BREAST MASS: 0
COUGH: 0
DIARRHEA: 0
ARTHRALGIAS: 0
NAUSEA: 0
FEVER: 0

## 2022-12-22 ASSESSMENT — ACTIVITIES OF DAILY LIVING (ADL): CURRENT_FUNCTION: NO ASSISTANCE NEEDED

## 2022-12-22 NOTE — PATIENT INSTRUCTIONS
Patient Education   Personalized Prevention Plan  You are due for the preventive services outlined below.  Your care team is available to assist you in scheduling these services.  If you have already completed any of these items, please share that information with your care team to update in your medical record.  Health Maintenance Due   Topic Date Due     Osteoporosis Screening  Never done     Eye Exam  09/24/2022     Annual Wellness Visit  10/14/2022     Kidney Microalbumin Urine Test  10/14/2022     Thyroid Function Lab  10/14/2022     Diabetic Foot Exam  10/14/2022     ANNUAL REVIEW OF HM ORDERS  10/14/2022     A1C Lab  12/08/2022       Exercise for a Healthier Heart  You may wonder how you can improve the health of your heart. If you re thinking about exercise, you re on the right track. You don t need to become an athlete. But you do need a certain amount of brisk exercise to help strengthen your heart. If you have been diagnosed with a heart condition, your healthcare provider may advise exercise to help stabilize your condition. To help make exercise a habit, choose safe, fun activities.      Exercise with a friend. When activity is fun, you're more likely to stick with it.   Before you start  Check with your healthcare provider before starting an exercise program. This is especially important if you have not been active for a while. It's also important if you have a long-term (chronic) health problem such as heart disease, diabetes, or obesity. Or if you are at high risk for having these problems.   Why exercise?  Exercising regularly offers many healthy rewards. It can help you do all of the following:     Improve your blood cholesterol level to help prevent further heart trouble    Lower your blood pressure to help prevent a stroke or heart attack    Control diabetes, or reduce your risk of getting this disease    Improve your heart and lung function    Reach and stay at a healthy weight    Make your  muscles stronger so you can stay active    Prevent falls and fractures by slowing the loss of bone mass (osteoporosis)    Manage stress better    Reduce your blood pressure    Improve your sense of self and your body image  Exercise tips      Ease into your routine. Set small goals. Then build on them. If you are not sure what your activity level should be, talk with your healthcare provider first before starting an exercise routine.    Exercise on most days. Aim for a total of 150 minutes (2 hours and 30 minutes) or more of moderate-intensity aerobic activity each week. Or 75 minutes (1 hour and 15 minutes) or more of vigorous-intensity aerobic activity each week. Or try for a combination of both. Moderate activity means that you breathe heavier and your heart rate increases but you can still talk. Think about doing 40 minutes of moderate exercise, 3 to 4 times a week. For best results, activity should last for about 40 minutes to lower blood pressure and cholesterol. It's OK to work up to the 40-minute period over time. Examples of moderate-intensity activity are walking 1 mile in 15 minutes. Or doing 30 to 45 minutes of yard work.    Step up your daily activity level.  Along with your exercise program, try being more active the whole day. Walk instead of drive. Or park further away so that you take more steps each day. Do more household tasks or yard work. You may not be able to meet the advised mount of physical activity. But doing some moderate- or vigorous-intensity aerobic activity can help reduce your risk for heart disease. Your healthcare provider can help you figure out what is best for you.    Choose 1 or more activities you enjoy.  Walking is one of the easiest things you can do. You can also try swimming, riding a bike, dancing, or taking an exercise class.    When to call your healthcare provider  Call your healthcare provider if you have any of these:     Chest pain or feel dizzy or  lightheaded    Burning, tightness, pressure, or heaviness in your chest, neck, shoulders, back, or arms    Abnormal shortness of breath    More joint or muscle pain    A very fast or irregular heartbeat (palpitations)  [employer] last reviewed this educational content on 7/1/2019 2000-2021 [employer]. All rights reserved. This information is not intended as a substitute for professional medical care. Always follow your healthcare professional's instructions.          Signs of Hearing Loss      Hearing much better with one ear can be a sign of hearing loss.   Hearing loss is a problem shared by many people. In fact, it is one of the most common health problems, particularly as people age. Most people age 65 and older have some hearing loss. By age 80, almost everyone does. Hearing loss often occurs slowly over the years. So you may not realize your hearing has gotten worse.  Have your hearing checked  Call your healthcare provider if you:    Have to strain to hear normal conversation    Have to watch other people s faces very carefully to follow what they re saying    Need to ask people to repeat what they ve said    Often misunderstand what people are saying    Turn the volume of the television or radio up so high that others complain    Feel that people are mumbling when they re talking to you    Find that the effort to hear leaves you feeling tired and irritated    Notice, when using the phone, that you hear better with one ear than the other  [employer] last reviewed this educational content on 1/1/2020 2000-2021 [employer]. All rights reserved. This information is not intended as a substitute for professional medical care. Always follow your healthcare professional's instructions.

## 2022-12-22 NOTE — PROGRESS NOTES
"SUBJECTIVE:   Crystal is a 67 year old who presents for Preventive Visit.  Patient has been advised of split billing requirements and indicates understanding: Yes  Are you in the first 12 months of your Medicare coverage?  No    Healthy Habits:     In general, how would you rate your overall health?  Good    Frequency of exercise:  1 day/week    Duration of exercise:  15-30 minutes    Do you usually eat at least 4 servings of fruit and vegetables a day, include whole grains    & fiber and avoid regularly eating high fat or \"junk\" foods?  Yes    Taking medications regularly:  Yes    Medication side effects:  None    Ability to successfully perform activities of daily living:  No assistance needed    Home Safety:  No safety concerns identified    Hearing Impairment:  Difficulty understanding soft or whispered speech    In the past 6 months, have you been bothered by leaking of urine?  No    In general, how would you rate your overall mental or emotional health?  Good      PHQ-2 Total Score: 0    Additional concerns today:  No      Have you ever done Advance Care Planning? (For example, a Health Directive, POLST, or a discussion with a medical provider or your loved ones about your wishes): No, advance care planning information given to patient to review.  Patient plans to discuss their wishes with loved ones or provider.         Fall risk  Fallen 2 or more times in the past year?: No  Any fall with injury in the past year?: No    Cognitive Screening   1) Repeat 3 items (Leader, Season, Table)    2) Clock draw: NORMAL  3) 3 item recall: Recalls 3 objects  Results: 3 items recalled: COGNITIVE IMPAIRMENT LESS LIKELY    Mini-CogTM Copyright VALORIE Sumner. Licensed by the author for use in [facility]; reprinted with permission ([email or web address]@[email or web address]). All rights reserved.      Do you have sleep apnea, excessive snoring or daytime drowsiness?: no    Reviewed and updated as needed this visit by clinical staff   Tobacco  " Allergies  Meds  Problems  Med Hx  Surg Hx  Fam Hx            Diabetes Follow-up      How often are you checking your blood sugar?has CGM-pt sees Endocrine    What concerns do you have today about your diabetes? None     Do you have any of these symptoms? (Select all that apply)     Have you had a diabetic eye exam in the last 12 months? See epic        BP Readings from Last 2 Encounters:   12/22/22 129/72   07/06/22 (!) 173/93     Hemoglobin A1C (%)   Date Value   09/08/2022 7.4 (H)   06/08/2022 7.2 (H)   04/23/2021 6.2 (H)   01/07/2021 6.2 (H)     LDL Cholesterol Calculated (mg/dL)   Date Value   06/08/2022 39   07/30/2021 72   06/25/2020 72         Hyperlipidemia Follow-Up      Are you regularly taking any medication or supplement to lower your cholesterol?   Yes- statins    Are you having muscle aches or other side effects that you think could be caused by your cholesterol lowering medication?  No    Hypertension Follow-up      Do you check your blood pressure regularly outside of the clinic? No     Are you following a low salt diet? Yes    Are your blood pressures ever more than 140 on the top number (systolic) OR more   than 90 on the bottom number (diastolic), for example 140/90? No    Hypothyroidism Follow-up    Since last visit, patient describes the following symptoms:   Doing well on meds           Reviewed and updated as needed this visit by Provider   Tobacco  Allergies  Meds  Problems  Med Hx  Surg Hx  Fam Hx         Social History     Tobacco Use     Smoking status: Never     Smokeless tobacco: Never   Substance Use Topics     Alcohol use: Yes     Comment: Occ     If you drink alcohol do you typically have >3 drinks per day or >7 drinks per week? No    Alcohol Use 12/22/2022   Prescreen: >3 drinks/day or >7 drinks/week? No   Prescreen: >3 drinks/day or >7 drinks/week? -   No flowsheet data found.      Pt says she has Decreased Hearing      Current providers sharing in care for this  patient include:   Patient Care Team:  Juani Mae MD as PCP - General (Family Medicine)  Juani Mae MD as Assigned PCP  Dennise Moon RD as Diabetes Educator (Dietitian, Registered)  Eladio Araujo MD as Assigned Endocrinology Provider  Karen Pop RD as Diabetes Educator (Dietitian)    The following health maintenance items are reviewed in Epic and correct as of today:  Health Maintenance   Topic Date Due     DEXA  Never done     EYE EXAM  09/24/2022     MEDICARE ANNUAL WELLNESS VISIT  10/14/2022     MICROALBUMIN  10/14/2022     TSH W/FREE T4 REFLEX  10/14/2022     DIABETIC FOOT EXAM  10/14/2022     A1C  12/08/2022     BMP  03/23/2023     LIPID  06/08/2023     HEMOGLOBIN  07/06/2023     ANNUAL REVIEW OF HM ORDERS  12/22/2023     FALL RISK ASSESSMENT  12/22/2023     MAMMO SCREENING  10/28/2024     ADVANCE CARE PLANNING  12/22/2027     COLORECTAL CANCER SCREENING  12/18/2028     DTAP/TDAP/TD IMMUNIZATION (2 - Td or Tdap) 06/13/2029     HEPATITIS C SCREENING  Completed     PHQ-2 (once per calendar year)  Completed     PAP FOLLOW-UP  Completed     HPV FOLLOW-UP  Completed     INFLUENZA VACCINE  Completed     Pneumococcal Vaccine: 65+ Years  Completed     URINALYSIS  Completed     ZOSTER IMMUNIZATION  Completed     COVID-19 Vaccine  Completed     IPV IMMUNIZATION  Aged Out     MENINGITIS IMMUNIZATION  Aged Out     PAP  Discontinued     Lab work is in process  Labs reviewed in EPIC  BP Readings from Last 3 Encounters:   12/22/22 129/72   07/06/22 (!) 173/93   06/15/22 122/80    Wt Readings from Last 3 Encounters:   12/22/22 149.7 kg (330 lb)   07/06/22 146.8 kg (323 lb 9 oz)   06/15/22 148.8 kg (328 lb)                  Patient Active Problem List   Diagnosis     Diabetes mellitus, type 2 (H)     Morbid obesity (H)     Essential hypertension     Hypothyroidism     Hyperlipidemia LDL goal <70     Gout, unspecified cause, unspecified chronicity, unspecified site     Psoriasis     Gastroesophageal  reflux disease     Chronic kidney disease, stage 3a (H)     Past Surgical History:   Procedure Laterality Date     CHOLECYSTECTOMY       DAVINCI HYSTERECTOMY TOTAL, BILATERAL SALPINGO-OOPHORECTOMY, COMBINED Bilateral 7/6/2022    Procedure: ROBOTIC HYSTERECTOMY, BILATERAL SALPINGO OOPHORECTOMY, LYSIS OF ADHESIONS;  Surgeon: Jefferson Chavez MD;  Location: [facility] - [place], [place]     HERNIA REPAIR  1990 s     INSERT INTRAUTERINE DEVICE N/A 7/6/2022    Procedure: REMOVAL OF INTRAUTERINE DEVICE;  Surgeon: Jefferson Chavez MD;  Location: [facility] - [place], [place]       Social History     Tobacco Use     Smoking status: Never     Smokeless tobacco: Never   Substance Use Topics     Alcohol use: Yes     Comment: Occ     Family History   Problem Relation Age of Onset     Diabetes Mother         d age 80     Hypertension Mother      Coronary Artery Disease Father         d age 85     Brain Tumor Father      Hypertension Father      Diabetes Brother          Current Outpatient Medications   Medication Sig Dispense Refill     Alcohol Swabs ([facility] PREP) 70 % PADS USE TO SWAB AREA OF INJECTION/JUSTINE AS DIRECTED. 100 each 11     allopurinol (ZYLOPRIM) 300 MG tablet TAKE 1 TABLET (300 MG) BY MOUTH DAILY NEEDS FOLLOW UP LABS 90 tablet 3     aspirin (ASA) 325 MG EC tablet Take 325 mg by mouth daily       atorvastatin (LIPITOR) 10 MG tablet Take 1 tablet (10 mg) by mouth daily 90 tablet 3     blood glucose (NO BRAND SPECIFIED) test strip Use to test blood sugar 4 times daily or as directed. One Touch Verio strips 400 strip 3     blood glucose calibration (NO BRAND SPECIFIED) solution Use to calibrate blood glucose monitor as needed as directed. 1 Bottle 3     blood glucose monitoring (NO BRAND SPECIFIED) meter device kit Use to test blood sugar 4 times daily or as directed. 1 kit 0     clobetasol (TEMOVATE) 0.05 % external ointment APPLY TO AFFECTED AREA TWICE A DAY 45 g 0     clobetasol (TEMOVATE) 0.05 % external solution Apply  topically as needed       Dulaglutide (TRULICITY) 4.5 MG/0.5ML SOPN Inject 4.5 mg Subcutaneous once a week 6 mL 3     furosemide (LASIX) 40 MG tablet Take 1 tablet (40 mg) by mouth daily as needed (swelling) 90 tablet 3     glipiZIDE (GLUCOTROL XL) 5 MG 24 hr tablet Take 1 tablet (5 mg) by mouth daily 30 tablet 3     Glucosamine-Chondroit-Vit C-Mn (GLUCOSAMINE 1500 COMPLEX PO) daily       insulin glargine (LANTUS PEN) 100 UNIT/ML pen Inject 15 Units Subcutaneous every morning (before breakfast) (allow max dose 75 units/day for ongoing titration & needle prime)  3     Insulin Lispro (HUMALOG KWIKPEN SC) Siding scale starting at 140       insulin pen needle (BD PEN NEEDLE CONOR 2ND GEN) 32G X 4 MM miscellaneous Use 4 pen needles daily or as directed. 200 each 3     levothyroxine (SYNTHROID/LEVOTHROID) 100 MCG tablet Take 1 tablet (100 mcg) by mouth daily 90 tablet 3     losartan (COZAAR) 25 MG tablet Take 1 tablet (25 mg) by mouth daily Needs follow up appointment 90 tablet 3     metFORMIN (GLUCOPHAGE XR) 500 MG 24 hr tablet Take 2 tablets (1,000 mg) by mouth 2 times daily (with meals) 360 tablet 1     multivitamin, therapeutic (THERA-VIT) TABS tablet Take 1 tablet by mouth daily       OneTouch Delica Lancets 33G MISC Inject 1 Device Subcutaneous 3 times daily (before meals) 300 each 3     ONETOUCH VERIO IQ test strip USE TO TEST BLOOD SUGAR 4 TIMES DAILY OR AS DIRECTED. 100 strip 2     pantoprazole (PROTONIX) 40 MG EC tablet Take 1 tablet (40 mg) by mouth daily before breakfast 90 tablet 0     thin (NO BRAND SPECIFIED) lancets Use with lanceting device. 200 each 1     triamcinolone (KENALOG) 0.1 % external cream APPLY TO AFFECTED AREA TWICE A  g 1     triamterene-HCTZ (DYAZIDE) 37.5-25 MG capsule TAKE 2 CAPSULES BY MOUTH EVERY MORNING NEEDS FOLLOW UP APPOINTMENT AS BLOOD PRESSURE IS HIGH Strength: 37.5-25 mg 180 capsule 3     Continuous Blood Gluc  (FREESTYLE SJ 14 DAY READER) HILARY Use to read blood  sugars as per 's instructions. (Patient not taking: Reported on 9/14/2022) 1 each 0     Continuous Blood Gluc Sensor (DEXCOM G6 SENSOR) MISC 1 each every 10 days Change every 10 days. 3 each 5     Continuous Blood Gluc Transmit (DEXCOM G6 TRANSMITTER) MISC 1 each every 3 months Change every 3 months. 1 each 1     glipiZIDE (GLUCOTROL XL) 2.5 MG 24 hr tablet Take 1 tablet (2.5 mg) by mouth daily (Patient not taking: Reported on 11/23/2022) 30 tablet 3     No Known Allergies  Recent Labs   Lab Test 09/08/22  1207 06/15/22  1648 06/08/22  1149 03/23/22  1641 03/01/22  0951 12/06/21  1122 10/14/21  1202 07/30/21  1236 04/23/21  1417 01/07/21  1040 09/14/20  1013 06/25/20  1214 06/19/20  0948 06/18/20 2042 06/18/20  0001   A1C 7.4*  --  7.2* 6.6* 6.7*   < >  --  6.5*   < > 6.2* 7.0*  --   --   --  12.4*   LDL  --   --  39  --   --   --   --  72  --   --   --  72  --   --   --    HDL  --   --  49*  --   --   --   --  45*  --   --   --  29*  --   --   --    TRIG  --   --  93  --   --   --   --  118  --   --   --  141  --   --   --    ALT  --   --   --   --   --   --   --  39  --   --   --   --  25  --  31   CR  --   --   --  0.82 1.11*   < >  --  0.97  --  0.88 1.03 1.20* 0.73   < > 0.82   GFRESTIMATED  --   --   --  79 55*   < >  --  61  --  69 57* 48* 87   < > 76   GFRESTBLACK  --   --   --   --   --   --   --   --   --  80 66 55* >90   < > 88   POTASSIUM  --  3.7  --  3.9 3.8   < >  --  3.8  --  3.7 3.6 3.8 3.9   < > 3.9   TSH  --   --   --   --   --   --  1.66  --   --   --   --  2.44  --   --   --     < > = values in this interval not displayed.      Pt is UTD with mammogram    Breast CA Risk Assessment (S-7) 3/23/2022 6/15/2022   Do you have a family history of breast, colon, or ovarian cancer? Yes No / Unknown       Review of Systems   Constitutional: Negative for chills and fever.   HENT: Negative for congestion, ear pain, hearing loss and sore throat.    Eyes: Negative for pain and visual  "disturbance.   Respiratory: Negative for cough and shortness of breath.    Cardiovascular: Negative for chest pain, palpitations and peripheral edema.   Gastrointestinal: Negative for abdominal pain, constipation, diarrhea, heartburn, hematochezia and nausea.   Breasts:  Negative for tenderness, breast mass and discharge.   Genitourinary: Negative for dysuria, frequency, genital sores, hematuria, pelvic pain, urgency, vaginal bleeding and vaginal discharge.   Musculoskeletal: Negative for arthralgias, joint swelling and myalgias.   Skin: Negative for rash.   Neurological: Negative for dizziness, weakness, headaches and paresthesias.   Psychiatric/Behavioral: Negative for mood changes. The patient is not nervous/anxious.    Rest of the ROS is Negative except see above and Problem list [stable]    OBJECTIVE:   /72 (BP Location: Right arm, Patient Position: Chair, Cuff Size: Adult Large)   Pulse 96   Temp 98.4  F (36.9  C) (Oral)   Resp 22   Ht 1.626 m (5' 4\")   Wt 149.7 kg (330 lb)   SpO2 95%   BMI 56.64 kg/m   Estimated body mass index is 56.64 kg/m  as calculated from the following:    Height as of this encounter: 1.626 m (5' 4\").    Weight as of this encounter: 149.7 kg (330 lb).  Physical Exam  GENERAL APPEARANCE: healthy, alert and no distress  GENERAL APPEARANCE: healthy, alert, no distress and morbidly obese  EYES: Eyes grossly normal to inspection, PERRL and conjunctivae and sclerae normal  HENT: ear canals  Have some Hard waxand TM's normal, nose and mouth without ulcers or lesions, oropharynx clear and oral mucous membranes moist  NECK: no adenopathy, no asymmetry, masses, or scars and thyroid normal to palpation  RESP: lungs clear to auscultation - no rales, rhonchi or wheezes  BREAST: normal without masses, tenderness or nipple discharge and no palpable axillary masses or adenopathy  CV: regular rate and rhythm, normal S1 S2, no S3 or S4, no murmur, click or rub, no peripheral edema and " peripheral pulses strong  ABDOMEN: soft, nontender, no hepatosplenomegaly, no masses and bowel sounds normal  MS: no musculoskeletal defects are noted and gait is age appropriate without ataxia  SKIN: no suspicious lesions or rashes  NEURO: Normal strength and tone, sensory exam grossly normal, mentation intact and speech normal  PSYCH: mentation appears normal and affect normal/bright    Diagnostic Test Results:  Labs reviewed in Epic  Pending     ASSESSMENT / PLAN:   (Z00.00) Encounter for Medicare annual wellness exam  Comment:   Plan:     (E11.9,  Z79.4) Type 2 diabetes mellitus without complication, with long-term current use of insulin (H)  Comment: pending   Plan: Adult Eye  Referral, Hemoglobin A1c        Needs eye Exam    (N18.31) Chronic kidney disease, stage 3a (H)  Comment: pending labs   Plan: Albumin Random Urine Quantitative with Creat         Ratio, Basic metabolic panel  (Ca, Cl, CO2,         Creat, Gluc, K, Na, BUN)        On No NSAID    (E78.5) Hyperlipidemia LDL goal <70  Comment: stable   Plan: TSH with free T4 reflex, atorvastatin (LIPITOR)        10 MG tablet        Labs reviewed     (E03.9) Hypothyroidism, unspecified type  Comment: pending labs   Plan: levothyroxine (SYNTHROID/LEVOTHROID) 100 MCG         tablet            (M10.9) Gout, unspecified cause, unspecified chronicity, unspecified site  Comment: doing well  Plan: Uric acid            (E66.01) BMI>40  Comment: Low brianne diet   Plan: discussed wt management Program    (Z78.0) Asymptomatic postmenopausal status  Comment: advised   Plan: DEXA HIP/PELVIS/SPINE - Future            (I87.8) Venous stasis  Comment: elevate legs  Low salt diet  George Kime   Plan: furosemide (LASIX) 40 MG tablet            (I10) Essential hypertension  Comment: stable   Plan: losartan (COZAAR) 25 MG tablet,         triamterene-HCTZ (DYAZIDE) 37.5-25 MG capsule            (K21.9) Gastroesophageal reflux disease without esophagitis  Comment: discussed try  "[pepcid  Plan: pantoprazole (PROTONIX) 40 MG EC tablet        Risk of Long term use of PPI discussed   Pt will use ceumenex and follow up for ear Flush    COUNSELING:  Reviewed preventive health counseling, as reflected in patient instructions       Regular exercise       Healthy diet/nutrition       Vision screening       Hearing screening       Dental care       Bladder control       Fall risk prevention       Osteoporosis prevention/bone health            Advanced Planning       BMI:   Estimated body mass index is 56.64 kg/m  as calculated from the following:    Height as of this encounter: 1.626 m (5' 4\").    Weight as of this encounter: 149.7 kg (330 lb).   Weight management plan: as above      She reports that she has never smoked. She has never used smokeless tobacco.      Appropriate preventive services were discussed with this patient, including applicable screening as appropriate for cardiovascular disease, diabetes, osteopenia/osteoporosis, and glaucoma.  As appropriate for age/gender, discussed screening for colorectal cancer, prostate cancer, breast cancer, and cervical cancer. Checklist reviewing preventive services available has been given to the patient.    Reviewed patients plan of care and provided an AVS. The Intermediate Care Plan ( asthma action plan, low back pain action plan, and migraine action plan) for Crystal meets the Care Plan requirement. This Care Plan has been established and reviewed with the Patient.      Juani Mae MD  [facility]    Identified Health Risks:  "

## 2022-12-22 NOTE — PROGRESS NOTES
"    She is at risk for lack of exercise and has been provided with information to increase physical activity for the benefit of her well-being.  The patient was provided with written information regarding signs of hearing loss.  Answers for HPI/ROS submitted by the patient on 12/22/2022  In general, how would you rate your overall physical health?: good  Frequency of exercise:: 1 day/week  Do you usually eat at least 4 servings of fruit and vegetables a day, include whole grains & fiber, and avoid regularly eating high fat or \"junk\" foods? : Yes  Taking medications regularly:: Yes  Medication side effects:: None  Activities of Daily Living: no assistance needed  Home safety: no safety concerns identified  Hearing Impairment:: difficulty understanding soft or whispered speech  In the past 6 months, have you been bothered by leaking of urine?: No  abdominal pain: No  Blood in stool: No  Blood in urine: No  chest pain: No  chills: No  congestion: No  constipation: No  cough: No  diarrhea: No  dizziness: No  ear pain: No  eye pain: No  nervous/anxious: No  fever: No  frequency: No  genital sores: No  headaches: No  hearing loss: No  heartburn: No  arthralgias: No  joint swelling: No  peripheral edema: No  mood changes: No  myalgias: No  nausea: No  dysuria: No  palpitations: No  Skin sensation changes: No  sore throat: No  urgency: No  rash: No  shortness of breath: No  visual disturbance: No  weakness: No  pelvic pain: No  vaginal bleeding: No  vaginal discharge: No  tenderness: No  breast mass: No  breast discharge: No  In general, how would you rate your overall mental or emotional health?: good  Additional concerns today:: No  Duration of exercise:: 15-30 minutes      "

## 2022-12-28 ENCOUNTER — TRANSFERRED RECORDS (OUTPATIENT)
Dept: MULTI SPECIALTY CLINIC | Facility: CLINIC | Age: 67
End: 2022-12-28

## 2022-12-28 LAB — RETINOPATHY: NORMAL

## 2022-12-30 ENCOUNTER — ANCILLARY PROCEDURE (OUTPATIENT)
Dept: BONE DENSITY | Facility: CLINIC | Age: 67
End: 2022-12-30
Attending: FAMILY MEDICINE
Payer: COMMERCIAL

## 2022-12-30 DIAGNOSIS — Z78.0 ASYMPTOMATIC POSTMENOPAUSAL STATUS: ICD-10-CM

## 2022-12-30 PROCEDURE — 77080 DXA BONE DENSITY AXIAL: CPT | Performed by: INTERNAL MEDICINE

## 2023-01-03 ENCOUNTER — VIRTUAL VISIT (OUTPATIENT)
Dept: ENDOCRINOLOGY | Facility: CLINIC | Age: 68
End: 2023-01-03
Payer: COMMERCIAL

## 2023-01-03 DIAGNOSIS — Z79.4 TYPE 2 DIABETES MELLITUS WITHOUT COMPLICATION, WITH LONG-TERM CURRENT USE OF INSULIN (H): Primary | ICD-10-CM

## 2023-01-03 DIAGNOSIS — E11.9 TYPE 2 DIABETES MELLITUS WITHOUT COMPLICATION, WITH LONG-TERM CURRENT USE OF INSULIN (H): Primary | ICD-10-CM

## 2023-01-03 DIAGNOSIS — E66.01 MORBID OBESITY (H): ICD-10-CM

## 2023-01-03 PROCEDURE — 99214 OFFICE O/P EST MOD 30 MIN: CPT | Mod: 95 | Performed by: INTERNAL MEDICINE

## 2023-01-03 NOTE — LETTER
1/3/2023         RE: Crystal Nicole  [address]  [place] [address]        Dear Colleague,    Thank you for referring your patient, Crystal Nicole, to the [facility] [place]. Please see a copy of my visit note below.    Patient is being evaluated via a billable video visit.      How would you like to obtain your AVS? Verbally Reviewed  If the video visit is dropped, the invitation should be resent by: Cellphone  Will anyone else be joining your video visit? No        Video-Visit Details    Video Start Time: 3:40 pm    Type of service:  Video Visit    Video End Time:  4:00 pm    Originating Location (pt. Location):  Home    PROVIDER LOCATION On-site vs Off-site    Distant Location (provider location):  Home    Platform used for Video Visit: E Ink          Recent issues:  Diabetes follow-up evaluation  Had hysterectomy surgery in 7/2022        History of pre-diabetes  ~2016. Initial diagnosis of diabetes mellitus  Had seen physician in [place], [place]  Started treatment with metformin   ~6/2020. Acute back pain?, diagnosed with pneumonia, high glucose and hgbA1c 12%   Hospital treatment included insulin treatment   Treatment with Abx, pain resolved   Recalls basal insulin 55U and use of rapid acting Humalog     7/15/20 Endocrinology evaluation with Dr. MERON Armijo/[facility] clinic  Addition of Farxiga but expensive, then change to Jardiance medication  ~Fall '21. Discontinue Jardiance since expensive      Previous FV hgbA1c trends include:     Lab Test 06/08/22  1149 03/23/22  1641 03/01/22  0951 12/06/21  1132 07/30/21  1236   A1C 7.2* 6.6* 6.7* 6.9* 6.5*       6/21/22. Initial diabetes evaluation with me at [place]  Reviewed health history and diabetes issues  [facility] Diab Ed eval and diagnostic Hi  Added glipizideER medication    Current DM medications:  Metformin 500 mg  2-tabs in morning and evening  Trulicity 4.5 mg  Subcutaneous weekly  Basaglar Kwikpen  17U Subcutaneous in morning  glipizideER 5 mg  1-tab daily  Humalog sscale  (uses approx 2x/day often morning vs suppertime)   140-179  2U   180-219  4U   220-259  6U   260-299  8U   >300   10U    Blood glucose (BG) meter:  One Touch Verio   Tests 2-3/day   Recent trends 100-200 mg/d, but details not available    Fam Hx Diabetes:  ?  Recent [facility] labs include:  Lab Results   Component Value Date    A1C 7.2 (H) 12/22/2022     12/22/2022    POTASSIUM 3.4 12/22/2022    CHLORIDE 100 12/22/2022    CO2 31 12/22/2022    ANIONGAP 7 12/22/2022     (H) 12/22/2022    BUN 23 12/22/2022    CR 1.02 12/22/2022    GFRESTIMATED 60 (L) 12/22/2022    GFRESTBLACK 80 01/07/2021    TRISTIAN 9.6 12/22/2022    CPEPT 9.8 (H) 07/31/2020    CHOL 107 06/08/2022    TRIG 93 06/08/2022    HDL 49 (L) 06/08/2022    LDL 39 06/08/2022    NHDL 58 06/08/2022    UCRR 105 12/22/2022    MICROL 15 12/22/2022    UMALCR 14.29 12/22/2022    TSH 1.78 12/22/2022     Last eye exam 2021, no DR per patient  DM Complications:  None known      Lives in [facility]  Sees Dr. Juani aMe/[facility] for general medicine evaluations.    PMH/PSH:  Past Medical History:   Diagnosis Date     Acquired hypothyroidism      History of blood transfusion      Hypertension      Obesity due to excess calories      Type 2 diabetes mellitus without complication, with long-term current use of insulin (H)      Past Surgical History:   Procedure Laterality Date     CHOLECYSTECTOMY       DAVINCI HYSTERECTOMY TOTAL, BILATERAL SALPINGO-OOPHORECTOMY, COMBINED Bilateral 7/6/2022    Procedure: ROBOTIC HYSTERECTOMY, BILATERAL SALPINGO OOPHORECTOMY, LYSIS OF ADHESIONS;  Surgeon: Jefferson Chavez MD;  Location: [facility] OR     HERNIA REPAIR  1990 s     INSERT INTRAUTERINE DEVICE N/A 7/6/2022    Procedure: REMOVAL OF INTRAUTERINE DEVICE;  Surgeon: Jefferson Chavez MD;  Location: [facility] OR       Family Hx:  Family History   Problem Relation Age  of Onset     Diabetes Mother         d age 80     Hypertension Mother      Coronary Artery Disease Father         d age 85     Brain Tumor Father      Hypertension Father      Diabetes Brother          Social Hx:  Social History     Socioeconomic History     Marital status:      Spouse name: Not on file     Number of children: Not on file     Years of education: Not on file     Highest education level: Not on file   Occupational History     Not on file   Tobacco Use     Smoking status: Never     Smokeless tobacco: Never   Vaping Use     Vaping Use: Never used   Substance and Sexual Activity     Alcohol use: Yes     Comment: Occ     Drug use: Never     Sexual activity: Not Currently   Other Topics Concern     Parent/sibling w/ CABG, MI or angioplasty before 65F 55M? No   Social History Narrative     Not on file     Social Determinants of Health     Financial Resource Strain: Not on file   Food Insecurity: Not on file   Transportation Needs: Not on file   Physical Activity: Not on file   Stress: Not on file   Social Connections: Not on file   Intimate Partner Violence: Not on file   Housing Stability: Not on file          MEDICATIONS:  has a current medication list which includes the following prescription(s): allopurinol, aspirin, atorvastatin, clobetasol, clobetasol, trulicity, furosemide, glipizide, glucosamine-chondroit-vit c-mn, insulin glargine, insulin lispro, levothyroxine, losartan, metformin, multivitamin, therapeutic, pantoprazole, triamcinolone, triamterene-hctz, cvs prep, blood glucose, blood glucose calibration, blood glucose monitoring, freestyle yanely 14 day reader, dexcom g6 sensor, dexcom g6 transmitter, glipizide, bd pen needle fadi 2nd gen, onetouch delica lancets 33g, onetouch verio iq, and thin.    ROS:     ROS: 10 point ROS neg other than the symptoms noted above in the HPI.    GENERAL: some fatigue, wt stable; denies fevers, chills, night sweats.   HEENT: no dysphagia, odonophagia,  diplopia, neck pain  THYROID:  no apparent hyper or hypothyroid symptoms  CV: no chest pain, pressure, palpitations  LUNGS: no SOB, NICHOLS, cough, wheezing   ABDOMEN: some indigestion; no diarrhea, constipation, abdominal pain  EXTREMITIES: no rashes, ulcers, edema  NEUROLOGY: no headaches, denies changes in vision, tingling, extremitiy numbness   MSK: no muscle aches or pains, weakness  SKIN: no rashes or lesions  : no menses  PSYCH:  stable mood, no significant anxiety or depression  ENDOCRINE: no heat or cold intolerance    Physical Exam (visual exam)  VS:  no vital signs taken for video visit  CONSTITUTIONAL: healthy, alert and NAD, well dressed, answering questions appropriately  ENT: no nose swelling or nasal discharge, mouth redness or gum changes.  EYES: eyes grossly normal to inspection, conjunctivae and sclerae normal, no exophthalmos or proptosis  THYROID:  no apparent nodules or goiter  LUNGS: no audible wheeze, cough or visible cyanosis, no visible retractions or increased work of breathing  ABDOMEN: abdomen not evaluated  EXTREMITIES: no hand tremors, limited exam  NEUROLOGY: CN grossly intact, mentation intact and speech normal   SKIN:  no apparent skin lesions, rash, or edema with visualized skin appearance  PSYCH: mentation appears normal, affect normal/bright, judgement and insight intact,   normal speech and appearance well groomed    LABS:    All pertinent notes, labs, and images personally reviewed by me.     A/P:  Encounter Diagnoses   Name Primary?     Type 2 diabetes mellitus without complication, with long-term current use of insulin (H) Yes     Morbid obesity (H)        Comments:  Reviewed health history and diabetes issues.  Recent hgbA1c good but difficult to make med titration decisions without BGM or CGM data  Reviewed and interpreted tests that I previously ordered.   Ordered appropriate tests for the endocrinology disease management.    Management options discussed and implemented  after shared medical decision making with the patient.  T2DM problem is chronic-stable    Plan:  Discussed general issues with the diabetes diagnosis and management  We discussed the hgbA1c test which reflects previous overall glucose levels or control  Discussed the importance of blood glucose (BG) testing to assess glucose trends  Provided general overview of the diabetes medication options and medication treatment plan.    Recommend:  Continue the current metformin, Trulicity, Basaglar insulin, and Humalog correction scale  Treatment goal to continue weight loss, minimize and try to discontinue mealtime insulin  Diabetes med plan:  Metformin 500 mg  2-tabs in morning and evening  Trulicity 4.5 mg  Subcutaneous weekly  Basaglar Kwikpen 17U Subcutaneous in morning  glipizideER 2.5 mg  1-tab daily  Humalog sscale     140-179  2U   180-219  4U   220-259  6U   260-299  8U   >300   10U    Monitor for possible GI side effects  Goal target premeal glucose 80- 150 mg/dl  Reviewed option of acquiring Personal Freestyle Hi CGM   Discussed the Freestyle Libre3 CGM   Offered option to have work-in clinic appt, start sample Libre3 CGM  Would be helpful for patient to have follow-up [facility] Diab Ed evaluation  Plan repeat non-fasting labs in 3/2023 or 4/2023   Consider lab testing at [facility] clinic   Lab orders placed  Keep focus on diet, exercise, weight management.  Continue losartan, atorvastatin, levothyroxine med use, per PCP  Advise having fasting lipid panel testing and dilated eye examination, at least annually    Addressed patient questions today    There are no Patient Instructions on file for this visit.    Future labs ordered today:   Orders Placed This Encounter   Procedures     Hemoglobin A1c     Basic metabolic panel     ALT     Radiology/Consults ordered today: None    Total time spent on day of encounter:  30 min    Follow-up:  4/2023, Bert Araujo MD, MS  Endocrinology  [facility]    CC:   VALORIE Mae              Again, thank you for allowing me to participate in the care of your patient.        Sincerely,        Eladio Araujo MD

## 2023-01-03 NOTE — PROGRESS NOTES
Patient is being evaluated via a billable video visit.      How would you like to obtain your AVS? Verbally Reviewed  If the video visit is dropped, the invitation should be resent by: Cellphone  Will anyone else be joining your video visit? No        Video-Visit Details    Video Start Time: 3:40 pm    Type of service:  Video Visit    Video End Time:  4:00 pm    Originating Location (pt. Location):  Home    PROVIDER LOCATION On-site vs Off-site    Distant Location (provider location):  Home    Platform used for Video Visit: [employer]          Recent issues:  Diabetes follow-up evaluation  Had hysterectomy surgery in 7/2022        History of pre-diabetes  ~2016. Initial diagnosis of diabetes mellitus  Had seen physician in [place], [place]  Started treatment with metformin   ~6/2020. Acute back pain?, diagnosed with pneumonia, high glucose and hgbA1c 12%   Hospital treatment included insulin treatment   Treatment with Abx, pain resolved   Recalls basal insulin 55U and use of rapid acting Humalog     7/15/20 Endocrinology evaluation with Dr. MERON Armijo/[facility] clinic  Addition of Farxiga but expensive, then change to Jardiance medication  ~Fall '21. Discontinue Jardiance since expensive      Previous [facility] hgbA1c trends include:     Lab Test 06/08/22  1149 03/23/22  1641 03/01/22  0951 12/06/21  1132 07/30/21  1236   A1C 7.2* 6.6* 6.7* 6.9* 6.5*       6/21/22. Initial diabetes evaluation with me at [place]  Reviewed health history and diabetes issues  [facility] Diab Ed eval and diagnostic Hi  Added glipizideER medication    Current DM medications:  Metformin 500 mg  2-tabs in morning and evening  Trulicity 4.5 mg  Subcutaneous weekly  Basaglar Kwikpen 17U Subcutaneous in morning  glipizideER 5 mg  1-tab daily  Humalog sscale  (uses approx 2x/day often morning vs suppertime)   140-179  2U   180-219  4U   220-259  6U   260-299  8U   >300   10U    Blood glucose (BG) meter:  One Touch Verio   Tests 2-3/day   Recent trends  100-200 mg/d, but details not available    Fam Hx Diabetes:  ?  Recent [facility] labs include:  Lab Results   Component Value Date    A1C 7.2 (H) 12/22/2022     12/22/2022    POTASSIUM 3.4 12/22/2022    CHLORIDE 100 12/22/2022    CO2 31 12/22/2022    ANIONGAP 7 12/22/2022     (H) 12/22/2022    BUN 23 12/22/2022    CR 1.02 12/22/2022    GFRESTIMATED 60 (L) 12/22/2022    GFRESTBLACK 80 01/07/2021    TRISTIAN 9.6 12/22/2022    CPEPT 9.8 (H) 07/31/2020    CHOL 107 06/08/2022    TRIG 93 06/08/2022    HDL 49 (L) 06/08/2022    LDL 39 06/08/2022    NHDL 58 06/08/2022    UCRR 105 12/22/2022    MICROL 15 12/22/2022    UMALCR 14.29 12/22/2022    TSH 1.78 12/22/2022     Last eye exam 2021, no DR per patient  DM Complications:  None known      Lives in [facility]  Sees Dr. Juani Mae/[facility] for general medicine evaluations.    PMH/PSH:  Past Medical History:   Diagnosis Date     Acquired hypothyroidism      History of blood transfusion      Hypertension      Obesity due to excess calories      Type 2 diabetes mellitus without complication, with long-term current use of insulin (H)      Past Surgical History:   Procedure Laterality Date     CHOLECYSTECTOMY       DAVINCI HYSTERECTOMY TOTAL, BILATERAL SALPINGO-OOPHORECTOMY, COMBINED Bilateral 7/6/2022    Procedure: ROBOTIC HYSTERECTOMY, BILATERAL SALPINGO OOPHORECTOMY, LYSIS OF ADHESIONS;  Surgeon: Jefferson Chavez MD;  Location: [facility] OR     HERNIA REPAIR  1990 s     INSERT INTRAUTERINE DEVICE N/A 7/6/2022    Procedure: REMOVAL OF INTRAUTERINE DEVICE;  Surgeon: Jefferson Chavez MD;  Location: [facility] OR       Family Hx:  Family History   Problem Relation Age of Onset     Diabetes Mother         d age 80     Hypertension Mother      Coronary Artery Disease Father         d age 85     Brain Tumor Father      Hypertension Father      Diabetes Brother          Social Hx:  Social History     Socioeconomic History     Marital  status:      Spouse name: Not on file     Number of children: Not on file     Years of education: Not on file     Highest education level: Not on file   Occupational History     Not on file   Tobacco Use     Smoking status: Never     Smokeless tobacco: Never   Vaping Use     Vaping Use: Never used   Substance and Sexual Activity     Alcohol use: Yes     Comment: Occ     Drug use: Never     Sexual activity: Not Currently   Other Topics Concern     Parent/sibling w/ CABG, MI or angioplasty before 65F 55M? No   Social History Narrative     Not on file     Social Determinants of Health     Financial Resource Strain: Not on file   Food Insecurity: Not on file   Transportation Needs: Not on file   Physical Activity: Not on file   Stress: Not on file   Social Connections: Not on file   Intimate Partner Violence: Not on file   Housing Stability: Not on file          MEDICATIONS:  has a current medication list which includes the following prescription(s): allopurinol, aspirin, atorvastatin, clobetasol, clobetasol, trulicity, furosemide, glipizide, glucosamine-chondroit-vit c-mn, insulin glargine, insulin lispro, levothyroxine, losartan, metformin, multivitamin, therapeutic, pantoprazole, triamcinolone, triamterene-hctz, cvs prep, blood glucose, blood glucose calibration, blood glucose monitoring, freestyle yanely 14 day reader, dexcom g6 sensor, dexcom g6 transmitter, glipizide, bd pen needle fadi 2nd gen, onetouch delica lancets 33g, onetouch verio iq, and thin.    ROS:     ROS: 10 point ROS neg other than the symptoms noted above in the HPI.    GENERAL: some fatigue, wt stable; denies fevers, chills, night sweats.   HEENT: no dysphagia, odonophagia, diplopia, neck pain  THYROID:  no apparent hyper or hypothyroid symptoms  CV: no chest pain, pressure, palpitations  LUNGS: no SOB, NICHOLS, cough, wheezing   ABDOMEN: some indigestion; no diarrhea, constipation, abdominal pain  EXTREMITIES: no rashes, ulcers,  edema  NEUROLOGY: no headaches, denies changes in vision, tingling, extremitiy numbness   MSK: no muscle aches or pains, weakness  SKIN: no rashes or lesions  : no menses  PSYCH:  stable mood, no significant anxiety or depression  ENDOCRINE: no heat or cold intolerance    Physical Exam (visual exam)  VS:  no vital signs taken for video visit  CONSTITUTIONAL: healthy, alert and NAD, well dressed, answering questions appropriately  ENT: no nose swelling or nasal discharge, mouth redness or gum changes.  EYES: eyes grossly normal to inspection, conjunctivae and sclerae normal, no exophthalmos or proptosis  THYROID:  no apparent nodules or goiter  LUNGS: no audible wheeze, cough or visible cyanosis, no visible retractions or increased work of breathing  ABDOMEN: abdomen not evaluated  EXTREMITIES: no hand tremors, limited exam  NEUROLOGY: CN grossly intact, mentation intact and speech normal   SKIN:  no apparent skin lesions, rash, or edema with visualized skin appearance  PSYCH: mentation appears normal, affect normal/bright, judgement and insight intact,   normal speech and appearance well groomed    LABS:    All pertinent notes, labs, and images personally reviewed by me.     A/P:  Encounter Diagnoses   Name Primary?     Type 2 diabetes mellitus without complication, with long-term current use of insulin (H) Yes     Morbid obesity (H)        Comments:  Reviewed health history and diabetes issues.  Recent hgbA1c good but difficult to make med titration decisions without BGM or CGM data  Reviewed and interpreted tests that I previously ordered.   Ordered appropriate tests for the endocrinology disease management.    Management options discussed and implemented after shared medical decision making with the patient.  T2DM problem is chronic-stable    Plan:  Discussed general issues with the diabetes diagnosis and management  We discussed the hgbA1c test which reflects previous overall glucose levels or  control  Discussed the importance of blood glucose (BG) testing to assess glucose trends  Provided general overview of the diabetes medication options and medication treatment plan.    Recommend:  Continue the current metformin, Trulicity, Basaglar insulin, and Humalog correction scale  Treatment goal to continue weight loss, minimize and try to discontinue mealtime insulin  Diabetes med plan:  Metformin 500 mg  2-tabs in morning and evening  Trulicity 4.5 mg  Subcutaneous weekly  Basaglar Kwikpen 17U Subcutaneous in morning  glipizideER 2.5 mg  1-tab daily  Humalog sscale     140-179  2U   180-219  4U   220-259  6U   260-299  8U   >300   10U    Monitor for possible GI side effects  Goal target premeal glucose 80- 150 mg/dl  Reviewed option of acquiring Personal Freestyle Hi CGM   Discussed the Freestyle Libre3 CGM   Offered option to have work-in clinic appt, start sample Libre3 CGM  Would be helpful for patient to have follow-up [facility] Ed evaluation  Plan repeat non-fasting labs in 3/2023 or 4/2023   Consider lab testing at [facility] clinic   Lab orders placed  Keep focus on diet, exercise, weight management.  Continue losartan, atorvastatin, levothyroxine med use, per PCP  Advise having fasting lipid panel testing and dilated eye examination, at least annually    Addressed patient questions today    There are no Patient Instructions on file for this visit.    Future labs ordered today:   Orders Placed This Encounter   Procedures     Hemoglobin A1c     Basic metabolic panel     ALT     Radiology/Consults ordered today: None    Total time spent on day of encounter:  30 min    Follow-up:  4/2023, Bert Araujo MD, MS  Endocrinology  [facility]    CC:  VALORIE Mae

## 2023-01-04 ENCOUNTER — TELEPHONE (OUTPATIENT)
Dept: ENDOCRINOLOGY | Facility: CLINIC | Age: 68
End: 2023-01-04

## 2023-01-04 NOTE — TELEPHONE ENCOUNTER
LVM for PT to call [phone] to schedule f/u April appt with Dr. Araujo.  Schedule lab appt too.

## 2023-03-13 DIAGNOSIS — E11.9 TYPE 2 DIABETES MELLITUS WITHOUT COMPLICATION, WITH LONG-TERM CURRENT USE OF INSULIN (H): ICD-10-CM

## 2023-03-13 DIAGNOSIS — Z79.4 TYPE 2 DIABETES MELLITUS WITHOUT COMPLICATION, WITH LONG-TERM CURRENT USE OF INSULIN (H): ICD-10-CM

## 2023-03-13 NOTE — TELEPHONE ENCOUNTER
"Requested Prescriptions   Pending Prescriptions Disp Refills     glipiZIDE (GLUCOTROL XL) 5 MG 24 hr tablet [Pharmacy Med Name: GLIPIZIDE ER 5 MG TABLET] 90 tablet 1     Sig: TAKE 1 TABLET BY MOUTH EVERY DAY       Sulfonylurea Agents Passed - 3/13/2023 12:35 AM        Passed - Patient has documented A1c within the specified period of time.     If HgbA1C is 8 or greater, it needs to be on file within the past 3 months.  If less than 8, must be on file within the past 6 months.     Recent Labs   Lab Test 12/22/22  1104   A1C 7.2*             Passed - Medication is active on med list        Passed - Patient is age 18 or older        Passed - No active pregnancy on record        Passed - Patient has a recent creatinine (normal) within the past 12 mos.     Recent Labs   Lab Test 12/22/22  1104   CR 1.02       Ok to refill medication if creatinine is low          Passed - Patient has not had a positive pregnancy test within the past 12 mos.        Passed - Recent (6 mo) or future (30 days) visit within the authorizing provider's specialty     Patient had office visit in the last 6 months or has a visit in the next 30 days with authorizing provider or within the authorizing provider's specialty.  See \"Patient Info\" tab in inbasket, or \"Choose Columns\" in Meds & Orders section of the refill encounter.               Last Written Prescription Date:  10/26/22  Last Fill Quantity: 30 tab,  # refills: 3   Last office visit: Virtual vist 1/3/23:  Dr Araujo  Future Office Visit:  4/12/23    LVM to confirm dosage for pt. Per Dr Araujo's note/med plan on 1/3/23.   glipizide ER 2.5mg- 1 tab daily          "

## 2023-03-14 ENCOUNTER — MYC MEDICAL ADVICE (OUTPATIENT)
Dept: ENDOCRINOLOGY | Facility: CLINIC | Age: 68
End: 2023-03-14
Payer: COMMERCIAL

## 2023-03-14 DIAGNOSIS — E11.9 TYPE 2 DIABETES MELLITUS WITHOUT COMPLICATION, WITH LONG-TERM CURRENT USE OF INSULIN (H): ICD-10-CM

## 2023-03-14 DIAGNOSIS — Z79.4 TYPE 2 DIABETES MELLITUS WITHOUT COMPLICATION, WITH LONG-TERM CURRENT USE OF INSULIN (H): ICD-10-CM

## 2023-03-14 NOTE — TELEPHONE ENCOUNTER
Sent Patient [employer] message asking to confirm what dose of glipizide she is taking. Rx request is for 5mg to take daily. Last visit  noted 2.5 mg daily.   Awaiting her response.   Maricarmen Orozco RN on 3/14/2023 at 9:14 AM

## 2023-03-15 NOTE — TELEPHONE ENCOUNTER
Patient returned call stating her medication below dosage is 5MG Tab once daily. Please call with questions. Thank you.

## 2023-03-16 DIAGNOSIS — E11.9 TYPE 2 DIABETES MELLITUS WITHOUT COMPLICATION, WITH LONG-TERM CURRENT USE OF INSULIN (H): ICD-10-CM

## 2023-03-16 DIAGNOSIS — Z79.4 TYPE 2 DIABETES MELLITUS WITHOUT COMPLICATION, WITH LONG-TERM CURRENT USE OF INSULIN (H): ICD-10-CM

## 2023-03-16 RX ORDER — GLIPIZIDE 5 MG/1
5 TABLET, FILM COATED, EXTENDED RELEASE ORAL DAILY
Qty: 30 TABLET | Refills: 3 | Status: CANCELLED | OUTPATIENT
Start: 2023-03-16

## 2023-03-16 RX ORDER — GLIPIZIDE 5 MG/1
5 TABLET, FILM COATED, EXTENDED RELEASE ORAL DAILY
Qty: 30 TABLET | Refills: 3 | Status: SHIPPED | OUTPATIENT
Start: 2023-03-16 | End: 2023-05-24

## 2023-03-16 RX ORDER — GLIPIZIDE 5 MG/1
5 TABLET, FILM COATED, EXTENDED RELEASE ORAL DAILY
Qty: 90 TABLET | Refills: 1 | OUTPATIENT
Start: 2023-03-16

## 2023-03-16 NOTE — TELEPHONE ENCOUNTER
Last Written Prescription Date:  10/26/22  Last Fill Quantity: 30,  # refills: 3   Last office visit: 1/3/23 with Dr. Aruajo  Future Office Visit:  4/12/23      Requested Prescriptions   Pending Prescriptions Disp Refills     glipiZIDE (GLUCOTROL XL) 5 MG 24 hr tablet 30 tablet 3     Sig: Take 1 tablet (5 mg) by mouth daily       There is no refill protocol information for this order        Refills sent  Gisel Flannery RN     no no no no no no no no no no no no no no no no no no no no

## 2023-03-26 ENCOUNTER — HEALTH MAINTENANCE LETTER (OUTPATIENT)
Age: 68
End: 2023-03-26

## 2023-04-03 ENCOUNTER — LAB (OUTPATIENT)
Dept: LAB | Facility: CLINIC | Age: 68
End: 2023-04-03
Payer: COMMERCIAL

## 2023-04-03 DIAGNOSIS — Z79.4 TYPE 2 DIABETES MELLITUS WITHOUT COMPLICATION, WITH LONG-TERM CURRENT USE OF INSULIN (H): ICD-10-CM

## 2023-04-03 DIAGNOSIS — E11.9 TYPE 2 DIABETES MELLITUS WITHOUT COMPLICATION, WITH LONG-TERM CURRENT USE OF INSULIN (H): ICD-10-CM

## 2023-04-03 LAB
ALT SERPL W P-5'-P-CCNC: 53 U/L (ref 0–50)
ANION GAP SERPL CALCULATED.3IONS-SCNC: 5 MMOL/L (ref 3–14)
BUN SERPL-MCNC: 30 MG/DL (ref 7–30)
CALCIUM SERPL-MCNC: 9.9 MG/DL (ref 8.5–10.1)
CHLORIDE BLD-SCNC: 105 MMOL/L (ref 94–109)
CO2 SERPL-SCNC: 28 MMOL/L (ref 20–32)
CREAT SERPL-MCNC: 0.9 MG/DL (ref 0.52–1.04)
GFR SERPL CREATININE-BSD FRML MDRD: 70 ML/MIN/1.73M2
GLUCOSE BLD-MCNC: 216 MG/DL (ref 70–99)
HBA1C MFR BLD: 7.3 % (ref 0–5.6)
POTASSIUM BLD-SCNC: 3.6 MMOL/L (ref 3.4–5.3)
SODIUM SERPL-SCNC: 138 MMOL/L (ref 133–144)

## 2023-04-03 PROCEDURE — 84460 ALANINE AMINO (ALT) (SGPT): CPT

## 2023-04-03 PROCEDURE — 80048 BASIC METABOLIC PNL TOTAL CA: CPT

## 2023-04-03 PROCEDURE — 36415 COLL VENOUS BLD VENIPUNCTURE: CPT

## 2023-04-03 PROCEDURE — 83036 HEMOGLOBIN GLYCOSYLATED A1C: CPT

## 2023-04-12 ENCOUNTER — VIRTUAL VISIT (OUTPATIENT)
Dept: ENDOCRINOLOGY | Facility: CLINIC | Age: 68
End: 2023-04-12
Payer: COMMERCIAL

## 2023-04-12 DIAGNOSIS — E11.9 TYPE 2 DIABETES MELLITUS WITHOUT COMPLICATION, WITH LONG-TERM CURRENT USE OF INSULIN (H): Primary | ICD-10-CM

## 2023-04-12 DIAGNOSIS — E66.01 MORBID OBESITY (H): ICD-10-CM

## 2023-04-12 DIAGNOSIS — Z79.4 TYPE 2 DIABETES MELLITUS WITHOUT COMPLICATION, WITH LONG-TERM CURRENT USE OF INSULIN (H): Primary | ICD-10-CM

## 2023-04-12 PROCEDURE — 99213 OFFICE O/P EST LOW 20 MIN: CPT | Mod: VID | Performed by: INTERNAL MEDICINE

## 2023-04-12 NOTE — PROGRESS NOTES
Video-Visit Details    Type of service:  Video Visit    Video Start Time (time video started):  1:30 pm    Video End Time (time video stopped):  1:45 pm    Originating Location (pt. Location):  Home        Distant Location (provider location):  Off-site    Mode of Communication:  Video Conference via [email or web address]        Recent issues:  Diabetes follow-up evaluation  Ran out of [employer] 1-month ago  Patient having difficulty acquiring the [employer] patient assistance program        History of pre-diabetes  ~2016. Initial diagnosis of diabetes mellitus  Had seen physician in [place], [place]  Started treatment with metformin   ~6/2020. Acute back pain?, diagnosed with pneumonia, high glucose and hgbA1c 12%   Hospital treatment included insulin treatment   Treatment with Abx, pain resolved   Recalls basal insulin 55U and use of rapid acting Humalog     7/15/20 Endocrinology evaluation with Dr. MERON Armijo/[facility]  Addition of Farxiga but expensive, then change to Jardiance medication  ~Fall '21. Discontinue Jardiance since expensive      Previous FV hgbA1c trends include:     Lab Test 06/08/22  1149 03/23/22  1641 03/01/22  0951 12/06/21  1132 07/30/21  1236   A1C 7.2* 6.6* 6.7* 6.9* 6.5*       6/21/22. Initial diabetes evaluation with me at [place]  Reviewed health history and diabetes issues  [facility] Diab Ed eval and diagnostic Hi  Added glipizideER medication  3/2023. Ran out of Trulicity (4.5 mg) medication    Current DM medications:  Metformin 500 mg  2-tabs in morning and evening  Basaglar Kwikpen 17U Subcutaneous in morning  glipizideER 5 mg  1-tab daily  Humalog sscale  (uses approx 2x/day often morning vs suppertime)   140-179  2U   180-219  4U   220-259  6U   260-299  8U   >300   10U    Blood glucose (BG) meter:  One Touch Verio   Tests 2-3/day   Recent trends 160-180 mg/d, but details not available    Fam Hx Diabetes:  ?  Recent [facility] labs include:  Lab Results   Component Value  Date    A1C 7.3 (H) 04/03/2023     04/03/2023    POTASSIUM 3.6 04/03/2023    CHLORIDE 105 04/03/2023    CO2 28 04/03/2023    ANIONGAP 5 04/03/2023     (H) 04/03/2023    BUN 30 04/03/2023    CR 0.90 04/03/2023    GFRESTIMATED 70 04/03/2023    GFRESTBLACK 80 01/07/2021    TRISTIAN 9.9 04/03/2023    CPEPT 9.8 (H) 07/31/2020    CHOL 107 06/08/2022    TRIG 93 06/08/2022    HDL 49 (L) 06/08/2022    LDL 39 06/08/2022    NHDL 58 06/08/2022    UCRR 105 12/22/2022    MICROL 15 12/22/2022    UMALCR 14.29 12/22/2022    TSH 1.78 12/22/2022     Last eye exam 2021, no DR per patient  DM Complications:  None known      Lives in [facility]  Sees Dr. Juani Mae/[facility] for general medicine evaluations.    PMH/PSH:  Past Medical History:   Diagnosis Date     Acquired hypothyroidism      History of blood transfusion      Hypertension      Obesity due to excess calories      Type 2 diabetes mellitus without complication, with long-term current use of insulin (H)      Past Surgical History:   Procedure Laterality Date     CHOLECYSTECTOMY       DAVINCI HYSTERECTOMY TOTAL, BILATERAL SALPINGO-OOPHORECTOMY, COMBINED Bilateral 7/6/2022    Procedure: ROBOTIC HYSTERECTOMY, BILATERAL SALPINGO OOPHORECTOMY, LYSIS OF ADHESIONS;  Surgeon: Jefferson Chavez MD;  Location: [facility] OR     HERNIA REPAIR  1990 s     INSERT INTRAUTERINE DEVICE N/A 7/6/2022    Procedure: REMOVAL OF INTRAUTERINE DEVICE;  Surgeon: Jefferson Chavez MD;  Location: [facility] OR       Family Hx:  Family History   Problem Relation Age of Onset     Diabetes Mother         d age 80     Hypertension Mother      Coronary Artery Disease Father         d age 85     Brain Tumor Father      Hypertension Father      Diabetes Brother          Social Hx:  Social History     Socioeconomic History     Marital status:      Spouse name: Not on file     Number of children: Not on file     Years of education: Not on file     Highest  education level: Not on file   Occupational History     Not on file   Tobacco Use     Smoking status: Never     Smokeless tobacco: Never   Vaping Use     Vaping status: Never Used   Substance and Sexual Activity     Alcohol use: Yes     Comment: Occ     Drug use: Never     Sexual activity: Not Currently   Other Topics Concern     Parent/sibling w/ CABG, MI or angioplasty before 65F 55M? No   Social History Narrative     Not on file     Social Determinants of Health     Financial Resource Strain: Not on file   Food Insecurity: Not on file   Transportation Needs: Not on file   Physical Activity: Not on file   Stress: Not on file   Social Connections: Not on file   Intimate Partner Violence: Not on file   Housing Stability: Not on file          MEDICATIONS:  has a current medication list which includes the following prescription(s): cvs prep, allopurinol, aspirin, atorvastatin, blood glucose, blood glucose calibration, blood glucose monitoring, clobetasol, clobetasol, freestyle yanely 14 day reader, dexcom g6 sensor, dexcom g6 transmitter, trulicity, furosemide, glipizide, glipizide, glucosamine-chondroit-vit c-mn, insulin glargine, insulin lispro, bd pen needle fadi 2nd gen, levothyroxine, losartan, metformin, multivitamin, therapeutic, onetouch delica lancets 33g, onetouch verio iq, pantoprazole, thin, triamcinolone, and triamterene-hctz.    ROS:     ROS: 10 point ROS neg other than the symptoms noted above in the HPI.    GENERAL: some fatigue, wt stable; denies fevers, chills, night sweats.   HEENT: no dysphagia, odonophagia, diplopia, neck pain  THYROID:  no apparent hyper or hypothyroid symptoms  CV: no chest pain, pressure, palpitations  LUNGS: no SOB, NICHOLS, cough, wheezing   ABDOMEN: some indigestion; no diarrhea, constipation, abdominal pain  EXTREMITIES: no rashes, ulcers, edema  NEUROLOGY: no headaches, denies changes in vision, tingling, extremitiy numbness   MSK: no muscle aches or pains, weakness  SKIN: no  rashes or lesions  : no menses  PSYCH:  stable mood, no significant anxiety or depression  ENDOCRINE: no heat or cold intolerance    Physical Exam (visual exam)  VS:  no vital signs taken for video visit  CONSTITUTIONAL: healthy, alert and NAD, well dressed, answering questions appropriately  ENT: no nose swelling or nasal discharge, mouth redness or gum changes.  EYES: eyes grossly normal to inspection, conjunctivae and sclerae normal, no exophthalmos or proptosis  THYROID:  no apparent nodules or goiter  LUNGS: no audible wheeze, cough or visible cyanosis, no visible retractions or increased work of breathing  ABDOMEN: abdomen not evaluated  EXTREMITIES: no hand tremors, limited exam  NEUROLOGY: CN grossly intact, mentation intact and speech normal   SKIN:  no apparent skin lesions, rash, or edema with visualized skin appearance  PSYCH: mentation appears normal, affect normal/bright, judgement and insight intact,   normal speech and appearance well groomed    LABS:    All pertinent notes, labs, and images personally reviewed by me.     A/P:  Encounter Diagnoses   Name Primary?     Type 2 diabetes mellitus without complication, with long-term current use of insulin (H) Yes     Morbid obesity (H)        Comments:  Reviewed health history and diabetes issues.  Recent hgbA1c and FBG mildly elevated, difficult to make med titration decisions without BGM or CGM data  Reviewed and interpreted tests that I previously ordered.   Ordered appropriate tests for the endocrinology disease management.    Management options discussed and implemented after shared medical decision making with the patient.  T2DM problem is chronic-stable    Plan:  Discussed general issues with the diabetes diagnosis and management  We discussed the hgbA1c test which reflects previous overall glucose levels or control  Discussed the importance of blood glucose (BG) testing to assess glucose trends  Provided general overview of the diabetes  medication options and medication treatment plan.    Recommend:  Continue the current metformin, Basaglar insulin, and Humalog correction scale  Treatment goal to continue weight loss, minimize and try to discontinue mealtime insulin    Diabetes med plan:  Metformin 500 mg  2-tabs in morning and evening  Basaglar Kwikpen 19U Subcutaneous in morning  glipizideER 5 mg  1-tab daily  Humalog sscale  (uses approx 2x/day often morning vs suppertime)   140-179  2U   180-219  4U   220-259  6U   260-299  8U   >300   10U    Monitor for possible GI side effects  Reviewed idea of renewing the [employer] program paperwork, restarting Trulicity also   Encouraged her to download program form from internet website   Discussed importance of up-titrating the Trulicity dose gradually  She also asked about relative importance of the glipizideER medication, consider stopping it  Goal target premeal glucose 80- 150 mg/dl  Reviewed option of acquiring [employer] CGM again... she's not interested   Would be helpful for patient to have follow-up [facility] Diab Ed evaluation  Keep focus on diet, exercise, weight management.  No lab tests ordered at this time  Continue losartan, atorvastatin, levothyroxine med use, per PCP  Advise having fasting lipid panel testing and dilated eye examination, at least annually    Addressed patient questions today    There are no Patient Instructions on file for this visit.    Future labs ordered today: No orders of the defined types were placed in this encounter.    Radiology/Consults ordered today: None    Total time spent on day of encounter:  25 min    Follow-up:  8/2023, JUVE Araujo MD, MS  Endocrinology  [facility]    CC:  VALORIE Mae

## 2023-04-12 NOTE — LETTER
4/12/2023         RE: Crystal Nicole  [address]  [place] [facility] [address]        Dear Colleague,    Thank you for referring your patient, Crystal Nicole, to the [facility] [place]. Please see a copy of my visit note below.    Video-Visit Details    Type of service:  Video Visit    Video Start Time (time video started):  1:30 pm    Video End Time (time video stopped):  1:45 pm    Originating Location (pt. Location):  Home        Distant Location (provider location):  Off-site    Mode of Communication:  Video Conference via [employer]        Recent issues:  Diabetes follow-up evaluation  Ran out of [employer] 1-month ago  Patient having difficulty acquiring the [employer] patient assistance program        History of pre-diabetes  ~2016. Initial diagnosis of diabetes mellitus  Had seen physician in [place], [place]  Started treatment with metformin   ~6/2020. Acute back pain?, diagnosed with pneumonia, high glucose and hgbA1c 12%   Hospital treatment included insulin treatment   Treatment with Abx, pain resolved   Recalls basal insulin 55U and use of rapid acting Humalog     7/15/20 Endocrinology evaluation with Dr. MERON Armijo/[facility]  Addition of Farxiga but expensive, then change to Jardiance medication  ~Fall '21. Discontinue Jardiance since expensive      Previous FV hgbA1c trends include:     Lab Test 06/08/22  1149 03/23/22  1641 03/01/22  0951 12/06/21  1132 07/30/21  1236   A1C 7.2* 6.6* 6.7* 6.9* 6.5*       6/21/22. Initial diabetes evaluation with me at [place]  Reviewed health history and diabetes issues  [facility] Diab Ed eval and diagnostic Hi  Added glipizideER medication  3/2023. Ran out of Trulicity (4.5 mg) medication    Current DM medications:  Metformin 500 mg  2-tabs in morning and evening  Basaglar Kwikpen 17U Subcutaneous in morning  glipizideER 5 mg  1-tab daily  Humalog sscale  (uses approx 2x/day often morning vs  suppertime)   140-179  2U   180-219  4U   220-259  6U   260-299  8U   >300   10U    Blood glucose (BG) meter:  One Touch Verio   Tests 2-3/day   Recent trends 160-180 mg/d, but details not available    Fam Hx Diabetes:  ?  Recent FV labs include:  Lab Results   Component Value Date    A1C 7.3 (H) 04/03/2023     04/03/2023    POTASSIUM 3.6 04/03/2023    CHLORIDE 105 04/03/2023    CO2 28 04/03/2023    ANIONGAP 5 04/03/2023     (H) 04/03/2023    BUN 30 04/03/2023    CR 0.90 04/03/2023    GFRESTIMATED 70 04/03/2023    GFRESTBLACK 80 01/07/2021    TRISTIAN 9.9 04/03/2023    CPEPT 9.8 (H) 07/31/2020    CHOL 107 06/08/2022    TRIG 93 06/08/2022    HDL 49 (L) 06/08/2022    LDL 39 06/08/2022    NHDL 58 06/08/2022    UCRR 105 12/22/2022    MICROL 15 12/22/2022    UMALCR 14.29 12/22/2022    TSH 1.78 12/22/2022     Last eye exam 2021, no DR per patient  DM Complications:  None known      Lives in [facility]  Sees Dr. Juani [facility] for general medicine evaluations.    PMH/PSH:  Past Medical History:   Diagnosis Date     Acquired hypothyroidism      History of blood transfusion      Hypertension      Obesity due to excess calories      Type 2 diabetes mellitus without complication, with long-term current use of insulin (H)      Past Surgical History:   Procedure Laterality Date     CHOLECYSTECTOMY       DAVINCI HYSTERECTOMY TOTAL, BILATERAL SALPINGO-OOPHORECTOMY, COMBINED Bilateral 7/6/2022    Procedure: ROBOTIC HYSTERECTOMY, BILATERAL SALPINGO OOPHORECTOMY, LYSIS OF ADHESIONS;  Surgeon: Jefferson Chavez MD;  Location: [facility]     HERNIA REPAIR  1990 s     INSERT INTRAUTERINE DEVICE N/A 7/6/2022    Procedure: REMOVAL OF INTRAUTERINE DEVICE;  Surgeon: Jefferson Chavez MD;  Location: [facility] OR       Family Hx:  Family History   Problem Relation Age of Onset     Diabetes Mother         d age 80     Hypertension Mother      Coronary Artery Disease Father         d  age 85     Brain Tumor Father      Hypertension Father      Diabetes Brother          Social Hx:  Social History     Socioeconomic History     Marital status:      Spouse name: Not on file     Number of children: Not on file     Years of education: Not on file     Highest education level: Not on file   Occupational History     Not on file   Tobacco Use     Smoking status: Never     Smokeless tobacco: Never   Vaping Use     Vaping status: Never Used   Substance and Sexual Activity     Alcohol use: Yes     Comment: Occ     Drug use: Never     Sexual activity: Not Currently   Other Topics Concern     Parent/sibling w/ CABG, MI or angioplasty before 65F 55M? No   Social History Narrative     Not on file     Social Determinants of Health     Financial Resource Strain: Not on file   Food Insecurity: Not on file   Transportation Needs: Not on file   Physical Activity: Not on file   Stress: Not on file   Social Connections: Not on file   Intimate Partner Violence: Not on file   Housing Stability: Not on file          MEDICATIONS:  has a current medication list which includes the following prescription(s): cvs prep, allopurinol, aspirin, atorvastatin, blood glucose, blood glucose calibration, blood glucose monitoring, clobetasol, clobetasol, freestyle yanely 14 day reader, dexcom g6 sensor, dexcom g6 transmitter, trulicity, furosemide, glipizide, glipizide, glucosamine-chondroit-vit c-mn, insulin glargine, insulin lispro, bd pen needle fadi 2nd gen, levothyroxine, losartan, metformin, multivitamin, therapeutic, onetouch delica lancets 33g, onetouch verio iq, pantoprazole, thin, triamcinolone, and triamterene-hctz.    ROS:     ROS: 10 point ROS neg other than the symptoms noted above in the HPI.    GENERAL: some fatigue, wt stable; denies fevers, chills, night sweats.   HEENT: no dysphagia, odonophagia, diplopia, neck pain  THYROID:  no apparent hyper or hypothyroid symptoms  CV: no chest pain, pressure,  palpitations  LUNGS: no SOB, NICHOLS, cough, wheezing   ABDOMEN: some indigestion; no diarrhea, constipation, abdominal pain  EXTREMITIES: no rashes, ulcers, edema  NEUROLOGY: no headaches, denies changes in vision, tingling, extremitiy numbness   MSK: no muscle aches or pains, weakness  SKIN: no rashes or lesions  : no menses  PSYCH:  stable mood, no significant anxiety or depression  ENDOCRINE: no heat or cold intolerance    Physical Exam (visual exam)  VS:  no vital signs taken for video visit  CONSTITUTIONAL: healthy, alert and NAD, well dressed, answering questions appropriately  ENT: no nose swelling or nasal discharge, mouth redness or gum changes.  EYES: eyes grossly normal to inspection, conjunctivae and sclerae normal, no exophthalmos or proptosis  THYROID:  no apparent nodules or goiter  LUNGS: no audible wheeze, cough or visible cyanosis, no visible retractions or increased work of breathing  ABDOMEN: abdomen not evaluated  EXTREMITIES: no hand tremors, limited exam  NEUROLOGY: CN grossly intact, mentation intact and speech normal   SKIN:  no apparent skin lesions, rash, or edema with visualized skin appearance  PSYCH: mentation appears normal, affect normal/bright, judgement and insight intact,   normal speech and appearance well groomed    LABS:    All pertinent notes, labs, and images personally reviewed by me.     A/P:  Encounter Diagnoses   Name Primary?     Type 2 diabetes mellitus without complication, with long-term current use of insulin (H) Yes     Morbid obesity (H)        Comments:  Reviewed health history and diabetes issues.  Recent hgbA1c and FBG mildly elevated, difficult to make med titration decisions without BGM or CGM data  Reviewed and interpreted tests that I previously ordered.   Ordered appropriate tests for the endocrinology disease management.    Management options discussed and implemented after shared medical decision making with the patient.  T2DM problem is  chronic-stable    Plan:  Discussed general issues with the diabetes diagnosis and management  We discussed the hgbA1c test which reflects previous overall glucose levels or control  Discussed the importance of blood glucose (BG) testing to assess glucose trends  Provided general overview of the diabetes medication options and medication treatment plan.    Recommend:  Continue the current metformin, Basaglar insulin, and Humalog correction scale  Treatment goal to continue weight loss, minimize and try to discontinue mealtime insulin    Diabetes med plan:  Metformin 500 mg  2-tabs in morning and evening  Basaglar Kwikpen 19U Subcutaneous in morning  glipizideER 5 mg  1-tab daily  Humalog sscale  (uses approx 2x/day often morning vs suppertime)   140-179  2U   180-219  4U   220-259  6U   260-299  8U   >300   10U    Monitor for possible GI side effects  Reviewed idea of renewing the [employer] program paperwork, restarting Trulicity also   Encouraged her to download program form from internet website   Discussed importance of up-titrating the Trulicity dose gradually  She also asked about relative importance of the glipizideER medication, consider stopping it  Goal target premeal glucose 80- 150 mg/dl  Reviewed option of acquiring [employer] CGM again... she's not interested   Would be helpful for patient to have follow-up [facility] Diab Ed evaluation  Keep focus on diet, exercise, weight management.  No lab tests ordered at this time  Continue losartan, atorvastatin, levothyroxine med use, per PCP  Advise having fasting lipid panel testing and dilated eye examination, at least annually    Addressed patient questions today    There are no Patient Instructions on file for this visit.    Future labs ordered today: No orders of the defined types were placed in this encounter.    Radiology/Consults ordered today: None    Total time spent on day of encounter:  25 min    Follow-up:  8/2023, JUVE Araujo MD,  MS  Endocrinology  [facility]    CC:  VALORIE Mae              Again, thank you for allowing me to participate in the care of your patient.        Sincerely,        Eladio Araujo MD

## 2023-04-13 ENCOUNTER — TELEPHONE (OUTPATIENT)
Dept: ENDOCRINOLOGY | Facility: CLINIC | Age: 68
End: 2023-04-13
Payer: COMMERCIAL

## 2023-04-13 DIAGNOSIS — Z79.4 TYPE 2 DIABETES MELLITUS WITHOUT COMPLICATION, WITH LONG-TERM CURRENT USE OF INSULIN (H): Primary | ICD-10-CM

## 2023-04-13 DIAGNOSIS — E11.9 TYPE 2 DIABETES MELLITUS WITHOUT COMPLICATION, WITH LONG-TERM CURRENT USE OF INSULIN (H): Primary | ICD-10-CM

## 2023-04-13 NOTE — TELEPHONE ENCOUNTER
[facility] Call Center    Phone Message    May a detailed message be left on voicemail: yes     Reason for Call: Other: Patient will require lab orders prior to her 8/14 scheduled appt.  They will also need to be scheduled.  Thank you.     Action Taken: Other: endo    Travel Screening: Not Applicable

## 2023-04-24 ENCOUNTER — MYC MEDICAL ADVICE (OUTPATIENT)
Dept: ENDOCRINOLOGY | Facility: CLINIC | Age: 68
End: 2023-04-24
Payer: COMMERCIAL

## 2023-04-25 ENCOUNTER — MYC MEDICAL ADVICE (OUTPATIENT)
Dept: ENDOCRINOLOGY | Facility: CLINIC | Age: 68
End: 2023-04-25
Payer: COMMERCIAL

## 2023-04-26 NOTE — TELEPHONE ENCOUNTER
[employer] Patient Assistance Program  Form(s) have been completed faxed.  Faxed or mailed to: number listed on form.  Form(s) in accordion file     Jemma Urbano MA

## 2023-04-26 NOTE — TELEPHONE ENCOUNTER
Information received from patient this morning.  I had been waiting on this info to complete her [facility] patient assistance program application.  I sent her a separate message reply today and will have the form faxed to [employer] today.    JONELLE Araujo MD, MS  Endocrinology  [facility]

## 2023-05-04 ENCOUNTER — OFFICE VISIT (OUTPATIENT)
Dept: FAMILY MEDICINE | Facility: CLINIC | Age: 68
End: 2023-05-04
Payer: COMMERCIAL

## 2023-05-04 VITALS
HEIGHT: 64 IN | TEMPERATURE: 98 F | HEART RATE: 85 BPM | BODY MASS INDEX: 50.02 KG/M2 | DIASTOLIC BLOOD PRESSURE: 83 MMHG | WEIGHT: 293 LBS | OXYGEN SATURATION: 98 % | SYSTOLIC BLOOD PRESSURE: 130 MMHG

## 2023-05-04 DIAGNOSIS — E11.22 TYPE 2 DIABETES MELLITUS WITH STAGE 3A CHRONIC KIDNEY DISEASE, WITH LONG-TERM CURRENT USE OF INSULIN (H): ICD-10-CM

## 2023-05-04 DIAGNOSIS — R93.89 INFILTRATE NOTED ON IMAGING STUDY: Primary | ICD-10-CM

## 2023-05-04 DIAGNOSIS — N18.31 CHRONIC KIDNEY DISEASE, STAGE 3A (H): ICD-10-CM

## 2023-05-04 DIAGNOSIS — E66.01 MORBID OBESITY (H): ICD-10-CM

## 2023-05-04 DIAGNOSIS — N18.31 TYPE 2 DIABETES MELLITUS WITH STAGE 3A CHRONIC KIDNEY DISEASE, WITH LONG-TERM CURRENT USE OF INSULIN (H): ICD-10-CM

## 2023-05-04 DIAGNOSIS — R06.02 SHORTNESS OF BREATH: ICD-10-CM

## 2023-05-04 DIAGNOSIS — I10 ESSENTIAL HYPERTENSION: ICD-10-CM

## 2023-05-04 DIAGNOSIS — Z79.4 TYPE 2 DIABETES MELLITUS WITH STAGE 3A CHRONIC KIDNEY DISEASE, WITH LONG-TERM CURRENT USE OF INSULIN (H): ICD-10-CM

## 2023-05-04 LAB
ALBUMIN SERPL BCG-MCNC: 4.3 G/DL (ref 3.5–5.2)
ALP SERPL-CCNC: 72 U/L (ref 35–104)
ALT SERPL W P-5'-P-CCNC: 43 U/L (ref 10–35)
ANION GAP SERPL CALCULATED.3IONS-SCNC: 14 MMOL/L (ref 7–15)
AST SERPL W P-5'-P-CCNC: 51 U/L (ref 10–35)
BASOPHILS # BLD AUTO: 0.1 10E3/UL (ref 0–0.2)
BASOPHILS NFR BLD AUTO: 1 %
BILIRUB SERPL-MCNC: 0.7 MG/DL
BUN SERPL-MCNC: 19.8 MG/DL (ref 8–23)
CALCIUM SERPL-MCNC: 10.1 MG/DL (ref 8.8–10.2)
CHLORIDE SERPL-SCNC: 104 MMOL/L (ref 98–107)
CREAT SERPL-MCNC: 0.94 MG/DL (ref 0.51–0.95)
DEPRECATED HCO3 PLAS-SCNC: 25 MMOL/L (ref 22–29)
EOSINOPHIL # BLD AUTO: 0.2 10E3/UL (ref 0–0.7)
EOSINOPHIL NFR BLD AUTO: 2 %
ERYTHROCYTE [DISTWIDTH] IN BLOOD BY AUTOMATED COUNT: 14.7 % (ref 10–15)
GFR SERPL CREATININE-BSD FRML MDRD: 67 ML/MIN/1.73M2
GLUCOSE SERPL-MCNC: 140 MG/DL (ref 70–99)
HCT VFR BLD AUTO: 42.8 % (ref 35–47)
HGB BLD-MCNC: 13.5 G/DL (ref 11.7–15.7)
LYMPHOCYTES # BLD AUTO: 1.5 10E3/UL (ref 0.8–5.3)
LYMPHOCYTES NFR BLD AUTO: 16 %
MCH RBC QN AUTO: 30.3 PG (ref 26.5–33)
MCHC RBC AUTO-ENTMCNC: 31.5 G/DL (ref 31.5–36.5)
MCV RBC AUTO: 96 FL (ref 78–100)
MONOCYTES # BLD AUTO: 0.9 10E3/UL (ref 0–1.3)
MONOCYTES NFR BLD AUTO: 10 %
NEUTROPHILS # BLD AUTO: 6.7 10E3/UL (ref 1.6–8.3)
NEUTROPHILS NFR BLD AUTO: 72 %
PLATELET # BLD AUTO: 301 10E3/UL (ref 150–450)
POTASSIUM SERPL-SCNC: 4.1 MMOL/L (ref 3.4–5.3)
PROT SERPL-MCNC: 8.2 G/DL (ref 6.4–8.3)
RBC # BLD AUTO: 4.45 10E6/UL (ref 3.8–5.2)
SODIUM SERPL-SCNC: 143 MMOL/L (ref 136–145)
WBC # BLD AUTO: 9.3 10E3/UL (ref 4–11)

## 2023-05-04 PROCEDURE — 36415 COLL VENOUS BLD VENIPUNCTURE: CPT | Performed by: FAMILY MEDICINE

## 2023-05-04 PROCEDURE — 99215 OFFICE O/P EST HI 40 MIN: CPT | Performed by: FAMILY MEDICINE

## 2023-05-04 PROCEDURE — 85025 COMPLETE CBC W/AUTO DIFF WBC: CPT | Performed by: FAMILY MEDICINE

## 2023-05-04 PROCEDURE — 80053 COMPREHEN METABOLIC PANEL: CPT | Performed by: FAMILY MEDICINE

## 2023-05-04 ASSESSMENT — PAIN SCALES - GENERAL: PAINLEVEL: NO PAIN (0)

## 2023-05-04 NOTE — PROGRESS NOTES
"  Assessment & Plan       ICD-10-CM    1. Infiltrate noted on imaging study  R93.89 CBC with platelets and differential     CBC with platelets and differential     BNP-N terminal pro      2. Shortness of breath  R06.02 BNP-N terminal pro      3. Type 2 diabetes mellitus with stage 3a chronic kidney disease, with long-term current use of insulin (H)  E11.22 Comprehensive metabolic panel    N18.31 Comprehensive metabolic panel    Z79.4       4. Chronic kidney disease, stage 3a (H)  N18.31 Comprehensive metabolic panel     Comprehensive metabolic panel      5. Essential hypertension  I10       6. Morbid obesity (H)  E66.01         Her temperature and blood pressure and oxygen saturation and other vital signs look good today  I am not sure what to make of her \"bibasilar infiltrates\" on chest x-ray  She does not appear to have any obvious infection  She does not have a history of CHF, but I question whether she may have some fluid buildup contributing to the infiltrates  We will check a CBC with differential and a BNP and also a CMP today as above  She could continue with losartan at 50 mg/day for the time being  She could use hydroxyzine at night to help with sleep if needed/desired  If she develops any further weight gain or obvious fluid retention, then she should use some furosemide in addition to the Dyazide  I advised follow-up with her PCP in the next week or 2 for recheck and ongoing management of the above    40-minute visit today including chart review, the visit itself, review of outside test results, and subsequent documentation    Tejas Vega MD  [facility] FRED Rojas is a 67 year old, presenting for the following health issues:  Insomnia        5/4/2023     3:07 PM   Additional Questions   Roomed by CAM   Accompanied by none         5/4/2023     3:07 PM   Patient Reported Additional Medications   Patient reports taking the following new medications Basaglar 19 units " daily     HPI     ED/ Followup:    Facility:  [facility]  Date of visit: 5/3/23  Reason for visit:SOB, felt funny and difficulty with sleeping  Current Status: still sob    [facility] because she had some vague shortness of breath and chest heaviness.  She has a history of diabetes, morbid obesity, CKD stage III, hypothyroidism and hypertension as per her chart.  They apparently were not able to do any lab work yesterday, they did do an EKG and a chest x-ray.  Her EKG showed normal sinus rhythm without acute changes.  Her chest x-ray was read by radiology as showing bibasilar infiltrates.  No cardiomegaly.  The patient does not feel especially sick.  She is not having any significant cough.  She is not bringing up phlegm.  She has had no recent fever.  She has had pneumonia in the past.  She feels like her heart has some extra beats at times, but she really has not had any known significant cardiac issues previously.  She decided to take an extra losartan pill yesterday and that seemed to help her feel better and she thought it may help get rid of a little bit of fluid in her legs as well.  She does take 2 Dyazide tablets per day.  She does have some furosemide at home, but she rarely uses it.  She says she it makes her go to the bathroom all the time, so she does not like that.    She had some difficulty sleeping the last couple of nights.  She mentioned that to them yesterday and they gave her some hydroxyzine tablets and she took 1 last night and that helped her sleep.    Patient Active Problem List   Diagnosis     Diabetes mellitus, type 2 (H)     Morbid obesity (H)     Essential hypertension     Hypothyroidism     Hyperlipidemia LDL goal <70     Gout, unspecified cause, unspecified chronicity, unspecified site     Psoriasis     Gastroesophageal reflux disease     Chronic kidney disease, stage 3a (H)     Type 2 diabetes mellitus with stage 3a chronic kidney disease,  "with long-term current use of insulin (H)     Current Outpatient Medications   Medication     Alcohol Swabs (CVS PREP) 70 % PADS     allopurinol (ZYLOPRIM) 300 MG tablet     aspirin (ASA) 325 MG EC tablet     atorvastatin (LIPITOR) 10 MG tablet     blood glucose (NO BRAND SPECIFIED) test strip     blood glucose calibration (NO BRAND SPECIFIED) solution     blood glucose monitoring (NO BRAND SPECIFIED) meter device kit     clobetasol (TEMOVATE) 0.05 % external ointment     glipiZIDE (GLUCOTROL XL) 5 MG 24 hr tablet     insulin glargine (LANTUS PEN) 100 UNIT/ML pen     Insulin Lispro (HUMALOG KWIKPEN SC)     insulin pen needle (BD PEN NEEDLE CONOR 2ND GEN) 32G X 4 MM miscellaneous     levothyroxine (SYNTHROID/LEVOTHROID) 100 MCG tablet     losartan (COZAAR) 25 MG tablet     metFORMIN (GLUCOPHAGE XR) 500 MG 24 hr tablet     multivitamin, therapeutic (THERA-VIT) TABS tablet     OneTouch Delica Lancets 33G MISC     ONETOUCH VERIO IQ test strip     thin (NO BRAND SPECIFIED) lancets     triamcinolone (KENALOG) 0.1 % external cream     triamterene-HCTZ (DYAZIDE) 37.5-25 MG capsule     Continuous Blood Gluc  (ImageTagYLE SJ 14 DAY READER) HILARY     Continuous Blood Gluc Sensor (DEXCOM G6 SENSOR) MISC     Continuous Blood Gluc Transmit (DEXCOM G6 TRANSMITTER) MISC     Dulaglutide (TRULICITY) 4.5 MG/0.5ML SOPN     furosemide (LASIX) 40 MG tablet     glipiZIDE (GLUCOTROL XL) 2.5 MG 24 hr tablet     Glucosamine-Chondroit-Vit C-Mn (GLUCOSAMINE 1500 COMPLEX PO)     pantoprazole (PROTONIX) 40 MG EC tablet     No current facility-administered medications for this visit.           Review of Systems   Noncontributory except as above.      Objective    /83 (BP Location: Left arm, Patient Position: Sitting, Cuff Size: Adult Large)   Pulse 85   Temp 98  F (36.7  C) (Oral)   Ht 1.626 m (5' 4\")   Wt 149.7 kg (330 lb)   SpO2 98%   BMI 56.64 kg/m    Body mass index is 56.64 kg/m .  Physical Exam   GENERAL: Pleasant, " comfortable appearing, alert, no distress and morbidly obese  HENT: Grossly normal  RESP: lungs clear to auscultation - no rales, rhonchi or wheezes  CV: regular rate and rhythm, normal S1 S2, no S3 or S4, no murmur, click or rub, trace lower extremity peripheral edema     I reviewed her EKG report and chest x-ray report that she brought in today from her visit yesterday at [facility]

## 2023-05-05 ENCOUNTER — TELEPHONE (OUTPATIENT)
Dept: ENDOCRINOLOGY | Facility: CLINIC | Age: 68
End: 2023-05-05
Payer: COMMERCIAL

## 2023-05-05 ENCOUNTER — TELEPHONE (OUTPATIENT)
Dept: FAMILY MEDICINE | Facility: CLINIC | Age: 68
End: 2023-05-05
Payer: COMMERCIAL

## 2023-05-05 NOTE — RESULT ENCOUNTER NOTE
Crystal,  Your blood counts are all normal, so no sign of any anemia or obvious apparent infection.  Your electrolytes and kidney tests are normal.  A couple of your liver tests are slightly elevated, but lower than a month ago.  Nothing here that looks especially worrisome...    Tejas Vega MD

## 2023-05-05 NOTE — TELEPHONE ENCOUNTER
[facility] Call Center    Phone Message    May a detailed message be left on voicemail: yes     Reason for Call: Form or Letter   Type or form/letter needing completion: [facility]  Provider:Dr bass  Date form needed: asap  Once completed: resubmit to [facility]    Patient is stating that they were in touch with [facility].  [facility] is missing page 2 (patient section ) and units/dosage for patient's Humolog and Bastular on page 5.  Please revise and resubmit.      Can call patient if further informtion is required      Action Taken: Other: endo    Travel Screening: Not Applicable

## 2023-05-05 NOTE — TELEPHONE ENCOUNTER
RN found faxed form that included pg 2 and the insulin info for humalog and basaglar was already filled out.  Re-faxed all paperwork. Gisel Flannery RN

## 2023-05-05 NOTE — TELEPHONE ENCOUNTER
Patient Quality Outreach    Patient is due for the following:   Diabetes -  Eye Exam    Next Steps:   see below    Type of outreach:    Patient in office and dicussed HM due, eye exam. Francis deed her eye exam on 02/23 with Dr. Maloney at the [facility]    Next Steps:  Reach out within 90 days via done.    Max number of attempts reached: Yes. Will try again in 90 days if patient still on fail list.    Questions for provider review:    None           Peyton Gabriel, [facility]  Chart routed to routing to  to satisfy the  dept for the eye exam .

## 2023-05-11 ENCOUNTER — MYC MEDICAL ADVICE (OUTPATIENT)
Dept: ENDOCRINOLOGY | Facility: CLINIC | Age: 68
End: 2023-05-11
Payer: COMMERCIAL

## 2023-05-11 DIAGNOSIS — E11.00 TYPE 2 DIABETES MELLITUS WITH HYPEROSMOLARITY WITHOUT COMA, UNSPECIFIED WHETHER LONG TERM INSULIN USE (H): ICD-10-CM

## 2023-05-11 RX ORDER — PEN NEEDLE, DIABETIC 32GX 5/32"
NEEDLE, DISPOSABLE MISCELLANEOUS
Qty: 200 EACH | Refills: 3 | Status: SHIPPED | OUTPATIENT
Start: 2023-05-11 | End: 2024-03-19

## 2023-05-11 RX ORDER — PEN NEEDLE, DIABETIC 32GX 5/32"
NEEDLE, DISPOSABLE MISCELLANEOUS
Qty: 200 EACH | Refills: 3 | Status: CANCELLED | OUTPATIENT
Start: 2023-05-11

## 2023-05-11 RX ORDER — PEN NEEDLE, DIABETIC 32GX 5/32"
NEEDLE, DISPOSABLE MISCELLANEOUS
Qty: 200 EACH | Refills: 1 | OUTPATIENT
Start: 2023-05-11

## 2023-05-11 NOTE — TELEPHONE ENCOUNTER
Prescription on file at Requesting Pharmacy.     Ekta Epstein RN BSN  [facility]

## 2023-05-11 NOTE — TELEPHONE ENCOUNTER
Last Written Prescription Date:  7/5/22  Last Fill Quantity: 200,  # refills: 5   Last office visit: 4/12/23 with Dr. Araujo  Future Office Visit:  8/14/23        Requested Prescriptions   Pending Prescriptions Disp Refills     insulin pen needle (BD PEN NEEDLE CONOR 2ND GEN) 32G X 4 MM miscellaneous 200 each 3     Sig: Use 4 pen needles daily or as directed.       There is no refill protocol information for this order        Refills sent  Gisel Flannery RN

## 2023-05-23 DIAGNOSIS — Z79.4 TYPE 2 DIABETES MELLITUS WITHOUT COMPLICATION, WITH LONG-TERM CURRENT USE OF INSULIN (H): ICD-10-CM

## 2023-05-23 DIAGNOSIS — E11.9 TYPE 2 DIABETES MELLITUS WITHOUT COMPLICATION, WITH LONG-TERM CURRENT USE OF INSULIN (H): ICD-10-CM

## 2023-05-24 RX ORDER — GLIPIZIDE 5 MG/1
TABLET, FILM COATED, EXTENDED RELEASE ORAL
Qty: 90 TABLET | Refills: 1 | Status: SHIPPED | OUTPATIENT
Start: 2023-05-24 | End: 2023-09-26

## 2023-05-24 NOTE — TELEPHONE ENCOUNTER
"Last Written Prescription Date:  3/16/23  Last Fill Quantity: 30,  # refills: 3   Last office visit:4/12/2023 with prescribing provider:  Dr. Araujo   Future Office Visit: 8/14/23  Next 5 appointments (look out 90 days)    May 30, 2023 12:00 PM  (Arrive by 11:40 AM)  Provider Visit with Juani Mae MD  [facility] ([facility] [address] [id number] [phone]               Requested Prescriptions   Pending Prescriptions Disp Refills     glipiZIDE (GLUCOTROL XL) 5 MG 24 hr tablet [Pharmacy Med Name: GLIPIZIDE ER 5 MG TABLET] 90 tablet 1     Sig: TAKE 1 TABLET BY MOUTH EVERY DAY       Sulfonylurea Agents Passed - 5/23/2023  4:36 PM        Passed - Patient has documented A1c within the specified period of time.     If HgbA1C is 8 or greater, it needs to be on file within the past 3 months.  If less than 8, must be on file within the past 6 months.     Recent Labs   Lab Test 04/03/23  1127   A1C 7.3*             Passed - Medication is active on med list        Passed - Patient is age 18 or older        Passed - No active pregnancy on record        Passed - Patient has a recent creatinine (normal) within the past 12 mos.     Recent Labs   Lab Test 05/04/23  1553   CR 0.94       Ok to refill medication if creatinine is low          Passed - Patient has not had a positive pregnancy test within the past 12 mos.        Passed - Recent (6 mo) or future (30 days) visit within the authorizing provider's specialty     Patient had office visit in the last 6 months or has a visit in the next 30 days with authorizing provider or within the authorizing provider's specialty.  See \"Patient Info\" tab in inbasket, or \"Choose Columns\" in Meds & Orders section of the refill encounter.               Refills sent  Gisel Flannery RN    "

## 2023-06-13 ENCOUNTER — OFFICE VISIT (OUTPATIENT)
Dept: FAMILY MEDICINE | Facility: CLINIC | Age: 68
End: 2023-06-13
Payer: COMMERCIAL

## 2023-06-13 VITALS
HEIGHT: 64 IN | HEART RATE: 91 BPM | WEIGHT: 293 LBS | BODY MASS INDEX: 50.02 KG/M2 | RESPIRATION RATE: 19 BRPM | SYSTOLIC BLOOD PRESSURE: 131 MMHG | OXYGEN SATURATION: 98 % | DIASTOLIC BLOOD PRESSURE: 82 MMHG

## 2023-06-13 DIAGNOSIS — I10 ESSENTIAL HYPERTENSION: ICD-10-CM

## 2023-06-13 DIAGNOSIS — Z23 HIGH PRIORITY FOR 2019-NCOV VACCINE: ICD-10-CM

## 2023-06-13 DIAGNOSIS — L40.9 PSORIASIS: ICD-10-CM

## 2023-06-13 DIAGNOSIS — F41.9 ANXIETY: ICD-10-CM

## 2023-06-13 DIAGNOSIS — F51.01 PRIMARY INSOMNIA: ICD-10-CM

## 2023-06-13 DIAGNOSIS — Z79.4 TYPE 2 DIABETES MELLITUS WITH STAGE 3A CHRONIC KIDNEY DISEASE, WITH LONG-TERM CURRENT USE OF INSULIN (H): Primary | ICD-10-CM

## 2023-06-13 DIAGNOSIS — N18.31 CHRONIC KIDNEY DISEASE, STAGE 3A (H): ICD-10-CM

## 2023-06-13 DIAGNOSIS — N18.31 TYPE 2 DIABETES MELLITUS WITH STAGE 3A CHRONIC KIDNEY DISEASE, WITH LONG-TERM CURRENT USE OF INSULIN (H): Primary | ICD-10-CM

## 2023-06-13 DIAGNOSIS — E11.22 TYPE 2 DIABETES MELLITUS WITH STAGE 3A CHRONIC KIDNEY DISEASE, WITH LONG-TERM CURRENT USE OF INSULIN (H): Primary | ICD-10-CM

## 2023-06-13 DIAGNOSIS — R09.81 NASAL CONGESTION: ICD-10-CM

## 2023-06-13 DIAGNOSIS — E78.5 HYPERLIPIDEMIA LDL GOAL <70: ICD-10-CM

## 2023-06-13 DIAGNOSIS — E66.01 MORBID OBESITY (H): ICD-10-CM

## 2023-06-13 PROCEDURE — 0121A COVID-19 BIVALENT 12+ (PFIZER): CPT | Performed by: FAMILY MEDICINE

## 2023-06-13 PROCEDURE — 80061 LIPID PANEL: CPT | Performed by: FAMILY MEDICINE

## 2023-06-13 PROCEDURE — 36416 COLLJ CAPILLARY BLOOD SPEC: CPT | Performed by: FAMILY MEDICINE

## 2023-06-13 PROCEDURE — 99207 PR FOOT EXAM NO CHARGE: CPT | Mod: 25 | Performed by: FAMILY MEDICINE

## 2023-06-13 PROCEDURE — 83880 ASSAY OF NATRIURETIC PEPTIDE: CPT | Performed by: FAMILY MEDICINE

## 2023-06-13 PROCEDURE — 99214 OFFICE O/P EST MOD 30 MIN: CPT | Mod: 25 | Performed by: FAMILY MEDICINE

## 2023-06-13 PROCEDURE — 91312 COVID-19 BIVALENT 12+ (PFIZER): CPT | Performed by: FAMILY MEDICINE

## 2023-06-13 RX ORDER — FLUTICASONE PROPIONATE 50 MCG
2 SPRAY, SUSPENSION (ML) NASAL DAILY
Qty: 9 ML | Refills: 4 | Status: SHIPPED | OUTPATIENT
Start: 2023-06-13

## 2023-06-13 RX ORDER — TRIAMCINOLONE ACETONIDE 1 MG/G
CREAM TOPICAL
Qty: 454 G | Refills: 3 | Status: SHIPPED | OUTPATIENT
Start: 2023-06-13 | End: 2024-03-19

## 2023-06-13 RX ORDER — HYDROXYZINE HYDROCHLORIDE 10 MG/1
TABLET, FILM COATED ORAL
Qty: 25 TABLET | Refills: 0 | Status: SHIPPED | OUTPATIENT
Start: 2023-06-13 | End: 2023-07-07

## 2023-06-13 ASSESSMENT — PAIN SCALES - GENERAL: PAINLEVEL: NO PAIN (0)

## 2023-06-13 NOTE — PROGRESS NOTES
"December 8/2022 for last eye exam        Assessment & Plan     Type 2 diabetes mellitus with stage 3a chronic kidney disease, with long-term current use of insulin (H)  Sees endocrine  It has been stable   - Lipid panel reflex to direct LDL Non-fasting; Future  - FOOT EXAM  - Lipid panel reflex to direct LDL Non-fasting    Primary insomnia  Can take Melatonin otc     Essential hypertension  Controlled     Chronic kidney disease, stage 3a (H)  Stable     Morbid obesity (H)  Low brianne diet    Hyperlipidemia LDL goal <70  Stable     Anxiety  Refilled   - hydrOXYzine (ATARAX) 10 MG tablet; 1 at hs    Nasal congestion  refilled  - fluticasone (FLONASE) 50 MCG/ACT nasal spray; Spray 2 sprays into both nostrils daily    Psoriasis  Refilled   - triamcinolone (KENALOG) 0.1 % external cream; APPLY TO AFFECTED AREA TWICE A DAY    High priority for 2019-nCoV vaccine  Advised   - COVID-19 BIVALENT 12+ (PFIZER)    [id number]     BMI:   Estimated body mass index is 56.94 kg/m  as calculated from the following:    Height as of this encounter: 1.615 m (5' 3.58\").    Weight as of this encounter: 148.5 kg (327 lb 6.4 oz).   Weight management plan: pt needs to lose wt    She sees endocrine    Juani Mae MD  [facility] is a 67 year old, presenting for the following health issues:  RECHECK (Follow Up )  she was seen by Dr Vega  She is doing better  No cough, no sob, no fever or chills  She sees Dr Araujo for Diabetes and is doing well  Bloood Pressure has been stable   She feels anxious at Times and wants refill of her Vistaril  She also occasionally has Insomnia  She has Psoriasis and wants Refill of Triamcinolone  Unable to afford UV treatments  Also has nasal congestion and wants Flonase as This has helped in the past      6/13/2023    11:19 AM   Additional Questions   Roomed by Flor EUGENE   Accompanied by self   discuss adding another jar for triamciniolone cream as its not enough for " "her  HPI     Recently in the Urgent care due to stress/ anxiety, sleep issues  Npo palpitations Now  Was anxious as Friend very sick     Review of Systems   CONSTITUTIONAL: NEGATIVE for fever, chills, change in weight  ENT/MOUTH: NEGATIVE for ear, mouth and throat problems  RESP: NEGATIVE for significant cough or SOB  CV: NEGATIVE for chest pain, palpitations or peripheral edema  GI: NEGATIVE for nausea, abdominal pain, heartburn, or change in bowel habits  MUSCULOSKELETAL: NEGATIVE for significant arthralgias or myalgia  PSYCHIATRIC: NEGATIVE for changes in mood or affect  ROS otherwise negative      Objective    /82 (BP Location: Left arm, Cuff Size: Adult Large)   Pulse 91   Resp 19   Ht 1.615 m (5' 3.58\")   Wt 148.5 kg (327 lb 6.4 oz)   SpO2 98%   BMI 56.94 kg/m    Body mass index is 56.94 kg/m .  Physical Exam   GENERAL: healthy, alert and no distress  GENERAL: alert, no distress and obese  EYES: Eyes grossly normal to inspection  NECK: no adenopathy, no asymmetry, masses, or scars and thyroid normal to palpation  RESP: lungs clear to auscultation - no rales, rhonchi or wheezes  CV: regular rate and rhythm, normal S1 S2, no S3 or S4, no murmur, click or rub, no peripheral edema and peripheral pulses strong  ABDOMEN: soft, nontender, no hepatosplenomegaly, no masses and bowel sounds normal  MS: no gross musculoskeletal defects noted, no edema  PSYCH: anxious    Office Visit on 05/04/2023   Component Date Value Ref Range Status     Sodium 05/04/2023 143  136 - 145 mmol/L Final     Potassium 05/04/2023 4.1  3.4 - 5.3 mmol/L Final     Chloride 05/04/2023 104  98 - 107 mmol/L Final     Carbon Dioxide (CO2) 05/04/2023 25  22 - 29 mmol/L Final     Anion Gap 05/04/2023 14  7 - 15 mmol/L Final     Urea Nitrogen 05/04/2023 19.8  8.0 - 23.0 mg/dL Final     Creatinine 05/04/2023 0.94  0.51 - 0.95 mg/dL Final     Calcium 05/04/2023 10.1  8.8 - 10.2 mg/dL Final     Glucose 05/04/2023 140 (H)  70 - 99 mg/dL Final "     Alkaline Phosphatase 05/04/2023 72  35 - 104 U/L Final     AST 05/04/2023 51 (H)  10 - 35 U/L Final     ALT 05/04/2023 43 (H)  10 - 35 U/L Final     Protein Total 05/04/2023 8.2  6.4 - 8.3 g/dL Final     Albumin 05/04/2023 4.3  3.5 - 5.2 g/dL Final     Bilirubin Total 05/04/2023 0.7  <=1.2 mg/dL Final     GFR Estimate 05/04/2023 67  >60 mL/min/1.73m2 Final    eGFR calculated using 2021 CKD-EPI equation.     WBC Count 05/04/2023 9.3  4.0 - 11.0 10e3/uL Final     RBC Count 05/04/2023 4.45  3.80 - 5.20 10e6/uL Final     Hemoglobin 05/04/2023 13.5  11.7 - 15.7 g/dL Final     Hematocrit 05/04/2023 42.8  35.0 - 47.0 % Final     MCV 05/04/2023 96  78 - 100 fL Final     MCH 05/04/2023 30.3  26.5 - 33.0 pg Final     MCHC 05/04/2023 31.5  31.5 - 36.5 g/dL Final     RDW 05/04/2023 14.7  10.0 - 15.0 % Final     Platelet Count 05/04/2023 301  150 - 450 10e3/uL Final     % Neutrophils 05/04/2023 72  % Final     % Lymphocytes 05/04/2023 16  % Final     % Monocytes 05/04/2023 10  % Final     % Eosinophils 05/04/2023 2  % Final     % Basophils 05/04/2023 1  % Final     Absolute Neutrophils 05/04/2023 6.7  1.6 - 8.3 10e3/uL Final     Absolute Lymphocytes 05/04/2023 1.5  0.8 - 5.3 10e3/uL Final     Absolute Monocytes 05/04/2023 0.9  0.0 - 1.3 10e3/uL Final     Absolute Eosinophils 05/04/2023 0.2  0.0 - 0.7 10e3/uL Final     Absolute Basophils 05/04/2023 0.1  0.0 - 0.2 10e3/uL Final

## 2023-06-14 LAB
CHOLEST SERPL-MCNC: 112 MG/DL
HDLC SERPL-MCNC: 44 MG/DL
LDLC SERPL CALC-MCNC: 49 MG/DL
NONHDLC SERPL-MCNC: 68 MG/DL
NT-PROBNP SERPL-MCNC: 219 PG/ML (ref 0–900)
TRIGL SERPL-MCNC: 94 MG/DL

## 2023-07-07 DIAGNOSIS — F41.9 ANXIETY: ICD-10-CM

## 2023-07-07 DIAGNOSIS — I10 ESSENTIAL HYPERTENSION: ICD-10-CM

## 2023-07-07 RX ORDER — HYDROXYZINE HYDROCHLORIDE 10 MG/1
TABLET, FILM COATED ORAL
Qty: 25 TABLET | Refills: 0 | Status: SHIPPED | OUTPATIENT
Start: 2023-07-07

## 2023-07-07 RX ORDER — LOSARTAN POTASSIUM 25 MG/1
25 TABLET ORAL DAILY
Qty: 90 TABLET | Refills: 3 | Status: CANCELLED | OUTPATIENT
Start: 2023-07-07

## 2023-08-08 ENCOUNTER — LAB (OUTPATIENT)
Dept: LAB | Facility: CLINIC | Age: 68
End: 2023-08-08
Payer: COMMERCIAL

## 2023-08-08 DIAGNOSIS — Z79.4 TYPE 2 DIABETES MELLITUS WITHOUT COMPLICATION, WITH LONG-TERM CURRENT USE OF INSULIN (H): ICD-10-CM

## 2023-08-08 DIAGNOSIS — E11.9 TYPE 2 DIABETES MELLITUS WITHOUT COMPLICATION, WITH LONG-TERM CURRENT USE OF INSULIN (H): ICD-10-CM

## 2023-08-08 LAB
ALT SERPL W P-5'-P-CCNC: 29 U/L (ref 0–50)
ANION GAP SERPL CALCULATED.3IONS-SCNC: 14 MMOL/L (ref 7–15)
BUN SERPL-MCNC: 16.5 MG/DL (ref 8–23)
CALCIUM SERPL-MCNC: 9.7 MG/DL (ref 8.8–10.2)
CHLORIDE SERPL-SCNC: 104 MMOL/L (ref 98–107)
CREAT SERPL-MCNC: 1 MG/DL (ref 0.51–0.95)
DEPRECATED HCO3 PLAS-SCNC: 24 MMOL/L (ref 22–29)
GFR SERPL CREATININE-BSD FRML MDRD: 61 ML/MIN/1.73M2
GLUCOSE SERPL-MCNC: 137 MG/DL (ref 70–99)
HBA1C MFR BLD: 6.7 % (ref 0–5.6)
POTASSIUM SERPL-SCNC: 3.9 MMOL/L (ref 3.4–5.3)
SODIUM SERPL-SCNC: 142 MMOL/L (ref 136–145)

## 2023-08-08 PROCEDURE — 84460 ALANINE AMINO (ALT) (SGPT): CPT

## 2023-08-08 PROCEDURE — 83036 HEMOGLOBIN GLYCOSYLATED A1C: CPT

## 2023-08-08 PROCEDURE — 36415 COLL VENOUS BLD VENIPUNCTURE: CPT

## 2023-08-08 PROCEDURE — 80048 BASIC METABOLIC PNL TOTAL CA: CPT

## 2023-08-14 ENCOUNTER — VIRTUAL VISIT (OUTPATIENT)
Dept: ENDOCRINOLOGY | Facility: CLINIC | Age: 68
End: 2023-08-14
Payer: COMMERCIAL

## 2023-08-14 DIAGNOSIS — E66.01 MORBID OBESITY (H): ICD-10-CM

## 2023-08-14 DIAGNOSIS — Z79.4 TYPE 2 DIABETES MELLITUS WITHOUT COMPLICATION, WITH LONG-TERM CURRENT USE OF INSULIN (H): Primary | ICD-10-CM

## 2023-08-14 DIAGNOSIS — E11.9 TYPE 2 DIABETES MELLITUS WITHOUT COMPLICATION, WITH LONG-TERM CURRENT USE OF INSULIN (H): Primary | ICD-10-CM

## 2023-08-14 PROCEDURE — 99214 OFFICE O/P EST MOD 30 MIN: CPT | Mod: 95 | Performed by: INTERNAL MEDICINE

## 2023-08-14 NOTE — LETTER
8/14/2023         RE: Crystal Nicole  [address]  [facility] [address]        Dear Colleague,    Thank you for referring your patient, Crystal Nicole, to the [facility] [place]. Please see a copy of my visit note below.    Virtual Visit Details    Type of service:  Video Visit   Video Start Time: 1:34 PM  Video End Time:{video visit start/end time for provider to select:[id number]}    Originating Location (pt. Location): Home  {PROVIDER LOCATION On-site should be selected for visits conducted from your clinic location or adjoining [facility] hospital, academic office, or other nearby [facility] building. Off-site should be selected for all other provider locations, including home:[id number]}  Distant Location (provider location):  Off-site  Platform used for Video Visit: StephenStreak      Recent issues:  Diabetes follow-up evaluation  Ran out of [employer] med 1-month ago  Patient having difficulty acquiring the [employer] paperwork patient assistance program        History of pre-diabetes  ~2016. Initial diagnosis of diabetes mellitus  Had seen physician in [place], [place]  Started treatment with metformin   ~6/2020. Acute back pain?, diagnosed with pneumonia, high glucose and hgbA1c 12%   Hospital treatment included insulin treatment   Treatment with Abx, pain resolved   Recalls basal insulin 55U and use of rapid acting Humalog     7/15/20 Endocrinology evaluation with Dr. MERON Armijo/[facility] clinic  Addition of Farxiga but expensive, then change to Jardiance medication  ~Fall '21. Discontinue Jardiance since expensive      Previous FV hgbA1c trends include:     Lab Test 06/08/22  1149 03/23/22  1641 03/01/22  0951 12/06/21  1132 07/30/21  1236   A1C 7.2* 6.6* 6.7* 6.9* 6.5*       6/21/22. Initial diabetes evaluation with me at [place]  Reviewed health history and diabetes issues  [facility] Diab Ed eval and diagnostic Hi  Added glipizideER medication  3/2023. Ran out of Trulicity (4.5 mg)  medication  ~ 5/2023. Restarted Trulicity med per plan    Current DM medications:  Metformin 500 mg  2-tabs in morning and evening  Basaglar Kwikpen 19U Subcutaneous in morning  glipizideER 5 mg  1-tab daily  Trulicity 4.5 mg  Subcutaneous weekly  Humalog sscale  (uses approx 2x/day often morning vs suppertime)   140-179  2U   180-219  4U   220-259  6U   260-299  8U   >300   10U    Blood glucose (BG) meter:  One Touch Verio   Tests 2-3/day   Recent trends 100-140 mg/dl per patient    Fam Hx Diabetes:  brother (T1DM), cousins  Recent [facility] labs include:  Lab Results   Component Value Date    A1C 6.7 (H) 08/08/2023     08/08/2023    POTASSIUM 3.9 08/08/2023    CHLORIDE 104 08/08/2023    CO2 24 08/08/2023    ANIONGAP 14 08/08/2023     (H) 08/08/2023    BUN 16.5 08/08/2023    CR 1.00 (H) 08/08/2023    GFRESTIMATED 61 08/08/2023    GFRESTBLACK 80 01/07/2021    TRISTIAN 9.7 08/08/2023    CPEPT 9.8 (H) 07/31/2020    CHOL 112 06/13/2023    TRIG 94 06/13/2023    HDL 44 (L) 06/13/2023    LDL 49 06/13/2023    NHDL 68 06/13/2023    UCRR 105 12/22/2022    MICROL 15 12/22/2022    UMALCR 14.29 12/22/2022    TSH 1.78 12/22/2022     Last eye exam 2021, no DR per patient  DM Complications:  None known      Lives in [facility]  Sees Dr. Juani Mae/[facility] for general medicine evaluations.    PMH/PSH:  Past Medical History:   Diagnosis Date     Acquired hypothyroidism      History of blood transfusion      Hypertension      Obesity due to excess calories      Type 2 diabetes mellitus without complication, with long-term current use of insulin (H)      Past Surgical History:   Procedure Laterality Date     CHOLECYSTECTOMY       DAVINCI HYSTERECTOMY TOTAL, BILATERAL SALPINGO-OOPHORECTOMY, COMBINED Bilateral 7/6/2022    Procedure: ROBOTIC HYSTERECTOMY, BILATERAL SALPINGO OOPHORECTOMY, LYSIS OF ADHESIONS;  Surgeon: Jefferson Chavez MD;  Location: [facility] OR     HERNIA REPAIR  1990 s     INSERT  INTRAUTERINE DEVICE N/A 7/6/2022    Procedure: REMOVAL OF INTRAUTERINE DEVICE;  Surgeon: Jefferson Chavez MD;  Location: [facility] Main OR       Family Hx:  Family History   Problem Relation Age of Onset     Diabetes Mother         d age 80     Hypertension Mother      Coronary Artery Disease Father         d age 85     Brain Tumor Father      Hypertension Father      Diabetes Brother          Social Hx:  Social History     Socioeconomic History     Marital status:      Spouse name: Not on file     Number of children: Not on file     Years of education: Not on file     Highest education level: Not on file   Occupational History     Not on file   Tobacco Use     Smoking status: Never     Smokeless tobacco: Never   Vaping Use     Vaping Use: Never used   Substance and Sexual Activity     Alcohol use: Yes     Comment: Occ     Drug use: Never     Sexual activity: Not Currently   Other Topics Concern     Parent/sibling w/ CABG, MI or angioplasty before 65F 55M? No   Social History Narrative     Not on file     Social Determinants of Health     Financial Resource Strain: Not on file   Food Insecurity: Not on file   Transportation Needs: Not on file   Physical Activity: Not on file   Stress: Not on file   Social Connections: Not on file   Intimate Partner Violence: Not on file   Housing Stability: Not on file          MEDICATIONS:  has a current medication list which includes the following prescription(s): allopurinol, aspirin, atorvastatin, clobetasol, fluticasone, glipizide, hydroxyzine, insulin glargine, insulin lispro, levothyroxine, losartan, metformin, multivitamin, therapeutic, triamcinolone, triamterene-hctz, cvs prep, blood glucose, blood glucose calibration, blood glucose monitoring, freestyle yanely 14 day reader, dexcom g6 sensor, dexcom g6 transmitter, trulicity, furosemide, glipizide, glucosamine-chondroit-vit c-mn, bd pen needle fadi 2nd gen, onetouch delica lancets 33g, onetouch verio iq,  pantoprazole, and thin.    ROS:     ROS: 10 point ROS neg other than the symptoms noted above in the HPI.    GENERAL: some fatigue, wt stable; denies fevers, chills, night sweats.   HEENT: no dysphagia, odonophagia, diplopia, neck pain  THYROID:  no apparent hyper or hypothyroid symptoms  CV: no chest pain, pressure, palpitations  LUNGS: no SOB, NICHOLS, cough, wheezing   ABDOMEN: some indigestion; no diarrhea, constipation, abdominal pain  EXTREMITIES: no rashes, ulcers, edema  NEUROLOGY: no headaches, denies changes in vision, tingling, extremitiy numbness   MSK: no muscle aches or pains, weakness  SKIN: no rashes or lesions  : no menses  PSYCH:  stable mood, no significant anxiety or depression  ENDOCRINE: no heat or cold intolerance    Physical Exam (visual exam)  VS:  no vital signs taken for video visit  CONSTITUTIONAL: healthy, alert and NAD, well dressed, answering questions appropriately  ENT: no nose swelling or nasal discharge, mouth redness or gum changes.  EYES: eyes grossly normal to inspection, conjunctivae and sclerae normal, no exophthalmos or proptosis  THYROID:  no apparent nodules or goiter  LUNGS: no audible wheeze, cough or visible cyanosis, no visible retractions or increased work of breathing  ABDOMEN: abdomen not evaluated  EXTREMITIES: no hand tremors, limited exam  NEUROLOGY: CN grossly intact, mentation intact and speech normal   SKIN:  no apparent skin lesions, rash, or edema with visualized skin appearance  PSYCH: mentation appears normal, affect normal/bright, judgement and insight intact,   normal speech and appearance well groomed    LABS:    All pertinent notes, labs, and images personally reviewed by me.     A/P:  Encounter Diagnoses   Name Primary?     Type 2 diabetes mellitus without complication, with long-term current use of insulin (H) Yes     Morbid obesity (H)        Comments:  Reviewed health history and diabetes issues.  Reviewed and interpreted tests that I previously  ordered.   Ordered appropriate tests for the endocrinology disease management.    Management options discussed and implemented after shared medical decision making with the patient.  T2DM problem is chronic-stable    Plan:  Discussed general issues with the diabetes diagnosis and management  We discussed the hgbA1c test which reflects previous overall glucose levels or control  Discussed the importance of blood glucose (BG) testing to assess glucose trends  Provided general overview of the diabetes medication options and medication treatment plan.    Recommend:  Continue the current metformin, Basaglar insulin, glipizideER, and Humalog correction scale  Treatment goal to continue weight loss, minimize and try to discontinue mealtime insulin  Advised further dose reduction of the Basaglar insulin medication    Diabetes med plan:  Metformin 500 mg  2-tabs in morning and evening  Basaglar Kwikpen 12U Subcutaneous in morning  glipizideER 5 mg  1-tab daily  Trulicity 4.5 mg   Subcutaneous weekly  Humalog sscale  (uses approx 2x/day often morning vs suppertime)   140-179  2U   180-219  4U   220-259  6U   260-299  8U   >300   10U    Monitor for possible GI side effects  She plans to continue the Trulicity medication with the [employer] program  Goal target premeal glucose 80- 150 mg/dl  We have reviewed option of acquiring Personal [employer] CGM again... she's not interested   Would be helpful for patient to have follow-up [facility] Diab Ed evaluation  Keep focus on diet, exercise, weight management.  Repeat pre-appt lab testing in late 1/2024   Testing at [facility] clinic   Lab orders placed  Continue losartan, atorvastatin, levothyroxine med use, per PCP  Advise having fasting lipid panel testing and dilated eye examination, at least annually    Addressed patient questions today    There are no Patient Instructions on file for this visit.    Future labs ordered today:   Orders Placed This Encounter   Procedures      Hemoglobin A1c     TSH     Basic metabolic panel     Albumin Random Urine Quantitative with Creat Ratio     Radiology/Consults ordered today: None    Total time spent on day of encounter:  20 min    Follow-up:  2/5/24 at 1pm, Bert Araujo MD, MS  Endocrinology  [facility]    CC:  VALORIE Mae          Again, thank you for allowing me to participate in the care of your patient.        Sincerely,        Eladio Araujo MD

## 2023-08-14 NOTE — PROGRESS NOTES
Virtual Visit Details    Type of service:  Video Visit   Video Start Time: 1:34 PM  Video End Time: 1:50 PM    Originating Location (pt. Location): Home  Distant Location (provider location):  Off-site  Platform used for Video Visit: Javier      Recent issues:  Diabetes follow-up evaluation  Ran out of Trulicity med 1-month ago  Patient having difficulty acquiring the [employer] paperwork patient assistance program        History of pre-diabetes  ~2016. Initial diagnosis of diabetes mellitus  Had seen physician in [place], [place]  Started treatment with metformin   ~6/2020. Acute back pain?, diagnosed with pneumonia, high glucose and hgbA1c 12%   Hospital treatment included insulin treatment   Treatment with Abx, pain resolved   Recalls basal insulin 55U and use of rapid acting Humalog     7/15/20 Endocrinology evaluation with Dr. MERON Armijo/[facility] clinic  Addition of Farxiga but expensive, then change to Jardiance medication  ~Fall '21. Discontinue Jardiance since expensive      Previous [facility]V hgbA1c trends include:     Lab Test 06/08/22  1149 03/23/22  1641 03/01/22  0951 12/06/21  1132 07/30/21  1236   A1C 7.2* 6.6* 6.7* 6.9* 6.5*       6/21/22. Initial diabetes evaluation with me at [place]  Reviewed health history and diabetes issues  [facility] Diab Ed eval and diagnostic Hi  Added glipizideER medication  3/2023. Ran out of Trulicity (4.5 mg) medication  ~ 5/2023. Restarted Trulicity med per plan    Current DM medications:  Metformin 500 mg  2-tabs in morning and evening  Basaglar Kwikpen 19U Subcutaneous in morning  glipizideER 5 mg  1-tab daily  Trulicity 4.5 mg  Subcutaneous weekly  Humalog sscale  (uses approx 2x/day often morning vs suppertime)   140-179  2U   180-219  4U   220-259  6U   260-299  8U   >300   10U    Blood glucose (BG) meter:  One Touch Verio   Tests 2-3/day   Recent trends 100-140 mg/dl per patient    Fam Hx Diabetes:  brother (T1DM), cousins  Recent [facility]V labs include:  Lab Results    Component Value Date    A1C 6.7 (H) 08/08/2023     08/08/2023    POTASSIUM 3.9 08/08/2023    CHLORIDE 104 08/08/2023    CO2 24 08/08/2023    ANIONGAP 14 08/08/2023     (H) 08/08/2023    BUN 16.5 08/08/2023    CR 1.00 (H) 08/08/2023    GFRESTIMATED 61 08/08/2023    GFRESTBLACK 80 01/07/2021    TRISTIAN 9.7 08/08/2023    CPEPT 9.8 (H) 07/31/2020    CHOL 112 06/13/2023    TRIG 94 06/13/2023    HDL 44 (L) 06/13/2023    LDL 49 06/13/2023    NHDL 68 06/13/2023    UCRR 105 12/22/2022    MICROL 15 12/22/2022    UMALCR 14.29 12/22/2022    TSH 1.78 12/22/2022     Last eye exam 2021, no DR per patient  DM Complications:  None known      Lives in [facility]  Sees [facility] Juani Mae/[facility] for general medicine evaluations.    PMH/PSH:  Past Medical History:   Diagnosis Date    Acquired hypothyroidism     History of blood transfusion     Hypertension     Obesity due to excess calories     Type 2 diabetes mellitus without complication, with long-term current use of insulin (H)      Past Surgical History:   Procedure Laterality Date    CHOLECYSTECTOMY      DAVINCI HYSTERECTOMY TOTAL, BILATERAL SALPINGO-OOPHORECTOMY, COMBINED Bilateral 7/6/2022    Procedure: ROBOTIC HYSTERECTOMY, BILATERAL SALPINGO OOPHORECTOMY, LYSIS OF ADHESIONS;  Surgeon: Jefferson Chavez MD;  Location: [facility] OR    HERNIA REPAIR  1990 s    INSERT INTRAUTERINE DEVICE N/A 7/6/2022    Procedure: REMOVAL OF INTRAUTERINE DEVICE;  Surgeon: Jefferson Chavez MD;  Location: [facility] OR       Family Hx:  Family History   Problem Relation Age of Onset    Diabetes Mother         d age 80    Hypertension Mother     Coronary Artery Disease Father         d age 85    Brain Tumor Father     Hypertension Father     Diabetes Brother          Social Hx:  Social History     Socioeconomic History    Marital status:      Spouse name: Not on file    Number of children: Not on file    Years of education: Not on file     Highest education level: Not on file   Occupational History    Not on file   Tobacco Use    Smoking status: Never    Smokeless tobacco: Never   Vaping Use    Vaping Use: Never used   Substance and Sexual Activity    Alcohol use: Yes     Comment: Occ    Drug use: Never    Sexual activity: Not Currently   Other Topics Concern    Parent/sibling w/ CABG, MI or angioplasty before 65F 55M? No   Social History Narrative    Not on file     Social Determinants of Health     Financial Resource Strain: Not on file   Food Insecurity: Not on file   Transportation Needs: Not on file   Physical Activity: Not on file   Stress: Not on file   Social Connections: Not on file   Intimate Partner Violence: Not on file   Housing Stability: Not on file          MEDICATIONS:  has a current medication list which includes the following prescription(s): allopurinol, aspirin, atorvastatin, clobetasol, fluticasone, glipizide, hydroxyzine, insulin glargine, insulin lispro, levothyroxine, losartan, metformin, multivitamin, therapeutic, triamcinolone, triamterene-hctz, cvs prep, blood glucose, blood glucose calibration, blood glucose monitoring, freestyle yanely 14 day reader, dexcom g6 sensor, dexcom g6 transmitter, trulicity, furosemide, glipizide, glucosamine-chondroit-vit c-mn, bd pen needle fadi 2nd gen, onetouch delica lancets 33g, onetouch verio iq, pantoprazole, and thin.    ROS:     ROS: 10 point ROS neg other than the symptoms noted above in the HPI.    GENERAL: some fatigue, wt stable; denies fevers, chills, night sweats.   HEENT: no dysphagia, odonophagia, diplopia, neck pain  THYROID:  no apparent hyper or hypothyroid symptoms  CV: no chest pain, pressure, palpitations  LUNGS: no SOB, NICHOLS, cough, wheezing   ABDOMEN: some indigestion; no diarrhea, constipation, abdominal pain  EXTREMITIES: no rashes, ulcers, edema  NEUROLOGY: no headaches, denies changes in vision, tingling, extremitiy numbness   MSK: no muscle aches or pains,  weakness  SKIN: no rashes or lesions  : no menses  PSYCH:  stable mood, no significant anxiety or depression  ENDOCRINE: no heat or cold intolerance    Physical Exam (visual exam)  VS:  no vital signs taken for video visit  CONSTITUTIONAL: healthy, alert and NAD, well dressed, answering questions appropriately  ENT: no nose swelling or nasal discharge, mouth redness or gum changes.  EYES: eyes grossly normal to inspection, conjunctivae and sclerae normal, no exophthalmos or proptosis  THYROID:  no apparent nodules or goiter  LUNGS: no audible wheeze, cough or visible cyanosis, no visible retractions or increased work of breathing  ABDOMEN: abdomen not evaluated  EXTREMITIES: no hand tremors, limited exam  NEUROLOGY: CN grossly intact, mentation intact and speech normal   SKIN:  no apparent skin lesions, rash, or edema with visualized skin appearance  PSYCH: mentation appears normal, affect normal/bright, judgement and insight intact,   normal speech and appearance well groomed    LABS:    All pertinent notes, labs, and images personally reviewed by me.     A/P:  Encounter Diagnoses   Name Primary?    Type 2 diabetes mellitus without complication, with long-term current use of insulin (H) Yes    Morbid obesity (H)        Comments:  Reviewed health history and diabetes issues.  Reviewed and interpreted tests that I previously ordered.   Ordered appropriate tests for the endocrinology disease management.    Management options discussed and implemented after shared medical decision making with the patient.  T2DM problem is chronic-stable    Plan:  Discussed general issues with the diabetes diagnosis and management  We discussed the hgbA1c test which reflects previous overall glucose levels or control  Discussed the importance of blood glucose (BG) testing to assess glucose trends  Provided general overview of the diabetes medication options and medication treatment plan.    Recommend:  Continue the current metformin,  Basaglar insulin, glipizideER, and Humalog correction scale  Treatment goal to continue weight loss, minimize and try to discontinue mealtime insulin  Advised further dose reduction of the Basaglar insulin medication    Diabetes med plan:  Metformin 500 mg  2-tabs in morning and evening  Basaglar Kwikpen 12U Subcutaneous in morning  glipizideER 5 mg  1-tab daily  Trulicity 4.5 mg   Subcutaneous weekly  Humalog sscale  (uses approx 2x/day often morning vs suppertime)   140-179  2U   180-219  4U   220-259  6U   260-299  8U   >300   10U    Monitor for possible GI side effects  She plans to continue the Trulicity medication with the [employer] program  Goal target premeal glucose 80- 150 mg/dl  We have reviewed option of acquiring [employer] CGM again... she's not interested   Would be helpful for patient to have follow-up [facility] Diab Ed evaluation  Keep focus on diet, exercise, weight management.  Repeat pre-appt lab testing in late 1/2024   Testing at [facility] clinic   Lab orders placed  Continue losartan, atorvastatin, levothyroxine med use, per PCP  Advise having fasting lipid panel testing and dilated eye examination, at least annually    Addressed patient questions today    There are no Patient Instructions on file for this visit.    Future labs ordered today:   Orders Placed This Encounter   Procedures    Hemoglobin A1c    TSH    Basic metabolic panel    Albumin Random Urine Quantitative with Creat Ratio     Radiology/Consults ordered today: None    Total time spent on day of encounter:  20 min    Follow-up:  2/5/24 at 1pm, Bert Araujo MD, MS  Endocrinology  [facility]    CC:  VALORIE Mae

## 2023-08-24 DIAGNOSIS — Z79.4 TYPE 2 DIABETES MELLITUS WITHOUT COMPLICATION, WITH LONG-TERM CURRENT USE OF INSULIN (H): ICD-10-CM

## 2023-08-24 DIAGNOSIS — E11.9 TYPE 2 DIABETES MELLITUS WITHOUT COMPLICATION, WITH LONG-TERM CURRENT USE OF INSULIN (H): ICD-10-CM

## 2023-08-24 DIAGNOSIS — I10 ESSENTIAL HYPERTENSION: ICD-10-CM

## 2023-08-24 DIAGNOSIS — M10.9 GOUT, UNSPECIFIED CAUSE, UNSPECIFIED CHRONICITY, UNSPECIFIED SITE: ICD-10-CM

## 2023-08-25 ENCOUNTER — TELEPHONE (OUTPATIENT)
Dept: FAMILY MEDICINE | Facility: CLINIC | Age: 68
End: 2023-08-25
Payer: COMMERCIAL

## 2023-08-25 RX ORDER — LOSARTAN POTASSIUM 25 MG/1
25 TABLET ORAL DAILY
Qty: 90 TABLET | Refills: 3 | OUTPATIENT
Start: 2023-08-25

## 2023-08-25 RX ORDER — GLIPIZIDE 5 MG/1
TABLET, FILM COATED, EXTENDED RELEASE ORAL
Qty: 90 TABLET | Refills: 1 | OUTPATIENT
Start: 2023-08-25

## 2023-08-25 RX ORDER — ALLOPURINOL 300 MG/1
1 TABLET ORAL DAILY
Qty: 90 TABLET | Refills: 0 | OUTPATIENT
Start: 2023-08-25

## 2023-08-25 NOTE — TELEPHONE ENCOUNTER
Patient is requesting a new Rx for losartan 50mg be sent in to [facility]-[phone]

## 2023-08-25 NOTE — TELEPHONE ENCOUNTER
Patient states she does not need the allopurinol, she needs a new Rx for Losartan 50mg tablets sent to her pharmacy.    [facility] [address]

## 2023-08-26 ENCOUNTER — NURSE TRIAGE (OUTPATIENT)
Dept: NURSING | Facility: CLINIC | Age: 68
End: 2023-08-26
Payer: COMMERCIAL

## 2023-08-26 DIAGNOSIS — I10 ESSENTIAL HYPERTENSION: ICD-10-CM

## 2023-08-26 NOTE — TELEPHONE ENCOUNTER
Nurse Triage     Is this a 2nd Level Triage? YES, LICENSED PRACTITIONER REVIEW IS REQUIRED    Pt states she has been taking 50 mg of Losartan.  She had discussed this a her last visit with her MD.    She is out of it and has been for 2 days.  Normally her BP runs 120/80.  She has a BP machine but has not checked her BP did not want to.    Did not say she had any sxs.       Was told to she would need a medication check per MD.  Pt refused since she was just seen in June.    Offered she could send another note to MD.  If sxs UC.  Pt agreed.      Disp Refills Start End ERIC   losartan (COZAAR) 25 MG tablet 90 tablet 3 12/22/2022  No   Sig - Route: Take 1 tablet (25 mg) by mouth daily Needs follow up appointment - Oral   Patient taking differently: Take 50 mg by mouth daily Needs follow up appointment        Sent to pharmacy as: Losartan Potassium 25 MG Oral Tablet (COZAAR)   Class: E-Prescribe   Order: [id number]   E-Prescribing Status: Receipt confirmed by pharmacy (12/22/2022 10:30 AM CST)         Protocol Recommended Disposition:   No disposition on file.    Recommendation: note to MD     Routed to provider.    Reason for Disposition   [1] Caller has NON-URGENT medicine question about med that PCP prescribed AND [2] triager unable to answer question    Additional Information   Negative: [1] Prescription refill request for NON-ESSENTIAL medicine (i.e., no harm to patient if med not taken) AND [2] triager unable to refill per department policy    Protocols used: Medication Refill and Renewal Call-A-

## 2023-08-28 RX ORDER — LOSARTAN POTASSIUM 25 MG/1
25 TABLET ORAL DAILY
Qty: 90 TABLET | Refills: 0 | Status: SHIPPED | OUTPATIENT
Start: 2023-08-28 | End: 2023-12-29

## 2023-08-28 RX ORDER — LOSARTAN POTASSIUM 25 MG/1
25 TABLET ORAL DAILY
Qty: 30 TABLET | Refills: 0 | Status: SHIPPED | OUTPATIENT
Start: 2023-08-28 | End: 2023-08-28

## 2023-08-28 NOTE — TELEPHONE ENCOUNTER
Received call from patient. She is frustrated; states that she has been on 50mg of Losartan since June. However, 25mg rx was sent through.    She would like new rx sent through with updated 50mg dose.    BATSHEVA StewartN RN  [facility], [facility]

## 2023-08-28 NOTE — TELEPHONE ENCOUNTER
Patient again calling clinic, states that she is currently out of Losartan and is asking for refill. Patient states that insurance will not cover 25 mg Losartan and patient was instructed to take 50 mg.    Asking for Losartan 50 mg script to be sent to pharmacy.    Routing to PCP to please advise.      Patient would like call back from team when this has been addressed.    Thanks,  RODERICK Ashley RN  [facility]

## 2023-09-26 DIAGNOSIS — M10.9 GOUT, UNSPECIFIED CAUSE, UNSPECIFIED CHRONICITY, UNSPECIFIED SITE: ICD-10-CM

## 2023-09-26 DIAGNOSIS — Z79.4 TYPE 2 DIABETES MELLITUS WITHOUT COMPLICATION, WITH LONG-TERM CURRENT USE OF INSULIN (H): ICD-10-CM

## 2023-09-26 DIAGNOSIS — E11.9 TYPE 2 DIABETES MELLITUS WITHOUT COMPLICATION, WITH LONG-TERM CURRENT USE OF INSULIN (H): ICD-10-CM

## 2023-09-26 RX ORDER — ALLOPURINOL 300 MG/1
1 TABLET ORAL DAILY
Qty: 90 TABLET | Refills: 3 | Status: SHIPPED | OUTPATIENT
Start: 2023-09-26 | End: 2024-03-19

## 2023-09-26 RX ORDER — GLIPIZIDE 5 MG/1
TABLET, FILM COATED, EXTENDED RELEASE ORAL
Qty: 90 TABLET | Refills: 1 | Status: SHIPPED | OUTPATIENT
Start: 2023-09-26 | End: 2024-03-07

## 2023-09-26 NOTE — TELEPHONE ENCOUNTER
"Last Written Prescription Date:  5/24/23  Last Fill Quantity: 90,  # refills: 1   Last office visit: 8/14/2023 with prescribing provider:  Dr. Araujo   Future Office Visit:  2/5/24        Requested Prescriptions   Pending Prescriptions Disp Refills    glipiZIDE (GLUCOTROL XL) 5 MG 24 hr tablet [Pharmacy Med Name: GLIPIZIDE ER 5 MG TABLET] 90 tablet 1     Sig: TAKE 1 TABLET BY MOUTH EVERY DAY       Sulfonylurea Agents Failed - 9/26/2023  2:06 PM        Failed - Patient has a recent creatinine (normal) within the past 12 mos.     Recent Labs   Lab Test 08/08/23  1129   CR 1.00*       Ok to refill medication if creatinine is low          Passed - Patient has documented A1c within the specified period of time.     If HgbA1C is 8 or greater, it needs to be on file within the past 3 months.  If less than 8, must be on file within the past 6 months.     Recent Labs   Lab Test 08/08/23  1129   A1C 6.7*             Passed - Medication is active on med list        Passed - Patient is age 18 or older        Passed - No active pregnancy on record        Passed - Patient has not had a positive pregnancy test within the past 12 mos.        Passed - Recent (6 mo) or future (30 days) visit within the authorizing provider's specialty     Patient had office visit in the last 6 months or has a visit in the next 30 days with authorizing provider or within the authorizing provider's specialty.  See \"Patient Info\" tab in inbasket, or \"Choose Columns\" in Meds & Orders section of the refill encounter.               Refills sent  Gisel Flannery RN    "

## 2023-10-05 DIAGNOSIS — E11.00 TYPE 2 DIABETES MELLITUS WITH HYPEROSMOLARITY WITHOUT COMA, UNSPECIFIED WHETHER LONG TERM INSULIN USE (H): ICD-10-CM

## 2023-10-05 RX ORDER — LANCETS 33 GAUGE
1 EACH MISCELLANEOUS
Qty: 300 EACH | Refills: 3 | Status: SHIPPED | OUTPATIENT
Start: 2023-10-05 | End: 2024-03-19

## 2023-10-05 RX ORDER — BLOOD SUGAR DIAGNOSTIC
STRIP MISCELLANEOUS
Qty: 300 STRIP | Refills: 3 | Status: SHIPPED | OUTPATIENT
Start: 2023-10-05 | End: 2024-03-19

## 2023-10-05 NOTE — TELEPHONE ENCOUNTER
"Last Written Prescription Date:  9/14/22  Last Fill Quantity: 300,  # refills: 3   Last office visit: 8/14/2023 with prescribing provider:  Dr. Araujo   Future Office Visit:  2/5/24        Requested Prescriptions   Pending Prescriptions Disp Refills    OneTouch Delica Lancets 33G MISC 300 each 3     Sig: Inject 1 Device Subcutaneous 3 times daily (before meals)       Diabetic Supplies Protocol Passed - 10/5/2023 12:54 PM        Passed - Medication is active on med list        Passed - Patient is 18 years of age or older        Passed - Recent (6 mo) or future (30 days) visit within the authorizing provider's specialty     Patient had office visit in the last 6 months or has a visit in the next 30 days with authorizing provider.  See \"Patient Info\" tab in inbasket, or \"Choose Columns\" in Meds & Orders section of the refill encounter.              blood glucose (ONETOUCH VERIO IQ) test strip 100 strip 2     Sig: Use to test blood sugar four times daily or as directed.       Diabetic Supplies Protocol Passed - 10/5/2023 12:54 PM        Passed - Medication is active on med list        Passed - Patient is 18 years of age or older        Passed - Recent (6 mo) or future (30 days) visit within the authorizing provider's specialty     Patient had office visit in the last 6 months or has a visit in the next 30 days with authorizing provider.  See \"Patient Info\" tab in inbasket, or \"Choose Columns\" in Meds & Orders section of the refill encounter.               Refills sent  Gisel Flannery RN    "

## 2023-11-22 ENCOUNTER — PATIENT OUTREACH (OUTPATIENT)
Dept: CARE COORDINATION | Facility: CLINIC | Age: 68
End: 2023-11-22
Payer: COMMERCIAL

## 2023-11-23 DIAGNOSIS — I10 ESSENTIAL HYPERTENSION: ICD-10-CM

## 2023-11-24 RX ORDER — LOSARTAN POTASSIUM 50 MG/1
50 TABLET ORAL DAILY
Qty: 90 TABLET | Refills: 0 | Status: SHIPPED | OUTPATIENT
Start: 2023-11-24 | End: 2024-02-26

## 2023-12-19 ENCOUNTER — ANCILLARY PROCEDURE (OUTPATIENT)
Dept: MAMMOGRAPHY | Facility: CLINIC | Age: 68
End: 2023-12-19
Attending: FAMILY MEDICINE
Payer: COMMERCIAL

## 2023-12-19 DIAGNOSIS — Z12.31 VISIT FOR SCREENING MAMMOGRAM: ICD-10-CM

## 2023-12-19 PROCEDURE — 77067 SCR MAMMO BI INCL CAD: CPT | Mod: TC | Performed by: RADIOLOGY

## 2023-12-27 DIAGNOSIS — E11.00 TYPE 2 DIABETES MELLITUS WITH HYPEROSMOLARITY WITHOUT COMA, UNSPECIFIED WHETHER LONG TERM INSULIN USE (H): ICD-10-CM

## 2023-12-27 RX ORDER — METFORMIN HCL 500 MG
1000 TABLET, EXTENDED RELEASE 24 HR ORAL 2 TIMES DAILY WITH MEALS
Qty: 360 TABLET | Refills: 0 | Status: SHIPPED | OUTPATIENT
Start: 2023-12-27 | End: 2024-02-09

## 2023-12-28 DIAGNOSIS — I10 ESSENTIAL HYPERTENSION: ICD-10-CM

## 2023-12-29 RX ORDER — LOSARTAN POTASSIUM 25 MG/1
25 TABLET ORAL DAILY
Qty: 90 TABLET | Refills: 0 | Status: SHIPPED | OUTPATIENT
Start: 2023-12-29 | End: 2024-03-19

## 2024-01-07 ENCOUNTER — HEALTH MAINTENANCE LETTER (OUTPATIENT)
Age: 69
End: 2024-01-07

## 2024-01-29 ENCOUNTER — LAB (OUTPATIENT)
Dept: LAB | Facility: CLINIC | Age: 69
End: 2024-01-29
Payer: COMMERCIAL

## 2024-01-29 DIAGNOSIS — E66.01 MORBID OBESITY (H): ICD-10-CM

## 2024-01-29 DIAGNOSIS — E11.9 TYPE 2 DIABETES MELLITUS WITHOUT COMPLICATION, WITH LONG-TERM CURRENT USE OF INSULIN (H): ICD-10-CM

## 2024-01-29 DIAGNOSIS — Z79.4 TYPE 2 DIABETES MELLITUS WITHOUT COMPLICATION, WITH LONG-TERM CURRENT USE OF INSULIN (H): ICD-10-CM

## 2024-01-29 LAB
ANION GAP SERPL CALCULATED.3IONS-SCNC: 16 MMOL/L (ref 7–15)
BUN SERPL-MCNC: 21.2 MG/DL (ref 8–23)
CALCIUM SERPL-MCNC: 9.9 MG/DL (ref 8.8–10.2)
CHLORIDE SERPL-SCNC: 101 MMOL/L (ref 98–107)
CREAT SERPL-MCNC: 1.04 MG/DL (ref 0.51–0.95)
CREAT UR-MCNC: 171 MG/DL
DEPRECATED HCO3 PLAS-SCNC: 22 MMOL/L (ref 22–29)
EGFRCR SERPLBLD CKD-EPI 2021: 59 ML/MIN/1.73M2
GLUCOSE SERPL-MCNC: 163 MG/DL (ref 70–99)
HBA1C MFR BLD: 7.2 % (ref 0–5.6)
MICROALBUMIN UR-MCNC: <12 MG/L
MICROALBUMIN/CREAT UR: NORMAL MG/G{CREAT}
POTASSIUM SERPL-SCNC: 4.2 MMOL/L (ref 3.4–5.3)
SODIUM SERPL-SCNC: 139 MMOL/L (ref 135–145)
TSH SERPL DL<=0.005 MIU/L-ACNC: 1.91 UIU/ML (ref 0.3–4.2)

## 2024-01-29 PROCEDURE — 84443 ASSAY THYROID STIM HORMONE: CPT

## 2024-01-29 PROCEDURE — 82570 ASSAY OF URINE CREATININE: CPT

## 2024-01-29 PROCEDURE — 83036 HEMOGLOBIN GLYCOSYLATED A1C: CPT

## 2024-01-29 PROCEDURE — 82043 UR ALBUMIN QUANTITATIVE: CPT

## 2024-01-29 PROCEDURE — 80048 BASIC METABOLIC PNL TOTAL CA: CPT

## 2024-01-29 PROCEDURE — 36415 COLL VENOUS BLD VENIPUNCTURE: CPT

## 2024-02-05 ENCOUNTER — VIRTUAL VISIT (OUTPATIENT)
Dept: ENDOCRINOLOGY | Facility: CLINIC | Age: 69
End: 2024-02-05
Payer: COMMERCIAL

## 2024-02-05 DIAGNOSIS — Z79.4 TYPE 2 DIABETES MELLITUS WITHOUT COMPLICATION, WITH LONG-TERM CURRENT USE OF INSULIN (H): Primary | ICD-10-CM

## 2024-02-05 DIAGNOSIS — E11.9 TYPE 2 DIABETES MELLITUS WITHOUT COMPLICATION, WITH LONG-TERM CURRENT USE OF INSULIN (H): Primary | ICD-10-CM

## 2024-02-05 PROCEDURE — 99214 OFFICE O/P EST MOD 30 MIN: CPT | Mod: 95 | Performed by: INTERNAL MEDICINE

## 2024-02-05 PROCEDURE — G2211 COMPLEX E/M VISIT ADD ON: HCPCS | Mod: 95 | Performed by: INTERNAL MEDICINE

## 2024-02-05 NOTE — PROGRESS NOTES
Virtual Visit Details    Type of service:  Video Visit   Video Start Time: 1:08 PM  Video End Time: 1:25 PM    Originating Location (pt. Location): Home  Distant Location (provider location):  Off-site  Platform used for Video Visit: Javier        Recent issues:  Diabetes follow-up evaluation  Participating with the [employer] patient assistance program, gets Trulicity, Basaglar and Humalog pens  Feeling well overall, no new health issues reported        History of pre-diabetes  ~2016. Initial diagnosis of diabetes mellitus  Had seen physician in [place], [place]  Started treatment with metformin   ~6/2020. Acute back pain?, diagnosed with pneumonia, high glucose and hgbA1c 12%   Hospital treatment included insulin treatment   Treatment with Abx, pain resolved   Recalls basal insulin 55U and use of rapid acting Humalog     7/15/20 Endocrinology evaluation with Dr. MERON Armijo/[facility]  Addition of Farxiga but expensive, then change to Jardiance medication  Fall '21. Discontinue Jardiance since expensive   Previous [facility] hgbA1c trends include:     Lab Test 06/08/22  1149 03/23/22  1641 03/01/22  0951 12/06/21  1132 07/30/21  1236   A1C 7.2* 6.6* 6.7* 6.9* 6.5*         6/21/22. Initial diabetes evaluation with me at [facility]  Reviewed health history and diabetes issues  [facility] Diab Ed eval and diagnostic Hi  Added glipizideER medication  3/2023. Ran out of Trulicity (4.5 mg) medication  ~ 5/2023. Restarted Trulicity med per plan    Current DM medications:  Metformin 500 mg  2-tabs in morning and evening  Basaglar Kwikpen 12U Subcutaneous in morning  glipizideER 5 mg  1-tab daily  Trulicity 4.5 mg  Subcutaneous weekly  Humalog sscale  (uses approx 1-2x/day often morning vs suppertime)   140-179  2U   180-219  4U   220-259  6U   260-299  8U   >300   10U    Blood glucose (BG) meter:  One Touch Verio   Tests 2-3/day   Recent trends 100-150 mg/dl per patient    Fam Hx Diabetes:  brother (T1DM), cousins  Previous  [facility] hgbA1c trends include:  Lab Results   Component Value Date    A1C 7.2 (H) 01/29/2024    A1C 6.7 (H) 08/08/2023    A1C 7.3 (H) 04/03/2023    A1C 7.2 (H) 12/22/2022    A1C 7.4 (H) 09/08/2022      Recent [facility] labs include:  Lab Results   Component Value Date    A1C 7.2 (H) 01/29/2024     01/29/2024    POTASSIUM 4.2 01/29/2024    CHLORIDE 101 01/29/2024    CO2 22 01/29/2024    ANIONGAP 16 (H) 01/29/2024     (H) 01/29/2024    BUN 21.2 01/29/2024    CR 1.04 (H) 01/29/2024    GFRESTIMATED 59 (L) 01/29/2024    GFRESTBLACK 80 01/07/2021    TRISTIAN 9.9 01/29/2024    CPEPT 9.8 (H) 07/31/2020    CHOL 112 06/13/2023    TRIG 94 06/13/2023    HDL 44 (L) 06/13/2023    LDL 49 06/13/2023    NHDL 68 06/13/2023    UCRR 171.0 01/29/2024    MICROL <12.0 01/29/2024    UMALCR  01/29/2024      Comment:      Unable to calculate, urine albumin and/or urine creatinine is outside detectable limits.  Microalbuminuria is defined as an albumin:creatinine ratio of 17 to 299 for males and 25 to 299 for females. A ratio of albumin:creatinine of 300 or higher is indicative of overt proteinuria.  Due to biologic variability, positive results should be confirmed by a second, first-morning random or 24-hour timed urine specimen. If there is discrepancy, a third specimen is recommended. When 2 out of 3 results are in the microalbuminuria range, this is evidence for incipient nephropathy and warrants increased efforts at glucose control, blood pressure control, and institution of therapy with an angiotensin-converting-enzyme (ACE) inhibitor (if the patient can tolerate it).      TSH 1.91 01/29/2024     Last eye exam 2023, no DR per patient  DM Complications:  None known      Lives in [facility]  Sees Dr. Juani Mae/[facility] for general medicine evaluations.    PMH/PSH:  Past Medical History:   Diagnosis Date    Acquired hypothyroidism     History of blood transfusion     Hypertension     Obesity due to excess calories      Type 2 diabetes mellitus without complication, with long-term current use of insulin (H)      Past Surgical History:   Procedure Laterality Date    CHOLECYSTECTOMY      DAVINCI HYSTERECTOMY TOTAL, BILATERAL SALPINGO-OOPHORECTOMY, COMBINED Bilateral 7/6/2022    Procedure: ROBOTIC HYSTERECTOMY, BILATERAL SALPINGO OOPHORECTOMY, LYSIS OF ADHESIONS;  Surgeon: Jefferson Chavez MD;  Location: [facility] [place]    HERNIA REPAIR  1990 s    INSERT INTRAUTERINE DEVICE N/A 7/6/2022    Procedure: REMOVAL OF INTRAUTERINE DEVICE;  Surgeon: Jefferson Chavez MD;  Location: [facility] [place]       Family Hx:  Family History   Problem Relation Age of Onset    Diabetes Mother         d age 80    Hypertension Mother     Coronary Artery Disease Father         d age 85    Brain Tumor Father     Hypertension Father     Diabetes Brother          Social Hx:  Social History     Socioeconomic History    Marital status:      Spouse name: Not on file    Number of children: Not on file    Years of education: Not on file    Highest education level: Not on file   Occupational History    Not on file   Tobacco Use    Smoking status: Never    Smokeless tobacco: Never   Vaping Use    Vaping Use: Never used   Substance and Sexual Activity    Alcohol use: Yes     Comment: Occ    Drug use: Never    Sexual activity: Not Currently   Other Topics Concern    Parent/sibling w/ CABG, MI or angioplasty before 65F 55M? No   Social History Narrative    Not on file     Social Determinants of Health     Financial Resource Strain: Not on file   Food Insecurity: Not on file   Transportation Needs: Not on file   Physical Activity: Not on file   Stress: Not on file   Social Connections: Not on file   Interpersonal Safety: Not on file   Housing Stability: Not on file          MEDICATIONS:  has a current medication list which includes the following prescription(s): [facility] prep, allopurinol, aspirin, atorvastatin, onetouch verio iq, blood glucose  calibration, blood glucose monitoring, clobetasol, freestyle yanely 14 day reader, dexcom g6 sensor, dexcom g6 transmitter, trulicity, fluticasone, furosemide, glipizide, glipizide, glucosamine-chondroit-vit c-mn, hydroxyzine hcl, insulin glargine, insulin lispro, bd pen needle fadi 2nd gen, levothyroxine, losartan, losartan, metformin, multivitamin, therapeutic, onetouch delica lancets 33g, pantoprazole, thin, triamcinolone, and triamterene-hctz.    ROS:     ROS: 10 point ROS neg other than the symptoms noted above in the HPI.    GENERAL: some fatigue, wt stable?; denies fevers, chills, night sweats.   HEENT: no dysphagia, odonophagia, diplopia, neck pain  THYROID:  no apparent hyper or hypothyroid symptoms  CV: no chest pain, pressure, palpitations  LUNGS: no SOB, NICHOLS, cough, wheezing   ABDOMEN: some indigestion; no diarrhea, constipation, abdominal pain  EXTREMITIES: no rashes, ulcers, edema  NEUROLOGY: no headaches, denies changes in vision, tingling, extremitiy numbness   MSK: no muscle aches or pains, weakness  SKIN: no rashes or lesions  : no menses  PSYCH:  stable mood, no significant anxiety or depression  ENDOCRINE: no heat or cold intolerance    Physical Exam (visual exam)  VS:  no vital signs taken for video visit  CONSTITUTIONAL: healthy, alert and NAD, well dressed, answering questions appropriately  ENT: no nose swelling or nasal discharge, mouth redness or gum changes.  EYES: eyes grossly normal to inspection, conjunctivae and sclerae normal, no exophthalmos or proptosis  THYROID:  no apparent nodules or goiter  LUNGS: no audible wheeze, cough or visible cyanosis, no visible retractions or increased work of breathing  ABDOMEN: abdomen not evaluated  EXTREMITIES: no hand tremors, limited exam  NEUROLOGY: CN grossly intact, mentation intact and speech normal   SKIN:  no apparent skin lesions, rash, or edema with visualized skin appearance  PSYCH: mentation appears normal, affect normal/bright,  judgement and insight intact,   normal speech and appearance well groomed    LABS:    All pertinent notes, labs, and images personally reviewed by me.     A/P:  Encounter Diagnosis   Name Primary?    Type 2 diabetes mellitus without complication, with long-term current use of insulin (H) Yes       Comments:  Reviewed health history and diabetes issues.  Reviewed and interpreted tests that I previously ordered.   Ordered appropriate tests for the endocrinology disease management.    Management options discussed and implemented after shared medical decision making with the patient.  T2DM problem is chronic-stable    Plan:  Discussed general issues with the diabetes diagnosis and management  We discussed the hgbA1c test which reflects previous overall glucose levels or control  Discussed the importance of blood glucose (BG) testing to assess glucose trends  Provided general overview of the diabetes medication options and medication treatment plan.    Recommend:  Continue the current metformin, Basaglar insulin, glipizideER, and Humalog correction scale  Treatment goal to continue weight loss, minimize and try to discontinue mealtime insulin  She gets Basaglar, Humalog, and Trulicity meds from [employer], but no [employer] option with that program    Diabetes med plan:  Metformin 500 mg  2-tabs in morning and evening  Basaglar Kwikpen 9U  Subcutaneous in morning  glipizideER 5 mg  1-tab daily  Trulicity 4.5 mg   Subcutaneous weekly  Humalog sscale  (uses approx 2x/day often morning vs suppertime)   140-179  2U   180-219  4U   220-259  6U   260-299  8U   >300   10U    Monitor for possible GI side effects  Goal target premeal glucose 80- 150 mg/dl  We have reviewed option of acquiring Personal [email or web address] Hi CGM again... she's not interested   Would be helpful for patient to have follow-up [facility] Diab Ed evaluation  Keep focus on diet, exercise, weight management.  Repeat pre-appt lab testing in mid 5/2024   Testing at [facility]  [facility]   Lab orders placed  Continue losartan, atorvastatin, levothyroxine med use, per PCP  Advise having fasting lipid panel testing and dilated eye examination, at least annually    Addressed patient questions today    The longitudinal plan of care for the endocrine problem(s) were addressed during this visit.  Due to added complexity of care,   we will continue to support the patient and the subsequent management of this condition with ongoing continuity of care.    There are no Patient Instructions on file for this visit.    Future labs ordered today:   Orders Placed This Encounter   Procedures    Hemoglobin A1c    Basic metabolic panel    ALT     Radiology/Consults ordered today: None    Total time spent on day of encounter:  18 min    Follow-up:  5/21/24 at 1:30 pm, Bert Araujo MD, MS  Endocrinology  [facility]    CC:  VALORIE Mae

## 2024-02-05 NOTE — Clinical Note
"    2/5/2024         RE: Crystal Nicole  [address]  [place] [address]        Dear Colleague,    Thank you for referring your patient, Crystal Nicole, to the [facility] [place]. Please see a copy of my visit note below.    Virtual Visit Details    Type of service:  Video Visit   Video Start Time: {video visit start/end time for provider to select:767925}  Video End Time:{video visit start/end time for provider to select:202250}    Originating Location (pt. Location): {video visit patient location:287850::\"Home\"}  {PROVIDER LOCATION On-site should be selected for visits conducted from your clinic location or adjoining [facility] hospital, academic office, or other nearby [facility] building. Off-site should be selected for all other provider locations, including home:972448}  Distant Location (provider location):  {virtual location provider:025398}  Platform used for Video Visit: {Virtual Visit Platforms:157549::\"Zimory\"}        Recent issues:  Diabetes follow-up evaluation  Ran out of [email or web address] 1-month ago  Patient having difficulty acquiring the [employer] paperwork patient assistance program        History of pre-diabetes  ~2016. Initial diagnosis of diabetes mellitus  Had seen physician in [place], [place]  Started treatment with metformin   ~6/2020. Acute back pain?, diagnosed with pneumonia, high glucose and hgbA1c 12%   Hospital treatment included insulin treatment   Treatment with Abx, pain resolved   Recalls basal insulin 55U and use of rapid acting Humalog     7/15/20 Endocrinology evaluation with Dr. MERON Armijo/[facility]  Addition of Farxiga but expensive, then change to Jardiance medication  ~Fall '21. Discontinue Jardiance since expensive      Previous [facility]V hgbA1c trends include:     Lab Test 06/08/22  1149 03/23/22  1641 03/01/22  0951 12/06/21  1132 07/30/21  1236   A1C 7.2* 6.6* 6.7* 6.9* 6.5*       6/21/22. Initial diabetes evaluation with me at " [facility]  Reviewed health history and diabetes issues  [facility] Diab Ed eval and diagnostic Hi  Added glipizideER medication  3/2023. Ran out of Trulicity (4.5 mg) medication  ~ 5/2023. Restarted Trulicity med per plan    Current DM medications:  Metformin 500 mg  2-tabs in morning and evening  Basaglar Kwikpen 19U Subcutaneous in morning  glipizideER 5 mg  1-tab daily  Trulicity 4.5 mg  Subcutaneous weekly  Humalog sscale  (uses approx 2x/day often morning vs suppertime)   140-179  2U   180-219  4U   220-259  6U   260-299  8U   >300   10U    Blood glucose (BG) meter:  One Touch Verio   Tests 2-3/day   Recent trends 100-140 mg/dl per patient    Fam Hx Diabetes:  brother (T1DM), cousins  Recent  labs include:  Lab Results   Component Value Date    A1C 7.2 (H) 01/29/2024     01/29/2024    POTASSIUM 4.2 01/29/2024    CHLORIDE 101 01/29/2024    CO2 22 01/29/2024    ANIONGAP 16 (H) 01/29/2024     (H) 01/29/2024    BUN 21.2 01/29/2024    CR 1.04 (H) 01/29/2024    GFRESTIMATED 59 (L) 01/29/2024    GFRESTBLACK 80 01/07/2021    TRISTIAN 9.9 01/29/2024    CPEPT 9.8 (H) 07/31/2020    CHOL 112 06/13/2023    TRIG 94 06/13/2023    HDL 44 (L) 06/13/2023    LDL 49 06/13/2023    NHDL 68 06/13/2023    UCRR 171.0 01/29/2024    MICROL <12.0 01/29/2024    UMALCR  01/29/2024      Comment:      Unable to calculate, urine albumin and/or urine creatinine is outside detectable limits.  Microalbuminuria is defined as an albumin:creatinine ratio of 17 to 299 for males and 25 to 299 for females. A ratio of albumin:creatinine of 300 or higher is indicative of overt proteinuria.  Due to biologic variability, positive results should be confirmed by a second, first-morning random or 24-hour timed urine specimen. If there is discrepancy, a third specimen is recommended. When 2 out of 3 results are in the microalbuminuria range, this is evidence for incipient nephropathy and warrants increased efforts at glucose control, blood pressure  control, and institution of therapy with an angiotensin-converting-enzyme (ACE) inhibitor (if the patient can tolerate it).      TSH 1.91 01/29/2024     Last eye exam 2021, no DR per patient  DM Complications:  None known      Lives in [facility]  Sees Dr. Juani Mae/[facility] for general medicine evaluations.    PMH/PSH:  Past Medical History:   Diagnosis Date    Acquired hypothyroidism     History of blood transfusion     Hypertension     Obesity due to excess calories     Type 2 diabetes mellitus without complication, with long-term current use of insulin (H)      Past Surgical History:   Procedure Laterality Date    CHOLECYSTECTOMY      DAVINCI HYSTERECTOMY TOTAL, BILATERAL SALPINGO-OOPHORECTOMY, COMBINED Bilateral 7/6/2022    Procedure: ROBOTIC HYSTERECTOMY, BILATERAL SALPINGO OOPHORECTOMY, LYSIS OF ADHESIONS;  Surgeon: Jefferson Chavez MD;  [place] [facility] [place]    HERNIA REPAIR  1990 s    INSERT INTRAUTERINE DEVICE N/A 7/6/2022    Procedure: REMOVAL OF INTRAUTERINE DEVICE;  Surgeon: Jefferson Chavez MD;  [place] [facility] [place]       Family Hx:  Family History   Problem Relation Age of Onset    Diabetes Mother         d age 80    Hypertension Mother     Coronary Artery Disease Father         d age 85    Brain Tumor Father     Hypertension Father     Diabetes Brother          Social Hx:  Social History     Socioeconomic History    Marital status:      Spouse name: Not on file    Number of children: Not on file    Years of education: Not on file    Highest education level: Not on file   Occupational History    Not on file   Tobacco Use    Smoking status: Never    Smokeless tobacco: Never   Vaping Use    Vaping Use: Never used   Substance and Sexual Activity    Alcohol use: Yes     Comment: Occ    Drug use: Never    Sexual activity: Not Currently   Other Topics Concern    Parent/sibling w/ CABG, MI or angioplasty before 65F 55M? No   Social History Narrative     Not on file     Social Determinants of Health     Financial Resource Strain: Not on file   Food Insecurity: Not on file   Transportation Needs: Not on file   Physical Activity: Not on file   Stress: Not on file   Social Connections: Not on file   Interpersonal Safety: Not on file   Housing Stability: Not on file          MEDICATIONS:  has a current medication list which includes the following prescription(s): cvs prep, allopurinol, aspirin, atorvastatin, onetouch verio iq, blood glucose calibration, blood glucose monitoring, clobetasol, freestyle yanely 14 day reader, dexcom g6 sensor, dexcom g6 transmitter, trulicity, fluticasone, furosemide, glipizide, glipizide, glucosamine-chondroit-vit c-mn, hydroxyzine hcl, insulin glargine, insulin lispro, bd pen needle fadi 2nd gen, levothyroxine, losartan, losartan, metformin, multivitamin, therapeutic, onetouch delica lancets 33g, pantoprazole, thin, triamcinolone, and triamterene-hctz.    ROS:     ROS: 10 point ROS neg other than the symptoms noted above in the HPI.    GENERAL: some fatigue, wt stable; denies fevers, chills, night sweats.   HEENT: no dysphagia, odonophagia, diplopia, neck pain  THYROID:  no apparent hyper or hypothyroid symptoms  CV: no chest pain, pressure, palpitations  LUNGS: no SOB, NICHOLS, cough, wheezing   ABDOMEN: some indigestion; no diarrhea, constipation, abdominal pain  EXTREMITIES: no rashes, ulcers, edema  NEUROLOGY: no headaches, denies changes in vision, tingling, extremitiy numbness   MSK: no muscle aches or pains, weakness  SKIN: no rashes or lesions  : no menses  PSYCH:  stable mood, no significant anxiety or depression  ENDOCRINE: no heat or cold intolerance    Physical Exam (visual exam)  VS:  no vital signs taken for video visit  CONSTITUTIONAL: healthy, alert and NAD, well dressed, answering questions appropriately  ENT: no nose swelling or nasal discharge, mouth redness or gum changes.  EYES: eyes grossly normal to inspection,  conjunctivae and sclerae normal, no exophthalmos or proptosis  THYROID:  no apparent nodules or goiter  LUNGS: no audible wheeze, cough or visible cyanosis, no visible retractions or increased work of breathing  ABDOMEN: abdomen not evaluated  EXTREMITIES: no hand tremors, limited exam  NEUROLOGY: CN grossly intact, mentation intact and speech normal   SKIN:  no apparent skin lesions, rash, or edema with visualized skin appearance  PSYCH: mentation appears normal, affect normal/bright, judgement and insight intact,   normal speech and appearance well groomed    LABS:    All pertinent notes, labs, and images personally reviewed by me.     A/P:  No diagnosis found.      Comments:  Reviewed health history and diabetes issues.  Reviewed and interpreted tests that I previously ordered.   Ordered appropriate tests for the endocrinology disease management.    Management options discussed and implemented after shared medical decision making with the patient.  T2DM problem is chronic-stable    Plan:  Discussed general issues with the diabetes diagnosis and management  We discussed the hgbA1c test which reflects previous overall glucose levels or control  Discussed the importance of blood glucose (BG) testing to assess glucose trends  Provided general overview of the diabetes medication options and medication treatment plan.    Recommend:  Continue the current metformin, Basaglar insulin, glipizideER, and Humalog correction scale  Treatment goal to continue weight loss, minimize and try to discontinue mealtime insulin  Advised further dose reduction of the Basaglar insulin medication    Diabetes med plan:  Metformin 500 mg  2-tabs in morning and evening  Basaglar Kwikpen 12U Subcutaneous in morning  glipizideER 5 mg  1-tab daily  Trulicity 4.5 mg   Subcutaneous weekly  Humalog sscale  (uses approx 2x/day often morning vs suppertime)   140-179  2U   180-219  4U   220-259  6U   260-299  8U   >300   10U    Monitor for possible  GI side effects  She plans to continue the Trulicity medication with the [employer] program  Goal target premeal glucose 80- 150 mg/dl  We have reviewed option of acquiring Personal [employer] CGM again... she's not interested   Would be helpful for patient to have follow-up [facility] Diab Ed evaluation  Keep focus on diet, exercise, weight management.  Repeat pre-appt lab testing in late 1/2024   Testing at [facility] clinic   Lab orders placed  Continue losartan, atorvastatin, levothyroxine med use, per PCP  Advise having fasting lipid panel testing and dilated eye examination, at least annually    Addressed patient questions today    The longitudinal plan of care for the endocrine problem(s) were addressed during this visit.  Due to added complexity of care,   we will continue to support the patient and the subsequent management of this condition with ongoing continuity of care.    There are no Patient Instructions on file for this visit.    Future labs ordered today: No orders of the defined types were placed in this encounter.    Radiology/Consults ordered today: None    Total time spent on day of encounter:  ***    Follow-up:  ***    JONELLE Araujo MD, MS  Endocrinology  [facility]      CC:  VALORIE Mae          Virtual Visit Details    Type of service:  Video Visit   Video Start Time: 1:08 PM  Video End Time: 1:25 PM    Originating Location (pt. Location): Home  Distant Location (provider location):  Off-site  Platform used for Video Visit: [employer]        Recent issues:  Diabetes follow-up evaluation  Participating with the [employer] patient assistance program, gets Trulicity, Basaglar and Humalog pens  Feeling well overall, no new health issues reported        History of pre-diabetes  ~2016. Initial diagnosis of diabetes mellitus  Had seen physician in [place], [place]  Started treatment with metformin   ~6/2020. Acute back pain?, diagnosed with pneumonia, high glucose and hgbA1c 12%   Hospital treatment  included insulin treatment   Treatment with Abx, pain resolved   Recalls basal insulin 55U and use of rapid acting Humalog     7/15/20 Endocrinology evaluation with Dr. MERON Armijo/[facility]  Addition of Farxiga but expensive, then change to Jardiance medication  Fall '21. Discontinue Jardiance since expensive   Previous [facility] hgbA1c trends include:     Lab Test 06/08/22  1149 03/23/22  1641 03/01/22  0951 12/06/21  1132 07/30/21  1236   A1C 7.2* 6.6* 6.7* 6.9* 6.5*         6/21/22. Initial diabetes evaluation with me at [facility]  Reviewed health history and diabetes issues  [facility] Diab Ed eval and diagnostic Hi  Added glipizideER medication  3/2023. Ran out of Trulicity (4.5 mg) medication  ~ 5/2023. Restarted Trulicity med per plan    Current DM medications:  Metformin 500 mg  2-tabs in morning and evening  Basaglar Kwikpen 12U Subcutaneous in morning  glipizideER 5 mg  1-tab daily  Trulicity 4.5 mg  Subcutaneous weekly  Humalog sscale  (uses approx 1-2x/day often morning vs suppertime)   140-179  2U   180-219  4U   220-259  6U   260-299  8U   >300   10U    Blood glucose (BG) meter:  One Touch Verio   Tests 2-3/day   Recent trends 100-150 mg/dl per patient    Fam Hx Diabetes:  brother (T1DM), cousins  Previous FV hgbA1c trends include:  Lab Results   Component Value Date    A1C 7.2 (H) 01/29/2024    A1C 6.7 (H) 08/08/2023    A1C 7.3 (H) 04/03/2023    A1C 7.2 (H) 12/22/2022    A1C 7.4 (H) 09/08/2022      Recent FV labs include:  Lab Results   Component Value Date    A1C 7.2 (H) 01/29/2024     01/29/2024    POTASSIUM 4.2 01/29/2024    CHLORIDE 101 01/29/2024    CO2 22 01/29/2024    ANIONGAP 16 (H) 01/29/2024     (H) 01/29/2024    BUN 21.2 01/29/2024    CR 1.04 (H) 01/29/2024    GFRESTIMATED 59 (L) 01/29/2024    GFRESTBLACK 80 01/07/2021    TRISTIAN 9.9 01/29/2024    CPEPT 9.8 (H) 07/31/2020    CHOL 112 06/13/2023    TRIG 94 06/13/2023    HDL 44 (L) 06/13/2023    LDL 49 06/13/2023    NHDL 68  06/13/2023    UCRR 171.0 01/29/2024    MICROL <12.0 01/29/2024    UMALCR  01/29/2024      Comment:      Unable to calculate, urine albumin and/or urine creatinine is outside detectable limits.  Microalbuminuria is defined as an albumin:creatinine ratio of 17 to 299 for males and 25 to 299 for females. A ratio of albumin:creatinine of 300 or higher is indicative of overt proteinuria.  Due to biologic variability, positive results should be confirmed by a second, first-morning random or 24-hour timed urine specimen. If there is discrepancy, a third specimen is recommended. When 2 out of 3 results are in the microalbuminuria range, this is evidence for incipient nephropathy and warrants increased efforts at glucose control, blood pressure control, and institution of therapy with an angiotensin-converting-enzyme (ACE) inhibitor (if the patient can tolerate it).      TSH 1.91 01/29/2024     Last eye exam 2023, no DR per patient  DM Complications:  None known      Lives in [facility]  Sees Dr. Juani Mae/[facility] for general medicine evaluations.    PMH/PSH:  Past Medical History:   Diagnosis Date     Acquired hypothyroidism      History of blood transfusion      Hypertension      Obesity due to excess calories      Type 2 diabetes mellitus without complication, with long-term current use of insulin (H)      Past Surgical History:   Procedure Laterality Date     CHOLECYSTECTOMY       DAVINCI HYSTERECTOMY TOTAL, BILATERAL SALPINGO-OOPHORECTOMY, COMBINED Bilateral 7/6/2022    Procedure: ROBOTIC HYSTERECTOMY, BILATERAL SALPINGO OOPHORECTOMY, LYSIS OF ADHESIONS;  Surgeon: Jefferson Chavez MD;  Location: [facility] OR     HERNIA REPAIR  1990 s     INSERT INTRAUTERINE DEVICE N/A 7/6/2022    Procedure: REMOVAL OF INTRAUTERINE DEVICE;  Surgeon: Jefferson Chavez MD;  Location: [facility] OR       Family Hx:  Family History   Problem Relation Age of Onset     Diabetes Mother         d  age 80     Hypertension Mother      Coronary Artery Disease Father         d age 85     Brain Tumor Father      Hypertension Father      Diabetes Brother          Social Hx:  Social History     Socioeconomic History     Marital status:      Spouse name: Not on file     Number of children: Not on file     Years of education: Not on file     Highest education level: Not on file   Occupational History     Not on file   Tobacco Use     Smoking status: Never     Smokeless tobacco: Never   Vaping Use     Vaping Use: Never used   Substance and Sexual Activity     Alcohol use: Yes     Comment: Occ     Drug use: Never     Sexual activity: Not Currently   Other Topics Concern     Parent/sibling w/ CABG, MI or angioplasty before 65F 55M? No   Social History Narrative     Not on file     Social Determinants of Health     Financial Resource Strain: Not on file   Food Insecurity: Not on file   Transportation Needs: Not on file   Physical Activity: Not on file   Stress: Not on file   Social Connections: Not on file   Interpersonal Safety: Not on file   Housing Stability: Not on file          MEDICATIONS:  has a current medication list which includes the following prescription(s): cvs prep, allopurinol, aspirin, atorvastatin, onetouch verio iq, blood glucose calibration, blood glucose monitoring, clobetasol, freestyle yanely 14 day reader, dexcom g6 sensor, dexcom g6 transmitter, trulicity, fluticasone, furosemide, glipizide, glipizide, glucosamine-chondroit-vit c-mn, hydroxyzine hcl, insulin glargine, insulin lispro, bd pen needle fadi 2nd gen, levothyroxine, losartan, losartan, metformin, multivitamin, therapeutic, onetouch delica lancets 33g, pantoprazole, thin, triamcinolone, and triamterene-hctz.    ROS:     ROS: 10 point ROS neg other than the symptoms noted above in the HPI.    GENERAL: some fatigue, wt stable?; denies fevers, chills, night sweats.   HEENT: no dysphagia, odonophagia, diplopia, neck pain  THYROID:  no  apparent hyper or hypothyroid symptoms  CV: no chest pain, pressure, palpitations  LUNGS: no SOB, NICHOLS, cough, wheezing   ABDOMEN: some indigestion; no diarrhea, constipation, abdominal pain  EXTREMITIES: no rashes, ulcers, edema  NEUROLOGY: no headaches, denies changes in vision, tingling, extremitiy numbness   MSK: no muscle aches or pains, weakness  SKIN: no rashes or lesions  : no menses  PSYCH:  stable mood, no significant anxiety or depression  ENDOCRINE: no heat or cold intolerance    Physical Exam (visual exam)  VS:  no vital signs taken for video visit  CONSTITUTIONAL: healthy, alert and NAD, well dressed, answering questions appropriately  ENT: no nose swelling or nasal discharge, mouth redness or gum changes.  EYES: eyes grossly normal to inspection, conjunctivae and sclerae normal, no exophthalmos or proptosis  THYROID:  no apparent nodules or goiter  LUNGS: no audible wheeze, cough or visible cyanosis, no visible retractions or increased work of breathing  ABDOMEN: abdomen not evaluated  EXTREMITIES: no hand tremors, limited exam  NEUROLOGY: CN grossly intact, mentation intact and speech normal   SKIN:  no apparent skin lesions, rash, or edema with visualized skin appearance  PSYCH: mentation appears normal, affect normal/bright, judgement and insight intact,   normal speech and appearance well groomed    LABS:    All pertinent notes, labs, and images personally reviewed by me.     A/P:  Encounter Diagnosis   Name Primary?     Type 2 diabetes mellitus without complication, with long-term current use of insulin (H) Yes       Comments:  Reviewed health history and diabetes issues.  Reviewed and interpreted tests that I previously ordered.   Ordered appropriate tests for the endocrinology disease management.    Management options discussed and implemented after shared medical decision making with the patient.  T2DM problem is chronic-stable    Plan:  Discussed general issues with the diabetes diagnosis  and management  We discussed the hgbA1c test which reflects previous overall glucose levels or control  Discussed the importance of blood glucose (BG) testing to assess glucose trends  Provided general overview of the diabetes medication options and medication treatment plan.    Recommend:  Continue the current metformin, Basaglar insulin, glipizideER, and Humalog correction scale  Treatment goal to continue weight loss, minimize and try to discontinue mealtime insulin  She gets Basaglar, Humalog, and Trulicity meds from [employer], but no [employer] option with that program    Diabetes med plan:  Metformin 500 mg  2-tabs in morning and evening  Basaglar Kwikpen 9U  Subcutaneous in morning  glipizideER 5 mg  1-tab daily  Trulicity 4.5 mg   Subcutaneous weekly  Humalog sscale  (uses approx 2x/day often morning vs suppertime)   140-179  2U   180-219  4U   220-259  6U   260-299  8U   >300   10U    Monitor for possible GI side effects  Goal target premeal glucose 80- 150 mg/dl  We have reviewed option of acquiring Personal [employer] CGM again... she's not interested   Would be helpful for patient to have follow-up [facility] Diab Ed evaluation  Keep focus on diet, exercise, weight management.  Repeat pre-appt lab testing in mid 5/2024   Testing at [facility] clinic   Lab orders placed  Continue losartan, atorvastatin, levothyroxine med use, per PCP  Advise having fasting lipid panel testing and dilated eye examination, at least annually    Addressed patient questions today    The longitudinal plan of care for the endocrine problem(s) were addressed during this visit.  Due to added complexity of care,   we will continue to support the patient and the subsequent management of this condition with ongoing continuity of care.    There are no Patient Instructions on file for this visit.    Future labs ordered today:   Orders Placed This Encounter   Procedures     Hemoglobin A1c     Basic metabolic panel     ALT      Radiology/Consults ordered today: None    Total time spent on day of encounter:  18 min    Follow-up:  5/21/24 at 1:30 pm, Bert Araujo MD, MS  Endocrinology  [facility]    CC:  VALORIE Mae            Again, thank you for allowing me to participate in the care of your patient.        Sincerely,        Eladio Araujo MD

## 2024-02-09 DIAGNOSIS — E78.5 HYPERLIPIDEMIA LDL GOAL <70: ICD-10-CM

## 2024-02-09 DIAGNOSIS — E11.00 TYPE 2 DIABETES MELLITUS WITH HYPEROSMOLARITY WITHOUT COMA, UNSPECIFIED WHETHER LONG TERM INSULIN USE (H): ICD-10-CM

## 2024-02-09 DIAGNOSIS — E03.9 HYPOTHYROIDISM, UNSPECIFIED TYPE: ICD-10-CM

## 2024-02-09 DIAGNOSIS — I10 ESSENTIAL HYPERTENSION: ICD-10-CM

## 2024-02-09 RX ORDER — TRIAMTERENE AND HYDROCHLOROTHIAZIDE 37.5; 25 MG/1; MG/1
CAPSULE ORAL
Qty: 180 CAPSULE | Refills: 0 | Status: SHIPPED | OUTPATIENT
Start: 2024-02-09 | End: 2024-03-21

## 2024-02-09 RX ORDER — ATORVASTATIN CALCIUM 10 MG/1
10 TABLET, FILM COATED ORAL DAILY
Qty: 90 TABLET | Refills: 0 | Status: SHIPPED | OUTPATIENT
Start: 2024-02-09 | End: 2024-03-19

## 2024-02-09 RX ORDER — LEVOTHYROXINE SODIUM 100 UG/1
100 TABLET ORAL DAILY
Qty: 90 TABLET | Refills: 0 | Status: SHIPPED | OUTPATIENT
Start: 2024-02-09 | End: 2024-04-24

## 2024-02-09 RX ORDER — METFORMIN HCL 500 MG
1000 TABLET, EXTENDED RELEASE 24 HR ORAL 2 TIMES DAILY WITH MEALS
Qty: 360 TABLET | Refills: 0 | Status: SHIPPED | OUTPATIENT
Start: 2024-02-09 | End: 2024-06-24

## 2024-02-12 DIAGNOSIS — E11.00 TYPE 2 DIABETES MELLITUS WITH HYPEROSMOLARITY WITHOUT COMA, UNSPECIFIED WHETHER LONG TERM INSULIN USE (H): ICD-10-CM

## 2024-02-13 RX ORDER — LORATADINE 10 MG
1 TABLET ORAL PRN
Qty: 100 EACH | Refills: 11 | Status: SHIPPED | OUTPATIENT
Start: 2024-02-13

## 2024-02-20 SDOH — HEALTH STABILITY: PHYSICAL HEALTH: ON AVERAGE, HOW MANY DAYS PER WEEK DO YOU ENGAGE IN MODERATE TO STRENUOUS EXERCISE (LIKE A BRISK WALK)?: 2 DAYS

## 2024-02-20 SDOH — HEALTH STABILITY: PHYSICAL HEALTH: ON AVERAGE, HOW MANY MINUTES DO YOU ENGAGE IN EXERCISE AT THIS LEVEL?: 10 MIN

## 2024-02-20 ASSESSMENT — SOCIAL DETERMINANTS OF HEALTH (SDOH): HOW OFTEN DO YOU GET TOGETHER WITH FRIENDS OR RELATIVES?: MORE THAN THREE TIMES A WEEK

## 2024-03-06 ENCOUNTER — TELEPHONE (OUTPATIENT)
Dept: ENDOCRINOLOGY | Facility: CLINIC | Age: 69
End: 2024-03-06
Payer: COMMERCIAL

## 2024-03-06 DIAGNOSIS — Z79.4 TYPE 2 DIABETES MELLITUS WITHOUT COMPLICATION, WITH LONG-TERM CURRENT USE OF INSULIN (H): ICD-10-CM

## 2024-03-06 DIAGNOSIS — Z79.4 TYPE 2 DIABETES MELLITUS WITHOUT COMPLICATION, WITH LONG-TERM CURRENT USE OF INSULIN (H): Primary | ICD-10-CM

## 2024-03-06 DIAGNOSIS — E11.9 TYPE 2 DIABETES MELLITUS WITHOUT COMPLICATION, WITH LONG-TERM CURRENT USE OF INSULIN (H): ICD-10-CM

## 2024-03-06 DIAGNOSIS — E11.9 TYPE 2 DIABETES MELLITUS WITHOUT COMPLICATION, WITH LONG-TERM CURRENT USE OF INSULIN (H): Primary | ICD-10-CM

## 2024-03-06 NOTE — TELEPHONE ENCOUNTER
Health Call Center    Phone Message    May a detailed message be left on voicemail: yes     Reason for Call: Medication Refill Request    Has the patient contacted the pharmacy for the refill? Yes   Name of medication being requested: One Touch Verio Glucometer  Provider who prescribed the medication: Eladio Araujo MD   Pharmacy:   [facility] #[id number] - [place], [place] - [address]     Date medication is needed: ASAP  Patient states the pharmacy was going to send a prescription for this but she wants it fast so she called. [facility] pharmacy phone number is [phone]. Patient is asking this be filled today, please call and advise.

## 2024-03-07 RX ORDER — GLIPIZIDE 5 MG/1
TABLET, FILM COATED, EXTENDED RELEASE ORAL
Qty: 90 TABLET | Refills: 1 | Status: SHIPPED | OUTPATIENT
Start: 2024-03-07

## 2024-03-07 NOTE — TELEPHONE ENCOUNTER
RN reviewed chart.  Last rx is from 2021.  New rx pended and sent to provider. Gisel Flannery RN

## 2024-03-07 NOTE — TELEPHONE ENCOUNTER
Last Written Prescription Date:  9/26/23  Last Fill Quantity: 90,  # refills: 1   Last office visit: 2/5/2024 with prescribing provider:  Dr. Araujo   Future Office Visit: 5/21/24  Next 5 appointments (look out 90 days)      Mar 19, 2024 11:40 AM  (Arrive by 11:20 AM)  Annual Wellness Visit with Juani Mae MD  [facility] ([facility] ) [address] [facility] [id number] [phone]                 Requested Prescriptions   Pending Prescriptions Disp Refills    glipiZIDE (GLUCOTROL XL) 5 MG 24 hr tablet [Pharmacy Med Name: GLIPIZIDE ER 5 MG TABLET] 90 tablet 1     Sig: TAKE 1 TABLET BY MOUTH EVERY DAY       Sulfonylurea Agents Failed - 3/6/2024  3:26 PM        Failed - Has GFR on file in past 12 months and most recent value is normal        Failed - Patient has a recent creatinine (normal) within the past 12 mos.     Recent Labs   Lab Test 01/29/24  1048   CR 1.04*       Ok to refill medication if creatinine is low          Passed - Patient has documented A1c within the specified period of time.     If HgbA1C is 8 or greater, it needs to be on file within the past 3 months.  If less than 8, must be on file within the past 6 months.     Recent Labs   Lab Test 01/29/24  1048   A1C 7.2*             Passed - Medication is active on med list        Passed - Recent (6 mo) or future (90 days) visit within the authorizing provider's specialty     The patient must have completed an in-person or virtual visit within the past 6 months or has a future visit scheduled within the next 90 days with the authorizing provider s specialty.  Urgent care and e-visits do not quality as an office visit for this protocol.          Passed - Medication indicated for associated diagnosis     Medication is associated with one or more of the following diagnoses:  Maturity-onset diabetes of the young   Peripheral circulatory disorder due to type 2 diabetes mellitus   Type 2 diabetes  mellitus           Passed - Patient is age 18 or older        Passed - No active pregnancy on record        Passed - Patient has not had a positive pregnancy test within the past 12 mos.           Refills sent  Gisel Flannery RN

## 2024-03-08 NOTE — TELEPHONE ENCOUNTER
Message reviewed.  New blood glucose meter Rx sent to her pharmacy.    JONELLE Araujo MD, MS  Endocrinology  [facility]

## 2024-03-17 ENCOUNTER — HEALTH MAINTENANCE LETTER (OUTPATIENT)
Age: 69
End: 2024-03-17

## 2024-03-19 ENCOUNTER — OFFICE VISIT (OUTPATIENT)
Dept: FAMILY MEDICINE | Facility: CLINIC | Age: 69
End: 2024-03-19
Payer: COMMERCIAL

## 2024-03-19 VITALS
HEART RATE: 80 BPM | TEMPERATURE: 97 F | BODY MASS INDEX: 50.02 KG/M2 | RESPIRATION RATE: 18 BRPM | HEIGHT: 64 IN | SYSTOLIC BLOOD PRESSURE: 128 MMHG | DIASTOLIC BLOOD PRESSURE: 81 MMHG | OXYGEN SATURATION: 96 % | WEIGHT: 293 LBS

## 2024-03-19 DIAGNOSIS — E78.5 HYPERLIPIDEMIA LDL GOAL <70: ICD-10-CM

## 2024-03-19 DIAGNOSIS — E11.22 TYPE 2 DIABETES MELLITUS WITH STAGE 3A CHRONIC KIDNEY DISEASE, WITH LONG-TERM CURRENT USE OF INSULIN (H): ICD-10-CM

## 2024-03-19 DIAGNOSIS — Z00.00 ENCOUNTER FOR MEDICARE ANNUAL WELLNESS EXAM: ICD-10-CM

## 2024-03-19 DIAGNOSIS — N18.31 TYPE 2 DIABETES MELLITUS WITH STAGE 3A CHRONIC KIDNEY DISEASE, WITH LONG-TERM CURRENT USE OF INSULIN (H): ICD-10-CM

## 2024-03-19 DIAGNOSIS — M10.9 GOUT, UNSPECIFIED CAUSE, UNSPECIFIED CHRONICITY, UNSPECIFIED SITE: ICD-10-CM

## 2024-03-19 DIAGNOSIS — N18.31 STAGE 3A CHRONIC KIDNEY DISEASE (H): ICD-10-CM

## 2024-03-19 DIAGNOSIS — E66.01 CLASS 3 SEVERE OBESITY DUE TO EXCESS CALORIES WITH SERIOUS COMORBIDITY AND BODY MASS INDEX (BMI) OF 50.0 TO 59.9 IN ADULT (H): ICD-10-CM

## 2024-03-19 DIAGNOSIS — E66.813 CLASS 3 SEVERE OBESITY DUE TO EXCESS CALORIES WITH SERIOUS COMORBIDITY AND BODY MASS INDEX (BMI) OF 50.0 TO 59.9 IN ADULT (H): ICD-10-CM

## 2024-03-19 DIAGNOSIS — I12.9 BENIGN HYPERTENSION WITH CKD (CHRONIC KIDNEY DISEASE) STAGE III (H): ICD-10-CM

## 2024-03-19 DIAGNOSIS — L40.9 PSORIASIS: ICD-10-CM

## 2024-03-19 DIAGNOSIS — D12.6 TUBULAR ADENOMA OF COLON: ICD-10-CM

## 2024-03-19 DIAGNOSIS — I10 ESSENTIAL HYPERTENSION: ICD-10-CM

## 2024-03-19 DIAGNOSIS — Z79.4 TYPE 2 DIABETES MELLITUS WITH STAGE 3A CHRONIC KIDNEY DISEASE, WITH LONG-TERM CURRENT USE OF INSULIN (H): ICD-10-CM

## 2024-03-19 DIAGNOSIS — N18.30 BENIGN HYPERTENSION WITH CKD (CHRONIC KIDNEY DISEASE) STAGE III (H): ICD-10-CM

## 2024-03-19 DIAGNOSIS — E03.9 HYPOTHYROIDISM, UNSPECIFIED TYPE: ICD-10-CM

## 2024-03-19 DIAGNOSIS — Z12.11 SCREEN FOR COLON CANCER: ICD-10-CM

## 2024-03-19 LAB — HGB BLD-MCNC: 13.5 G/DL (ref 11.7–15.7)

## 2024-03-19 PROCEDURE — 80061 LIPID PANEL: CPT | Performed by: FAMILY MEDICINE

## 2024-03-19 PROCEDURE — 99207 PR FOOT EXAM NO CHARGE: CPT | Mod: 25 | Performed by: FAMILY MEDICINE

## 2024-03-19 PROCEDURE — 36415 COLL VENOUS BLD VENIPUNCTURE: CPT | Performed by: FAMILY MEDICINE

## 2024-03-19 PROCEDURE — 84550 ASSAY OF BLOOD/URIC ACID: CPT | Performed by: FAMILY MEDICINE

## 2024-03-19 PROCEDURE — 99214 OFFICE O/P EST MOD 30 MIN: CPT | Mod: 25 | Performed by: FAMILY MEDICINE

## 2024-03-19 PROCEDURE — 85018 HEMOGLOBIN: CPT | Performed by: FAMILY MEDICINE

## 2024-03-19 PROCEDURE — G0439 PPPS, SUBSEQ VISIT: HCPCS | Performed by: FAMILY MEDICINE

## 2024-03-19 RX ORDER — TRIAMCINOLONE ACETONIDE 1 MG/G
CREAM TOPICAL
Qty: 454 G | Refills: 3 | Status: SHIPPED | OUTPATIENT
Start: 2024-03-19

## 2024-03-19 RX ORDER — DESONIDE 0.5 MG/G
CREAM TOPICAL 2 TIMES DAILY
Qty: 60 G | Refills: 0 | Status: SHIPPED | OUTPATIENT
Start: 2024-03-19 | End: 2024-06-24

## 2024-03-19 RX ORDER — TRIAMCINOLONE ACETONIDE 1 MG/G
CREAM TOPICAL 2 TIMES DAILY
Qty: 60 G | Refills: 0 | Status: SHIPPED | OUTPATIENT
Start: 2024-03-19

## 2024-03-19 RX ORDER — PEN NEEDLE, DIABETIC 32GX 5/32"
NEEDLE, DISPOSABLE MISCELLANEOUS
Qty: 200 EACH | Refills: 3 | Status: SHIPPED | OUTPATIENT
Start: 2024-03-19

## 2024-03-19 RX ORDER — LANCETS 33 GAUGE
1 EACH MISCELLANEOUS
Qty: 300 EACH | Refills: 3 | Status: SHIPPED | OUTPATIENT
Start: 2024-03-19

## 2024-03-19 RX ORDER — ALLOPURINOL 300 MG/1
1 TABLET ORAL DAILY
Qty: 90 TABLET | Refills: 3 | Status: SHIPPED | OUTPATIENT
Start: 2024-03-19

## 2024-03-19 RX ORDER — ATORVASTATIN CALCIUM 10 MG/1
10 TABLET, FILM COATED ORAL DAILY
Qty: 90 TABLET | Refills: 0 | Status: SHIPPED | OUTPATIENT
Start: 2024-03-19 | End: 2024-06-24

## 2024-03-19 RX ORDER — FAMOTIDINE 20 MG
1 TABLET ORAL DAILY
Qty: 90 CAPSULE | Refills: 3 | Status: SHIPPED | OUTPATIENT
Start: 2024-03-19 | End: 2024-08-05

## 2024-03-19 RX ORDER — BLOOD SUGAR DIAGNOSTIC
STRIP MISCELLANEOUS
Qty: 300 STRIP | Refills: 3 | Status: SHIPPED | OUTPATIENT
Start: 2024-03-19

## 2024-03-19 RX ORDER — LOSARTAN POTASSIUM 25 MG/1
25 TABLET ORAL DAILY
Qty: 90 TABLET | Refills: 3 | Status: CANCELLED | OUTPATIENT
Start: 2024-03-19

## 2024-03-19 ASSESSMENT — ANXIETY QUESTIONNAIRES
GAD7 TOTAL SCORE: 0
7. FEELING AFRAID AS IF SOMETHING AWFUL MIGHT HAPPEN: NOT AT ALL
1. FEELING NERVOUS, ANXIOUS, OR ON EDGE: NOT AT ALL
2. NOT BEING ABLE TO STOP OR CONTROL WORRYING: NOT AT ALL
6. BECOMING EASILY ANNOYED OR IRRITABLE: NOT AT ALL
GAD7 TOTAL SCORE: 0
IF YOU CHECKED OFF ANY PROBLEMS ON THIS QUESTIONNAIRE, HOW DIFFICULT HAVE THESE PROBLEMS MADE IT FOR YOU TO DO YOUR WORK, TAKE CARE OF THINGS AT HOME, OR GET ALONG WITH OTHER PEOPLE: NOT DIFFICULT AT ALL
3. WORRYING TOO MUCH ABOUT DIFFERENT THINGS: NOT AT ALL
5. BEING SO RESTLESS THAT IT IS HARD TO SIT STILL: NOT AT ALL

## 2024-03-19 ASSESSMENT — PATIENT HEALTH QUESTIONNAIRE - PHQ9: 5. POOR APPETITE OR OVEREATING: NOT AT ALL

## 2024-03-19 NOTE — PATIENT INSTRUCTIONS
Preventive Care Advice   This is general advice given by our system to help you stay healthy. However, your care team may have specific advice just for you. Please talk to your care team about your preventive care needs.  Nutrition  Eat 5 or more servings of fruits and vegetables each day.  Try wheat bread, brown rice and whole grain pasta (instead of white bread, rice, and pasta).  Get enough calcium and vitamin D. Check the label on foods and aim for 100% of the RDA (recommended daily allowance).  Lifestyle  Exercise at least 150 minutes each week   (30 minutes a day, 5 days a week).  Do muscle strengthening activities 2 days a week. These help control your weight and prevent disease.  No smoking.  Wear sunscreen to prevent skin cancer.  Have a dental exam and cleaning every 6 months.  Yearly exams  See your health care team every year to talk about:  Any changes in your health.  Any medicines your care team has prescribed.  Preventive care, family planning, and ways to prevent chronic diseases.  Shots (vaccines)   HPV shots (up to age 26), if you've never had them before.  Hepatitis B shots (up to age 59), if you've never had them before.  COVID-19 shot: Get this shot when it's due.  Flu shot: Get a flu shot every year.  Tetanus shot: Get a tetanus shot every 10 years.  Pneumococcal, hepatitis A, and RSV shots: Ask your care team if you need these based on your risk.  Shingles shot (for age 50 and up).  General health tests  Diabetes screening:  Starting at age 35, Get screened for diabetes at least every 3 years.  If you are younger than age 35, ask your care team if you should be screened for diabetes.  Cholesterol test: At age 39, start having a cholesterol test every 5 years, or more often if advised.  Bone density scan (DEXA): At age 50, ask your care team if you should have this scan for osteoporosis (brittle bones).  Hepatitis C: Get tested at least once in your life.  STIs (sexually transmitted  infections)  Before age 24: Ask your care team if you should be screened for STIs.  After age 24: Get screened for STIs if you're at risk. You are at risk for STIs (including HIV) if:  You are sexually active with more than one person.  You don't use condoms every time.  You or a partner was diagnosed with a sexually transmitted infection.  If you are at risk for HIV, ask about PrEP medicine to prevent HIV.  Get tested for HIV at least once in your life, whether you are at risk for HIV or not.  Cancer screening tests  Cervical cancer screening: If you have a cervix, begin getting regular cervical cancer screening tests at age 21. Most people who have regular screenings with normal results can stop after age 65. Talk about this with your provider.  Breast cancer scan (mammogram): If you've ever had breasts, begin having regular mammograms starting at age 40. This is a scan to check for breast cancer.  Colon cancer screening: It is important to start screening for colon cancer at age 45.  Have a colonoscopy test every 10 years (or more often if you're at risk) Or, ask your provider about stool tests like a FIT test every year or Cologuard test every 3 years.  To learn more about your testing options, visit: https://[email or web address]/836545.pdf.  For help making a decision, visit: https://bit.ly/hn65842.  Prostate cancer screening test: If you have a prostate and are age 55 to 69, ask your provider if you would benefit from a yearly prostate cancer screening test.  Lung cancer screening: If you are a current or former smoker age 50 to 80, ask your care team if ongoing lung cancer screenings are right for you.  For informational purposes only. Not to replace the advice of your health care provider. Copyright   2023 [employer]. All rights reserved. Clinically reviewed by the [facility]. PPI 500024 - REV 01/24.

## 2024-03-19 NOTE — PROGRESS NOTES
9 units of lantus pen  Preventive Care Visit  [facility] FR[facility] access hospitalNBA Mae MD, Family Medicine  Mar 19, 2024      Assessment & Plan     Encounter for Medicare annual wellness exam    - Vitamin D, Cholecalciferol, 25 MCG (1000 UT) CAPS; Take 1 tablet by mouth daily    Class 3 severe obesity due to excess calories with serious comorbidity and body mass index (BMI) of 50.0 to 59.9 in adult (H)  Pt on GLP 1  Is seeing endocrine     Type 2 diabetes mellitus with stage 3a chronic kidney disease, with long-term current use of insulin (H)  Stable   Consider Jardiance as she has CKD  Discussed side effects of Jardiance   If any urinary issues, Itching to let us know  Drink lots of Fluids with this  She will let us know if her Insurance covers this  Watch for Hypoglycemia  If accuchecks less than 100 decrease Glipizide to 2.5 mg daily  Call your endocrine   - Adult Eye  Referral; Future  - Hemoglobin; Future  - blood glucose (ONETOUCH VERIO IQ) test strip; Use to test blood sugar 3 times daily or as directed.  - insulin pen needle (BD PEN NEEDLE CONOR 2ND GEN) 32G X 4 MM miscellaneous; Use 4 pen needles daily or as directed.  - OneTouch Delica Lancets 33G MISC; Inject 1 Device Subcutaneous 3 times daily (before meals)  - empagliflozin (JARDIANCE) 10 MG TABS tablet; Take 1 tablet (10 mg) by mouth daily  - Hemoglobin  - FOOT EXAM    Stage 3a chronic kidney disease (H)  As above     Hyperlipidemia LDL goal <70  Stable   - atorvastatin (LIPITOR) 10 MG tablet; Take 1 tablet (10 mg) by mouth daily  - Lipid panel reflex to direct LDL Fasting; Future  - Lipid panel reflex to direct LDL Fasting    Benign hypertension with CKD (chronic kidney disease) stage III (H)  Controlled     Gout, unspecified cause, unspecified chronicity, unspecified site  Pending labs   - allopurinol (ZYLOPRIM) 300 MG tablet; Take 1 tablet (300 mg) by mouth daily Needs follow up labs  - Uric acid; Future  - Uric  "acid    Psoriasis  Refilled   - triamcinolone (KENALOG) 0.1 % external cream; APPLY TO AFFECTED AREA TWICE A DAY  - desonide (DESOWEN) 0.05 % external cream; Apply topically 2 times daily  - triamcinolone (KENALOG) 0.1 % external cream; Apply topically 2 times daily    Hypothyroidism, unspecified type  Stable     Screen for colon cancer  Advised as she had colon Polyps   - Colonoscopy Screening  Referral; Future    Tubular adenoma of colon  As above   - Colonoscopy Screening  Referral; Future              BMI  Estimated body mass index is 57.79 kg/m  as calculated from the following:    Height as of this encounter: 1.625 m (5' 3.98\").    Weight as of this encounter: 152.6 kg (336 lb 6.4 oz).   Weight management plan: Discussed healthy diet and exercise guidelines sees Endocrine     Counseling  Appropriate preventive services were discussed with this patient, including applicable screening as appropriate for fall prevention, nutrition, physical activity, Tobacco-use cessation, weight loss and cognition.  Checklist reviewing preventive services available has been given to the patient.      Follow up 6 months    Verito Rojas is a 68 year old, presenting for the following:  Physical (Annual/Urgency yet taking dieretic )        3/19/2024    11:47 AM   Additional Questions   Roomed by titi Kunz as of today     PHQ-2 Total Score (Adult) - Positive if 3 or more points; Administer   PHQ-9 if positive 0    JORGE-7 Total Score 0    Fallen 2 or more times in the past year? No          Health Care Directive  Patient does not have a Health Care Directive or Living Will: Discussed advance care planning with patient; information given to patient to review.    HPI  Pt here for a physical  Pt sees Endocrine for Diabetes and is doing well  She is on GLP1   Also has CKD but not on jardiance due to Insurance  Hyperlipidemia Follow-Up    Are you regularly taking any medication or supplement to lower " your cholesterol?   Yes- statins  Are you having muscle aches or other side effects that you think could be caused by your cholesterol lowering medication?  No    Hypertension Follow-up    Do you check your blood pressure regularly outside of the clinic? No   Are you following a low salt diet? No  Are your blood pressures ever more than 140 on the top number (systolic) OR more   than 90 on the bottom number (diastolic), for example 140/90? No    Chronic Kidney Disease Follow-up    Do you take any over the counter pain medicine?: No          2/20/2024   General Health   How would you rate your overall physical health? Good   Feel stress (tense, anxious, or unable to sleep) Only a little         2/20/2024   Nutrition   Diet: Low salt    Low fat/cholesterol    Diabetic         2/20/2024   Exercise   Days per week of moderate/strenous exercise 2 days   Average minutes spent exercising at this level 10 min         2/20/2024   Social Factors   Frequency of gathering with friends or relatives More than three times a week   Worry food won't last until get money to buy more No   Food not last or not have enough money for food? No   Do you have housing?  Yes   Are you worried about losing your housing? No   Lack of transportation? No   Unable to get utilities (heat,electricity)? No         3/19/2024   Fall Risk   Fallen 2 or more times in the past year? No   Trouble with walking or balance? No          2/20/2024   Activities of Daily Living- Home Safety   Needs help with the following daily activites None of the above   Safety concerns in the home None of the above         2/20/2024   Dental   Dentist two times every year? Yes         2/20/2024   Hearing Screening   Hearing concerns? (!) TROUBLE UNDERSTANDING SOFT OR WHISPERED SPEECH.           2/20/2024   Urinary Incontinence Screening   Bothered by leaking urine in past 6 months Yes         2/20/2024   TB Screening   Were you born outside of the [place]? No         Today's PHQ-2  Score:       3/19/2024    11:47 AM   PHQ-2 ( 1999 [employer])   Q1: Little interest or pleasure in doing things 0   Q2: Feeling down, depressed or hopeless 0   PHQ-2 Score 0   Q1: Little interest or pleasure in doing things Not at all   Q2: Feeling down, depressed or hopeless Not at all   PHQ-2 Score 0           2/20/2024   Substance Use   Alcohol more than 3/day or more than 7/wk Not Applicable   Do you have a current opioid prescription? No   How severe/bad is pain from 1 to 10? 1/10   Do you use any other substances recreationally? No     Social History     Tobacco Use    Smoking status: Never    Smokeless tobacco: Never   Vaping Use    Vaping Use: Never used   Substance Use Topics    Alcohol use: Yes     Comment: Occ    Drug use: Never           12/19/2023   LAST FHS-7 RESULTS   1st degree relative breast or ovarian cancer No   Any relative bilateral breast cancer No   Any male have breast cancer No   Any ONE woman have BOTH breast AND ovarian cancer No   Any woman with breast cancer before 50yrs No   2 or more relatives with breast AND/OR ovarian cancer No   2 or more relatives with breast AND/OR bowel cancer No        Pt is UTD with her mammogram    ASCVD Risk   The ASCVD Risk score (Tarsha PARDO, et al., 2019) failed to calculate for the following reasons:    The valid total cholesterol range is 130 to 320 mg/dL            Reviewed and updated as needed this visit by Provider                    Past Medical History:   Diagnosis Date    Acquired hypothyroidism     History of blood transfusion     Hypertension     Obesity due to excess calories     Type 2 diabetes mellitus without complication, with long-term current use of insulin (H)      Past Surgical History:   Procedure Laterality Date    CHOLECYSTECTOMY      DAVINCI HYSTERECTOMY TOTAL, BILATERAL SALPINGO-OOPHORECTOMY, COMBINED Bilateral 7/6/2022    Procedure: ROBOTIC HYSTERECTOMY, BILATERAL SALPINGO OOPHORECTOMY, LYSIS OF ADHESIONS;  Surgeon: Scott  Jefferson Nation MD;  [place] [facility] [place]    HERNIA REPAIR  1990 s    INSERT INTRAUTERINE DEVICE N/A 7/6/2022    Procedure: REMOVAL OF INTRAUTERINE DEVICE;  Surgeon: Jefferson Chavez MD;  [place] [facility] [place]     OB History   No obstetric history on file.     Lab work is in process  Labs reviewed in EPIC  BP Readings from Last 3 Encounters:   03/19/24 128/81   06/13/23 131/82   05/04/23 130/83    Wt Readings from Last 3 Encounters:   03/19/24 (!) 152.6 kg (336 lb 6.4 oz)   06/13/23 148.5 kg (327 lb 6.4 oz)   05/04/23 149.7 kg (330 lb)                  Patient Active Problem List   Diagnosis    Diabetes mellitus, type 2 (H)    Morbid obesity (H)    Essential hypertension    Hypothyroidism    Hyperlipidemia LDL goal <70    Gout, unspecified cause, unspecified chronicity, unspecified site    Psoriasis    Gastroesophageal reflux disease    Chronic kidney disease, stage 3a (H)    Type 2 diabetes mellitus with stage 3a chronic kidney disease, with long-term current use of insulin (H)     Past Surgical History:   Procedure Laterality Date    CHOLECYSTECTOMY      DAVINCI HYSTERECTOMY TOTAL, BILATERAL SALPINGO-OOPHORECTOMY, COMBINED Bilateral 7/6/2022    Procedure: ROBOTIC HYSTERECTOMY, BILATERAL SALPINGO OOPHORECTOMY, LYSIS OF ADHESIONS;  Surgeon: Jefferson Chavez MD;  [place] [facility] [place]    HERNIA REPAIR  1990 s    INSERT INTRAUTERINE DEVICE N/A 7/6/2022    Procedure: REMOVAL OF INTRAUTERINE DEVICE;  Surgeon: Jefferson Chavez MD;  [place] [facility] [place]       Social History     Tobacco Use    Smoking status: Never    Smokeless tobacco: Never   Substance Use Topics    Alcohol use: Yes     Comment: Occ     Family History   Problem Relation Age of Onset    Diabetes Mother         d age 80    Hypertension Mother     Coronary Artery Disease Father         d age 85    Brain Tumor Father     Hypertension Father     Diabetes Brother          Current Outpatient Medications    Medication Sig Dispense Refill    Alcohol Swabs ([facility] PREP) 70 % PADS Externally apply 1 each topically as needed 100 each 11    allopurinol (ZYLOPRIM) 300 MG tablet Take 1 tablet (300 mg) by mouth daily Needs follow up labs 90 tablet 3    aspirin (ASA) 325 MG EC tablet Take 325 mg by mouth daily      atorvastatin (LIPITOR) 10 MG tablet Take 1 tablet (10 mg) by mouth daily 90 tablet 0    blood glucose (ONETOUCH VERIO IQ) test strip Use to test blood sugar 3 times daily or as directed. 300 strip 3    blood glucose monitoring (NO BRAND SPECIFIED) meter device kit Use to test blood sugar 3 times daily or as directed. 1 kit 0    blood glucose monitoring (NO BRAND SPECIFIED) meter device kit Use to test blood sugar 4 times daily or as directed. 1 kit 0    desonide (DESOWEN) 0.05 % external cream Apply topically 2 times daily 60 g 0    empagliflozin (JARDIANCE) 10 MG TABS tablet Take 1 tablet (10 mg) by mouth daily 30 tablet 2    fluticasone (FLONASE) 50 MCG/ACT nasal spray Spray 2 sprays into both nostrils daily 9 mL 4    furosemide (LASIX) 40 MG tablet Take 1 tablet (40 mg) by mouth daily as needed (swelling) 90 tablet 3    glipiZIDE (GLUCOTROL XL) 5 MG 24 hr tablet TAKE 1 TABLET BY MOUTH EVERY DAY 90 tablet 1    Glucosamine-Chondroit-Vit C-Mn (GLUCOSAMINE 1500 COMPLEX PO) daily      hydrOXYzine (ATARAX) 10 MG tablet TAKE 1 TABLET BY MOUTH AT BEDTIME 25 tablet 0    insulin glargine (LANTUS PEN) 100 UNIT/ML pen Inject 15 Units Subcutaneous every morning (before breakfast) (allow max dose 75 units/day for ongoing titration & needle prime)  3    Insulin Lispro (HUMALOG KWIKPEN SC) Siding scale starting at 140      insulin pen needle (BD PEN NEEDLE CONOR 2ND GEN) 32G X 4 MM miscellaneous Use 4 pen needles daily or as directed. 200 each 3    levothyroxine (SYNTHROID/LEVOTHROID) 100 MCG tablet TAKE 1 TABLET BY MOUTH EVERY DAY 90 tablet 0    losartan (COZAAR) 50 MG tablet TAKE 1 TABLET BY MOUTH EVERY DAY 90 tablet 3     metFORMIN (GLUCOPHAGE XR) 500 MG 24 hr tablet TAKE 2 TABLETS BY MOUTH TWICE A DAY WITH MEALS 360 tablet 0    multivitamin, therapeutic (THERA-VIT) TABS tablet Take 1 tablet by mouth daily      OneTouch Delica Lancets 33G MISC Inject 1 Device Subcutaneous 3 times daily (before meals) 300 each 3    thin (NO BRAND SPECIFIED) lancets Use with lanceting device. 200 each 1    triamcinolone (KENALOG) 0.1 % external cream APPLY TO AFFECTED AREA TWICE A  g 3    triamcinolone (KENALOG) 0.1 % external cream Apply topically 2 times daily 60 g 0    triamterene-HCTZ (DYAZIDE) 37.5-25 MG capsule TAKE 2 CAPSULES BY MOUTH EVERY MORNING NEEDS FOLLOW UP APPOINTMENT AS BLOOD PRESSURE IS HIGH STRENGTH: 37.5-25  capsule 0    Vitamin D, Cholecalciferol, 25 MCG (1000 UT) CAPS Take 1 tablet by mouth daily 90 capsule 3    blood glucose calibration (NO BRAND SPECIFIED) solution Use to calibrate blood glucose monitor as needed as directed. 1 Bottle 3    clobetasol (TEMOVATE) 0.05 % external ointment APPLY TO AFFECTED AREA TWICE A DAY 45 g 0    Continuous Blood Gluc Sensor (DEXCOM G6 SENSOR) MISC 1 each every 10 days Change every 10 days. (Patient not taking: Reported on 2/5/2024) 3 each 5    Continuous Blood Gluc Transmit (DEXCOM G6 TRANSMITTER) MISC 1 each every 3 months Change every 3 months. (Patient not taking: Reported on 2/5/2024) 1 each 1    Dulaglutide (TRULICITY) 4.5 MG/0.5ML SOPN Inject 4.5 mg Subcutaneous once a week (Patient not taking: Reported on 5/4/2023) 6 mL 3     No Known Allergies  Recent Labs   Lab Test 01/29/24  1048 08/08/23  1129 06/13/23  1238 05/04/23  1553 04/03/23  1127 04/03/23  1127 12/22/22  1104 06/15/22  1648 06/08/22  1149 10/14/21  1202 07/30/21  1236 04/23/21  1417 01/07/21  1040 09/14/20  1013   A1C 7.2* 6.7*  --   --   --  7.3* 7.2*   < > 7.2*   < > 6.5*   < > 6.2* 7.0*   LDL  --   --  49  --   --   --   --   --  39  --  72  --   --   --    HDL  --   --  44*  --   --   --   --   --  49*  --   45*  --   --   --    TRIG  --   --  94  --   --   --   --   --  93  --  118  --   --   --    ALT  --  29  --  43*  --  53*  --   --   --   --  39   < >  --   --    CR 1.04* 1.00*  --  0.94  --  0.90 1.02  --   --    < > 0.97  --  0.88 1.03   GFRESTIMATED 59* 61  --  67  --  70 60*  --   --    < > 61  --  69 57*   GFRESTBLACK  --   --   --   --   --   --   --   --   --   --   --   --  80 66   POTASSIUM 4.2 3.9  --  4.1   < > 3.6 3.4   < >  --    < > 3.8  --  3.7 3.6   TSH 1.91  --   --   --   --   --  1.78  --   --    < >  --   --   --   --     < > = values in this interval not displayed.      Current providers sharing in care for this patient include:  Patient Care Team:  Juani Mae MD as PCP - General (Family Medicine)  Juani Mae MD as Assigned PCP  Dennise Moon RD as Diabetes Educator (Dietitian, Registered)  Eladio Araujo MD as Assigned Endocrinology Provider  Karen Pop RD as Diabetes Educator (Dietitian)    The following health maintenance items are reviewed in Epic and correct as of today:  Health Maintenance   Topic Date Due    ANNUAL REVIEW OF HM ORDERS  12/22/2023    EYE EXAM  12/28/2023    HEMOGLOBIN  05/04/2024    A1C  04/29/2024    LIPID  06/13/2024    DIABETIC FOOT EXAM  06/13/2024    BMP  01/29/2025    MICROALBUMIN  01/29/2025    TSH W/FREE T4 REFLEX  01/29/2025    MEDICARE ANNUAL WELLNESS VISIT  03/19/2025    FALL RISK ASSESSMENT  03/19/2025    MAMMO SCREENING  12/19/2025    ADVANCE CARE PLANNING  12/22/2027    COLORECTAL CANCER SCREENING  12/18/2028    DTAP/TDAP/TD IMMUNIZATION (2 - Td or Tdap) 06/13/2029    DEXA  12/30/2037    HEPATITIS C SCREENING  Completed    PHQ-2 (once per calendar year)  Completed    PAP FOLLOW-UP  Completed    HPV FOLLOW-UP  Completed    INFLUENZA VACCINE  Completed    Pneumococcal Vaccine: 65+ Years  Completed    URINALYSIS  Completed    ZOSTER IMMUNIZATION  Completed    RSV VACCINE (Pregnancy & 60+)  Completed    COVID-19 Vaccine  Completed     "IPV IMMUNIZATION  Aged Out    HPV IMMUNIZATION  Aged Out    MENINGITIS IMMUNIZATION  Aged Out    RSV MONOCLONAL ANTIBODY  Aged Out    PAP  Discontinued         Review of Systems  CONSTITUTIONAL: NEGATIVE for fever, chills, change in weight  INTEGUMENTARY/SKIN: NEGATIVE for worrisome rashes, moles or lesions  EYES: NEGATIVE for vision changes or irritation  ENT/MOUTH: NEGATIVE for ear, mouth and throat problems  RESP: NEGATIVE for significant cough or SOB  BREAST: NEGATIVE for masses, tenderness or discharge  CV: NEGATIVE for chest pain, palpitations or peripheral edema  GI: NEGATIVE for nausea, abdominal pain, heartburn, or change in bowel habits  : NEGATIVE for frequency, dysuria, or hematuria  MUSCULOSKELETAL: NEGATIVE for significant arthralgias or myalgia  NEURO: NEGATIVE for weakness, dizziness or paresthesias  ENDOCRINE: NEGATIVE for temperature intolerance, skin/hair changes  HEME: NEGATIVE for bleeding problems  PSYCHIATRIC: NEGATIVE for changes in mood or affect     Objective    Exam  /81   Pulse 80   Temp 97  F (36.1  C) (Temporal)   Resp 18   Ht 1.625 m (5' 3.98\")   Wt (!) 152.6 kg (336 lb 6.4 oz)   SpO2 96%   BMI 57.79 kg/m     Estimated body mass index is 57.79 kg/m  as calculated from the following:    Height as of this encounter: 1.625 m (5' 3.98\").    Weight as of this encounter: 152.6 kg (336 lb 6.4 oz).    Physical Exam  GENERAL: alert and no distress  GENERAL: alert, no distress, and obese  EYES: Eyes grossly normal to inspection, PERRL and conjunctivae and sclerae normal  HENT: ear canals and TM's normal, nose and mouth without ulcers or lesions  NECK: no adenopathy, no asymmetry, masses, or scars  RESP: lungs clear to auscultation - no rales, rhonchi or wheezes  BREAST: normal without masses, tenderness or nipple discharge and no palpable axillary masses or adenopathy  CV: regular rate and rhythm, normal S1 S2, no S3 or S4, no murmur, click or rub, no peripheral edema  ABDOMEN: " soft, nontender, no hepatosplenomegaly, no masses and bowel sounds normal  MS: no gross musculoskeletal defects noted, no edema  SKIN: no suspicious lesions or rashes  NEURO: Normal strength and tone, mentation intact and speech normal  PSYCH: mentation appears normal, affect normal/bright  Diabetic foot exam: normal DP and PT pulses, no trophic changes or ulcerative lesions, and normal sensory exam        3/19/2024   Mini Cog   Clock Draw Score 2 Normal   3 Item Recall 3 objects recalled   Mini Cog Total Score 5         Vision Screen  Reason Vision Screen Not Completed: Other (has upcoming vision screening with specialist 3/29/24)    Time spent reviewing chart, addressing her medical Problems as above, ordering labs, refilling meds and discussing her medical Problems, , documenting-Labs will be reviewed when back and will make further recommendations based on her labs 40 minutes     Signed Electronically by: Juani Mae MD

## 2024-03-20 ENCOUNTER — PATIENT OUTREACH (OUTPATIENT)
Dept: GASTROENTEROLOGY | Facility: CLINIC | Age: 69
End: 2024-03-20
Payer: COMMERCIAL

## 2024-03-20 LAB
CHOLEST SERPL-MCNC: 134 MG/DL
FASTING STATUS PATIENT QL REPORTED: YES
HDLC SERPL-MCNC: 45 MG/DL
LDLC SERPL CALC-MCNC: 63 MG/DL
NONHDLC SERPL-MCNC: 89 MG/DL
TRIGL SERPL-MCNC: 132 MG/DL
URATE SERPL-MCNC: 6.4 MG/DL (ref 2.4–5.7)

## 2024-03-21 ENCOUNTER — TELEPHONE (OUTPATIENT)
Dept: FAMILY MEDICINE | Facility: CLINIC | Age: 69
End: 2024-03-21

## 2024-03-21 RX ORDER — TRIAMTERENE AND HYDROCHLOROTHIAZIDE 37.5; 25 MG/1; MG/1
CAPSULE ORAL
Qty: 1 CAPSULE | Refills: 0 | Status: SHIPPED | OUTPATIENT
Start: 2024-03-21 | End: 2024-03-21

## 2024-03-21 RX ORDER — LOSARTAN POTASSIUM 100 MG/1
100 TABLET ORAL DAILY
Qty: 30 TABLET | Refills: 1 | Status: SHIPPED | OUTPATIENT
Start: 2024-03-21 | End: 2024-05-07

## 2024-03-21 NOTE — TELEPHONE ENCOUNTER
Called patient and left a voicemail to return our call to the clinic.       Betsy Pena RN on 3/21/2024 at 5:42 PM

## 2024-03-21 NOTE — TELEPHONE ENCOUNTER
----- Message from Juani Mae MD sent at 3/21/2024  4:12 PM CDT -----  Please call pt   Uric acid is High-could be due to hydrochlorothiazide or lasix  Recommend stop Dyazide  Increase Losrtan to 100 mg daily  Follow up labs 6 weeks and BP check 1 month  Other labs are stable

## 2024-03-22 NOTE — TELEPHONE ENCOUNTER
2nd attempt. Called patient. Left voice message to return call at [phone]. [email or web address] message sent to patient.    Candelaria Ferrer RN

## 2024-03-22 NOTE — TELEPHONE ENCOUNTER
"Patient returned call; gave PCP message:    \"Please call pt   Uric acid is High-could be due to hydrochlorothiazide or lasix  Recommend stop Dyazide  Increase Losrtan to 100 mg daily  Follow up labs 6 weeks and BP check 1 month  Other labs are stable\"    Patient verbalized understanding.     Patient also requesting to have albumin lab drawn prior to starting jardiance - cued if ok    Amita Adam RN  [facility]        "

## 2024-04-17 ENCOUNTER — TRANSFERRED RECORDS (OUTPATIENT)
Dept: HEALTH INFORMATION MANAGEMENT | Facility: CLINIC | Age: 69
End: 2024-04-17

## 2024-04-24 DIAGNOSIS — E03.9 HYPOTHYROIDISM, UNSPECIFIED TYPE: ICD-10-CM

## 2024-04-24 DIAGNOSIS — I12.9 BENIGN HYPERTENSION WITH CKD (CHRONIC KIDNEY DISEASE) STAGE III (H): ICD-10-CM

## 2024-04-24 DIAGNOSIS — N18.30 BENIGN HYPERTENSION WITH CKD (CHRONIC KIDNEY DISEASE) STAGE III (H): ICD-10-CM

## 2024-04-24 RX ORDER — LOSARTAN POTASSIUM 100 MG/1
100 TABLET ORAL DAILY
Qty: 30 TABLET | Refills: 1 | OUTPATIENT
Start: 2024-04-24

## 2024-04-24 RX ORDER — LEVOTHYROXINE SODIUM 100 UG/1
100 TABLET ORAL DAILY
Qty: 90 TABLET | Refills: 2 | Status: SHIPPED | OUTPATIENT
Start: 2024-04-24

## 2024-05-03 ENCOUNTER — TELEPHONE (OUTPATIENT)
Dept: FAMILY MEDICINE | Facility: CLINIC | Age: 69
End: 2024-05-03

## 2024-05-03 ENCOUNTER — LAB (OUTPATIENT)
Dept: LAB | Facility: CLINIC | Age: 69
End: 2024-05-03
Payer: COMMERCIAL

## 2024-05-03 DIAGNOSIS — N18.31 STAGE 3A CHRONIC KIDNEY DISEASE (H): ICD-10-CM

## 2024-05-03 DIAGNOSIS — E79.0 HYPERURICEMIA: ICD-10-CM

## 2024-05-03 DIAGNOSIS — Z79.4 TYPE 2 DIABETES MELLITUS WITHOUT COMPLICATION, WITH LONG-TERM CURRENT USE OF INSULIN (H): ICD-10-CM

## 2024-05-03 DIAGNOSIS — E11.9 TYPE 2 DIABETES MELLITUS WITHOUT COMPLICATION, WITH LONG-TERM CURRENT USE OF INSULIN (H): ICD-10-CM

## 2024-05-03 LAB
HBA1C MFR BLD: 6.4 % (ref 0–5.6)
HOLD SPECIMEN: NORMAL

## 2024-05-03 PROCEDURE — 36415 COLL VENOUS BLD VENIPUNCTURE: CPT

## 2024-05-03 PROCEDURE — 84460 ALANINE AMINO (ALT) (SGPT): CPT

## 2024-05-03 PROCEDURE — 83036 HEMOGLOBIN GLYCOSYLATED A1C: CPT

## 2024-05-03 PROCEDURE — 84550 ASSAY OF BLOOD/URIC ACID: CPT

## 2024-05-03 PROCEDURE — 80048 BASIC METABOLIC PNL TOTAL CA: CPT

## 2024-05-03 NOTE — TELEPHONE ENCOUNTER
"Pt calling asking for albumiun lab to be placed for 5/3/24 lab appointment.     Pt also requesting to restart Dyazoide again and requesting pcp's advice as pt has Increased anxiety and reports \"too much fluid\" from sidrnicolee, increased fluid in legs.     PCP's Result note:  Uric acid is High-could be due to hydrochlorothiazide or lasix  Recommend stop Dyazide  Increase Losrtan to 100 mg daily    Pt is wanting to start 50mg losartan and 2 tablets of dyazide 37.5-25 MG.     Recent BP: 163/87  Base line SBP: 120's     Routing to provider to advise.     Virginie Szymanski, RN on 5/3/2024 at 12:58 PM    "

## 2024-05-03 NOTE — TELEPHONE ENCOUNTER
Please call patient  She is not due for a urine albumin test   Would need a clinic visit to discuss meds and edema   Please schedule      Marino Hyman RN  [facility]

## 2024-05-04 LAB
ALT SERPL W P-5'-P-CCNC: 32 U/L (ref 0–50)
ANION GAP SERPL CALCULATED.3IONS-SCNC: 14 MMOL/L (ref 7–15)
BUN SERPL-MCNC: 11.8 MG/DL (ref 8–23)
CALCIUM SERPL-MCNC: 10.2 MG/DL (ref 8.8–10.2)
CHLORIDE SERPL-SCNC: 104 MMOL/L (ref 98–107)
CREAT SERPL-MCNC: 0.87 MG/DL (ref 0.51–0.95)
DEPRECATED HCO3 PLAS-SCNC: 24 MMOL/L (ref 22–29)
EGFRCR SERPLBLD CKD-EPI 2021: 73 ML/MIN/1.73M2
GLUCOSE SERPL-MCNC: 111 MG/DL (ref 70–99)
POTASSIUM SERPL-SCNC: 4 MMOL/L (ref 3.4–5.3)
SODIUM SERPL-SCNC: 142 MMOL/L (ref 135–145)

## 2024-05-06 ENCOUNTER — DOCUMENTATION ONLY (OUTPATIENT)
Dept: FAMILY MEDICINE | Facility: CLINIC | Age: 69
End: 2024-05-06
Payer: COMMERCIAL

## 2024-05-06 DIAGNOSIS — E79.0 HYPERURICEMIA: Primary | ICD-10-CM

## 2024-05-06 LAB — URATE SERPL-MCNC: 3.6 MG/DL (ref 2.4–5.7)

## 2024-05-06 NOTE — TELEPHONE ENCOUNTER
Called patient declines to schedule a visit at this time She will call back if she changes her mind.Ciara Rodarte MA

## 2024-05-07 DIAGNOSIS — N18.30 BENIGN HYPERTENSION WITH CKD (CHRONIC KIDNEY DISEASE) STAGE III (H): ICD-10-CM

## 2024-05-07 DIAGNOSIS — I12.9 BENIGN HYPERTENSION WITH CKD (CHRONIC KIDNEY DISEASE) STAGE III (H): ICD-10-CM

## 2024-05-07 RX ORDER — LOSARTAN POTASSIUM 100 MG/1
100 TABLET ORAL DAILY
Qty: 30 TABLET | Refills: 1 | Status: SHIPPED | OUTPATIENT
Start: 2024-05-07 | End: 2024-06-24

## 2024-05-21 ENCOUNTER — VIRTUAL VISIT (OUTPATIENT)
Dept: ENDOCRINOLOGY | Facility: CLINIC | Age: 69
End: 2024-05-21
Payer: COMMERCIAL

## 2024-05-21 DIAGNOSIS — Z79.4 TYPE 2 DIABETES MELLITUS WITHOUT COMPLICATION, WITH LONG-TERM CURRENT USE OF INSULIN (H): Primary | ICD-10-CM

## 2024-05-21 DIAGNOSIS — E66.01 MORBID OBESITY (H): ICD-10-CM

## 2024-05-21 DIAGNOSIS — E11.9 TYPE 2 DIABETES MELLITUS WITHOUT COMPLICATION, WITH LONG-TERM CURRENT USE OF INSULIN (H): Primary | ICD-10-CM

## 2024-05-21 PROCEDURE — G2211 COMPLEX E/M VISIT ADD ON: HCPCS | Mod: 95 | Performed by: INTERNAL MEDICINE

## 2024-05-21 PROCEDURE — 99213 OFFICE O/P EST LOW 20 MIN: CPT | Mod: 95 | Performed by: INTERNAL MEDICINE

## 2024-05-21 NOTE — PROGRESS NOTES
Virtual Visit Details    Type of service:  Video Visit   Video Start Time: 1:24 PM  Video End Time: 1:40 PM    Originating Location (pt. Location): Home  Distant Location (provider location):  Off-site  Platform used for Video Visit: Javier        Recent issues:  Diabetes follow-up evaluation  Reports BP med change per PCP, stopped diuretic but wt (fluid) gain, then diuretic restarted... also Jardiance restarted  Participating with the [employer] patient assistance program, gets Trulicity, Basaglar and Humalog pens  Feeling well overall, no new health issues reported        History of pre-diabetes  ~2016. Initial diagnosis of diabetes mellitus  Had seen physician in [place], [place]  Started treatment with metformin   ~6/2020. Acute back pain?, diagnosed with pneumonia, high glucose and hgbA1c 12%   Hospital treatment included insulin treatment   Treatment with Abx, pain resolved   Recalls basal insulin 55U and use of rapid acting Humalog     7/15/20 Endocrinology evaluation with Dr. MERON Armijo/[facility] clinic  Addition of Farxiga but expensive, then change to Jardiance medication  Fall '21. Discontinue Jardiance since expensive   Previous [facility] hgbA1c trends include:     Lab Test 06/08/22  1149 03/23/22  1641 03/01/22  0951 12/06/21  1132 07/30/21  1236   A1C 7.2* 6.6* 6.7* 6.9* 6.5*         6/21/22. Initial diabetes evaluation with me at [facility]  Reviewed health history and diabetes issues  [facility] Diab Ed eval and diagnostic Hi  Added glipizideER medication  3/2023. Ran out of Trulicity (4.5 mg) medication  ~ 5/2023. Restarted Trulicity med per plan    Current DM medications:  Metformin 500 mg  2-tabs in morning and evening  Basaglar Kwikpen 9U  Subcutaneous in morning  glipizideER 5 mg  1-tab daily  Trulicity 4.5 mg   Subcutaneous weekly  Humalog Kwkpen sscale (uses approx 2x/day often morning vs suppertime)   140-179  2U   180-219  4U   220-259  6U   260-299  8U   >300   10U  Jardiance 10 mg  1-tab in  morning      Blood glucose (BG) meter:  One Touch Verio   Tests 2-3/day   Recent trends 140 in morning, 120-130's in afternoon  Fam Hx Diabetes:  brother (T1DM), cousins  Previous [facility] hgbA1c trends include:  Lab Results   Component Value Date    A1C 6.4 (H) 05/03/2024    A1C 7.2 (H) 01/29/2024    A1C 6.7 (H) 08/08/2023    A1C 7.3 (H) 04/03/2023    A1C 7.2 (H) 12/22/2022      Recent [facility] labs include:  Lab Results   Component Value Date    A1C 6.4 (H) 05/03/2024     05/03/2024    POTASSIUM 4.0 05/03/2024    CHLORIDE 104 05/03/2024    CO2 24 05/03/2024    ANIONGAP 14 05/03/2024     (H) 05/03/2024    BUN 11.8 05/03/2024    CR 0.87 05/03/2024    GFRESTIMATED 73 05/03/2024    GFRESTBLACK 80 01/07/2021    TRISTIAN 10.2 05/03/2024    CPEPT 9.8 (H) 07/31/2020    CHOL 134 03/19/2024    TRIG 132 03/19/2024    HDL 45 (L) 03/19/2024    LDL 63 03/19/2024    NHDL 89 03/19/2024    UCRR 171.0 01/29/2024    MICROL <12.0 01/29/2024    UMALCR  01/29/2024      Comment:      Unable to calculate, urine albumin and/or urine creatinine is outside detectable limits.  Microalbuminuria is defined as an albumin:creatinine ratio of 17 to 299 for males and 25 to 299 for females. A ratio of albumin:creatinine of 300 or higher is indicative of overt proteinuria.  Due to biologic variability, positive results should be confirmed by a second, first-morning random or 24-hour timed urine specimen. If there is discrepancy, a third specimen is recommended. When 2 out of 3 results are in the microalbuminuria range, this is evidence for incipient nephropathy and warrants increased efforts at glucose control, blood pressure control, and institution of therapy with an angiotensin-converting-enzyme (ACE) inhibitor (if the patient can tolerate it).      TSH 1.91 01/29/2024     Last eye exam 2023, no DR per patient  DM Complications:  None known      Lives in [facility]  Sees Dr. Juani Mae/[facility] for general medicine  evaluations.    PMH/PSH:  Past Medical History:   Diagnosis Date    Acquired hypothyroidism     History of blood transfusion     Hypertension     Obesity due to excess calories     Type 2 diabetes mellitus without complication, with long-term current use of insulin (H)      Past Surgical History:   Procedure Laterality Date    CHOLECYSTECTOMY      DAVINCI HYSTERECTOMY TOTAL, BILATERAL SALPINGO-OOPHORECTOMY, COMBINED Bilateral 7/6/2022    Procedure: ROBOTIC HYSTERECTOMY, BILATERAL SALPINGO OOPHORECTOMY, LYSIS OF ADHESIONS;  Surgeon: Jefferson Chavez MD;  Location: [facility] OR    HERNIA REPAIR  1990 s    INSERT INTRAUTERINE DEVICE N/A 7/6/2022    Procedure: REMOVAL OF INTRAUTERINE DEVICE;  Surgeon: Jefferson Chavez MD;  Location: [facility] OR       Family Hx:  Family History   Problem Relation Age of Onset    Diabetes Mother         d age 80    Hypertension Mother     Coronary Artery Disease Father         d age 85    Brain Tumor Father     Hypertension Father     Diabetes Brother          Social Hx:  Social History     Socioeconomic History    Marital status:      Spouse name: Not on file    Number of children: Not on file    Years of education: Not on file    Highest education level: Not on file   Occupational History    Not on file   Tobacco Use    Smoking status: Never    Smokeless tobacco: Never   Vaping Use    Vaping status: Never Used   Substance and Sexual Activity    Alcohol use: Yes     Comment: Occ    Drug use: Never    Sexual activity: Not Currently   Other Topics Concern    Parent/sibling w/ CABG, MI or angioplasty before 65F 55M? No   Social History Narrative    Not on file     Social Determinants of Health     Financial Resource Strain: Low Risk  (2/20/2024)    Financial Resource Strain     Within the past 12 months, have you or your family members you live with been unable to get utilities (heat, electricity) when it was really needed?: No   Food Insecurity: Low Risk   (2/20/2024)    Food Insecurity     Within the past 12 months, did you worry that your food would run out before you got money to buy more?: No     Within the past 12 months, did the food you bought just not last and you didn t have money to get more?: No   Transportation Needs: Low Risk  (2/20/2024)    Transportation Needs     Within the past 12 months, has lack of transportation kept you from medical appointments, getting your medicines, non-medical meetings or appointments, work, or from getting things that you need?: No   Physical Activity: Insufficiently Active (2/20/2024)    Exercise Vital Sign     Days of Exercise per Week: 2 days     Minutes of Exercise per Session: 10 min   Stress: No Stress Concern Present (2/20/2024)    [employer] - Occupational Stress Questionnaire     Feeling of Stress : Only a little   Social Connections: Unknown (2/20/2024)    Social Connection and Isolation Panel [NHANES]     Frequency of Communication with Friends and Family: Not on file     Frequency of Social Gatherings with Friends and Family: More than three times a week     Attends Synagogue Services: Not on file     Active Member of Clubs or Organizations: Not on file     Attends Club or Organization Meetings: Not on file     Marital Status: Not on file   Interpersonal Safety: Low Risk  (3/19/2024)    Interpersonal Safety     Do you feel physically and emotionally safe where you currently live?: Yes     Within the past 12 months, have you been hit, slapped, kicked or otherwise physically hurt by someone?: No     Within the past 12 months, have you been humiliated or emotionally abused in other ways by your partner or ex-partner?: No   Housing Stability: Low Risk  (2/20/2024)    Housing Stability     Do you have housing? : Yes     Are you worried about losing your housing?: No          MEDICATIONS:  has a current medication list which includes the following prescription(s): allopurinol, aspirin,  atorvastatin, clobetasol, desonide, trulicity, empagliflozin, fluticasone, furosemide, glipizide, hydrochlorothiazide, hydroxyzine hcl, insulin glargine, insulin lispro, levothyroxine, losartan, metformin, multivitamin, therapeutic, triamcinolone, vitamin d (cholecalciferol), cvs prep, onetouch verio iq, blood glucose calibration, blood glucose monitoring, dexcom g6 sensor, dexcom g6 transmitter, glucosamine-chondroit-vit c-mn, bd pen needle fadi 2nd gen, onetouch delica lancets 33g, and triamcinolone.    ROS:     ROS: 10 point ROS neg other than the symptoms noted above in the HPI.    GENERAL: some fatigue, some wt loss after diuretic added; denies fevers, chills, night sweats.   HEENT: no dysphagia, odonophagia, diplopia, neck pain  THYROID:  no apparent hyper or hypothyroid symptoms  CV: no chest pain, pressure, palpitations  LUNGS: no SOB, NICHOLS, cough, wheezing   ABDOMEN: some indigestion; no diarrhea, constipation, abdominal pain  EXTREMITIES: no rashes, ulcers, edema  NEUROLOGY: no headaches, denies changes in vision, tingling, extremitiy numbness   MSK: no muscle aches or pains, weakness  SKIN: no rashes or lesions  : no menses  PSYCH:  stable mood, no significant anxiety or depression  ENDOCRINE: no heat or cold intolerance    Physical Exam (visual exam)  VS:  no vital signs taken for video visit  CONSTITUTIONAL: healthy, alert and NAD, well dressed, answering questions appropriately  ENT: no nose swelling or nasal discharge, mouth redness or gum changes.  EYES: eyes grossly normal to inspection, conjunctivae and sclerae normal, no exophthalmos or proptosis  THYROID:  no apparent nodules or goiter  LUNGS: no audible wheeze, cough or visible cyanosis, no visible retractions or increased work of breathing  ABDOMEN: abdomen not evaluated  EXTREMITIES: no hand tremors, limited exam  NEUROLOGY: CN grossly intact, mentation intact and speech normal   SKIN:  no apparent skin lesions, rash, or edema with visualized  skin appearance  PSYCH: mentation appears normal, affect normal/bright, judgement and insight intact,   normal speech and appearance well groomed    LABS:    All pertinent notes, labs, and images personally reviewed by me.     A/P:  Encounter Diagnoses   Name Primary?    Type 2 diabetes mellitus without complication, with long-term current use of insulin (H) Yes    Morbid obesity (H)        Comments:  Reviewed health history and diabetes issues.  Addition of Jardiance reasonable, yet previously cost-prohibitive for patient  Reviewed and interpreted tests that I previously ordered.   Ordered appropriate tests for the endocrinology disease management.    Management options discussed and implemented after shared medical decision making with the patient.  T2DM problem is chronic-stable    Plan:  Discussed general issues with the diabetes diagnosis and management  We discussed the hgbA1c test which reflects previous overall glucose levels or control  Discussed the importance of blood glucose (BG) testing to assess glucose trends  Provided general overview of the diabetes medication options and medication treatment plan.    Recommend:  Continue the current T2DM medications:  Metformin 500 mg  2-tabs in morning and evening  glipizideER 5 mg  1-tab daily  Trulicity 4.5 mg   Subcutaneous weekly  Humalog Gia fuller (uses approx 2x/day often morning vs suppertime)   140-179  2U   180-219  4U   220-259  6U   260-299  8U   >300   10U  Jardiance 10 mg  1-tab in morning    Reasonable to continue the Jardiance medication, per PCP plan  Treatment goal to continue weight loss, minimize and try to discontinue mealtime insulin  She has previously gotten Basaglar, Humalog, and Trulicity meds from [facility], but no [facility] option with that program  Monitor for possible GI side effects  Goal target premeal glucose 80- 150 mg/dl  We have reviewed option of acquiring Personal [employer] Hi CGM again... she's not interested   Would be  helpful for patient to have follow-up [facility] Diab Ed evaluation  Keep focus on diet, exercise, weight management.  Repeat pre-appt lab testing in mid 9/2024   Testing at [facility]   Lab orders placed  Continue losartan, atorvastatin, levothyroxine med use, per PCP  Advise having fasting lipid panel testing and dilated eye examination, at least annually    Addressed patient questions today    The longitudinal plan of care for the endocrine problem(s) were addressed during this visit.  Due to added complexity of care,   we will continue to support the patient and the subsequent management of this condition with ongoing continuity of care.    There are no Patient Instructions on file for this visit.    Future labs ordered today:   Orders Placed This Encounter   Procedures    Hemoglobin A1c    Basic metabolic panel     Radiology/Consults ordered today: None    Total time spent on day of encounter:  20 min    Follow-up:  10/10/24 at 12p, Return    JONELLE Araujo MD, MS  Endocrinology  [facility]    CC:  VALORIE Mae

## 2024-06-07 DIAGNOSIS — E11.9 TYPE 2 DIABETES MELLITUS WITHOUT COMPLICATION, WITH LONG-TERM CURRENT USE OF INSULIN (H): ICD-10-CM

## 2024-06-07 DIAGNOSIS — Z79.4 TYPE 2 DIABETES MELLITUS WITHOUT COMPLICATION, WITH LONG-TERM CURRENT USE OF INSULIN (H): ICD-10-CM

## 2024-06-07 RX ORDER — INSULIN GLARGINE 100 [IU]/ML
9-10 INJECTION, SOLUTION SUBCUTANEOUS DAILY
Qty: 9 ML | Refills: 3 | Status: SHIPPED | OUTPATIENT
Start: 2024-06-07

## 2024-06-07 RX ORDER — DULAGLUTIDE 4.5 MG/.5ML
4.5 INJECTION, SOLUTION SUBCUTANEOUS WEEKLY
Qty: 6 ML | Refills: 3 | Status: SHIPPED | OUTPATIENT
Start: 2024-06-07

## 2024-06-22 ENCOUNTER — MYC MEDICAL ADVICE (OUTPATIENT)
Dept: FAMILY MEDICINE | Facility: CLINIC | Age: 69
End: 2024-06-22
Payer: COMMERCIAL

## 2024-06-22 DIAGNOSIS — I10 ESSENTIAL HYPERTENSION: Primary | ICD-10-CM

## 2024-06-22 DIAGNOSIS — N18.31 TYPE 2 DIABETES MELLITUS WITH STAGE 3A CHRONIC KIDNEY DISEASE, WITH LONG-TERM CURRENT USE OF INSULIN (H): ICD-10-CM

## 2024-06-22 DIAGNOSIS — Z79.4 TYPE 2 DIABETES MELLITUS WITH STAGE 3A CHRONIC KIDNEY DISEASE, WITH LONG-TERM CURRENT USE OF INSULIN (H): ICD-10-CM

## 2024-06-22 DIAGNOSIS — E78.5 HYPERLIPIDEMIA LDL GOAL <70: ICD-10-CM

## 2024-06-22 DIAGNOSIS — E11.00 TYPE 2 DIABETES MELLITUS WITH HYPEROSMOLARITY WITHOUT COMA, UNSPECIFIED WHETHER LONG TERM INSULIN USE (H): ICD-10-CM

## 2024-06-22 DIAGNOSIS — E11.22 TYPE 2 DIABETES MELLITUS WITH STAGE 3A CHRONIC KIDNEY DISEASE, WITH LONG-TERM CURRENT USE OF INSULIN (H): ICD-10-CM

## 2024-06-22 DIAGNOSIS — L40.9 PSORIASIS: ICD-10-CM

## 2024-06-24 DIAGNOSIS — I12.9 BENIGN HYPERTENSION WITH CKD (CHRONIC KIDNEY DISEASE) STAGE III (H): ICD-10-CM

## 2024-06-24 DIAGNOSIS — N18.30 BENIGN HYPERTENSION WITH CKD (CHRONIC KIDNEY DISEASE) STAGE III (H): ICD-10-CM

## 2024-06-24 RX ORDER — EMPAGLIFLOZIN 10 MG/1
10 TABLET, FILM COATED ORAL DAILY
Qty: 30 TABLET | Refills: 2 | Status: SHIPPED | OUTPATIENT
Start: 2024-06-24

## 2024-06-24 RX ORDER — ATORVASTATIN CALCIUM 10 MG/1
10 TABLET, FILM COATED ORAL DAILY
Qty: 90 TABLET | Refills: 0 | Status: SHIPPED | OUTPATIENT
Start: 2024-06-24

## 2024-06-24 RX ORDER — LOSARTAN POTASSIUM 100 MG/1
100 TABLET ORAL DAILY
Qty: 30 TABLET | Refills: 1 | Status: SHIPPED | OUTPATIENT
Start: 2024-06-24 | End: 2024-07-30

## 2024-06-24 RX ORDER — METFORMIN HCL 500 MG
1000 TABLET, EXTENDED RELEASE 24 HR ORAL 2 TIMES DAILY WITH MEALS
Qty: 360 TABLET | Refills: 0 | Status: SHIPPED | OUTPATIENT
Start: 2024-06-24 | End: 2024-08-05

## 2024-06-24 RX ORDER — DESONIDE 0.5 MG/G
CREAM TOPICAL 2 TIMES DAILY
Qty: 60 G | Refills: 0 | Status: SHIPPED | OUTPATIENT
Start: 2024-06-24 | End: 2024-08-05

## 2024-06-25 NOTE — TELEPHONE ENCOUNTER
Appears this is a historical medication. Not currently on medication list.     Cynthia Munguia RN on 6/25/2024 at 2:28 PM

## 2024-06-27 RX ORDER — HYDROCHLOROTHIAZIDE 25 MG/1
25 TABLET ORAL DAILY
Qty: 30 TABLET | Refills: 0 | Status: SHIPPED | OUTPATIENT
Start: 2024-06-27 | End: 2024-07-25

## 2024-06-27 NOTE — TELEPHONE ENCOUNTER
Left message on machine to call back      Call Pt this was elevating her uric acid  She can maybe try a Low dose   Hydrochlorothiazide 25 mg daily  Her uric acid has been so much better now  Follow up if this does not help

## 2024-06-27 NOTE — TELEPHONE ENCOUNTER
Patient returned call and was notified of provider's message; verbalized understanding.     RODERICK Stewart RN  [facility]

## 2024-07-25 DIAGNOSIS — I10 ESSENTIAL HYPERTENSION: ICD-10-CM

## 2024-07-25 RX ORDER — HYDROCHLOROTHIAZIDE 25 MG/1
25 TABLET ORAL DAILY
Qty: 90 TABLET | Refills: 1 | Status: SHIPPED | OUTPATIENT
Start: 2024-07-25

## 2024-07-30 DIAGNOSIS — I12.9 BENIGN HYPERTENSION WITH CKD (CHRONIC KIDNEY DISEASE) STAGE III (H): ICD-10-CM

## 2024-07-30 DIAGNOSIS — N18.30 BENIGN HYPERTENSION WITH CKD (CHRONIC KIDNEY DISEASE) STAGE III (H): ICD-10-CM

## 2024-07-30 RX ORDER — LOSARTAN POTASSIUM 100 MG/1
100 TABLET ORAL DAILY
Qty: 30 TABLET | Refills: 1 | Status: SHIPPED | OUTPATIENT
Start: 2024-07-30 | End: 2024-10-07

## 2024-07-30 NOTE — TELEPHONE ENCOUNTER
Pharmacy requesting a 90 day supply.  Pending Prescriptions:                       Disp   Refills    losartan (COZAAR) 100 MG tablet           30 tab*1            Sig: Take 1 tablet (100 mg) by mouth daily Stop 50 mg           tablet    Brittany Gunter CMA

## 2024-08-04 ENCOUNTER — HEALTH MAINTENANCE LETTER (OUTPATIENT)
Age: 69
End: 2024-08-04

## 2024-09-26 ENCOUNTER — LAB (OUTPATIENT)
Dept: LAB | Facility: CLINIC | Age: 69
End: 2024-09-26
Payer: COMMERCIAL

## 2024-09-26 DIAGNOSIS — E11.9 TYPE 2 DIABETES MELLITUS WITHOUT COMPLICATION, WITH LONG-TERM CURRENT USE OF INSULIN (H): ICD-10-CM

## 2024-09-26 DIAGNOSIS — Z79.4 TYPE 2 DIABETES MELLITUS WITHOUT COMPLICATION, WITH LONG-TERM CURRENT USE OF INSULIN (H): ICD-10-CM

## 2024-09-26 LAB
ANION GAP SERPL CALCULATED.3IONS-SCNC: 12 MMOL/L (ref 7–15)
BUN SERPL-MCNC: 16.2 MG/DL (ref 8–23)
CALCIUM SERPL-MCNC: 9.6 MG/DL (ref 8.8–10.4)
CHLORIDE SERPL-SCNC: 106 MMOL/L (ref 98–107)
CREAT SERPL-MCNC: 0.88 MG/DL (ref 0.51–0.95)
EGFRCR SERPLBLD CKD-EPI 2021: 71 ML/MIN/1.73M2
EST. AVERAGE GLUCOSE BLD GHB EST-MCNC: 146 MG/DL
GLUCOSE SERPL-MCNC: 162 MG/DL (ref 70–99)
HBA1C MFR BLD: 6.7 % (ref 0–5.6)
HCO3 SERPL-SCNC: 24 MMOL/L (ref 22–29)
POTASSIUM SERPL-SCNC: 4.4 MMOL/L (ref 3.4–5.3)
SODIUM SERPL-SCNC: 142 MMOL/L (ref 135–145)

## 2024-09-26 PROCEDURE — 36415 COLL VENOUS BLD VENIPUNCTURE: CPT

## 2024-09-26 PROCEDURE — 80048 BASIC METABOLIC PNL TOTAL CA: CPT

## 2024-09-26 PROCEDURE — 83036 HEMOGLOBIN GLYCOSYLATED A1C: CPT

## 2024-10-07 DIAGNOSIS — N18.30 BENIGN HYPERTENSION WITH CKD (CHRONIC KIDNEY DISEASE) STAGE III (H): ICD-10-CM

## 2024-10-07 DIAGNOSIS — I12.9 BENIGN HYPERTENSION WITH CKD (CHRONIC KIDNEY DISEASE) STAGE III (H): ICD-10-CM

## 2024-10-07 RX ORDER — LOSARTAN POTASSIUM 100 MG/1
100 TABLET ORAL DAILY
Qty: 30 TABLET | Refills: 1 | Status: SHIPPED | OUTPATIENT
Start: 2024-10-07

## 2024-10-09 DIAGNOSIS — N18.31 TYPE 2 DIABETES MELLITUS WITH STAGE 3A CHRONIC KIDNEY DISEASE, WITH LONG-TERM CURRENT USE OF INSULIN (H): ICD-10-CM

## 2024-10-09 DIAGNOSIS — E11.22 TYPE 2 DIABETES MELLITUS WITH STAGE 3A CHRONIC KIDNEY DISEASE, WITH LONG-TERM CURRENT USE OF INSULIN (H): ICD-10-CM

## 2024-10-09 DIAGNOSIS — Z79.4 TYPE 2 DIABETES MELLITUS WITH STAGE 3A CHRONIC KIDNEY DISEASE, WITH LONG-TERM CURRENT USE OF INSULIN (H): ICD-10-CM

## 2024-10-09 RX ORDER — EMPAGLIFLOZIN 10 MG/1
10 TABLET, FILM COATED ORAL DAILY
Qty: 30 TABLET | Refills: 1 | Status: SHIPPED | OUTPATIENT
Start: 2024-10-09

## 2024-10-10 ENCOUNTER — VIRTUAL VISIT (OUTPATIENT)
Dept: ENDOCRINOLOGY | Facility: CLINIC | Age: 69
End: 2024-10-10
Payer: COMMERCIAL

## 2024-10-10 DIAGNOSIS — E66.01 MORBID OBESITY (H): Primary | ICD-10-CM

## 2024-10-10 DIAGNOSIS — Z79.4 TYPE 2 DIABETES MELLITUS WITHOUT COMPLICATION, WITH LONG-TERM CURRENT USE OF INSULIN (H): ICD-10-CM

## 2024-10-10 DIAGNOSIS — E11.9 TYPE 2 DIABETES MELLITUS WITHOUT COMPLICATION, WITH LONG-TERM CURRENT USE OF INSULIN (H): ICD-10-CM

## 2024-10-10 PROCEDURE — G2211 COMPLEX E/M VISIT ADD ON: HCPCS | Mod: 95 | Performed by: INTERNAL MEDICINE

## 2024-10-10 PROCEDURE — 99214 OFFICE O/P EST MOD 30 MIN: CPT | Mod: 95 | Performed by: INTERNAL MEDICINE

## 2024-10-10 RX ORDER — GLIPIZIDE 2.5 MG/1
2.5 TABLET, EXTENDED RELEASE ORAL DAILY
Qty: 30 TABLET | Refills: 5 | Status: SHIPPED | OUTPATIENT
Start: 2024-10-10

## 2024-10-10 NOTE — PROGRESS NOTES
Virtual Visit Details    Type of service:  Video Visit   Video Start Time:  12:25 PM  Video End Time:   12:45 PM    Originating Location (pt. Location): Home  Distant Location (provider location):  Off-site  Platform used for Video Visit: Javier      Recent issues:  Diabetes follow-up evaluation.  I was delayed with start of today's laura  Has participated with the [facility] patient assistance program, gets Trulicity, Basaglar and Humalog pens  Stopped Basaglar per plan  Feeling well overall, no new health issues reported        History of pre-diabetes  ~2016. Initial diagnosis of diabetes mellitus  Had seen physician in [place], [place]  Started treatment with metformin   ~6/2020. Acute back pain?, diagnosed with pneumonia, high glucose and hgbA1c 12%   Hospital treatment included insulin treatment   Treatment with Abx, pain resolved   Recalls basal insulin 55U and use of rapid acting Humalog     7/15/20 Endocrinology evaluation with Dr. MERON Armijo/[facility] clinic  Addition of Farxiga but expensive, then change to Jardiance medication  Fall '21. Discontinue Jardiance since expensive   Previous [facility] hgbA1c trends include:     Lab Test 06/08/22  1149 03/23/22  1641 03/01/22  0951 12/06/21  1132 07/30/21  1236   A1C 7.2* 6.6* 6.7* 6.9* 6.5*         6/21/22. Initial diabetes evaluation with me at [facility]  Reviewed health history and diabetes issues  [facility] Diab Ed eval and diagnostic Hi  Added glipizideER medication  3/2023. Ran out of Trulicity (4.5 mg) medication  ~ 5/2023. Restarted Trulicity med per plan  ~7/2024. Stopped Basaglar  Current DM medications:  Metformin 500 mg  2-tabs in morning and evening  glipizideER 5 mg  1-tab daily  Trulicity 4.5 mg   Subcutaneous weekly  Humalog Kwkpen sscale (uses approx 2x/day often morning vs suppertime)   140-179  2U   180-219  4U   220-259  6U   260-299  8U   >300   10U  Jardiance 10 mg  1-tab in morning    Blood glucose (BG) meter:  One Touch Verio   Tests  2-3/day   Recent trends 120-150 mg/dl, per patient  Fam Hx Diabetes:  brother (T1DM), cousins  Previous [facility] hgbA1c trends include:  Lab Results   Component Value Date    A1C 6.7 (H) 09/26/2024    A1C 6.4 (H) 05/03/2024    A1C 7.2 (H) 01/29/2024    A1C 6.7 (H) 08/08/2023    A1C 7.3 (H) 04/03/2023      Recent [facility] labs include:  Lab Results   Component Value Date    A1C 6.7 (H) 09/26/2024     09/26/2024    POTASSIUM 4.4 09/26/2024    CHLORIDE 106 09/26/2024    CO2 24 09/26/2024    ANIONGAP 12 09/26/2024     (H) 09/26/2024    BUN 16.2 09/26/2024    CR 0.88 09/26/2024    GFRESTIMATED 71 09/26/2024    GFRESTBLACK 80 01/07/2021    TRISTIAN 9.6 09/26/2024    CPEPT 9.8 (H) 07/31/2020    CHOL 134 03/19/2024    TRIG 132 03/19/2024    HDL 45 (L) 03/19/2024    LDL 63 03/19/2024    NHDL 89 03/19/2024    UCRR 171.0 01/29/2024    MICROL <12.0 01/29/2024    UMALCR  01/29/2024      Comment:      Unable to calculate, urine albumin and/or urine creatinine is outside detectable limits.  Microalbuminuria is defined as an albumin:creatinine ratio of 17 to 299 for males and 25 to 299 for females. A ratio of albumin:creatinine of 300 or higher is indicative of overt proteinuria.  Due to biologic variability, positive results should be confirmed by a second, first-morning random or 24-hour timed urine specimen. If there is discrepancy, a third specimen is recommended. When 2 out of 3 results are in the microalbuminuria range, this is evidence for incipient nephropathy and warrants increased efforts at glucose control, blood pressure control, and institution of therapy with an angiotensin-converting-enzyme (ACE) inhibitor (if the patient can tolerate it).      TSH 1.91 01/29/2024     Last eye exam 2023?, no DR per patient  DM Complications:  None known      [place]  Sees Dr. Juani Mae/[facility] for general medicine evaluations.    PMH/PSH:  Past Medical History:   Diagnosis Date    Acquired  hypothyroidism     History of blood transfusion     Hypertension     Obesity due to excess calories     Type 2 diabetes mellitus without complication, with long-term current use of insulin (H)      Past Surgical History:   Procedure Laterality Date    CHOLECYSTECTOMY      DAVINCI HYSTERECTOMY TOTAL, BILATERAL SALPINGO-OOPHORECTOMY, COMBINED Bilateral 7/6/2022    Procedure: ROBOTIC HYSTERECTOMY, BILATERAL SALPINGO OOPHORECTOMY, LYSIS OF ADHESIONS;  Surgeon: Jefferson Chavez MD;  Location: [facility]    HERNIA REPAIR  1990 s    INSERT INTRAUTERINE DEVICE N/A 7/6/2022    Procedure: REMOVAL OF INTRAUTERINE DEVICE;  Surgeon: Jefferson Chavez MD;  Location: [facility]       Family Hx:  Family History   Problem Relation Age of Onset    Diabetes Mother         d age 80    Hypertension Mother     Coronary Artery Disease Father         d age 85    Brain Tumor Father     Hypertension Father     Diabetes Brother          Social Hx:  Social History     Socioeconomic History    Marital status:      Spouse name: Not on file    Number of children: Not on file    Years of education: Not on file    Highest education level: Not on file   Occupational History    Not on file   Tobacco Use    Smoking status: Never    Smokeless tobacco: Never   Vaping Use    Vaping status: Never Used   Substance and Sexual Activity    Alcohol use: Yes     Comment: Occ    Drug use: Never    Sexual activity: Not Currently   Other Topics Concern    Parent/sibling w/ CABG, MI or angioplasty before 65F 55M? No   Social History Narrative    Not on file     Social Determinants of Health     Financial Resource Strain: Low Risk  (2/20/2024)    Financial Resource Strain     Within the past 12 months, have you or your family members you live with been unable to get utilities (heat, electricity) when it was really needed?: No   Food Insecurity: Low Risk  (2/20/2024)    Food Insecurity     Within the past 12 months, did you worry  that your food would run out before you got money to buy more?: No     Within the past 12 months, did the food you bought just not last and you didn t have money to get more?: No   Transportation Needs: Low Risk  (2/20/2024)    Transportation Needs     Within the past 12 months, has lack of transportation kept you from medical appointments, getting your medicines, non-medical meetings or appointments, work, or from getting things that you need?: No   Physical Activity: Insufficiently Active (2/20/2024)    Exercise Vital Sign     Days of Exercise per Week: 2 days     Minutes of Exercise per Session: 10 min   Stress: No Stress Concern Present (2/20/2024)    [employer] [employer] - Occupational Stress Questionnaire     Feeling of Stress : Only a little   Social Connections: Unknown (2/20/2024)    Social Connection and Isolation Panel [NHANES]     Frequency of Communication with Friends and Family: Not on file     Frequency of Social Gatherings with Friends and Family: More than three times a week     Attends Tenriism Services: Not on file     Active Member of Clubs or Organizations: Not on file     Attends Club or Organization Meetings: Not on file     Marital Status: Not on file   Interpersonal Safety: Low Risk  (3/19/2024)    Interpersonal Safety     Do you feel physically and emotionally safe where you currently live?: Yes     Within the past 12 months, have you been hit, slapped, kicked or otherwise physically hurt by someone?: No     Within the past 12 months, have you been humiliated or emotionally abused in other ways by your partner or ex-partner?: No   Housing Stability: Low Risk  (2/20/2024)    Housing Stability     Do you have housing? : Yes     Are you worried about losing your housing?: No          MEDICATIONS:  has a current medication list which includes the following prescription(s): trulicity, jardiance, glipizide, insulin lispro, metformin, [facility]vs prep, allopurinol, aspirin,  atorvastatin, onetouch verio iq, blood glucose calibration, blood glucose monitoring, clobetasol, desonide, fluticasone, furosemide, glucosamine-chondroit-vit c-mn, hydrochlorothiazide, hydrochlorothiazide, hydroxyzine hcl, insulin glargine, insulin glargine, bd pen needle fadi 2nd gen, levothyroxine, losartan, multivitamin, therapeutic, onetouch delica lancets 33g, triamcinolone, triamcinolone, and vitamin d3.    ROS:     ROS: 10 point ROS neg other than the symptoms noted above in the HPI.    GENERAL: some fatigue, some wt loss after diuretic added; denies fevers, chills, night sweats.   HEENT: no dysphagia, odonophagia, diplopia, neck pain  THYROID:  no apparent hyper or hypothyroid symptoms  CV: no chest pain, pressure, palpitations  LUNGS: no SOB, NICHOLS, cough, wheezing   ABDOMEN: some indigestion; no diarrhea, constipation, abdominal pain  EXTREMITIES: no rashes, ulcers, edema  NEUROLOGY: no headaches, denies changes in vision, tingling, extremitiy numbness   MSK: no muscle aches or pains, weakness  SKIN: no rashes or lesions  : no menses  PSYCH:  stable mood, no significant anxiety or depression  ENDOCRINE: no heat or cold intolerance    Physical Exam (visual exam)  VS:  no vital signs taken for video visit  CONSTITUTIONAL: healthy, alert and NAD, well dressed, answering questions appropriately  ENT: no nose swelling or nasal discharge, mouth redness or gum changes.  EYES: eyes grossly normal to inspection, conjunctivae and sclerae normal, no exophthalmos or proptosis  THYROID:  no apparent nodules or goiter  LUNGS: no audible wheeze, cough or visible cyanosis, no visible retractions or increased work of breathing  ABDOMEN: abdomen not evaluated  EXTREMITIES: no hand tremors, limited exam  NEUROLOGY: CN grossly intact, mentation intact and speech normal   SKIN:  no apparent skin lesions, rash, or edema with visualized skin appearance  PSYCH: mentation appears normal, affect normal/bright, judgement and  insight intact,   normal speech and appearance well groomed    LABS:    All pertinent notes, labs, and images personally reviewed by me.     A/P:  Encounter Diagnoses   Name Primary?    Type 2 diabetes mellitus without complication, with long-term current use of insulin (H)     Morbid obesity (H) Yes       Comments:  Reviewed health history and diabetes issues.  Addition of Jardiance reasonable, yet previously cost-prohibitive for patient  Reviewed and interpreted tests that I previously ordered.   Ordered appropriate tests for the endocrinology disease management.    Management options discussed and implemented after shared medical decision making with the patient.  T2DM problem is chronic-stable    Plan:  Discussed general issues with the diabetes diagnosis and management  We discussed the hgbA1c test which reflects previous overall glucose levels or control  Discussed the importance of blood glucose (BG) testing to assess glucose trends  Provided general overview of the diabetes medication options and medication treatment plan.    Recommend:  Continue the metformin, glipizideER, Trulicity and Jardiance medications as noted  Reasonable to continue the Jardiance medication, per PCP plan  Treatment goal to continue weight loss, minimize and try to discontinue mealtime insulin  She has previously gotten Basaglar, Humalog, and Trulicity meds from [facility], but no [place] option with that program  Monitor for possible GI side effects  Goal target premeal glucose 80- 150 mg/dl  We have reviewed option of acquiring Personal [place] CGM again... she's not interested   Would be helpful for patient to have follow-up [facility] Diab Ed evaluation  Keep focus on diet, exercise, weight management.  Plan non-fasting labs in 1/2025   Testing at [facility] clinic   Lab orders placed  Continue losartan, atorvastatin, levothyroxine med use, per PCP  Advise having fasting lipid panel testing and dilated eye examination, at  least annually    Addressed patient questions today    The longitudinal plan of care for the endocrine problem(s) were addressed during this visit.  Due to added complexity of care,   we will continue to support the patient and the subsequent management of this condition with ongoing continuity of care.    Patient Instructions   Diabetes med plan:  Metformin 500 mg  2-tabs in morning and evening  glipizideER 2.5 mg  1-tab daily  Trulicity 4.5 mg   Subcutaneous weekly  Humalog Kwkpen sscale    200-250 mg/dl  +2U   251-300  +4U   301-350  +6U   >350   Call provider  Jardiance 10 mg  1-tab in morning    Continue checking blood glucose fasting and    Also premeal if concerns  Consider wearing a continuous glucose sensor  Arrange non-fasting lab appointment 1/2025  Follow-up endocrinology video visit appt 1/30/25 at 1:30pm    Future labs ordered today:   Orders Placed This Encounter   Procedures    Hemoglobin A1c    Basic metabolic panel    ALT     Radiology/Consults ordered today: None    Total time spent on day of encounter:  22 min    Follow-up: 1/30/25 at 1:30 pm, Return    JONELLE Araujo MD, MS  Endocrinology  [facility]

## 2024-10-10 NOTE — PATIENT INSTRUCTIONS
Diabetes med plan:  Metformin 500 mg  2-tabs in morning and evening  glipizideER 2.5 mg  1-tab daily  Trulicity 4.5 mg   Subcutaneous weekly  Humalog Kwkpen sscale    200-250 mg/dl  +2U   251-300  +4U   301-350  +6U   >350   Call provider  Jardiance 10 mg  1-tab in morning    Continue checking blood glucose fasting and    Also premeal if concerns  Consider wearing a continuous glucose sensor  Arrange non-fasting lab appointment 1/2025  Follow-up endocrinology video visit appt 1/30/25 at 1:30pm

## 2024-11-03 DIAGNOSIS — E11.9 TYPE 2 DIABETES MELLITUS WITHOUT COMPLICATION, WITH LONG-TERM CURRENT USE OF INSULIN (H): ICD-10-CM

## 2024-11-03 DIAGNOSIS — Z79.4 TYPE 2 DIABETES MELLITUS WITHOUT COMPLICATION, WITH LONG-TERM CURRENT USE OF INSULIN (H): ICD-10-CM

## 2024-11-04 DIAGNOSIS — E78.5 HYPERLIPIDEMIA LDL GOAL <70: ICD-10-CM

## 2024-11-04 RX ORDER — ATORVASTATIN CALCIUM 10 MG/1
10 TABLET, FILM COATED ORAL DAILY
Qty: 90 TABLET | Refills: 0 | Status: SHIPPED | OUTPATIENT
Start: 2024-11-04

## 2024-11-04 RX ORDER — GLIPIZIDE 5 MG/1
TABLET, FILM COATED, EXTENDED RELEASE ORAL
Qty: 90 TABLET | Refills: 1 | OUTPATIENT
Start: 2024-11-04

## 2024-12-07 DIAGNOSIS — Z79.4 TYPE 2 DIABETES MELLITUS WITH STAGE 3A CHRONIC KIDNEY DISEASE, WITH LONG-TERM CURRENT USE OF INSULIN (H): ICD-10-CM

## 2024-12-07 DIAGNOSIS — E11.22 TYPE 2 DIABETES MELLITUS WITH STAGE 3A CHRONIC KIDNEY DISEASE, WITH LONG-TERM CURRENT USE OF INSULIN (H): ICD-10-CM

## 2024-12-07 DIAGNOSIS — N18.31 TYPE 2 DIABETES MELLITUS WITH STAGE 3A CHRONIC KIDNEY DISEASE, WITH LONG-TERM CURRENT USE OF INSULIN (H): ICD-10-CM

## 2024-12-08 DIAGNOSIS — I12.9 BENIGN HYPERTENSION WITH CKD (CHRONIC KIDNEY DISEASE) STAGE III (H): ICD-10-CM

## 2024-12-08 DIAGNOSIS — N18.30 BENIGN HYPERTENSION WITH CKD (CHRONIC KIDNEY DISEASE) STAGE III (H): ICD-10-CM

## 2024-12-09 RX ORDER — LOSARTAN POTASSIUM 100 MG/1
100 TABLET ORAL DAILY
Qty: 30 TABLET | Refills: 1 | Status: SHIPPED | OUTPATIENT
Start: 2024-12-09

## 2024-12-09 NOTE — TELEPHONE ENCOUNTER
Appointment needed for refills. Once scheduled route to the provider for a jumana refill.     Rosanna Khan RN

## 2024-12-10 NOTE — TELEPHONE ENCOUNTER
Pt reports that she will reach out to her endocrinologist for this med.  She states that he follows her for her diabetes.  Pt reports that she will come and see Dr. Mae in march 2025 for her wellness exam.  Jeannette Hinojosa MA on 12/10/2024 at 5:46 PM

## 2024-12-11 RX ORDER — EMPAGLIFLOZIN 10 MG/1
10 TABLET, FILM COATED ORAL DAILY
Qty: 30 TABLET | Refills: 1 | OUTPATIENT
Start: 2024-12-11

## 2024-12-27 DIAGNOSIS — E11.00 TYPE 2 DIABETES MELLITUS WITH HYPEROSMOLARITY WITHOUT COMA, UNSPECIFIED WHETHER LONG TERM INSULIN USE (H): ICD-10-CM

## 2024-12-28 RX ORDER — METFORMIN HYDROCHLORIDE 500 MG/1
1000 TABLET, EXTENDED RELEASE ORAL 2 TIMES DAILY WITH MEALS
Qty: 360 TABLET | Refills: 0 | Status: SHIPPED | OUTPATIENT
Start: 2024-12-28

## 2025-01-09 ENCOUNTER — OFFICE VISIT (OUTPATIENT)
Dept: FAMILY MEDICINE | Facility: CLINIC | Age: 70
End: 2025-01-09
Payer: COMMERCIAL

## 2025-01-09 ENCOUNTER — LAB (OUTPATIENT)
Dept: LAB | Facility: CLINIC | Age: 70
End: 2025-01-09
Payer: COMMERCIAL

## 2025-01-09 VITALS
OXYGEN SATURATION: 95 % | TEMPERATURE: 97.5 F | HEART RATE: 94 BPM | DIASTOLIC BLOOD PRESSURE: 67 MMHG | BODY MASS INDEX: 51.91 KG/M2 | RESPIRATION RATE: 18 BRPM | SYSTOLIC BLOOD PRESSURE: 155 MMHG | HEIGHT: 63 IN | WEIGHT: 293 LBS

## 2025-01-09 DIAGNOSIS — E11.22 TYPE 2 DIABETES MELLITUS WITH STAGE 3A CHRONIC KIDNEY DISEASE, WITHOUT LONG-TERM CURRENT USE OF INSULIN (H): ICD-10-CM

## 2025-01-09 DIAGNOSIS — N18.31 TYPE 2 DIABETES MELLITUS WITH STAGE 3A CHRONIC KIDNEY DISEASE, WITHOUT LONG-TERM CURRENT USE OF INSULIN (H): ICD-10-CM

## 2025-01-09 DIAGNOSIS — Z01.818 PREOP GENERAL PHYSICAL EXAM: Primary | ICD-10-CM

## 2025-01-09 DIAGNOSIS — Z79.4 TYPE 2 DIABETES MELLITUS WITH STAGE 3A CHRONIC KIDNEY DISEASE, WITH LONG-TERM CURRENT USE OF INSULIN (H): Primary | ICD-10-CM

## 2025-01-09 DIAGNOSIS — E66.813 CLASS 3 SEVERE OBESITY DUE TO EXCESS CALORIES WITH SERIOUS COMORBIDITY AND BODY MASS INDEX (BMI) OF 50.0 TO 59.9 IN ADULT (H): ICD-10-CM

## 2025-01-09 DIAGNOSIS — Z79.4 TYPE 2 DIABETES MELLITUS WITHOUT COMPLICATION, WITH LONG-TERM CURRENT USE OF INSULIN (H): ICD-10-CM

## 2025-01-09 DIAGNOSIS — N18.31 TYPE 2 DIABETES MELLITUS WITH STAGE 3A CHRONIC KIDNEY DISEASE, WITH LONG-TERM CURRENT USE OF INSULIN (H): Primary | ICD-10-CM

## 2025-01-09 DIAGNOSIS — E78.5 HYPERLIPIDEMIA LDL GOAL <70: ICD-10-CM

## 2025-01-09 DIAGNOSIS — E11.9 TYPE 2 DIABETES MELLITUS WITHOUT COMPLICATION, WITH LONG-TERM CURRENT USE OF INSULIN (H): ICD-10-CM

## 2025-01-09 DIAGNOSIS — E66.01 CLASS 3 SEVERE OBESITY DUE TO EXCESS CALORIES WITH SERIOUS COMORBIDITY AND BODY MASS INDEX (BMI) OF 50.0 TO 59.9 IN ADULT (H): ICD-10-CM

## 2025-01-09 DIAGNOSIS — E03.9 HYPOTHYROIDISM, UNSPECIFIED TYPE: ICD-10-CM

## 2025-01-09 DIAGNOSIS — I10 ESSENTIAL HYPERTENSION: ICD-10-CM

## 2025-01-09 DIAGNOSIS — E11.22 TYPE 2 DIABETES MELLITUS WITH STAGE 3A CHRONIC KIDNEY DISEASE, WITH LONG-TERM CURRENT USE OF INSULIN (H): Primary | ICD-10-CM

## 2025-01-09 DIAGNOSIS — E03.9 HYPOTHYROIDISM: ICD-10-CM

## 2025-01-09 DIAGNOSIS — N18.31 CHRONIC KIDNEY DISEASE, STAGE 3A (H): ICD-10-CM

## 2025-01-09 PROBLEM — E11.29 TYPE 2 DIABETES MELLITUS WITH KIDNEY COMPLICATION, WITHOUT LONG-TERM CURRENT USE OF INSULIN (H): Status: ACTIVE | Noted: 2025-01-09

## 2025-01-09 LAB
ALT SERPL W P-5'-P-CCNC: 38 U/L (ref 0–50)
ANION GAP SERPL CALCULATED.3IONS-SCNC: 15 MMOL/L (ref 7–15)
BUN SERPL-MCNC: 22.5 MG/DL (ref 8–23)
CALCIUM SERPL-MCNC: 9.7 MG/DL (ref 8.8–10.4)
CHLORIDE SERPL-SCNC: 101 MMOL/L (ref 98–107)
CREAT SERPL-MCNC: 0.98 MG/DL (ref 0.51–0.95)
CREAT UR-MCNC: 78.1 MG/DL
EGFRCR SERPLBLD CKD-EPI 2021: 63 ML/MIN/1.73M2
EST. AVERAGE GLUCOSE BLD GHB EST-MCNC: 160 MG/DL
GLUCOSE SERPL-MCNC: 173 MG/DL (ref 70–99)
HBA1C MFR BLD: 7.2 % (ref 0–5.6)
HCO3 SERPL-SCNC: 23 MMOL/L (ref 22–29)
HGB BLD-MCNC: 13.4 G/DL (ref 11.7–15.7)
MICROALBUMIN UR-MCNC: <12 MG/L
MICROALBUMIN/CREAT UR: NORMAL MG/G{CREAT}
POTASSIUM SERPL-SCNC: 3.6 MMOL/L (ref 3.4–5.3)
SODIUM SERPL-SCNC: 139 MMOL/L (ref 135–145)
TSH SERPL DL<=0.005 MIU/L-ACNC: 2.38 UIU/ML (ref 0.3–4.2)

## 2025-01-09 ASSESSMENT — PAIN SCALES - GENERAL: PAINLEVEL_OUTOF10: NO PAIN (0)

## 2025-01-09 NOTE — PROGRESS NOTES
Preoperative Evaluation  [facility] [place]  [address]  [place] [address]  Phone: [phone]  Primary Provider: Juani Mae MD  Pre-op Performing Provider: Juani Mae MD  Jan 9, 2025 1/9/2025   Surgical Information   What procedure is being done? H & P   Facility or [facility] where [facility]   Who is doing the procedure / surgery? Dr. MARIMAR Rascon   Date of surgery / procedure: 01/20/25   Time of surgery / procedure: arrival time 7:45am   Where do you plan to recover after surgery? at home with family     Fax number for surgical facility: [phone]    Assessment & Plan     The proposed surgical procedure is considered INTERMEDIATE risk.    Preop general physical exam    - EKG 12-lead complete w/read - Clinics    Type 2 diabetes mellitus with stage 3a chronic kidney disease, with long-term current use of insulin (H)  Pending labs     Class 3 severe obesity due to excess calories with serious comorbidity and body mass index (BMI) of 50.0 to 59.9 in adult (H)  Low brianne diet     Chronic kidney disease, stage 3a (H)  Stable     Hyperlipidemia LDL goal <70  Stable     Hypothyroidism, unspecified type  Doing well     Essential hypertension  Controlled     The longitudinal plan of care for the diagnosis(es)/condition(s) as documented were addressed during this visit. Due to the added complexity in care, I will continue to support Crystal in the subsequent management and with ongoing continuity of care.       Risks and Recommendations  The patient has the following additional risks and recommendations for perioperative complications:   - Morbid obesity (BMI >40)  Diabetes:  - Patient is not on insulin therapy: regular NPO guidelines can be followed.     Antiplatelet or Anticoagulation Medication Instructions   - aspirin: Discontinue aspirin 7 days prior to procedure to reduce bleeding risk. It should be resumed postoperatively.     Additional  Medication Instructions  Take all scheduled medications on the day of surgery EXCEPT for modifications listed below:   - Herbal medications and vitamins: DO NOT TAKE 14 days prior to surgery.   - ACE/ARB/ARNI (lisinopril, enalapril, losartan, valsartan, olmesartan, sacubritril/valsartan) : DO NOT TAKE on day of surgery (minimum 11 hours for general anesthesia).   - Diuretics (furosemide, hydrochlorothiazide, chlorothalidone): DO NOT TAKE on the day of surgery.   - Statins (atorvastatin, simvastatin, pravastatin) : Continue taking on the day of surgery.    - metformin: DO NOT TAKE day of surgery.   - sulfonylurea (e.g. glyburide, glipizide): DO NOT TAKE day of surgery   - SGLT2 Inhibitor (canagliflozin, dapagliflozin, or empagliflozin): DO NOT TAKE 3 days before surgery.    - GLP-1 Injectable (exenitide, liraglutide, semaglutide, dulaglutide, etc.): DO NOT TAKE 7 days before surgery     Recommendation  Approval given to proceed with proposed procedure, without further diagnostic evaluation.    Verito Rojas is a 69 year old, presenting for the following:  Pre-Op Exam          1/9/2025    11:41 AM   Additional Questions   Roomed by Ciara HIGH related to upcoming procedure: pt is getting a screening colonoscopy  Pt has colon polyps        1/9/2025   Pre-Op Questionnaire   Have you ever had a heart attack or stroke? No   Have you ever had surgery on your heart or blood vessels, such as a stent placement, a coronary artery bypass, or surgery on an artery in your head, neck, heart, or legs? No   Do you have chest pain with activity? No   Do you have a history of heart failure? No   Do you currently have a cold, bronchitis or symptoms of other infection? No   Do you have a cough, shortness of breath, or wheezing? No   Do you or anyone in your family have previous history of blood clots? (!) YES many years  ago and was on coumadin   Do you or does anyone in your family have a serious bleeding problem such as  prolonged bleeding following surgeries or cuts? No   Have you ever had problems with anemia or been told to take iron pills? No   Have you had any abnormal blood loss such as black, tarry or bloody stools, or abnormal vaginal bleeding? No   Have you ever had a blood transfusion? (!) YES   Have you ever had a transfusion reaction? No   Are you willing to have a blood transfusion if it is medically needed before, during, or after your surgery? Yes   Have you or any of your relatives ever had problems with anesthesia? No   Do you have sleep apnea, excessive snoring or daytime drowsiness? No   Do you have any artifical heart valves or other implanted medical devices like a pacemaker, defibrillator, or continuous glucose monitor? No   Do you have artificial joints? No   Are you allergic to latex? No     Health Care Directive  Patient does not have a Health Care Directive: Discussed advance care planning with patient; information given to patient to review.    Preoperative Review of    reviewed - no record of controlled substances prescribed.      Status of Chronic Conditions:  DIABETES - Patient has a longstanding history of DiabetesType Type II . Patient is being treated with diet, oral agents, and GLP1 and denies significant side effects. Control has been good. Complicating factors include but are not limited to: hypertension, hyperlipidemia, chronic kidney disease, and morbid obesity .     HYPERLIPIDEMIA - Patient has a long history of significant Hyperlipidemia requiring medication for treatment with recent good control. Patient reports no problems or side effects with the medication.     HYPERTENSION - Patient has longstanding history of HTN , currently denies any symptoms referable to elevated blood pressure. Specifically denies chest pain, palpitations, dyspnea, orthopnea, PND or peripheral edema. Blood pressure readings have been in normal range. Current medication regimen is as listed below. Patient denies  any side effects of medication.     HYPOTHYROIDISM - Patient has a longstanding history of chronic Hypothyroidism. Patient has been doing well, noting no tremor, insomnia, hair loss or changes in skin texture. Continues to take medications as directed, without adverse reactions or side effects. Last TSH   Lab Results   Component Value Date    TSH 1.91 01/29/2024   .      RENAL INSUFFICIENCY - Patient has a longstanding history of moderate-severe chronic renal insufficiency. Last Cr [facility] labs in [facility].     Patient Active Problem List    Diagnosis Date Noted    Type 2 diabetes mellitus with kidney complication, without long-term current use of insulin (H) 01/09/2025     Priority: Medium    Chronic kidney disease, stage 3a (H) 03/23/2022     Priority: Medium    Hyperlipidemia LDL goal <70 10/14/2021     Priority: Medium    Gout, unspecified cause, unspecified chronicity, unspecified site 10/14/2021     Priority: Medium    Psoriasis 10/14/2021     Priority: Medium    Gastroesophageal reflux disease 10/14/2021     Priority: Medium    Morbid obesity (H) 06/25/2020     Priority: Medium    Hypothyroidism 12/27/2010     Priority: Medium     Hypothyroidism Acquired      Essential hypertension 12/03/2010     Priority: Medium     Hypertension        Past Medical History:   Diagnosis Date    Acquired hypothyroidism     History of blood transfusion     Hypertension     Obesity due to excess calories     Type 2 diabetes mellitus without complication, with long-term current use of insulin (H)      Past Surgical History:   Procedure Laterality Date    CHOLECYSTECTOMY      DAVINCI HYSTERECTOMY TOTAL, BILATERAL SALPINGO-OOPHORECTOMY, COMBINED Bilateral 7/6/2022    Procedure: ROBOTIC HYSTERECTOMY, BILATERAL SALPINGO OOPHORECTOMY, LYSIS OF ADHESIONS;  Surgeon: Jefferson Chavez MD;  Location: [facility] OR    HERNIA REPAIR  1990 s    INSERT INTRAUTERINE DEVICE N/A 7/6/2022    Procedure: REMOVAL OF INTRAUTERINE DEVICE;  Surgeon:  Jefferson Chavez MD;  Location: [facility] OR     Current Outpatient Medications   Medication Sig Dispense Refill    Alcohol Swabs ([employer] PREP) 70 % PADS Externally apply 1 each topically as needed 100 each 11    allopurinol (ZYLOPRIM) 300 MG tablet Take 1 tablet (300 mg) by mouth daily Needs follow up labs 90 tablet 3    aspirin (ASA) 325 MG EC tablet Take 325 mg by mouth daily      atorvastatin (LIPITOR) 10 MG tablet TAKE 1 TABLET (10 MG) BY MOUTH DAILY. 90 tablet 0    blood glucose (ONETOUCH VERIO IQ) test strip Use to test blood sugar 3 times daily or as directed. 300 strip 3    blood glucose calibration (NO BRAND SPECIFIED) solution Use to calibrate blood glucose monitor as needed as directed. 1 Bottle 3    blood glucose monitoring (NO BRAND SPECIFIED) meter device kit Use to test blood sugar 3 times daily or as directed. 1 kit 0    clobetasol (TEMOVATE) 0.05 % external ointment APPLY TO AFFECTED AREA TWICE A DAY 45 g 0    desonide (DESOWEN) 0.05 % external cream APPLY TO AFFECTED AREA TWICE A DAY 60 g 0    Dulaglutide (TRULICITY) 4.5 MG/0.5ML SOPN Inject 4.5 mg Subcutaneous once a week 6 mL 3    empagliflozin (JARDIANCE) 10 MG TABS tablet Take 1 tablet (10 mg) by mouth daily. 30 tablet 3    fluticasone (FLONASE) 50 MCG/ACT nasal spray Spray 2 sprays into both nostrils daily 9 mL 4    furosemide (LASIX) 40 MG tablet Take 1 tablet (40 mg) by mouth daily as needed (swelling) 90 tablet 3    glipiZIDE (GLUCOTROL XL) 2.5 MG 24 hr tablet Take 1 tablet (2.5 mg) by mouth daily. 30 tablet 5    hydrochlorothiazide (HYDRODIURIL) 25 MG tablet TAKE 1 TABLET BY MOUTH EVERY DAY 90 tablet 1    insulin pen needle (BD PEN NEEDLE CONOR 2ND GEN) 32G X 4 MM miscellaneous Use 4 pen needles daily or as directed. 200 each 3    levothyroxine (SYNTHROID/LEVOTHROID) 100 MCG tablet TAKE 1 TABLET BY MOUTH EVERY DAY 90 tablet 2    losartan (COZAAR) 100 MG tablet TAKE 1 TABLET (100 MG) BY MOUTH DAILY. STOP 50 MG TABLET 30 tablet 1  "   metFORMIN (GLUCOPHAGE XR) 500 MG 24 hr tablet TAKE 2 TABLETS BY MOUTH TWICE A DAY WITH MEALS 360 tablet 0    multivitamin, therapeutic (THERA-VIT) TABS tablet Take 1 tablet by mouth daily      OneTouch Delica Lancets 33G MISC Inject 1 Device Subcutaneous 3 times daily (before meals) 300 each 3    triamcinolone (KENALOG) 0.1 % external cream APPLY TO AFFECTED AREA TWICE A  g 3    Vitamin D3 25 mcg (1000 units) tablet TAKE 1 TABLET BY MOUTH EVERY DAY 90 tablet 2       No Known Allergies     Social History     Tobacco Use    Smoking status: Never    Smokeless tobacco: Never   Substance Use Topics    Alcohol use: Yes     Comment: Occ     Family History   Problem Relation Age of Onset    Diabetes Mother         d age 80    Hypertension Mother     Coronary Artery Disease Father         d age 85    Brain Tumor Father     Hypertension Father     Diabetes Brother      History   Drug Use Unknown             Review of Systems  CONSTITUTIONAL: NEGATIVE for fever, chills, change in weight  INTEGUMENTARY/SKIN: NEGATIVE for worrisome rashes, moles or lesions  EYES: NEGATIVE for vision changes or irritation  ENT/MOUTH: NEGATIVE for ear, mouth and throat problems  RESP: NEGATIVE for significant cough or SOB  BREAST: NEGATIVE for masses, tenderness or discharge  CV: NEGATIVE for chest pain, palpitations or peripheral edema  GI: NEGATIVE for nausea, abdominal pain, heartburn, or change in bowel habits  : NEGATIVE for frequency, dysuria, or hematuria  MUSCULOSKELETAL: NEGATIVE for significant arthralgias or myalgia  NEURO: NEGATIVE for weakness, dizziness or paresthesias  ENDOCRINE: NEGATIVE for temperature intolerance, skin/hair changes  HEME: NEGATIVE for bleeding problems  PSYCHIATRIC: NEGATIVE for changes in mood or affect    Objective    /67   Pulse 94   Temp 97.5  F (36.4  C) (Temporal)   Resp 18   Ht 1.6 m (5' 2.99\")   Wt (!) 144.7 kg (319 lb)   SpO2 95%   BMI 56.52 kg/m     Estimated body mass index is " "56.52 kg/m  as calculated from the following:    Height as of this encounter: 1.6 m (5' 2.99\").    Weight as of this encounter: 144.7 kg (319 lb).  Physical Exam  GENERAL: alert and no distress  EYES: Eyes grossly normal to inspection, PERRL and conjunctivae and sclerae normal  HENT: ear canals and TM's normal, nose and mouth without ulcers or lesions  NECK: no adenopathy, no asymmetry, masses, or scars  RESP: lungs clear to auscultation - no rales, rhonchi or wheezes  CV: regular rate and rhythm, normal S1 S2, no S3 or S4, no murmur, click or rub, no peripheral edema  ABDOMEN: soft, nontender, no hepatosplenomegaly, no masses and bowel sounds normal  MS: no gross musculoskeletal defects noted, no edema  SKIN: no suspicious lesions or rashes  NEURO: Normal strength and tone, mentation intact and speech normal  PSYCH: mentation appears normal, affect normal/bright    Recent Labs   Lab Test 01/09/25  1059 09/26/24  1110 05/03/24  1614 03/19/24  1305   HGB 13.4  --   --  13.5   NA  --  142 142  --    POTASSIUM  --  4.4 4.0  --    CR  --  0.88 0.87  --    A1C 7.2* 6.7* 6.4*  --         Diagnostics  Labs pending at this time.  Results will be reviewed when available.   EKG: appears normal, NSR, unchanged from previous tracings    Revised Cardiac Risk Index (RCRI)  The patient has the following serious cardiovascular risks for perioperative complications:   - No serious cardiac risks = 0 points     RCRI Interpretation: 0 points: Class I (very low risk - 0.4% complication rate)         Signed Electronically by: Juani Mae MD  A copy of this evaluation report is provided to the requesting physician.         "

## 2025-01-09 NOTE — PATIENT INSTRUCTIONS
Take all scheduled medications on the day of surgery EXCEPT for modifications listed below:   - Herbal medications and vitamins: DO NOT TAKE 14 days prior to surgery.   - ACE/ARB/ARNI (lisinopril, enalapril, losartan, valsartan, olmesartan, sacubritril/valsartan) : DO NOT TAKE on day of surgery (minimum 11 hours for general anesthesia).   - Diuretics (furosemide, hydrochlorothiazide, chlorothalidone): DO NOT TAKE on the day of surgery.   - Statins (atorvastatin, simvastatin, pravastatin) : Continue taking on the day of surgery.    - metformin: DO NOT TAKE day of surgery.   - sulfonylurea (e.g. glyburide, glipizide): DO NOT TAKE day of surgery   - SGLT2 Inhibitor (canagliflozin, dapagliflozin, or empagliflozin): DO NOT TAKE 3 days before surgery.    - GLP-1 Injectable (exenitide, liraglutide, semaglutide, dulaglutide, etc.): DO NOT TAKE 7 days before surgery   Stop aspirin 7 days before surgery  Sincerely,  Juani Mae MD      Patient Education   Preparing for Your Surgery  For Adults  Getting started  In most cases, a nurse will call to review your health history and instructions. They will give you an arrival time based on your scheduled surgery time. Please be ready to share:  Your doctor's clinic name and phone number  Your medical, surgical, and anesthesia history  A list of allergies and sensitivities  A list of medicines, including herbal treatments and over-the-counter drugs  Whether the patient has a legal guardian (ask how to send us the papers in advance)  Note: You may not receive a call if you were seen at our PAC ([facility]).  Please tell us if you're pregnant--or if there's any chance you might be pregnant. Some surgeries may injure a fetus (unborn baby), so they require a pregnancy test. Surgeries that are safe for a fetus don't always need a test, and you can choose whether to have one.   Preparing for surgery  Within 10 to 30 days of surgery: Have a pre-op exam (sometimes  called an H&P, or History and Physical). This can be done at a clinic or pre-operative center.  If you're having a , you may not need this exam. Talk to your care team.  At your pre-op exam, talk to your care team about all medicines you take. (This includes CBD oil and any drugs, such as THC, marijuana, and other forms of cannabis.) If you need to stop any medicine before surgery, ask when to start taking it again.  This is for your safety. Many medicines and drugs can make you bleed too much during surgery. Some change how well surgery (anesthesia) drugs work.  Call your insurance company to let them know you're having surgery. (If you don't have insurance, call [phone].)  Call your clinic if there's any change in your health. This includes a scrape or scratch near the surgery site, or any signs of a cold (sore throat, runny nose, cough, rash, fever).  Eating and drinking guidelines  For your safety: Unless your surgeon tells you otherwise, follow the guidelines below.  Eat and drink as normal until 8 hours before you arrive for surgery. After that, no food or milk. You can spit out gum when you arrive.  Drink clear liquids until 2 hours before you arrive. These are liquids you can see through, like water, Gatorade, and Propel Water. They also include plain black coffee and tea (no cream or milk).  No alcohol for 24 hours before you arrive. The night before surgery, stop any drinks that contain THC.  If your care team tells you to take medicine on the morning of surgery, it's okay to take it with a sip of water. No other medicines or drugs are allowed (including CBD oil)--follow your care team's instructions.  If you have questions the day of surgery, call your hospital or surgery center.   Preventing infection  Shower or bathe the night before and the morning of surgery. Follow the instructions your clinic gave you. (If no instructions, use regular soap.)  Don't shave or clip hair near your surgery  site. We'll remove the hair if needed.  Don't smoke or vape the morning of surgery. No chewing tobacco for 6 hours before you arrive. A nicotine patch is okay. You may spit out nicotine gum when you arrive.  For some surgeries, the surgeon will tell you to fully quit smoking and nicotine.  We will make every effort to keep you safe from infection. We will:  Clean our hands often with soap and water (or an alcohol-based hand rub).  Clean the skin at your surgery site with a special soap that kills germs.  Give you a special gown to keep you warm. (Cold raises the risk of infection.)  Wear hair covers, masks, gowns, and gloves during surgery.  Give antibiotic medicine, if prescribed. Not all surgeries need this medicine.  What to bring on the day of surgery  Photo ID and insurance card  Copy of your health care directive, if you have one  Glasses and hearing aids (bring cases)  You can't wear contacts during surgery  Inhaler and eye drops, if you use them (tell us about these when you arrive)  CPAP machine or breathing device, if you use them  A few personal items, if spending the night  If you have . . .  A pacemaker, ICD (cardiac defibrillator), or other implant: Bring the ID card.  An implanted stimulator: Bring the remote control.  A legal guardian: Bring a copy of the certified (court-stamped) guardianship papers.  Please remove any jewelry, including body piercings. Leave jewelry and other valuables at home.  If you're going home the day of surgery  You must have a responsible adult drive you home. They should stay with you overnight as well.  If you don't have someone to stay with you, and you aren't safe to go home alone, we may keep you overnight. Insurance often won't pay for this.  After surgery  If it's hard to control your pain or you need more pain medicine, please call your surgeon's office.  Questions?   If you have any questions for your care team, list them here:    ____________________________________________________________________________________________________________________________________________________________________________________________________________________________________________________________  For informational purposes only. Not to replace the advice of your health care provider. Copyright   2019 [employer] [facility]. All rights reserved. Clinically reviewed by Justin Macias MD. [employer] [id number] - REV .     Patient Education   Preparing for Your Surgery  For Adults  Getting started  In most cases, a nurse will call to review your health history and instructions. They will give you an arrival time based on your scheduled surgery time. Please be ready to share:  Your doctor's clinic name and phone number  Your medical, surgical, and anesthesia history  A list of allergies and sensitivities  A list of medicines, including herbal treatments and over-the-counter drugs  Whether the patient has a legal guardian (ask how to send us the papers in advance)  Note: You may not receive a call if you were seen at our PAC ([facility]).  Please tell us if you're pregnant--or if there's any chance you might be pregnant. Some surgeries may injure a fetus (unborn baby), so they require a pregnancy test. Surgeries that are safe for a fetus don't always need a test, and you can choose whether to have one.   Preparing for surgery  Within 10 to 30 days of surgery: Have a pre-op exam (sometimes called an H&P, or History and Physical). This can be done at a clinic or pre-operative center.  If you're having a , you may not need this exam. Talk to your care team.  At your pre-op exam, talk to your care team about all medicines you take. (This includes CBD oil and any drugs, such as THC, marijuana, and other forms of cannabis.) If you need to stop any medicine before surgery, ask when to start taking it again.  This is for your  safety. Many medicines and drugs can make you bleed too much during surgery. Some change how well surgery (anesthesia) drugs work.  Call your insurance company to let them know you're having surgery. (If you don't have insurance, call [phone].)  Call your clinic if there's any change in your health. This includes a scrape or scratch near the surgery site, or any signs of a cold (sore throat, runny nose, cough, rash, fever).  Eating and drinking guidelines  For your safety: Unless your surgeon tells you otherwise, follow the guidelines below.  Eat and drink as normal until 8 hours before you arrive for surgery. After that, no food or milk. You can spit out gum when you arrive.  Drink clear liquids until 2 hours before you arrive. These are liquids you can see through, like water, Gatorade, and Propel Water. They also include plain black coffee and tea (no cream or milk).  No alcohol for 24 hours before you arrive. The night before surgery, stop any drinks that contain THC.  If your care team tells you to take medicine on the morning of surgery, it's okay to take it with a sip of water. No other medicines or drugs are allowed (including CBD oil)--follow your care team's instructions.  If you have questions the day of surgery, call your hospital or surgery center.   Preventing infection  Shower or bathe the night before and the morning of surgery. Follow the instructions your clinic gave you. (If no instructions, use regular soap.)  Don't shave or clip hair near your surgery site. We'll remove the hair if needed.  Don't smoke or vape the morning of surgery. No chewing tobacco for 6 hours before you arrive. A nicotine patch is okay. You may spit out nicotine gum when you arrive.  For some surgeries, the surgeon will tell you to fully quit smoking and nicotine.  We will make every effort to keep you safe from infection. We will:  Clean our hands often with soap and water (or an alcohol-based hand rub).  Clean the  skin at your surgery site with a special soap that kills germs.  Give you a special gown to keep you warm. (Cold raises the risk of infection.)  Wear hair covers, masks, gowns, and gloves during surgery.  Give antibiotic medicine, if prescribed. Not all surgeries need this medicine.  What to bring on the day of surgery  Photo ID and insurance card  Copy of your health care directive, if you have one  Glasses and hearing aids (bring cases)  You can't wear contacts during surgery  Inhaler and eye drops, if you use them (tell us about these when you arrive)  CPAP machine or breathing device, if you use them  A few personal items, if spending the night  If you have . . .  A pacemaker, ICD (cardiac defibrillator), or other implant: Bring the ID card.  An implanted stimulator: Bring the remote control.  A legal guardian: Bring a copy of the certified (court-stamped) guardianship papers.  Please remove any jewelry, including body piercings. Leave jewelry and other valuables at home.  If you're going home the day of surgery  You must have a responsible adult drive you home. They should stay with you overnight as well.  If you don't have someone to stay with you, and you aren't safe to go home alone, we may keep you overnight. Insurance often won't pay for this.  After surgery  If it's hard to control your pain or you need more pain medicine, please call your surgeon's office.  Questions?   If you have any questions for your care team, list them here:   ____________________________________________________________________________________________________________________________________________________________________________________________________________________________________________________________  For informational purposes only. Not to replace the advice of your health care provider. Copyright   2003, 2019 [facility]. All rights reserved. Clinically reviewed by Justin Macias MD. [employer] [id number] -  REV 08/24.

## 2025-01-13 ENCOUNTER — ANCILLARY PROCEDURE (OUTPATIENT)
Dept: MAMMOGRAPHY | Facility: CLINIC | Age: 70
End: 2025-01-13
Payer: COMMERCIAL

## 2025-01-13 DIAGNOSIS — Z12.31 VISIT FOR SCREENING MAMMOGRAM: ICD-10-CM

## 2025-01-13 DIAGNOSIS — Z79.4 TYPE 2 DIABETES MELLITUS WITHOUT COMPLICATION, WITH LONG-TERM CURRENT USE OF INSULIN (H): ICD-10-CM

## 2025-01-13 DIAGNOSIS — E11.9 TYPE 2 DIABETES MELLITUS WITHOUT COMPLICATION, WITH LONG-TERM CURRENT USE OF INSULIN (H): ICD-10-CM

## 2025-01-13 PROCEDURE — 77067 SCR MAMMO BI INCL CAD: CPT | Mod: TC | Performed by: RADIOLOGY

## 2025-01-13 PROCEDURE — 77063 BREAST TOMOSYNTHESIS BI: CPT | Mod: TC | Performed by: RADIOLOGY

## 2025-01-13 RX ORDER — DULAGLUTIDE 4.5 MG/.5ML
4.5 INJECTION, SOLUTION SUBCUTANEOUS WEEKLY
Qty: 8 ML | Refills: 3 | Status: SHIPPED | OUTPATIENT
Start: 2025-01-13

## 2025-01-13 RX ORDER — INSULIN LISPRO 100 [IU]/ML
2-10 INJECTION, SOLUTION INTRAVENOUS; SUBCUTANEOUS
Qty: 24 ML | Refills: 3 | Status: SHIPPED | OUTPATIENT
Start: 2025-01-13

## 2025-01-17 DIAGNOSIS — L40.9 PSORIASIS: ICD-10-CM

## 2025-01-19 RX ORDER — TRIAMCINOLONE ACETONIDE 1 MG/G
CREAM TOPICAL
Qty: 454 G | Refills: 0 | Status: SHIPPED | OUTPATIENT
Start: 2025-01-19

## 2025-01-25 DIAGNOSIS — I10 ESSENTIAL HYPERTENSION: ICD-10-CM

## 2025-01-27 RX ORDER — HYDROCHLOROTHIAZIDE 25 MG/1
25 TABLET ORAL DAILY
Qty: 90 TABLET | Refills: 1 | Status: SHIPPED | OUTPATIENT
Start: 2025-01-27

## 2025-01-30 ENCOUNTER — VIRTUAL VISIT (OUTPATIENT)
Dept: ENDOCRINOLOGY | Facility: CLINIC | Age: 70
End: 2025-01-30
Payer: COMMERCIAL

## 2025-01-30 DIAGNOSIS — E11.9 TYPE 2 DIABETES MELLITUS WITHOUT COMPLICATION, WITH LONG-TERM CURRENT USE OF INSULIN (H): Primary | ICD-10-CM

## 2025-01-30 DIAGNOSIS — Z79.4 TYPE 2 DIABETES MELLITUS WITHOUT COMPLICATION, WITH LONG-TERM CURRENT USE OF INSULIN (H): Primary | ICD-10-CM

## 2025-01-30 NOTE — PROGRESS NOTES
Virtual Visit Details    Type of service:  Video Visit   Video Start Time: 1:33 PM  Video End Time:  1:50 PM    Originating Location (pt. Location): Home  Distant Location (provider location):  Off-site  Platform used for Video Visit: Javier      Recent issues:  Diabetes follow-up evaluation.   Taking the MF, glipizideER, Trulicity, and Humalog sscale, particpates with [employer] patient assistance program  Feeling well overall, no new health issues reported        History of pre-diabetes  ~2016. Initial diagnosis of diabetes mellitus  Had seen physician in [place], [place]  Started treatment with metformin   ~6/2020. Acute back pain?, diagnosed with pneumonia, high glucose and hgbA1c 12%   Hospital treatment included insulin treatment   Treatment with Abx, pain resolved   Recalls basal insulin 55U and use of rapid acting Humalog  7/15/20 Endocrinology evaluation with Dr. MERON Armijo/[facility] clinic  Addition of Farxiga but expensive, then change to Jardiance medication  Fall '21. Discontinue Jardiance since expensive   Previous [facility] hgbA1c trends include:   Lab Test 06/08/22  1149 03/23/22  1641 03/01/22  0951 12/06/21  1132 07/30/21  1236   A1C 7.2* 6.6* 6.7* 6.9* 6.5*       6/21/22. Initial diabetes evaluation with me at [place]  Reviewed health history and diabetes issues  [facility] Diab Ed eval and diagnostic Hi  Added glipizideER medication  3/2023. Ran out of Trulicity (4.5 mg) medication  ~ 5/2023. Restarted Trulicity med per plan  ~7/2024. Stopped Basaglar  Current DM medications:  Metformin 500 mg  2-tabs in morning and evening  glipizideER 2.5 mg  1-tab daily  Trulicity 4.5 mg   Subcutaneous weekly  Humalog Gia sscale (uses seldom)   140-179  2U   180-219  4U   220-259  6U   260-299  8U   >300   10U  Jardiance 10 mg  1-tab in morning    Blood glucose (BG) meter:  One Touch Verio   Has tested 2-3/day   Recent trends 150-170, per patient  Fam Hx Diabetes:  brother (T1DM), cousins  Previous [facility] hgbA1c  trends include:  Lab Results   Component Value Date    A1C 7.2 (H) 01/09/2025    A1C 6.7 (H) 09/26/2024    A1C 6.4 (H) 05/03/2024    A1C 7.2 (H) 01/29/2024    A1C 6.7 (H) 08/08/2023      Recent [facility] labs include:  Lab Results   Component Value Date    A1C 7.2 (H) 01/09/2025     01/09/2025    POTASSIUM 3.6 01/09/2025    CHLORIDE 101 01/09/2025    CO2 23 01/09/2025    ANIONGAP 15 01/09/2025     (H) 01/09/2025    BUN 22.5 01/09/2025    CR 0.98 (H) 01/09/2025    GFRESTIMATED 63 01/09/2025    GFRESTBLACK 80 01/07/2021    TRISTIAN 9.7 01/09/2025    CPEPT 9.8 (H) 07/31/2020    CHOL 134 03/19/2024    TRIG 132 03/19/2024    HDL 45 (L) 03/19/2024    LDL 63 03/19/2024    NHDL 89 03/19/2024    UCRR 78.1 01/09/2025    MICROL <12.0 01/09/2025    UMALCR  01/09/2025      Comment:      Unable to calculate, urine albumin and/or urine creatinine is outside detectable limits.  Microalbuminuria is defined as an albumin:creatinine ratio of 17 to 299 for males and 25 to 299 for females. A ratio of albumin:creatinine of 300 or higher is indicative of overt proteinuria.  Due to biologic variability, positive results should be confirmed by a second, first-morning random or 24-hour timed urine specimen. If there is discrepancy, a third specimen is recommended. When 2 out of 3 results are in the microalbuminuria range, this is evidence for incipient nephropathy and warrants increased efforts at glucose control, blood pressure control, and institution of therapy with an angiotensin-converting-enzyme (ACE) inhibitor (if the patient can tolerate it).      TSH 2.38 01/09/2025     Last eye exam at [facility] 9/2024, epiretinal membrane left eye but no DR   DM Complications:  None known      Lives in [facility]  Sees Dr. Juani Mae/[facility] for general medicine evaluations.    PMH/PSH:  Past Medical History:   Diagnosis Date    Acquired hypothyroidism     History of blood transfusion     Hypertension     Obesity due  to excess calories     Type 2 diabetes mellitus without complication, with long-term current use of insulin (H)      Past Surgical History:   Procedure Laterality Date    CHOLECYSTECTOMY      DAVINCI HYSTERECTOMY TOTAL, BILATERAL SALPINGO-OOPHORECTOMY, COMBINED Bilateral 7/6/2022    Procedure: ROBOTIC HYSTERECTOMY, BILATERAL SALPINGO OOPHORECTOMY, LYSIS OF ADHESIONS;  Surgeon: Jefferson Chavez MD;  Location: [facility]    HERNIA REPAIR  1990 s    INSERT INTRAUTERINE DEVICE N/A 7/6/2022    Procedure: REMOVAL OF INTRAUTERINE DEVICE;  Surgeon: Jefferson Chavez MD;  Location: [facility]       Family Hx:  Family History   Problem Relation Age of Onset    Diabetes Mother         d age 80    Hypertension Mother     Coronary Artery Disease Father         d age 85    Brain Tumor Father     Hypertension Father     Diabetes Brother          Social Hx:  Social History     Socioeconomic History    Marital status:      Spouse name: Not on file    Number of children: Not on file    Years of education: Not on file    Highest education level: Not on file   Occupational History    Not on file   Tobacco Use    Smoking status: Never    Smokeless tobacco: Never   Vaping Use    Vaping status: Never Used   Substance and Sexual Activity    Alcohol use: Yes     Comment: Occ    Drug use: Never    Sexual activity: Not Currently   Other Topics Concern    Parent/sibling w/ CABG, MI or angioplasty before 65F 55M? No   Social History Narrative    Not on file     Social Drivers of Health     Financial Resource Strain: Low Risk  (2/20/2024)    Financial Resource Strain     Within the past 12 months, have you or your family members you live with been unable to get utilities (heat, electricity) when it was really needed?: No   Food Insecurity: Low Risk  (2/20/2024)    Food Insecurity     Within the past 12 months, did you worry that your food would run out before you got money to buy more?: No     Within the past  12 months, did the food you bought just not last and you didn t have money to get more?: No   Transportation Needs: Low Risk  (2/20/2024)    Transportation Needs     Within the past 12 months, has lack of transportation kept you from medical appointments, getting your medicines, non-medical meetings or appointments, work, or from getting things that you need?: No   Physical Activity: Insufficiently Active (2/20/2024)    Exercise Vital Sign     Days of Exercise per Week: 2 days     Minutes of Exercise per Session: 10 min   Stress: No Stress Concern Present (2/20/2024)    [place] citizen [place] [employer] - Occupational Stress Questionnaire     Feeling of Stress : Only a little   Social Connections: Unknown (2/20/2024)    Social Connection and Isolation Panel [NHANES]     Frequency of Communication with Friends and Family: Not on file     Frequency of Social Gatherings with Friends and Family: More than three times a week     Attends [employer] Services: Not on file     Active Member of Clubs or Organizations: Not on file     Attends Club or Organization Meetings: Not on file     Marital Status: Not on file   Interpersonal Safety: Low Risk  (1/9/2025)    Interpersonal Safety     Do you feel physically and emotionally safe where you currently live?: Yes     Within the past 12 months, have you been hit, slapped, kicked or otherwise physically hurt by someone?: No     Within the past 12 months, have you been humiliated or emotionally abused in other ways by your partner or ex-partner?: No   Housing Stability: Low Risk  (2/20/2024)    Housing Stability     Do you have housing? : Yes     Are you worried about losing your housing?: No          MEDICATIONS:  has a current medication list which includes the following prescription(s): allopurinol, aspirin, atorvastatin, clobetasol, desonide, trulicity, empagliflozin, furosemide, glipizide, hydrochlorothiazide, levothyroxine, losartan, metformin, multivitamin,  therapeutic, triamcinolone, vitamin d3, cvs prep, onetouch verio iq, blood glucose calibration, blood glucose monitoring, fluticasone, insulin lispro, bd pen needle fadi 2nd gen, and onetouch delica lancets 33g.    ROS:     ROS: 10 point ROS neg other than the symptoms noted above in the HPI.    GENERAL: some fatigue, wt stable?; denies fevers, chills, night sweats.   HEENT: no dysphagia, odonophagia, diplopia, neck pain  THYROID:  no apparent hyper or hypothyroid symptoms  CV: no chest pain, pressure, palpitations  LUNGS: no SOB, NICHOLS, cough, wheezing   ABDOMEN: some indigestion; no diarrhea, constipation, abdominal pain  EXTREMITIES: no rashes, ulcers, edema  NEUROLOGY: no headaches, denies changes in vision, tingling, extremitiy numbness   MSK: no muscle aches or pains, weakness  SKIN: no rashes or lesions  : no menses  PSYCH:  stable mood, no significant anxiety or depression  ENDOCRINE: no heat or cold intolerance    Physical Exam (visual exam)  VS:  no vital signs taken for video visit  CONSTITUTIONAL: healthy, alert and NAD, well dressed, answering questions appropriately  ENT: no nose swelling or nasal discharge, mouth redness or gum changes.  EYES: eyes grossly normal to inspection, conjunctivae and sclerae normal, no exophthalmos or proptosis  THYROID:  no apparent nodules or goiter  LUNGS: no audible wheeze, cough or visible cyanosis, no visible retractions or increased work of breathing  ABDOMEN: abdomen not evaluated  EXTREMITIES: no hand tremors, limited exam  NEUROLOGY: CN grossly intact, mentation intact and speech normal   SKIN:  no apparent skin lesions, rash, or edema with visualized skin appearance  PSYCH: mentation appears normal, affect normal/bright, judgement and insight intact,   normal speech and appearance well groomed    LABS:    All pertinent notes, labs, and images personally reviewed by me.     A/P:  Encounter Diagnosis   Name Primary?    Type 2 diabetes mellitus without complication,  with long-term current use of insulin (H) Yes       Comments:  Reviewed health history and diabetes issues.  Addition of Jardiance reasonable, yet previously cost-prohibitive for patient  Reviewed and interpreted tests that I previously ordered.   Ordered appropriate tests for the endocrinology disease management.    Management options discussed and implemented after shared medical decision making with the patient.  T2DM problem is chronic-stable    Plan:  Discussed general issues with the diabetes diagnosis and management  We discussed the hgbA1c test which reflects previous overall glucose levels or control  Discussed the importance of blood glucose (BG) testing to assess glucose trends  Provided general overview of the diabetes medication options and medication treatment plan.    Recommend:  Continue the metformin, glipizideER, Trulicity and Jardiance medications as noted  Reasonable to continue the Jardiance medication, per PCP plan  Raise dose glipizideER as 2.5 mg 2-tablets daily, message me when supply low for new 5 mg tab Rx  She has previously gotten Basaglar, Humalog, and Trulicity meds from [facility], but no [facility] option with that program  Monitor for possible GI side effects  Goal target premeal glucose 80- 150 mg/dl  Reviewed option of acquiring Personal [employer] CGM again... she's not interested currently  Would be helpful for patient to have follow-up [facility] Diab Ed evaluation  Keep focus on diet, exercise, weight management.  Plan non-fasting labs in 3/2025 or 4/2025   Testing at [facility] PCP appt   Lab orders placed  Continue losartan, atorvastatin, levothyroxine med use, per PCP  Advise having fasting lipid panel testing and dilated eye examination, at least annually    Addressed patient questions today    The longitudinal plan of care for the endocrine problem(s) were addressed during this visit.  Due to added complexity of care,   we will continue to support the patient and  the subsequent management of this condition with ongoing continuity of care.    There are no Patient Instructions on file for this visit.    Future labs ordered today:   Orders Placed This Encounter   Procedures    Hemoglobin A1c     Radiology/Consults ordered today: None    Total time spent on day of encounter:  20 min    Follow-up:  5/12/25 at 12 noon, Return    JONELLE Araujo MD, MS  Endocrinology  [facility]    CC:  VALORIE Mae

## 2025-02-06 DIAGNOSIS — E03.9 HYPOTHYROIDISM, UNSPECIFIED TYPE: ICD-10-CM

## 2025-02-06 RX ORDER — LEVOTHYROXINE SODIUM 100 UG/1
100 TABLET ORAL DAILY
Qty: 90 TABLET | Refills: 0 | Status: SHIPPED | OUTPATIENT
Start: 2025-02-06

## 2025-02-18 DIAGNOSIS — M10.9 GOUT, UNSPECIFIED CAUSE, UNSPECIFIED CHRONICITY, UNSPECIFIED SITE: ICD-10-CM

## 2025-02-19 ENCOUNTER — TELEPHONE (OUTPATIENT)
Dept: PHARMACY | Facility: OTHER | Age: 70
End: 2025-02-19
Payer: COMMERCIAL

## 2025-02-19 RX ORDER — ALLOPURINOL 300 MG/1
1 TABLET ORAL DAILY
Qty: 90 TABLET | Refills: 3 | Status: SHIPPED | OUTPATIENT
Start: 2025-02-19

## 2025-02-19 NOTE — TELEPHONE ENCOUNTER
MTM Recruitment: [facility] insurance     Referral outreach attempt #1 on February 19, 2025      Outcome: patient opted out    YINA Hurtado

## 2025-02-26 ENCOUNTER — TELEPHONE (OUTPATIENT)
Dept: FAMILY MEDICINE | Facility: CLINIC | Age: 70
End: 2025-02-26
Payer: COMMERCIAL

## 2025-02-26 DIAGNOSIS — N18.31 TYPE 2 DIABETES MELLITUS WITH STAGE 3A CHRONIC KIDNEY DISEASE, WITH LONG-TERM CURRENT USE OF INSULIN (H): ICD-10-CM

## 2025-02-26 DIAGNOSIS — E11.22 TYPE 2 DIABETES MELLITUS WITH STAGE 3A CHRONIC KIDNEY DISEASE, WITH LONG-TERM CURRENT USE OF INSULIN (H): ICD-10-CM

## 2025-02-26 DIAGNOSIS — Z79.4 TYPE 2 DIABETES MELLITUS WITH STAGE 3A CHRONIC KIDNEY DISEASE, WITH LONG-TERM CURRENT USE OF INSULIN (H): ICD-10-CM

## 2025-02-26 RX ORDER — EMPAGLIFLOZIN 10 MG/1
10 TABLET, FILM COATED ORAL DAILY
Qty: 30 TABLET | Refills: 3 | Status: SHIPPED | OUTPATIENT
Start: 2025-02-26

## 2025-02-26 NOTE — TELEPHONE ENCOUNTER
Patient called stating that her jardiance was supposed to go to [facility] mail order pharmacy for 90 day supply  Needs this sent asap.  Explained that Juani Saldaña had previously forwarded the refill request to [facility] last December as they are now managing her diabetes.    Will forward a message to Dr. Araujo's team to please resend prescription to the above pharmacy for 90 day supply    Marino Hyman RN  [facility]

## 2025-02-26 NOTE — TELEPHONE ENCOUNTER
RN reviewed chart, and per last ov from 1/30/25: Reasonable to continue the Jardiance medication, per PCP plan.  Will send 90 day supply to requested pharmacy. Gisel Flannery RN

## 2025-03-13 DIAGNOSIS — E78.5 HYPERLIPIDEMIA LDL GOAL <70: ICD-10-CM

## 2025-03-13 RX ORDER — ATORVASTATIN CALCIUM 10 MG/1
10 TABLET, FILM COATED ORAL DAILY
Qty: 30 TABLET | Refills: 0 | Status: SHIPPED | OUTPATIENT
Start: 2025-03-13

## 2025-03-21 PROBLEM — D12.6 ADENOMATOUS COLON POLYP: Status: ACTIVE | Noted: 2025-03-21

## 2025-04-02 DIAGNOSIS — E11.00 TYPE 2 DIABETES MELLITUS WITH HYPEROSMOLARITY WITHOUT COMA, UNSPECIFIED WHETHER LONG TERM INSULIN USE (H): ICD-10-CM

## 2025-04-02 RX ORDER — METFORMIN HYDROCHLORIDE 500 MG/1
1000 TABLET, EXTENDED RELEASE ORAL 2 TIMES DAILY WITH MEALS
Qty: 360 TABLET | Refills: 0 | Status: SHIPPED | OUTPATIENT
Start: 2025-04-02

## 2025-04-12 ENCOUNTER — HEALTH MAINTENANCE LETTER (OUTPATIENT)
Age: 70
End: 2025-04-12

## 2025-04-16 ENCOUNTER — OFFICE VISIT (OUTPATIENT)
Dept: FAMILY MEDICINE | Facility: CLINIC | Age: 70
End: 2025-04-16
Payer: COMMERCIAL

## 2025-04-16 VITALS
SYSTOLIC BLOOD PRESSURE: 131 MMHG | OXYGEN SATURATION: 97 % | HEIGHT: 63 IN | BODY MASS INDEX: 51.91 KG/M2 | TEMPERATURE: 97.6 F | WEIGHT: 293 LBS | HEART RATE: 94 BPM | RESPIRATION RATE: 19 BRPM | DIASTOLIC BLOOD PRESSURE: 72 MMHG

## 2025-04-16 DIAGNOSIS — Z00.00 ENCOUNTER FOR MEDICARE ANNUAL WELLNESS EXAM: ICD-10-CM

## 2025-04-16 DIAGNOSIS — N18.30 BENIGN HYPERTENSION WITH CKD (CHRONIC KIDNEY DISEASE) STAGE III (H): ICD-10-CM

## 2025-04-16 DIAGNOSIS — E11.22 TYPE 2 DIABETES MELLITUS WITH STAGE 3A CHRONIC KIDNEY DISEASE, WITH LONG-TERM CURRENT USE OF INSULIN (H): ICD-10-CM

## 2025-04-16 DIAGNOSIS — E78.5 HYPERLIPIDEMIA LDL GOAL <70: ICD-10-CM

## 2025-04-16 DIAGNOSIS — E03.9 HYPOTHYROIDISM, UNSPECIFIED TYPE: ICD-10-CM

## 2025-04-16 DIAGNOSIS — M10.9 GOUT, UNSPECIFIED CAUSE, UNSPECIFIED CHRONICITY, UNSPECIFIED SITE: ICD-10-CM

## 2025-04-16 DIAGNOSIS — I12.9 BENIGN HYPERTENSION WITH CKD (CHRONIC KIDNEY DISEASE) STAGE III (H): ICD-10-CM

## 2025-04-16 DIAGNOSIS — Z79.4 TYPE 2 DIABETES MELLITUS WITH STAGE 3A CHRONIC KIDNEY DISEASE, WITH LONG-TERM CURRENT USE OF INSULIN (H): ICD-10-CM

## 2025-04-16 DIAGNOSIS — E66.01 MORBID OBESITY (H): Primary | ICD-10-CM

## 2025-04-16 DIAGNOSIS — N18.31 TYPE 2 DIABETES MELLITUS WITH STAGE 3A CHRONIC KIDNEY DISEASE, WITH LONG-TERM CURRENT USE OF INSULIN (H): ICD-10-CM

## 2025-04-16 DIAGNOSIS — N18.31 CHRONIC KIDNEY DISEASE, STAGE 3A (H): ICD-10-CM

## 2025-04-16 LAB
CHOLEST SERPL-MCNC: 150 MG/DL
EST. AVERAGE GLUCOSE BLD GHB EST-MCNC: 163 MG/DL
FASTING STATUS PATIENT QL REPORTED: YES
HBA1C MFR BLD: 7.3 % (ref 0–5.6)
HDLC SERPL-MCNC: 49 MG/DL
LDLC SERPL CALC-MCNC: 67 MG/DL
NONHDLC SERPL-MCNC: 101 MG/DL
TRIGL SERPL-MCNC: 169 MG/DL
URATE SERPL-MCNC: 4.7 MG/DL (ref 2.4–5.7)

## 2025-04-16 PROCEDURE — 84550 ASSAY OF BLOOD/URIC ACID: CPT | Performed by: FAMILY MEDICINE

## 2025-04-16 PROCEDURE — 3078F DIAST BP <80 MM HG: CPT | Performed by: FAMILY MEDICINE

## 2025-04-16 PROCEDURE — 83036 HEMOGLOBIN GLYCOSYLATED A1C: CPT | Performed by: FAMILY MEDICINE

## 2025-04-16 PROCEDURE — 99207 PR FOOT EXAM NO CHARGE: CPT | Performed by: FAMILY MEDICINE

## 2025-04-16 PROCEDURE — 90480 ADMN SARSCOV2 VAC 1/ONLY CMP: CPT | Performed by: FAMILY MEDICINE

## 2025-04-16 PROCEDURE — 1126F AMNT PAIN NOTED NONE PRSNT: CPT | Performed by: FAMILY MEDICINE

## 2025-04-16 PROCEDURE — 91320 SARSCV2 VAC 30MCG TRS-SUC IM: CPT | Performed by: FAMILY MEDICINE

## 2025-04-16 PROCEDURE — 36415 COLL VENOUS BLD VENIPUNCTURE: CPT | Performed by: FAMILY MEDICINE

## 2025-04-16 PROCEDURE — 3075F SYST BP GE 130 - 139MM HG: CPT | Performed by: FAMILY MEDICINE

## 2025-04-16 PROCEDURE — 80061 LIPID PANEL: CPT | Performed by: FAMILY MEDICINE

## 2025-04-16 PROCEDURE — G0439 PPPS, SUBSEQ VISIT: HCPCS | Performed by: FAMILY MEDICINE

## 2025-04-16 PROCEDURE — G2211 COMPLEX E/M VISIT ADD ON: HCPCS | Performed by: FAMILY MEDICINE

## 2025-04-16 PROCEDURE — 99214 OFFICE O/P EST MOD 30 MIN: CPT | Mod: 25 | Performed by: FAMILY MEDICINE

## 2025-04-16 RX ORDER — ATORVASTATIN CALCIUM 10 MG/1
10 TABLET, FILM COATED ORAL DAILY
Qty: 90 TABLET | Refills: 3 | Status: SHIPPED | OUTPATIENT
Start: 2025-04-16

## 2025-04-16 RX ORDER — VITAMIN B COMPLEX
1 TABLET ORAL DAILY
Qty: 90 TABLET | Refills: 3 | Status: SHIPPED | OUTPATIENT
Start: 2025-04-16

## 2025-04-16 RX ORDER — LEVOTHYROXINE SODIUM 100 UG/1
100 TABLET ORAL DAILY
Qty: 90 TABLET | Refills: 3 | Status: SHIPPED | OUTPATIENT
Start: 2025-04-16

## 2025-04-16 RX ORDER — LOSARTAN POTASSIUM 100 MG/1
100 TABLET ORAL DAILY
Qty: 30 TABLET | Refills: 1 | Status: SHIPPED | OUTPATIENT
Start: 2025-04-16

## 2025-04-16 RX ORDER — METFORMIN HYDROCHLORIDE 500 MG/1
1000 TABLET, EXTENDED RELEASE ORAL 2 TIMES DAILY WITH MEALS
Qty: 360 TABLET | Refills: 1 | Status: SHIPPED | OUTPATIENT
Start: 2025-04-16

## 2025-04-16 SDOH — HEALTH STABILITY: PHYSICAL HEALTH: ON AVERAGE, HOW MANY DAYS PER WEEK DO YOU ENGAGE IN MODERATE TO STRENUOUS EXERCISE (LIKE A BRISK WALK)?: 2 DAYS

## 2025-04-16 ASSESSMENT — PAIN SCALES - GENERAL: PAINLEVEL_OUTOF10: NO PAIN (0)

## 2025-04-16 ASSESSMENT — SOCIAL DETERMINANTS OF HEALTH (SDOH): HOW OFTEN DO YOU GET TOGETHER WITH FRIENDS OR RELATIVES?: TWICE A WEEK

## 2025-04-16 NOTE — PATIENT INSTRUCTIONS
Patient Education   Preventive Care Advice   This is general advice given by our system to help you stay healthy. However, your care team may have specific advice just for you. Please talk to your care team about your preventive care needs.  Nutrition  Eat 5 or more servings of fruits and vegetables each day.  Try wheat bread, brown rice and whole grain pasta (instead of white bread, rice, and pasta).  Get enough calcium and vitamin D. Check the label on foods and aim for 100% of the RDA (recommended daily allowance).  Lifestyle  Exercise at least 150 minutes each week  (30 minutes a day, 5 days a week).  Do muscle strengthening activities 2 days a week. These help control your weight and prevent disease.  No smoking.  Wear sunscreen to prevent skin cancer.  Have a dental exam and cleaning every 6 months.  Yearly exams  See your health care team every year to talk about:  Any changes in your health.  Any medicines your care team has prescribed.  Preventive care, family planning, and ways to prevent chronic diseases.  Shots (vaccines)   HPV shots (up to age 26), if you've never had them before.  Hepatitis B shots (up to age 59), if you've never had them before.  COVID-19 shot: Get this shot when it's due.  Flu shot: Get a flu shot every year.  Tetanus shot: Get a tetanus shot every 10 years.  Pneumococcal, hepatitis A, and RSV shots: Ask your care team if you need these based on your risk.  Shingles shot (for age 50 and up)  General health tests  Diabetes screening:  Starting at age 35, Get screened for diabetes at least every 3 years.  If you are younger than age 35, ask your care team if you should be screened for diabetes.  Cholesterol test: At age 39, start having a cholesterol test every 5 years, or more often if advised.  Bone density scan (DEXA): At age 50, ask your care team if you should have this scan for osteoporosis (brittle bones).  Hepatitis C: Get tested at least once in your life.  STIs (sexually  transmitted infections)  Before age 24: Ask your care team if you should be screened for STIs.  After age 24: Get screened for STIs if you're at risk. You are at risk for STIs (including HIV) if:  You are sexually active with more than one person.  You don't use condoms every time.  You or a partner was diagnosed with a sexually transmitted infection.  If you are at risk for HIV, ask about PrEP medicine to prevent HIV.  Get tested for HIV at least once in your life, whether you are at risk for HIV or not.  Cancer screening tests  Cervical cancer screening: If you have a cervix, begin getting regular cervical cancer screening tests starting at age 21.  Breast cancer scan (mammogram): If you've ever had breasts, begin having regular mammograms starting at age 40. This is a scan to check for breast cancer.  Colon cancer screening: It is important to start screening for colon cancer at age 45.  Have a colonoscopy test every 10 years (or more often if you're at risk) Or, ask your provider about stool tests like a FIT test every year or Cologuard test every 3 years.  To learn more about your testing options, visit:   .  For help making a decision, visit:   https://bit.ly/bt14813.  Prostate cancer screening test: If you have a prostate, ask your care team if a prostate cancer screening test (PSA) at age 55 is right for you.  Lung cancer screening: If you are a current or former smoker ages 50 to 80, ask your care team if ongoing lung cancer screenings are right for you.  For informational purposes only. Not to replace the advice of your health care provider. Copyright   2023 [facility]. All rights reserved. Clinically reviewed by the [facility]. [employer] [id number] - REV 01/24.  Bladder Training: Care Instructions  Your Care Instructions     Bladder training is used to treat urge incontinence and stress incontinence. Urge incontinence means that the need to urinate comes on so fast  that you can't get to a toilet in time. Stress incontinence means that you leak urine because of pressure on your bladder. For example, it may happen when you laugh, cough, or lift something heavy.  Bladder training can increase how long you can wait before you have to urinate. It can also help your bladder hold more urine. And it can give you better control over the urge to urinate.  It is important to remember that bladder training takes a few weeks to a few months to make a difference. You may not see results right away, but don't give up.  Follow-up care is a key part of your treatment and safety. Be sure to make and go to all appointments, and call your doctor if you are having problems. It's also a good idea to know your test results and keep a list of the medicines you take.  How can you care for yourself at home?  Work with your doctor to come up with a bladder training program that is right for you. You may use one or more of the following methods.  Delayed urination  In the beginning, try to keep from urinating for 5 minutes after you first feel the need to go.  While you wait, take deep, slow breaths to relax. Kegel exercises can also help you delay the need to go to the bathroom.  After some practice, when you can easily wait 5 minutes to urinate, try to wait 10 minutes before you urinate.  Slowly increase the waiting period until you are able to control when you have to urinate.  Scheduled urination  Empty your bladder when you first wake up in the morning.  Schedule times throughout the day when you will urinate.  Start by going to the bathroom every hour, even if you don't need to go.  Slowly increase the time between trips to the bathroom.  When you have found a schedule that works well for you, keep doing it.  If you wake up during the night and have to urinate, do it. Apply your schedule to waking hours only.  Kegel exercises  These tighten and strengthen pelvic muscles, which can help you control  "the flow of urine. (If doing these exercises causes pain, stop doing them and talk with your doctor.) To do Kegel exercises:  Squeeze your muscles as if you were trying not to pass gas. Or squeeze your muscles as if you were stopping the flow of urine. Your belly, legs, and buttocks shouldn't move.  Hold the squeeze for 3 seconds, then relax for 5 to 10 seconds.  Start with 3 seconds, then add 1 second each week until you are able to squeeze for 10 seconds.  Repeat the exercise 10 times a session. Do 3 to 8 sessions a day.  When should you call for help?  Watch closely for changes in your health, and be sure to contact your doctor if:    Your incontinence is getting worse.     You do not get better as expected.   Where can you learn more?  Go to https://www.[employer].Variable/patiented  Enter V684 in the search box to learn more about \"Bladder Training: Care Instructions.\"  Current as of: April 30, 2024  Content Version: 14.4 2024-2025 [employer].   Care instructions adapted under license by your healthcare professional. If you have questions about a medical condition or this instruction, always ask your healthcare professional. [employer] disclaims any warranty or liability for your use of this information.     Learning About Risk for Heart Attack and Stroke (01:56)  Your health professional recommends that you watch this short online health video.  Learn what raises your risk for having a heart attack or stroke and how you can lower your risk.   Purpose: Understand the risk of having a heart attack or stroke and what you can do about it.  Goal: Understand the risk of having a heart attack or stroke and what you can do about it.    Watch: Scan the QR code or visit the link to view video       https://hwi.se/r/[email or web address]  Current as of: July 31, 2024  Content Version: 14.4 2006-2025 [employer].   Care instructions adapted under license by your healthcare professional. If you " "have questions about a medical condition or this instruction, always ask your healthcare professional. [employer] disclaims any warranty or liability for your use of this information.    Learning About Being Physically Active  What is physical activity?     Being physically active means doing any kind of activity that gets your body moving.  The types of physical activity that can help you get fit and stay healthy include:  Aerobic or \"cardio\" activities. These make your heart beat faster and make you breathe harder, such as brisk walking, riding a bike, or running. They strengthen your heart and lungs and build up your endurance.  Strength training activities. These make your muscles work against, or \"resist,\" something. Examples include lifting weights or doing push-ups. These activities help tone and strengthen your muscles and bones.  Stretches. These let you move your joints and muscles through their full range of motion. Stretching helps you be more flexible.  Reaching a balance between these three types of physical activity is important because each one contributes to your overall fitness.  What are the benefits of being active?  Being active is one of the best things you can do for your health. It helps you to:  Feel stronger and have more energy to do all the things you like to do.  Focus better at school or work.  Feel, think, and sleep better.  Reach and stay at a healthy weight.  Lose fat and build lean muscle.  Lower your risk for serious health problems, including diabetes, heart attack, high blood pressure, and some cancers.  Keep your heart, lungs, bones, muscles, and joints strong and healthy.  How can you make being active part of your life?  Start slowly. Make it your long-term goal to get at least 30 minutes of exercise on most days of the week. Walking is a good choice. You also may want to do other activities, such as running, swimming, cycling, or playing tennis or team " "sports.  Pick activities that you like--ones that make your heart beat faster, your muscles stronger, and your muscles and joints more flexible. If you find more than one thing you like doing, do them all. You don't have to do the same thing every day.  Get your heart pumping every day. Any activity that makes your heart beat faster and keeps it at that rate for a while counts.  Here are some great ways to get your heart beating faster:  Go for a brisk walk, run, or hike.  Go for a swim or bike ride.  Take an online exercise class or dance.  Play a game of touch football, basketball, or soccer.  Play tennis, pickleball, or racquetball.  Climb stairs.  Even some household chores can be aerobic. Just do them at a faster pace. Raking or mowing the lawn, sweeping the garage, and vacuuming and cleaning your home all can help get your heart rate up.  Strengthen your muscles during the week. You don't have to lift heavy weights or grow big, bulky muscles to get stronger. Doing a few simple activities that make your muscles work against, or \"resist,\" something can help you get stronger. Aim for at least twice a week.  For example, you can:  Do push-ups or sit-ups, which use your own body weight as resistance.  Lift weights or dumbbells or use stretch bands at home or in a gym or community center.  Stretch your muscles often. Stretching will help you as you become more active. It can help you stay flexible and loosen tight muscles. It can also help improve your balance and posture and can be a great way to relax.  Be sure to stretch the muscles you'll be using when you work out. It's best to warm your muscles slightly before you stretch them. Walk or do some other light aerobic activity for a few minutes. Then start stretching.  When you stretch your muscles:  Do it slowly. Stretching is not about going fast or making sudden movements.  Don't push or bounce during a stretch.  Hold each stretch for at least 15 to 30 seconds, " "if you can. You should feel a stretch in the muscle, but not pain.  Breathe out as you do the stretch. Then breathe in as you hold the stretch. Don't hold your breath.  If you're worried about how more activity might affect your health, have a checkup before you start. Follow any special advice your doctor gives you for getting a smart start.  Where can you learn more?  Go to https://www.[employer]X.net/patiented  Enter W332 in the search box to learn more about \"Learning About Being Physically Active.\"  Current as of: July 31, 2024  Content Version: 14.4    7631-6639 [employer].   Care instructions adapted under license by your healthcare professional. If you have questions about a medical condition or this instruction, always ask your healthcare professional. [employer] disclaims any warranty or liability for your use of this information.    Learning About Being Active as an Older Adult  Why is being active important as you get older?     Being active is one of the best things you can do for your health. And it's never too late to start. Being active--or getting active, if you aren't already--has definite benefits. It can:  Give you more energy,  Keep your mind sharp.  Improve balance to reduce your risk of falls.  Help you manage chronic illness with fewer medicines.  No matter how old you are, how fit you are, or what health problems you have, there is a form of activity that will work for you. And the more physical activity you can do, the better your overall health will be.  What kinds of activity can help you stay healthy?  Being more active will make your daily activities easier. Physical activity includes planned exercise and things you do in daily life. There are four types of activity:  Aerobic.  Doing aerobic activity makes your heart and lungs strong.  Includes walking, dancing, and gardening.  Aim for at least 2  hours spread throughout the week.  It improves your energy and can " help you sleep better.  Muscle-strengthening.  This type of activity can help maintain muscle and strengthen bones.  Includes climbing stairs, using resistance bands, and lifting or carrying heavy loads.  Aim for at least twice a week.  It can help protect the knees and other joints.  Stretching.  Stretching gives you better range of motion in joints and muscles.  Includes upper arm stretches, calf stretches, and gentle yoga.  Aim for at least twice a week, preferably after your muscles are warmed up from other activities.  It can help you function better in daily life.  Balancing.  This helps you stay coordinated and have good posture.  Includes heel-to-toe walking, osiris chi, and certain types of yoga.  Aim for at least 3 days a week.  It can reduce your risk of falling.  Even if you have a hard time meeting the recommendations, it's better to be more active than less active. All activity done in each category counts toward your weekly total. You'd be surprised how daily things like carrying groceries, keeping up with grandchildren, and taking the stairs can add up.  What keeps you from being active?  If you've had a hard time being more active, you're not alone. Maybe you remember being able to do more. Or maybe you've never thought of yourself as being active. It's frustrating when you can't do the things you want. Being more active can help. What's holding you back?  Getting started.  Have a goal, but break it into easy tasks. Small steps build into big accomplishments.  Staying motivated.  If you feel like skipping your activity, remember your goal. Maybe you want to move better and stay independent. Every activity gets you one step closer.  Not feeling your best.  Start with 5 minutes of an activity you enjoy. Prove to yourself you can do it. As you get comfortable, increase your time.  You may not be where you want to be. But you're in the process of getting there. Everyone starts somewhere.  How can you find  "safe ways to stay active?  Talk with your doctor about any physical challenges you're facing. Make a plan with your doctor if you have a health problem or aren't sure how to get started with activity.  If you're already active, ask your doctor if there is anything you should change to stay safe as your body and health change.  If you tend to feel dizzy after you take medicine, avoid activity at that time. Try being active before you take your medicine. This will reduce your risk of falls.  If you plan to be active at home, make sure to clear your space before you get started. Remove things like TV cords, coffee tables, and throw rugs. It's safest to have plenty of space to move freely.  The key to getting more active is to take it slow and steady. Try to improve only a little bit at a time. Pick just one area to improve on at first. And if an activity hurts, stop and talk to your doctor.  Where can you learn more?  Go to https://www."[employer].net/patiented  Enter P600 in the search box to learn more about \"Learning About Being Active as an Older Adult.\"  Current as of: July 31, 2024  Content Version: 14.4    2611-3738 [employer].   Care instructions adapted under license by your healthcare professional. If you have questions about a medical condition or this instruction, always ask your healthcare professional. [employer] disclaims any warranty or liability for your use of this information.       "

## 2025-04-16 NOTE — PROGRESS NOTES
"Preventive Care Visit  [facility] FRED Mae MD, Family Medicine  Apr 16, 2025      Assessment & Plan     Encounter for Medicare annual wellness exam    - Vitamin D3 25 mcg (1000 units) tablet; Take 1 tablet (25 mcg) by mouth daily.    Type 2 diabetes mellitus with stage 3a chronic kidney disease, with long-term current use of insulin (H)  Stable   - Lipid panel reflex to direct LDL Non-fasting; Future  - Adult Eye  Referral; Future  - metFORMIN (GLUCOPHAGE XR) 500 MG 24 hr tablet; Take 2 tablets (1,000 mg) by mouth 2 times daily (with meals).  - FOOT EXAM  - Lipid panel reflex to direct LDL Non-fasting    Morbid obesity (H)  Losing wt will decrease her ASCVD risk     Benign hypertension with CKD (chronic kidney disease) stage III (H)  Controlled     Chronic kidney disease, stage 3a (H)  Labs reviewed   stable     Hyperlipidemia LDL goal <70  Stable on meds   - atorvastatin (LIPITOR) 10 MG tablet; Take 1 tablet (10 mg) by mouth daily.    Hypothyroidism, unspecified type  Reviewed labs   - levothyroxine (SYNTHROID/LEVOTHROID) 100 MCG tablet; Take 1 tablet (100 mcg) by mouth daily.    Gout, unspecified cause, unspecified chronicity, unspecified site  Doing well   - Uric acid; Future  - Uric acid            BMI  Estimated body mass index is 57.48 kg/m  as calculated from the following:    Height as of this encounter: 1.594 m (5' 2.76\").    Weight as of this encounter: 146.1 kg (322 lb).   Weight management plan: low brianne diet/ Exercise     Counseling  Appropriate preventive services were addressed with this patient via screening, questionnaire, or discussion as appropriate for fall prevention, nutrition, physical activity, Tobacco-use cessation, social engagement, weight loss and cognition.  Checklist reviewing preventive services available has been given to the patient.  Reviewed patient's diet, addressing concerns and/or questions.   She is at risk for lack of exercise and has been " provided with information to increase physical activity for the benefit of her well-being.   Information on urinary incontinence and treatment options given to patient.   The longitudinal plan of care for the diagnosis(es)/condition(s) as documented were addressed during this visit. Due to the added complexity in care, I will continue to support Crystal in the subsequent management and with ongoing continuity of care.    Follow-up    Follow-up Visit   Expected date:  Apr 23, 2026 (Approximate)      Follow Up Appointment Details:     Follow-up with whom?: PCP    Follow-Up for what?: Medicare Wellness    Welcome or Annual?: Annual Wellness    How?: In Person               Diabetic check in 6 months  Subjective   Crystal is a 69 year old, presenting for the following:  Physical        4/16/2025    10:49 AM   Additional Questions   Roomed by Ciara HIGH Pt here for a Physical and check on chronic medical conditions     Diabetes Follow-up    How often are you checking your blood sugar? Doing well   What concerns do you have today about your diabetes? None   Do you have any of these symptoms? (Select all that apply)  No numbness or tingling in feet.  No redness, sores or blisters on feet.  No complaints of excessive thirst.  No reports of blurry vision.  No significant changes to weight.  Have you had a diabetic eye exam in the last 12 months? due            Hyperlipidemia Follow-Up    Are you regularly taking any medication or supplement to lower your cholesterol?   Yes- sttain  Are you having muscle aches or other side effects that you think could be caused by your cholesterol lowering medication?  No    Hypertension Follow-up    Do you check your blood pressure regularly outside of the clinic? No   Are you following a low salt diet? Yes  Are your blood pressures ever more than 140 on the top number (systolic) OR more   than 90 on the bottom number (diastolic), for example 140/90? No    BP Readings from Last 2  Encounters:   04/16/25 131/72   01/09/25 155/67     Hemoglobin A1C (%)   Date Value   04/16/2025 7.3 (H)   01/09/2025 7.2 (H)   04/23/2021 6.2 (H)   01/07/2021 6.2 (H)     LDL Cholesterol Calculated (mg/dL)   Date Value   03/19/2024 63   06/13/2023 49   06/25/2020 72         Chronic Kidney Disease Follow-up    Do you take any over the counter pain medicine?: No  Hypothyroidism Follow-up    Since last visit, patient describes the following symptoms: Weight stable, no hair loss, no skin changes, no constipation, no loose stools    Advance Care Planning    Document on file is a Health Care Directive or POLST.        4/16/2025   General Health   How would you rate your overall physical health? Good   Feel stress (tense, anxious, or unable to sleep) Only a little   (!) STRESS CONCERN      4/16/2025   Nutrition   Diet: Low salt         4/16/2025   Exercise   Days per week of moderate/strenous exercise 2 days   (!) EXERCISE CONCERN      4/16/2025   Social Factors   Frequency of gathering with friends or relatives Twice a week   Worry food won't last until get money to buy more No   Food not last or not have enough money for food? No   Do you have housing? (Housing is defined as stable permanent housing and does not include staying ouside in a car, in a tent, in an abandoned building, in an overnight shelter, or couch-surfing.) Yes   Are you worried about losing your housing? No   Lack of transportation? No   Unable to get utilities (heat,electricity)? No         4/16/2025   Fall Risk   Fallen 2 or more times in the past year? No   Trouble with walking or balance? No          4/16/2025   Activities of Daily Living- Home Safety   Needs help with the following daily activites None of the above   Safety concerns in the home None of the above         4/16/2025   Dental   Dentist two times every year? Yes         4/16/2025   Hearing Screening   Hearing concerns? None of the above         4/16/2025   Driving Risk Screening    Patient/family members have concerns about driving No         4/16/2025   General Alertness/Fatigue Screening   Have you been more tired than usual lately? No         4/16/2025   Urinary Incontinence Screening   Bothered by leaking urine in past 6 months Yes         Today's PHQ-2 Score:       4/16/2025    10:38 AM   PHQ-2 ( 1999 [employer])   Q1: Little interest or pleasure in doing things 0   Q2: Feeling down, depressed or hopeless 0   PHQ-2 Score 0    Q1: Little interest or pleasure in doing things Not at all   Q2: Feeling down, depressed or hopeless Not at all   PHQ-2 Score 0       Patient-reported           4/16/2025   Substance Use   Alcohol more than 3/day or more than 7/wk No   Do you have a current opioid prescription? No   How severe/bad is pain from 1 to 10? 0/10 (No Pain)   Do you use any other substances recreationally? No     Social History     Tobacco Use    Smoking status: Never    Smokeless tobacco: Never   Vaping Use    Vaping status: Never Used   Substance Use Topics    Alcohol use: Yes     Comment: Occ    Drug use: Never           1/13/2025   LAST FHS-7 RESULTS   1st degree relative breast or ovarian cancer No   Any relative bilateral breast cancer No   Any male have breast cancer No   Any ONE woman have BOTH breast AND ovarian cancer No   Any woman with breast cancer before 50yrs No   2 or more relatives with breast AND/OR ovarian cancer No   2 or more relatives with breast AND/OR bowel cancer No        Pt will get mammogram as reviewed       History of abnormal Pap smear: as below        Latest Ref Rng & Units 10/14/2021    11:28 AM 9/23/2020     2:46 PM 9/23/2020     2:32 PM   PAP / HPV   PAP  Negative for Intraepithelial Lesion or Malignancy (NILM)      PAP (Historical)   NIL     HPV 16 DNA Negative Negative   Negative    HPV 18 DNA Negative Negative   Negative    Other HR HPV Negative Negative   Negative      ASCVD Risk   The 10-year ASCVD risk score (Tarsha PARDO, et al., 2019) is:  17.9%    Values used to calculate the score:      Age: 69 years      Sex: Female      Is Non- : No      Diabetic: Yes      Tobacco smoker: No      Systolic Blood Pressure: 131 mmHg      Is BP treated: Yes      HDL Cholesterol: 45 mg/dL      Total Cholesterol: 134 mg/dL    Fracture Risk Assessment Tool  Link to Frax Calculator  Use the information below to complete the Frax calculator  : 1955  Sex: female  Weight (kg): 146.1 kg (actual weight)  Height (cm): 159.4 cm  Previous Fragility Fracture:  No  History of parent with fractured hip:  No  Current Smoking:  No  Patient has been on glucocorticoids for more than 3 months (5mg/day or more): No  Rheumatoid Arthritis on Problem List:  No  Secondary Osteoporosis on Problem List:  No  Consumes 3 or more units of alcohol per day: No  Femoral Neck BMD (g/cm2)            Reviewed and updated as needed this visit by Provider   Tobacco  Allergies  Meds  Problems  Med Hx  Surg Hx  Fam Hx            Past Medical History:   Diagnosis Date    Acquired hypothyroidism     History of blood transfusion     Hypertension     Obesity due to excess calories     Type 2 diabetes mellitus without complication, with long-term current use of insulin (H)      Past Surgical History:   Procedure Laterality Date    CHOLECYSTECTOMY      DAVINCI HYSTERECTOMY TOTAL, BILATERAL SALPINGO-OOPHORECTOMY, COMBINED Bilateral 2022    Procedure: ROBOTIC HYSTERECTOMY, BILATERAL SALPINGO OOPHORECTOMY, LYSIS OF ADHESIONS;  Surgeon: Jefferson Chavez MD;  Location: [facility] OR    HERNIA REPAIR  1990    INSERT INTRAUTERINE DEVICE N/A 2022    Procedure: REMOVAL OF INTRAUTERINE DEVICE;  Surgeon: Jefferson Chavez MD;  Location: [facility] OR     OB History   No obstetric history on file.     Lab work is in process  Labs reviewed in EPIC  BP Readings from Last 3 Encounters:   25 131/72   25 155/67   24 128/81    Wt Readings  from Last 3 Encounters:   04/16/25 (!) 146.1 kg (322 lb)   01/09/25 (!) 144.7 kg (319 lb)   03/19/24 (!) 152.6 kg (336 lb 6.4 oz)                  Patient Active Problem List   Diagnosis    Morbid obesity (H)    Essential hypertension    Hypothyroidism    Hyperlipidemia LDL goal <70    Gout, unspecified cause, unspecified chronicity, unspecified site    Psoriasis    Gastroesophageal reflux disease    Chronic kidney disease, stage 3a (H)    Type 2 diabetes mellitus with kidney complication, without long-term current use of insulin (H)    Adenomatous colon polyp     Past Surgical History:   Procedure Laterality Date    CHOLECYSTECTOMY      DAVINCI HYSTERECTOMY TOTAL, BILATERAL SALPINGO-OOPHORECTOMY, COMBINED Bilateral 7/6/2022    Procedure: ROBOTIC HYSTERECTOMY, BILATERAL SALPINGO OOPHORECTOMY, LYSIS OF ADHESIONS;  Surgeon: Jefferson Chavez MD;  [place] [facility] [place]    HERNIA REPAIR  1990 s    INSERT INTRAUTERINE DEVICE N/A 7/6/2022    Procedure: REMOVAL OF INTRAUTERINE DEVICE;  Surgeon: Jefferson Chavez MD;  Location: [facility] OR       Social History     Tobacco Use    Smoking status: Never    Smokeless tobacco: Never   Substance Use Topics    Alcohol use: Yes     Comment: Occ     Family History   Problem Relation Age of Onset    Diabetes Mother         d age 80    Hypertension Mother     Coronary Artery Disease Father         d age 85    Brain Tumor Father     Hypertension Father     Diabetes Brother          Current Outpatient Medications   Medication Sig Dispense Refill    Alcohol Swabs ([facility] PREP) 70 % PADS Externally apply 1 each topically as needed 100 each 11    allopurinol (ZYLOPRIM) 300 MG tablet TAKE 1 TABLET (300 MG) BY MOUTH DAILY NEEDS FOLLOW UP LABS 90 tablet 3    aspirin (ASA) 325 MG EC tablet Take 325 mg by mouth daily      atorvastatin (LIPITOR) 10 MG tablet Take 1 tablet (10 mg) by mouth daily. 90 tablet 3    blood glucose (ONETOUCH VERIO IQ) test strip Use to test blood  sugar 3 times daily or as directed. 300 strip 3    blood glucose calibration (NO BRAND SPECIFIED) solution Use to calibrate blood glucose monitor as needed as directed. 1 Bottle 3    blood glucose monitoring (NO BRAND SPECIFIED) meter device kit Use to test blood sugar 3 times daily or as directed. 1 kit 0    clobetasol (TEMOVATE) 0.05 % external ointment APPLY TO AFFECTED AREA TWICE A DAY 45 g 0    desonide (DESOWEN) 0.05 % external cream APPLY TO AFFECTED AREA TWICE A DAY 60 g 0    Dulaglutide (TRULICITY) 4.5 MG/0.5ML SOAJ Inject 4.5 mg subcutaneously once a week. 8 mL 3    furosemide (LASIX) 40 MG tablet Take 1 tablet (40 mg) by mouth daily as needed (swelling) 90 tablet 3    glipiZIDE (GLUCOTROL XL) 2.5 MG 24 hr tablet Take 1 tablet (2.5 mg) by mouth daily. 30 tablet 5    hydrochlorothiazide (HYDRODIURIL) 25 MG tablet TAKE 1 TABLET BY MOUTH EVERY DAY 90 tablet 1    insulin lispro (HUMALOG KWIKPEN) 100 UNIT/ML (1 unit dial) KWIKPEN Inject 2-10 Units subcutaneously 3 times daily (before meals). , total daily dose approx 20U 24 mL 3    insulin pen needle (BD PEN NEEDLE CONOR 2ND GEN) 32G X 4 MM miscellaneous Use 4 pen needles daily or as directed. 200 each 3    JARDIANCE 10 MG TABS tablet TAKE 1 TABLET (10 MG) BY MOUTH DAILY. 30 tablet 3    levothyroxine (SYNTHROID/LEVOTHROID) 100 MCG tablet Take 1 tablet (100 mcg) by mouth daily. 90 tablet 3    losartan (COZAAR) 100 MG tablet TAKE 1 TABLET (100 MG) BY MOUTH DAILY. STOP 50 MG TABLET 30 tablet 1    metFORMIN (GLUCOPHAGE XR) 500 MG 24 hr tablet Take 2 tablets (1,000 mg) by mouth 2 times daily (with meals). 360 tablet 1    triamcinolone (KENALOG) 0.1 % external cream APPLY TO AFFECTED AREA TWICE A  g 0    Vitamin D3 25 mcg (1000 units) tablet Take 1 tablet (25 mcg) by mouth daily. 90 tablet 3    multivitamin, therapeutic (THERA-VIT) TABS tablet Take 1 tablet by mouth daily (Patient not taking: Reported on 4/16/2025)      OneTouch Delica Lancets 33G MISC Inject 1  Device Subcutaneous 3 times daily (before meals) (Patient not taking: Reported on 4/16/2025) 300 each 3     No Known Allergies  Recent Labs   Lab Test 04/16/25  1159 01/09/25  1059 09/26/24  1110 05/03/24  1614 03/19/24  1305 01/29/24  1048 08/08/23  1129 06/13/23  1238 06/15/22  1648 06/08/22  1149 04/23/21  1417 01/07/21  1040 09/14/20  1013   A1C 7.3* 7.2* 6.7* 6.4*  --  7.2* 6.7*  --    < > 7.2*   < > 6.2* 7.0*   LDL  --   --   --   --  63  --   --  49  --  39   < >  --   --    HDL  --   --   --   --  45*  --   --  44*  --  49*   < >  --   --    TRIG  --   --   --   --  132  --   --  94  --  93   < >  --   --    ALT  --  38  --  32  --   --  29  --    < >  --    < >  --   --    CR  --  0.98* 0.88 0.87  --  1.04* 1.00*  --    < >  --    < > 0.88 1.03   GFRESTIMATED  --  63 71 73  --  59* 61  --    < >  --    < > 69 57*   GFRESTBLACK  --   --   --   --   --   --   --   --   --   --   --  80 66   POTASSIUM  --  3.6 4.4 4.0  --  4.2 3.9  --    < >  --    < > 3.7 3.6   TSH  --  2.38  --   --   --  1.91  --   --    < >  --    < >  --   --     < > = values in this interval not displayed.      Current providers sharing in care for this patient include:  Patient Care Team:  Juani Mae MD as PCP - General (Family Medicine)  Juani Mae MD as Assigned PCP  Dennise Moon RD as Diabetes Educator (Dietitian, Registered)  Eladio Araujo MD as Assigned Endocrinology Provider  Karen Pop RD as Diabetes Educator (Dietitian)    The following health maintenance items are reviewed in Epic and correct as of today:  Health Maintenance   Topic Date Due    Medicare Annual MTM Pharmacist Visit (once per calendar year)  Never done    LIPID  03/19/2025    EYE EXAM  04/17/2025    URIC ACID  05/03/2025    A1C  07/16/2025    BMP  01/09/2026    MICROALBUMIN  01/09/2026    TSH W/FREE T4 REFLEX  01/09/2026    ANNUAL REVIEW OF HM ORDERS  01/09/2026    HEMOGLOBIN  01/09/2026    MEDICARE ANNUAL WELLNESS VISIT  04/16/2026     "DIABETIC FOOT EXAM  04/16/2026    FALL RISK ASSESSMENT  04/16/2026    MAMMO SCREENING  01/13/2027    DTAP/TDAP/TD IMMUNIZATION (2 - Td or Tdap) 06/13/2029    ADVANCE CARE PLANNING  01/09/2030    COLORECTAL CANCER SCREENING  01/20/2035    DEXA  12/30/2037    HEPATITIS C SCREENING  Completed    PHQ-2 (once per calendar year)  Completed    PAP FOLLOW-UP  Completed    HPV FOLLOW-UP  Completed    INFLUENZA VACCINE  Completed    Pneumococcal Vaccine: 50+ Years  Completed    URINALYSIS  Completed    ZOSTER IMMUNIZATION  Completed    RSV VACCINE  Completed    COVID-19 Vaccine  Completed    HPV IMMUNIZATION  Aged Out    MENINGITIS IMMUNIZATION  Aged Out    PAP  Discontinued         Review of Systems  CONSTITUTIONAL: NEGATIVE for fever, chills, change in weight  INTEGUMENTARY/SKIN: NEGATIVE for worrisome rashes, moles or lesions  EYES: NEGATIVE for vision changes or irritation  ENT/MOUTH: NEGATIVE for ear, mouth and throat problems  RESP: NEGATIVE for significant cough or SOB  BREAST: NEGATIVE for masses, tenderness or discharge  CV: NEGATIVE for chest pain, palpitations or peripheral edema  GI: NEGATIVE for nausea, abdominal pain, heartburn, or change in bowel habits  : NEGATIVE for frequency, dysuria, or hematuria  MUSCULOSKELETAL: NEGATIVE for significant arthralgias or myalgia  NEURO: NEGATIVE for weakness, dizziness or paresthesias  ENDOCRINE: NEGATIVE for temperature intolerance, skin/hair changes  HEME: NEGATIVE for bleeding problems  PSYCHIATRIC: NEGATIVE for changes in mood or affect     Objective    Exam  /72   Pulse 94   Temp 97.6  F (36.4  C) (Temporal)   Resp 19   Ht 1.594 m (5' 2.76\")   Wt (!) 146.1 kg (322 lb)   SpO2 97%   BMI 57.48 kg/m     Estimated body mass index is 57.48 kg/m  as calculated from the following:    Height as of this encounter: 1.594 m (5' 2.76\").    Weight as of this encounter: 146.1 kg (322 lb).    Physical Exam  GENERAL: alert and no distress  EYES: Eyes grossly normal to " inspection, PERRL and conjunctivae and sclerae normal  HENT: ear canals and TM's normal, nose and mouth without ulcers or lesions  NECK: no adenopathy, no asymmetry, masses, or scars  RESP: lungs clear to auscultation - no rales, rhonchi or wheezes  CV: regular rate and rhythm, normal S1 S2, no S3 or S4, no murmur, click or rub, no peripheral edema  ABDOMEN: soft, nontender, no hepatosplenomegaly, no masses and bowel sounds normal  MS: no gross musculoskeletal defects noted, no edema  SKIN: no suspicious lesions or rashes  NEURO: Normal strength and tone, mentation intact and speech normal  PSYCH: mentation appears normal, affect normal/bright  Diabetic foot exam: normal DP and PT pulses, no trophic changes or ulcerative lesions, and normal sensory exam        3/19/2024   Mini Cog   Clock Draw Score 2 Normal   3 Item Recall 3 objects recalled   Mini Cog Total Score 5             3/19/2024   Vision Screen   Reason Vision Screen Not Completed Other       Signed Electronically by: Juani Mae MD

## 2025-04-29 ENCOUNTER — MYC REFILL (OUTPATIENT)
Dept: ENDOCRINOLOGY | Facility: CLINIC | Age: 70
End: 2025-04-29
Payer: COMMERCIAL

## 2025-04-29 DIAGNOSIS — E11.22 TYPE 2 DIABETES MELLITUS WITH STAGE 3A CHRONIC KIDNEY DISEASE, WITH LONG-TERM CURRENT USE OF INSULIN (H): ICD-10-CM

## 2025-04-29 DIAGNOSIS — Z79.4 TYPE 2 DIABETES MELLITUS WITH STAGE 3A CHRONIC KIDNEY DISEASE, WITH LONG-TERM CURRENT USE OF INSULIN (H): ICD-10-CM

## 2025-04-29 DIAGNOSIS — N18.31 TYPE 2 DIABETES MELLITUS WITH STAGE 3A CHRONIC KIDNEY DISEASE, WITH LONG-TERM CURRENT USE OF INSULIN (H): ICD-10-CM

## 2025-04-29 RX ORDER — LANCETS 33 GAUGE
1 EACH MISCELLANEOUS
Qty: 300 EACH | Refills: 3 | Status: CANCELLED | OUTPATIENT
Start: 2025-04-29

## 2025-05-12 ENCOUNTER — OFFICE VISIT (OUTPATIENT)
Dept: ENDOCRINOLOGY | Facility: CLINIC | Age: 70
End: 2025-05-12
Payer: COMMERCIAL

## 2025-05-12 VITALS
SYSTOLIC BLOOD PRESSURE: 120 MMHG | HEART RATE: 105 BPM | WEIGHT: 293 LBS | BODY MASS INDEX: 57.48 KG/M2 | DIASTOLIC BLOOD PRESSURE: 75 MMHG

## 2025-05-12 DIAGNOSIS — E66.01 MORBID OBESITY (H): ICD-10-CM

## 2025-05-12 DIAGNOSIS — E11.9 TYPE 2 DIABETES MELLITUS WITHOUT COMPLICATION, WITH LONG-TERM CURRENT USE OF INSULIN (H): Primary | ICD-10-CM

## 2025-05-12 DIAGNOSIS — Z79.4 TYPE 2 DIABETES MELLITUS WITHOUT COMPLICATION, WITH LONG-TERM CURRENT USE OF INSULIN (H): Primary | ICD-10-CM

## 2025-05-12 PROCEDURE — 99214 OFFICE O/P EST MOD 30 MIN: CPT | Performed by: INTERNAL MEDICINE

## 2025-05-12 PROCEDURE — G2211 COMPLEX E/M VISIT ADD ON: HCPCS | Performed by: INTERNAL MEDICINE

## 2025-05-12 PROCEDURE — 3078F DIAST BP <80 MM HG: CPT | Performed by: INTERNAL MEDICINE

## 2025-05-12 PROCEDURE — 3074F SYST BP LT 130 MM HG: CPT | Performed by: INTERNAL MEDICINE

## 2025-05-12 RX ORDER — BLOOD SUGAR DIAGNOSTIC
STRIP MISCELLANEOUS
Qty: 300 STRIP | Refills: 3 | Status: SHIPPED | OUTPATIENT
Start: 2025-05-12

## 2025-05-12 RX ORDER — LORATADINE 10 MG
1 TABLET ORAL PRN
Qty: 100 EACH | Refills: 11 | Status: SHIPPED | OUTPATIENT
Start: 2025-05-12

## 2025-05-12 RX ORDER — GLIPIZIDE 5 MG/1
5 TABLET, FILM COATED, EXTENDED RELEASE ORAL DAILY
Qty: 90 TABLET | Refills: 3 | Status: SHIPPED | OUTPATIENT
Start: 2025-05-12

## 2025-05-12 RX ORDER — LANCETS 33 GAUGE
1 EACH MISCELLANEOUS
Qty: 300 EACH | Refills: 3 | Status: SHIPPED | OUTPATIENT
Start: 2025-05-12

## 2025-05-12 RX ORDER — GLIPIZIDE 2.5 MG/1
2.5 TABLET, EXTENDED RELEASE ORAL DAILY
Qty: 30 TABLET | Refills: 5 | Status: CANCELLED | OUTPATIENT
Start: 2025-05-12

## 2025-05-12 NOTE — PROGRESS NOTES
Recent issues:  Diabetes follow-up evaluation.   Taking the MF, glipizideER, Trulicity, and Humalog sscale, participates with [employer] patient assistance program  Feeling well overall, no new health issues reported        History of pre-diabetes  ~2016. Initial diagnosis of diabetes mellitus  Had seen physician in [place], [place]  Started treatment with metformin   ~6/2020. Acute back pain?, diagnosed with pneumonia, high glucose and hgbA1c 12%   Hospital treatment included insulin treatment   Treatment with Abx, pain resolved   Recalls basal insulin 55U and use of rapid acting Humalog  7/15/20 Endocrinology evaluation with Dr. MERON Armijo/[facility] clinic  Addition of Farxiga but expensive, then change to Jardiance medication  Fall '21. Discontinue Jardiance since expensive   Previous [facility] hgbA1c trends include:   Lab Test 06/08/22  1149 03/23/22  1641 03/01/22  0951 12/06/21  1132 07/30/21  1236   A1C 7.2* 6.6* 6.7* 6.9* 6.5*       6/21/22. Initial diabetes evaluation with me at [place]  Reviewed health history and diabetes issues  [facility] Diab Ed eval and diagnostic Hi  Added glipizideER medication  3/2023. Ran out of Trulicity (4.5 mg) medication  ~ 5/2023. Restarted Trulicity med per plan  ~7/2024. Stopped Basaglar  Current DM medications:  Metformin 500 mg  2-tabs in morning and evening  glipizideER 2.5 mg  2-tabs by mouth daily  Trulicity 4.5 mg   Subcutaneous weekly  Humalog Gia cuellarale (uses seldom)   140-179  2U   180-219  4U   220-259  6U   260-299  8U   >300   10U  Jardiance 10 mg  1-tab in morning    Blood glucose (BG) meter:  One Touch Verio   Has tested 2-3/day   Recent trends 160-180's, per patient  Fam Hx Diabetes:  brother (T1DM), cousins  Previous [facility] hgbA1c trends include:  Lab Results   Component Value Date    A1C 7.3 (H) 04/16/2025    A1C 7.2 (H) 01/09/2025    A1C 6.7 (H) 09/26/2024    A1C 6.4 (H) 05/03/2024    A1C 7.2 (H) 01/29/2024      Recent [facility] labs include:  Lab Results   Component  Value Date    A1C 7.3 (H) 04/16/2025     01/09/2025    POTASSIUM 3.6 01/09/2025    CHLORIDE 101 01/09/2025    CO2 23 01/09/2025    ANIONGAP 15 01/09/2025     (H) 01/09/2025    BUN 22.5 01/09/2025    CR 0.98 (H) 01/09/2025    GFRESTIMATED 63 01/09/2025    GFRESTBLACK 80 01/07/2021    TRISTIAN 9.7 01/09/2025    CPEPT 9.8 (H) 07/31/2020    CHOL 150 04/16/2025    TRIG 169 (H) 04/16/2025    HDL 49 (L) 04/16/2025    LDL 67 04/16/2025    NHDL 101 04/16/2025    UCRR 78.1 01/09/2025    MICROL <12.0 01/09/2025    UMALCR  01/09/2025      Comment:      Unable to calculate, urine albumin and/or urine creatinine is outside detectable limits.  Microalbuminuria is defined as an albumin:creatinine ratio of 17 to 299 for males and 25 to 299 for females. A ratio of albumin:creatinine of 300 or higher is indicative of overt proteinuria.  Due to biologic variability, positive results should be confirmed by a second, first-morning random or 24-hour timed urine specimen. If there is discrepancy, a third specimen is recommended. When 2 out of 3 results are in the microalbuminuria range, this is evidence for incipient nephropathy and warrants increased efforts at glucose control, blood pressure control, and institution of therapy with an angiotensin-converting-enzyme (ACE) inhibitor (if the patient can tolerate it).      TSH 2.38 01/09/2025     Last eye exam at [facility] 9/2024, epiretinal membrane left eye but no DR   DM Complications:  None known      Lives in [facility]  Sees Dr. Juani Mae/[facility] for general medicine evaluations.    PMH/PSH:  Past Medical History:   Diagnosis Date    Acquired hypothyroidism     History of blood transfusion     Hypertension     Obesity due to excess calories     Type 2 diabetes mellitus without complication, with long-term current use of insulin (H)      Past Surgical History:   Procedure Laterality Date    CHOLECYSTECTOMY      DAVINCI HYSTERECTOMY TOTAL, BILATERAL  SALPINGO-OOPHORECTOMY, COMBINED Bilateral 7/6/2022    Procedure: ROBOTIC HYSTERECTOMY, BILATERAL SALPINGO OOPHORECTOMY, LYSIS OF ADHESIONS;  Surgeon: Jefferson Chavez MD;  Location: [facility] OR    HERNIA REPAIR  1990 s    INSERT INTRAUTERINE DEVICE N/A 7/6/2022    Procedure: REMOVAL OF INTRAUTERINE DEVICE;  Surgeon: Jefferson Chavez MD;  Location: [facility] OR       Family Hx:  Family History   Problem Relation Age of Onset    Diabetes Mother         d age 80    Hypertension Mother     Coronary Artery Disease Father         d age 85    Brain Tumor Father     Hypertension Father     Diabetes Brother          Social Hx:  Social History     Socioeconomic History    Marital status:      Spouse name: Not on file    Number of children: Not on file    Years of education: Not on file    Highest education level: Not on file   Occupational History    Not on file   Tobacco Use    Smoking status: Never    Smokeless tobacco: Never   Vaping Use    Vaping status: Never Used   Substance and Sexual Activity    Alcohol use: Yes     Comment: Occ    Drug use: Never    Sexual activity: Not Currently   Other Topics Concern    Parent/sibling w/ CABG, MI or angioplasty before 65F 55M? No   Social History Narrative    Not on file     Social Drivers of Health     Financial Resource Strain: Low Risk  (4/16/2025)    Financial Resource Strain     Within the past 12 months, have you or your family members you live with been unable to get utilities (heat, electricity) when it was really needed?: No   Food Insecurity: Low Risk  (4/16/2025)    Food Insecurity     Within the past 12 months, did you worry that your food would run out before you got money to buy more?: No     Within the past 12 months, did the food you bought just not last and you didn t have money to get more?: No   Transportation Needs: Low Risk  (4/16/2025)    Transportation Needs     Within the past 12 months, has lack of transportation kept you  from medical appointments, getting your medicines, non-medical meetings or appointments, work, or from getting things that you need?: No   Physical Activity: Unknown (4/16/2025)    Exercise Vital Sign     Days of Exercise per Week: 2 days     Minutes of Exercise per Session: Not on file   Stress: No Stress Concern Present (4/16/2025)    Citizen of [place] [employer] - Occupational Stress Questionnaire     Feeling of Stress : Only a little   Social Connections: Unknown (4/16/2025)    Social Connection and Isolation Panel [NHANES]     Frequency of Communication with Friends and Family: Not on file     Frequency of Social Gatherings with Friends and Family: Twice a week     Attends Baptism Services: Not on file     Active Member of Clubs or Organizations: Not on file     Attends Club or Organization Meetings: Not on file     Marital Status: Not on file   Interpersonal Safety: Low Risk  (4/16/2025)    Interpersonal Safety     Do you feel physically and emotionally safe where you currently live?: Yes     Within the past 12 months, have you been hit, slapped, kicked or otherwise physically hurt by someone?: No     Within the past 12 months, have you been humiliated or emotionally abused in other ways by your partner or ex-partner?: No   Housing Stability: Low Risk  (4/16/2025)    Housing Stability     Do you have housing? : Yes     Are you worried about losing your housing?: No          MEDICATIONS:  has a current medication list which includes the following prescription(s): [facility] prep, allopurinol, aspirin, atorvastatin, onetouch verio iq, trulicity, furosemide, glipizide, hydrochlorothiazide, insulin lispro, jardiance, levothyroxine, losartan, metformin, onetouch delica lancets 33g, blood glucose calibration, blood glucose monitoring, clobetasol, desonide, bd pen needle fadi 2nd gen, multivitamin, therapeutic, triamcinolone, and vitamin d3.    ROS:     ROS: 10 point ROS neg other than the symptoms noted above  in the HPI.    GENERAL: some fatigue, wt stable?; denies fevers, chills, night sweats.   HEENT: no dysphagia, odonophagia, diplopia, neck pain  THYROID:  no apparent hyper or hypothyroid symptoms  CV: no chest pain, pressure, palpitations  LUNGS: no SOB, NICHOLS, cough, wheezing   ABDOMEN: some indigestion; no diarrhea, constipation, abdominal pain  EXTREMITIES: no rashes, ulcers, edema  NEUROLOGY: no headaches, denies changes in vision, tingling, extremitiy numbness   MSK: no muscle aches or pains, weakness  SKIN: no rashes or lesions  : no menses  PSYCH:  stable mood, no significant anxiety or depression  ENDOCRINE: no heat or cold intolerance      Physical Exam   VS: /75   Pulse 105   Wt (!) 146.1 kg (322 lb)   BMI 57.48 kg/m    GENERAL: AXOX3, NAD, well dressed, answering questions appropriately, appears stated age.  ENT: no nose swelling or nasal discharge, mouth redness or gum changes.  EYES: eyes grossly normal to inspection, conjunctivae and sclerae normal, no exophthalmos or proptosis  THYROID:  no apparent nodules or goiter  LUNGS: no audible wheeze, cough or visible cyanosis, or increased work of breathing  ABDOMEN: abdomen obese size  EXTREMITIES: mild ankle and feet edema; pulses R/L DP 2/2; no focal skin lesions noted  NEUROLOGY: CN grossly intact, no tremors  MSK: grossly intact  SKIN:  no apparent skin lesions, rash, or edema with visualized skin appearance  PSYCH: mentation appears normal, affect normal/bright, judgement and insight intact,   normal speech and appearance well groomed    LABS:    All pertinent notes, labs, and images personally reviewed by me.     A/P:  Encounter Diagnoses   Name Primary?    Type 2 diabetes mellitus without complication, with long-term current use of insulin (H) Yes    Morbid obesity (H)        Comments:  Reviewed health history and diabetes issues.  Addition of Jardiance reasonable, yet previously cost-prohibitive for patient  Reviewed and interpreted tests  that I previously ordered.   Ordered appropriate tests for the endocrinology disease management.    Management options discussed and implemented after shared medical decision making with the patient.  T2DM problem is chronic-stable    Plan:  Discussed general issues with the diabetes diagnosis and management  We discussed the hgbA1c test which reflects previous overall glucose levels or control  Discussed the importance of blood glucose (BG) testing to assess glucose trends  Provided general overview of the diabetes medication options and medication treatment plan.    Recommend:  Continue the metformin, glipizideER, Trulicity and Jardiance medications as noted  Reasonable to continue the Jardiance medication, per PCP plan  Change to glipizideER 5 mg tablet daily dosing  She has previously gotten Basaglar, Humalog, and Trulicity meds from [employer], but no [employer] option with that program  Monitor for possible GI side effects  Goal target premeal or fasting glucose 80- 150 mg/dl   Bring BGM meter to diabetes appointments  Reviewed option of acquiring Personal [employer] Hi CGM again... she's not interested currently  Would be helpful for patient to have follow-up [facility] Diab Ed evaluation  Keep focus on diet, exercise, weight loss management.  Plan non-fasting labs in 8/2025   Testing at [facility] clinic    Lab orders placed  Continue losartan, atorvastatin, levothyroxine med use, per PCP  Advise having fasting lipid panel testing and dilated eye examination, at least annually    Addressed patient questions today    The longitudinal plan of care for the endocrine problem(s) were addressed during this visit.  Due to added complexity of care,   we will continue to support the patient and the subsequent management of this condition with ongoing continuity of care.    There are no Patient Instructions on file for this visit.    Future labs ordered today:   Orders Placed This Encounter   Procedures    Hemoglobin A1c     Basic metabolic panel    ALT     Radiology/Consults ordered today: None    Total time spent on day of encounter:  20 min    Follow-up:  9/17/25 with EG, Return,    12/2025 Bert Araujo MD, MS  Endocrinology  [facility]    CC:  VALORIE Mae

## 2025-06-07 DIAGNOSIS — I12.9 BENIGN HYPERTENSION WITH CKD (CHRONIC KIDNEY DISEASE) STAGE III (H): ICD-10-CM

## 2025-06-07 DIAGNOSIS — N18.30 BENIGN HYPERTENSION WITH CKD (CHRONIC KIDNEY DISEASE) STAGE III (H): ICD-10-CM

## 2025-06-08 RX ORDER — LOSARTAN POTASSIUM 100 MG/1
100 TABLET ORAL DAILY
Qty: 90 TABLET | Refills: 0 | Status: SHIPPED | OUTPATIENT
Start: 2025-06-08

## 2025-06-30 DIAGNOSIS — E11.22 TYPE 2 DIABETES MELLITUS WITH STAGE 3A CHRONIC KIDNEY DISEASE, WITH LONG-TERM CURRENT USE OF INSULIN (H): ICD-10-CM

## 2025-06-30 DIAGNOSIS — Z79.4 TYPE 2 DIABETES MELLITUS WITH STAGE 3A CHRONIC KIDNEY DISEASE, WITH LONG-TERM CURRENT USE OF INSULIN (H): ICD-10-CM

## 2025-06-30 DIAGNOSIS — N18.31 TYPE 2 DIABETES MELLITUS WITH STAGE 3A CHRONIC KIDNEY DISEASE, WITH LONG-TERM CURRENT USE OF INSULIN (H): ICD-10-CM

## 2025-06-30 RX ORDER — EMPAGLIFLOZIN 10 MG/1
10 TABLET, FILM COATED ORAL DAILY
Qty: 90 TABLET | Refills: 3 | Status: SHIPPED | OUTPATIENT
Start: 2025-06-30

## 2025-06-30 NOTE — TELEPHONE ENCOUNTER
Requested Prescriptions   Pending Prescriptions Disp Refills    JARDIANCE 10 MG TABS tablet [Pharmacy Med Name: Jardiance Oral Tablet 10 MG] 90 tablet 0     Sig: TAKE ONE TABLET BY MOUTH ONCE DAILY       Sodium Glucose Co-Transport Inhibitor Agents Passed - 6/30/2025  1:06 PM        Passed - Patient has documented A1c within the specified period of time.     If HgbA1C is 8 or greater, it needs to be on file within the past 3 months.  If less than 8, must be on file within the past 6 months.     Recent Labs   Lab Test 04/16/25  1159   A1C 7.3*             Passed - Medication is active on med list and the sig matches. RN to manually verify dose and sig if red X/fail.     If the protocol passes (green check), you do not need to verify med dose and sig.    A prescription matches if they are the same clinical intention.    For Example: once daily and every morning are the same.    The protocol can not identify upper and lower case letters as matching and will fail.     For Example: Take 1 tablet (50 mg) by mouth daily     TAKE 1 TABLET (50 MG) BY MOUTH DAILY    For all fails (red x), verify dose and sig.    If the refill does match what is on file, the RN can still proceed to approve the refill request.       If they do not match, route to the appropriate provider.             Passed - Recent (6 month) or future (90 days) visit with the authorizing provider's specialty (provided they have been seen in the past 9 months)     The patient must have completed an in-person or virtual visit within the past 6 months or has a future visit scheduled within the next 90 days with the authorizing provider s specialty.  Urgent care and e-visits do not quality as an office visit for this protocol.          Passed - Medication indicated for associated diagnosis     Medication is associated with one or more of the following diagnoses:     Diabetic nephropathy, With Albuminuria - Type 2 diabetes mellitus     Disorder of cardiovascular  system; Prophylaxis - Type 2 diabetes mellitus     Type 2 diabetes mellitus    Disorder of cardiovascular system; Prophylaxis - Heart failure   Chronic kidney disease, (At risk of progression) to reduce the risk of sustained   estimated GFR decline, end-stage kidney disease, cardiovascular death,   and hospitalization for heart failure     Heart failure, (NYHA class II to IV, reduced ejection fraction) to reduce risk of  cardiovascular death and hospitalization           Passed - Has GFR on file in past 12 months and most recent value is >30        Passed - Patient is age 18 or older        Passed - Patient is not pregnant        Passed - Patient has documented normal Potassium within the last 12 mos.     Recent Labs   Lab Test 01/09/25  1059   POTASSIUM 3.6             Passed - Patient has no positive pregnancy test within the past 12 mos.           Last Written Prescription Date:  2/26/25  Last Fill Quantity: 30 tab,  # refills: 3   Last office visit: 5/12/25 ; last virtual visit: Visit date not found with prescribing provider:  Dr Araujo  Future Office Visit:  9/17/25 with JOSSUE Barnhart NP    Refill sent  Miguel Angel Armstorng RN

## 2025-07-26 ENCOUNTER — HEALTH MAINTENANCE LETTER (OUTPATIENT)
Age: 70
End: 2025-07-26

## 2025-09-01 DIAGNOSIS — Z79.4 TYPE 2 DIABETES MELLITUS WITHOUT COMPLICATION, WITH LONG-TERM CURRENT USE OF INSULIN (H): ICD-10-CM

## 2025-09-01 DIAGNOSIS — N18.30 BENIGN HYPERTENSION WITH CKD (CHRONIC KIDNEY DISEASE) STAGE III (H): ICD-10-CM

## 2025-09-01 DIAGNOSIS — I10 ESSENTIAL HYPERTENSION: ICD-10-CM

## 2025-09-01 DIAGNOSIS — L40.9 PSORIASIS: ICD-10-CM

## 2025-09-01 DIAGNOSIS — E11.9 TYPE 2 DIABETES MELLITUS WITHOUT COMPLICATION, WITH LONG-TERM CURRENT USE OF INSULIN (H): ICD-10-CM

## 2025-09-01 DIAGNOSIS — I12.9 BENIGN HYPERTENSION WITH CKD (CHRONIC KIDNEY DISEASE) STAGE III (H): ICD-10-CM

## 2025-09-02 RX ORDER — BLOOD SUGAR DIAGNOSTIC
STRIP MISCELLANEOUS 3 TIMES DAILY
Qty: 300 STRIP | Refills: 3 | OUTPATIENT
Start: 2025-09-02

## 2025-09-02 RX ORDER — DESONIDE 0.5 MG/G
CREAM TOPICAL
Qty: 60 G | Refills: 0 | Status: SHIPPED | OUTPATIENT
Start: 2025-09-02

## 2025-09-02 RX ORDER — HYDROCHLOROTHIAZIDE 25 MG/1
25 TABLET ORAL DAILY
Qty: 90 TABLET | Refills: 1 | Status: SHIPPED | OUTPATIENT
Start: 2025-09-02

## 2025-09-02 RX ORDER — LOSARTAN POTASSIUM 100 MG/1
100 TABLET ORAL DAILY
Qty: 90 TABLET | Refills: 0 | Status: SHIPPED | OUTPATIENT
Start: 2025-09-02

## (undated) DEVICE — DAVINCI XI HANDPIECE ESU VESSEL SEALER 8MM EXT 480422

## (undated) DEVICE — SU ETHILON 4-0 PS-2 18" BLACK 1667H

## (undated) DEVICE — COUNTER NEEDLE ADH & FOAM 20CT 9106

## (undated) DEVICE — CUSTOM PACK DA VINCI GYN SMA5BDVHEA

## (undated) DEVICE — TUBING LAP SUCT/IRRIG STRYKER 250070500

## (undated) DEVICE — DRAPE SHEET TABLE COVER KC 42301*

## (undated) DEVICE — SOL WATER IRRIG 1000ML BOTTLE 2F7114

## (undated) DEVICE — TUBING FILTER TRI-LUMEN AIRSEAL ASC-EVAC1

## (undated) DEVICE — MAT FLOOR SURGICAL 40X38 0702140238

## (undated) DEVICE — TROCAR BLADED Z THREAD FIOS 5X150MM

## (undated) DEVICE — PAD POS XL 1X20X40IN PINK PIGAZZI

## (undated) DEVICE — DAVINCI HOT SHEARS TIP COVER  400180

## (undated) DEVICE — DAVINCI XI OBTURATOR BLADELESS 8MM 470359

## (undated) DEVICE — ESU LIGASURE LAPAROSCOPIC BLUNT TIP SEALER 5MMX37CM LF1837

## (undated) DEVICE — SYSTEM LAPAROVUE VISIBILITY LAPVUE10

## (undated) DEVICE — DAVINCI XI DRAPE ARM 470015

## (undated) DEVICE — SU STRATAFIX PDS PLUS 0 CT-1 30CM SXPP1B450

## (undated) DEVICE — GLOVE SURG PI ULTRA TOUCH M SZ 7-1/2 LF

## (undated) DEVICE — GLOVE SURG PI ULTRA TOUCH M SZ 6-1/2 LF

## (undated) DEVICE — LUBRICANT INST ELECTROLUBE EL101

## (undated) DEVICE — PREP SCRUB SOL EXIDINE 4% CHG 4OZ 29002-404

## (undated) DEVICE — TRENGUARD 450 PROCEDURAL PACK 111404

## (undated) DEVICE — NDL INSUFFLATION 13GA 120MM C2201

## (undated) DEVICE — DAVINCI XI SEAL UNIVERSAL 5-8MM 470361

## (undated) DEVICE — ENDO OBTURATOR ACCESS PORT BLADELESS 8X100MM IAS8-100LP

## (undated) DEVICE — BLADE KNIFE SURG 11 371111

## (undated) DEVICE — PREP CHLORAPREP 26ML TINTED HI-LITE ORANGE 930815

## (undated) DEVICE — BRIEF STRETCH XL MPS40

## (undated) DEVICE — DRAPE U SPLIT 74X120" 29440

## (undated) DEVICE — CATH FOLEY 5CC 16FR SIL/LTX 0165V16S

## (undated) DEVICE — GLOVE SURG PI ULTRA TOUCH M SZ 7 LF 42670

## (undated) DEVICE — SYR 50ML SLIP TIP W/O NDL 309654

## (undated) DEVICE — BANDAGE ADH LF 1X3 ABN3100A

## (undated) DEVICE — DAVINCI XI DRAPE COLUMN 470341

## (undated) DEVICE — ENDO SHEARS RENEW LAP ENDOCUT SCISSOR TIP 16.5MM 3142

## (undated) DEVICE — PROTECTOR ARM STANDARD ONE STEP

## (undated) DEVICE — SUCTION MANIFOLD NEPTUNE 2 SYS 1 PORT 702-025-000

## (undated) DEVICE — GOWN XXL 9575

## (undated) DEVICE — DECANTER VIAL 2006S

## (undated) RX ORDER — ONDANSETRON 2 MG/ML
INJECTION INTRAMUSCULAR; INTRAVENOUS
Status: DISPENSED
Start: 2022-07-06

## (undated) RX ORDER — FENTANYL CITRATE 50 UG/ML
INJECTION, SOLUTION INTRAMUSCULAR; INTRAVENOUS
Status: DISPENSED
Start: 2022-07-06

## (undated) RX ORDER — BUPIVACAINE HYDROCHLORIDE 2.5 MG/ML
INJECTION, SOLUTION INFILTRATION; PERINEURAL
Status: DISPENSED
Start: 2022-07-06

## (undated) RX ORDER — PROPOFOL 10 MG/ML
INJECTION, EMULSION INTRAVENOUS
Status: DISPENSED
Start: 2022-07-06